# Patient Record
Sex: MALE | Race: WHITE | Employment: OTHER | URBAN - METROPOLITAN AREA
[De-identification: names, ages, dates, MRNs, and addresses within clinical notes are randomized per-mention and may not be internally consistent; named-entity substitution may affect disease eponyms.]

---

## 2019-04-19 ENCOUNTER — APPOINTMENT (OUTPATIENT)
Dept: GENERAL RADIOLOGY | Age: 79
End: 2019-04-19
Payer: MEDICARE

## 2019-04-19 ENCOUNTER — HOSPITAL ENCOUNTER (EMERGENCY)
Age: 79
Discharge: HOME | End: 2019-04-19
Attending: EMERGENCY MEDICINE
Payer: MEDICARE

## 2019-04-19 ENCOUNTER — APPOINTMENT (OUTPATIENT)
Dept: CT IMAGING | Age: 79
End: 2019-04-19
Payer: MEDICARE

## 2019-04-19 VITALS
BODY MASS INDEX: 18.41 KG/M2 | HEART RATE: 63 BPM | SYSTOLIC BLOOD PRESSURE: 140 MMHG | OXYGEN SATURATION: 100 % | TEMPERATURE: 97.7 F | WEIGHT: 132 LBS | RESPIRATION RATE: 16 BRPM | DIASTOLIC BLOOD PRESSURE: 81 MMHG

## 2019-04-19 VITALS
HEART RATE: 60 BPM | RESPIRATION RATE: 18 BRPM | DIASTOLIC BLOOD PRESSURE: 67 MMHG | SYSTOLIC BLOOD PRESSURE: 138 MMHG | TEMPERATURE: 98.4 F | OXYGEN SATURATION: 100 %

## 2019-04-19 DIAGNOSIS — N43.3 RIGHT HYDROCELE: ICD-10-CM

## 2019-04-19 DIAGNOSIS — R10.9 ACUTE ABDOMINAL PAIN: Primary | ICD-10-CM

## 2019-04-19 DIAGNOSIS — R10.84 GENERALIZED ABDOMINAL CRAMPING: Primary | ICD-10-CM

## 2019-04-19 LAB
A/G RATIO: 1.8 (ref 1.1–2.2)
A/G RATIO: 2 (ref 1.1–2.2)
ALBUMIN SERPL-MCNC: 4.3 G/DL (ref 3.4–5)
ALBUMIN SERPL-MCNC: 4.7 G/DL (ref 3.4–5)
ALP BLD-CCNC: 66 U/L (ref 40–129)
ALP BLD-CCNC: 70 U/L (ref 40–129)
ALT SERPL-CCNC: 14 U/L (ref 10–40)
ALT SERPL-CCNC: 15 U/L (ref 10–40)
ANION GAP SERPL CALCULATED.3IONS-SCNC: 9 MMOL/L (ref 3–16)
ANION GAP SERPL CALCULATED.3IONS-SCNC: 9 MMOL/L (ref 3–16)
AST SERPL-CCNC: 17 U/L (ref 15–37)
AST SERPL-CCNC: 19 U/L (ref 15–37)
BASOPHILS ABSOLUTE: 0 K/UL (ref 0–0.2)
BASOPHILS ABSOLUTE: 0.1 K/UL (ref 0–0.2)
BASOPHILS RELATIVE PERCENT: 0.4 %
BASOPHILS RELATIVE PERCENT: 0.6 %
BILIRUB SERPL-MCNC: 0.8 MG/DL (ref 0–1)
BILIRUB SERPL-MCNC: 1.2 MG/DL (ref 0–1)
BILIRUBIN URINE: NEGATIVE
BILIRUBIN URINE: NEGATIVE
BLOOD, URINE: NEGATIVE
BLOOD, URINE: NEGATIVE
BUN BLDV-MCNC: 12 MG/DL (ref 7–20)
BUN BLDV-MCNC: 14 MG/DL (ref 7–20)
CALCIUM SERPL-MCNC: 9.3 MG/DL (ref 8.3–10.6)
CALCIUM SERPL-MCNC: 9.5 MG/DL (ref 8.3–10.6)
CHLORIDE BLD-SCNC: 96 MMOL/L (ref 99–110)
CHLORIDE BLD-SCNC: 99 MMOL/L (ref 99–110)
CLARITY: CLEAR
CLARITY: CLEAR
CO2: 27 MMOL/L (ref 21–32)
CO2: 28 MMOL/L (ref 21–32)
COLOR: YELLOW
COLOR: YELLOW
CREAT SERPL-MCNC: 0.5 MG/DL (ref 0.8–1.3)
CREAT SERPL-MCNC: 0.7 MG/DL (ref 0.8–1.3)
EOSINOPHILS ABSOLUTE: 0 K/UL (ref 0–0.6)
EOSINOPHILS ABSOLUTE: 0.1 K/UL (ref 0–0.6)
EOSINOPHILS RELATIVE PERCENT: 0.1 %
EOSINOPHILS RELATIVE PERCENT: 1.2 %
GFR AFRICAN AMERICAN: >60
GFR AFRICAN AMERICAN: >60
GFR NON-AFRICAN AMERICAN: >60
GFR NON-AFRICAN AMERICAN: >60
GLOBULIN: 2.3 G/DL
GLOBULIN: 2.4 G/DL
GLUCOSE BLD-MCNC: 116 MG/DL (ref 70–99)
GLUCOSE BLD-MCNC: 148 MG/DL (ref 70–99)
GLUCOSE URINE: NEGATIVE MG/DL
GLUCOSE URINE: NEGATIVE MG/DL
HCT VFR BLD CALC: 39.8 % (ref 40.5–52.5)
HCT VFR BLD CALC: 42.7 % (ref 40.5–52.5)
HEMOGLOBIN: 13.2 G/DL (ref 13.5–17.5)
HEMOGLOBIN: 14 G/DL (ref 13.5–17.5)
KETONES, URINE: ABNORMAL MG/DL
KETONES, URINE: NEGATIVE MG/DL
LEUKOCYTE ESTERASE, URINE: NEGATIVE
LEUKOCYTE ESTERASE, URINE: NEGATIVE
LIPASE: 101 U/L (ref 13–60)
LIPASE: 23 U/L (ref 13–60)
LYMPHOCYTES ABSOLUTE: 0.3 K/UL (ref 1–5.1)
LYMPHOCYTES ABSOLUTE: 0.6 K/UL (ref 1–5.1)
LYMPHOCYTES RELATIVE PERCENT: 4.8 %
LYMPHOCYTES RELATIVE PERCENT: 5.6 %
MCH RBC QN AUTO: 32 PG (ref 26–34)
MCH RBC QN AUTO: 32.2 PG (ref 26–34)
MCHC RBC AUTO-ENTMCNC: 32.9 G/DL (ref 31–36)
MCHC RBC AUTO-ENTMCNC: 33.2 G/DL (ref 31–36)
MCV RBC AUTO: 97.1 FL (ref 80–100)
MCV RBC AUTO: 97.4 FL (ref 80–100)
MICROSCOPIC EXAMINATION: ABNORMAL
MICROSCOPIC EXAMINATION: NORMAL
MONOCYTES ABSOLUTE: 0.4 K/UL (ref 0–1.3)
MONOCYTES ABSOLUTE: 0.5 K/UL (ref 0–1.3)
MONOCYTES RELATIVE PERCENT: 4.5 %
MONOCYTES RELATIVE PERCENT: 5 %
NEUTROPHILS ABSOLUTE: 6.4 K/UL (ref 1.7–7.7)
NEUTROPHILS ABSOLUTE: 8.9 K/UL (ref 1.7–7.7)
NEUTROPHILS RELATIVE PERCENT: 88.6 %
NEUTROPHILS RELATIVE PERCENT: 89.2 %
NITRITE, URINE: NEGATIVE
NITRITE, URINE: NEGATIVE
PDW BLD-RTO: 13.5 % (ref 12.4–15.4)
PDW BLD-RTO: 13.6 % (ref 12.4–15.4)
PH UA: 5.5 (ref 5–8)
PH UA: 7 (ref 5–8)
PLATELET # BLD: 217 K/UL (ref 135–450)
PLATELET # BLD: 228 K/UL (ref 135–450)
PMV BLD AUTO: 6.6 FL (ref 5–10.5)
PMV BLD AUTO: 6.7 FL (ref 5–10.5)
POTASSIUM SERPL-SCNC: 4.4 MMOL/L (ref 3.5–5.1)
POTASSIUM SERPL-SCNC: 4.7 MMOL/L (ref 3.5–5.1)
PROTEIN UA: NEGATIVE MG/DL
PROTEIN UA: NEGATIVE MG/DL
RBC # BLD: 4.1 M/UL (ref 4.2–5.9)
RBC # BLD: 4.39 M/UL (ref 4.2–5.9)
SODIUM BLD-SCNC: 132 MMOL/L (ref 136–145)
SODIUM BLD-SCNC: 136 MMOL/L (ref 136–145)
SPECIFIC GRAVITY UA: >1.03 (ref 1–1.03)
SPECIFIC GRAVITY UA: >1.03 (ref 1–1.03)
TOTAL PROTEIN: 6.7 G/DL (ref 6.4–8.2)
TOTAL PROTEIN: 7 G/DL (ref 6.4–8.2)
URINE REFLEX TO CULTURE: ABNORMAL
URINE REFLEX TO CULTURE: NORMAL
URINE TYPE: ABNORMAL
URINE TYPE: NORMAL
UROBILINOGEN, URINE: 0.2 E.U./DL
UROBILINOGEN, URINE: 1 E.U./DL
WBC # BLD: 7.2 K/UL (ref 4–11)
WBC # BLD: 9.9 K/UL (ref 4–11)

## 2019-04-19 PROCEDURE — 83690 ASSAY OF LIPASE: CPT

## 2019-04-19 PROCEDURE — 81003 URINALYSIS AUTO W/O SCOPE: CPT

## 2019-04-19 PROCEDURE — 74177 CT ABD & PELVIS W/CONTRAST: CPT

## 2019-04-19 PROCEDURE — 80053 COMPREHEN METABOLIC PANEL: CPT

## 2019-04-19 PROCEDURE — 6360000004 HC RX CONTRAST MEDICATION: Performed by: EMERGENCY MEDICINE

## 2019-04-19 PROCEDURE — 74019 RADEX ABDOMEN 2 VIEWS: CPT

## 2019-04-19 PROCEDURE — 85025 COMPLETE CBC W/AUTO DIFF WBC: CPT

## 2019-04-19 PROCEDURE — 99284 EMERGENCY DEPT VISIT MOD MDM: CPT

## 2019-04-19 RX ORDER — MORPHINE SULFATE 4 MG/ML
4 INJECTION, SOLUTION INTRAMUSCULAR; INTRAVENOUS ONCE
Status: DISCONTINUED | OUTPATIENT
Start: 2019-04-19 | End: 2019-04-19 | Stop reason: HOSPADM

## 2019-04-19 RX ORDER — ONDANSETRON 2 MG/ML
4 INJECTION INTRAMUSCULAR; INTRAVENOUS ONCE
Status: DISCONTINUED | OUTPATIENT
Start: 2019-04-19 | End: 2019-04-19 | Stop reason: HOSPADM

## 2019-04-19 RX ORDER — IBUPROFEN 200 MG
200 TABLET ORAL EVERY 6 HOURS PRN
Status: ON HOLD | COMMUNITY
End: 2022-01-13 | Stop reason: ALTCHOICE

## 2019-04-19 RX ORDER — LATANOPROST 50 UG/ML
1 SOLUTION/ DROPS OPHTHALMIC NIGHTLY
COMMUNITY

## 2019-04-19 RX ORDER — DORZOLAMIDE HCL 20 MG/ML
2 SOLUTION/ DROPS OPHTHALMIC 3 TIMES DAILY
COMMUNITY

## 2019-04-19 RX ORDER — DICYCLOMINE HYDROCHLORIDE 10 MG/1
10 CAPSULE ORAL EVERY 6 HOURS PRN
Qty: 20 CAPSULE | Refills: 0 | Status: SHIPPED | OUTPATIENT
Start: 2019-04-19 | End: 2021-03-09 | Stop reason: ALTCHOICE

## 2019-04-19 RX ORDER — FINASTERIDE 5 MG/1
5 TABLET, FILM COATED ORAL DAILY
COMMUNITY

## 2019-04-19 RX ORDER — TIMOLOL MALEATE 6.8 MG/ML
1 SOLUTION/ DROPS OPHTHALMIC DAILY
COMMUNITY

## 2019-04-19 RX ADMIN — IOPAMIDOL 75 ML: 755 INJECTION, SOLUTION INTRAVENOUS at 11:41

## 2019-04-19 ASSESSMENT — ENCOUNTER SYMPTOMS
RECTAL PAIN: 0
BACK PAIN: 0
ABDOMINAL DISTENTION: 1
WHEEZING: 0
NAUSEA: 0
VOMITING: 0
BLOOD IN STOOL: 0
CONSTIPATION: 1
SHORTNESS OF BREATH: 0
STRIDOR: 0
ANAL BLEEDING: 0
ALLERGIC/IMMUNOLOGIC NEGATIVE: 1
ABDOMINAL PAIN: 1
COLOR CHANGE: 0
COUGH: 0
DIARRHEA: 0

## 2019-04-19 ASSESSMENT — PAIN SCALES - GENERAL
PAINLEVEL_OUTOF10: 2
PAINLEVEL_OUTOF10: 10
PAINLEVEL_OUTOF10: 0

## 2019-04-19 ASSESSMENT — PAIN DESCRIPTION - LOCATION
LOCATION: ABDOMEN
LOCATION: ABDOMEN

## 2019-04-19 ASSESSMENT — PAIN DESCRIPTION - PAIN TYPE: TYPE: ACUTE PAIN

## 2019-04-19 NOTE — ED PROVIDER NOTES
[facility] Emergency Department    CHIEF COMPLAINT  Chief Complaint   Patient presents with    Abdominal Pain     Pt was seen this AM in the ED, went home and was having a BM and stomach started hurting again so pt is back      HISTORY OF Manasa 57 is a 70 y.o. male  who presents to the ED complaining of abdominal cramping pain. He reports the pain worsened after he successfully had a bowel movement. He has minimal pain and having a lot of flatus. No nausea or vomiting. No blood on his stool. No diarrhea. He says is it mostly lower abdominal cramping. Denies dysuria or hematuria or retention. He took a laxative last night due to thinking he was constipated. No fevers. No CP cough or SOB. This morning's workup included a nonacute CTAP. Discharged with Bentyl which he took and says it helped. No other complaints, modifying factors or associated symptoms. I have reviewed the following from the nursing documentation. Past Medical History:   Diagnosis Date    Glaucoma     Prostate enlargement      History reviewed. No pertinent surgical history. History reviewed. No pertinent family history.   Social History     Socioeconomic History    Marital status:      Spouse name: Not on file    Number of children: Not on file    Years of education: Not on file    Highest education level: Not on file   Occupational History    Not on file   Social Needs    Financial resource strain: Not on file    Food insecurity:     Worry: Not on file     Inability: Not on file    Transportation needs:     Medical: Not on file     Non-medical: Not on file   Tobacco Use    Smoking status: Never Smoker    Smokeless tobacco: Never Used   Substance and Sexual Activity    Alcohol use: No    Drug use: No    Sexual activity: Not on file   Lifestyle    Physical activity:     Days per week: Not on file     Minutes per session: Not on file    Stress: Not on file Relationships    Social connections:     Talks on phone: Not on file     Gets together: Not on file     Attends Muslim service: Not on file     Active member of club or organization: Not on file     Attends meetings of clubs or organizations: Not on file     Relationship status: Not on file    Intimate partner violence:     Fear of current or ex partner: Not on file     Emotionally abused: Not on file     Physically abused: Not on file     Forced sexual activity: Not on file   Other Topics Concern    Not on file   Social History Narrative    Not on file     No current facility-administered medications for this encounter. Current Outpatient Medications   Medication Sig Dispense Refill    Timolol (TIMOPTIC) 0.5 % (DAILY) SOLN ophthalmic solution 1 drop daily      dorzolamide (TRUSOPT) 2 % ophthalmic solution Place 2 drops into both eyes 3 times daily      latanoprost (XALATAN) 0.005 % ophthalmic solution Place 1 drop into both eyes nightly      finasteride (PROSCAR) 5 MG tablet Take 5 mg by mouth daily      ibuprofen (ADVIL;MOTRIN) 200 MG tablet Take 200 mg by mouth every 6 hours as needed for Pain      dicyclomine (BENTYL) 10 MG capsule Take 1 capsule by mouth every 6 hours as needed (cramps) 20 capsule 0    tamsulosin (FLOMAX) 0.4 MG capsule Take 0.4 mg by mouth daily. No Known Allergies    REVIEW OF SYSTEMS  10 systems reviewed, pertinent positives per HPI otherwise noted to be negative. PHYSICAL EXAM  BP (!) 149/79   Pulse 60   Temp 98.4 °F (36.9 °C) (Oral)   Resp 18   SpO2 100%    GENERAL APPEARANCE: Awake and alert. Cooperative. No distress. HENT: Normocephalic. Atraumatic. Mucous membranes are dry. NECK: Supple. EYES: PERRL. EOM's grossly intact. HEART/CHEST: RRR. No murmurs. No chest wall tenderness. LUNGS: Respirations unlabored. CTAB. Good air exchange. Speaking comfortably in full sentences. ABDOMEN: Mild diffuse nonfocal tenderness. Soft. Non-distended.  No Lipase 23.0 13.0 - 60.0 U/L   Urine, reflex to culture   Result Value Ref Range    Color, UA YELLOW Straw/Yellow    Clarity, UA Clear Clear    Glucose, Ur Negative Negative mg/dL    Bilirubin Urine Negative Negative    Ketones, Urine TRACE (A) Negative mg/dL    Specific Gravity, UA >1.030 1.005 - 1.030    Blood, Urine Negative Negative    pH, UA 5.5 5.0 - 8.0    Protein, UA Negative Negative mg/dL    Urobilinogen, Urine 0.2 <2.0 E.U./dL    Nitrite, Urine Negative Negative    Leukocyte Esterase, Urine Negative Negative    Microscopic Examination Not Indicated     Urine Reflex to Culture Not Indicated     Urine Type Not Specified      RADIOLOGY    Ct Abdomen Pelvis W Iv Contrast Additional Contrast? None    Result Date: 4/19/2019  EXAMINATION: CT OF THE ABDOMEN AND PELVIS WITH CONTRAST 4/19/2019 11:40 am TECHNIQUE: CT of the abdomen and pelvis was performed with the administration of intravenous contrast. Multiplanar reformatted images are provided for review. Dose modulation, iterative reconstruction, and/or weight based adjustment of the mA/kV was utilized to reduce the radiation dose to as low as reasonably achievable. COMPARISON: None. HISTORY: ORDERING SYSTEM PROVIDED HISTORY: abd pain TECHNOLOGIST PROVIDED HISTORY: Additional Contrast?->None Ordering Physician Provided Reason for Exam: abd pain Acuity: Unknown Type of Exam: Unknown FINDINGS: Lower Chest: Unremarkable Organs: 5 mm low-attenuation lesion in the dome of the right hepatic lobe which is too small to accurately characterize. Gallbladder, pancreas, and spleen are unremarkable. 1.4 x 0.9 cm cyst in the medial right hepatic lobe. GI/Bowel: No bowel obstruction. Appendix is not visualized however no findings suggest acute appendicitis. Pelvis: No bladder stone. No pelvic fluid collection. No adenopathy. Small left inguinal hernia containing nonobstructed bowel. Right hydrocele. Peritoneum/Retroperitoneum: No abdominal aortic aneurysm.   Adrenal glands unremarkable. Parapelvic cysts bilaterally. Bones/Soft Tissues: No acute bony abnormality. Moderate to severe disc height loss at L5-S1. No acute findings. Right hydrocele. Xr Abdomen (2 Views)    Result Date: 4/19/2019  EXAMINATION: TWO XRAY VIEWS OF THE ABDOMEN 4/19/2019 5:23 pm COMPARISON: CT abdomen and pelvis of 04/19/2019. HISTORY: ORDERING SYSTEM PROVIDED HISTORY: abd pain TECHNOLOGIST PROVIDED HISTORY: Reason for exam:->abd pain Ordering Physician Provided Reason for Exam: Pt was seen this AM in the ED, went home and was having a BM and stomach started hurting again so pt is back Acuity: Acute Type of Exam: Unknown Acute abnormality. Follow-up study. FINDINGS: Moderate amount of bowel gas is present predominantly within the large bowel. Slight distension of a few mid abdominal small bowel loops present. No air-fluid level. Inferior lung bases are clear. No worrisome abnormal calcification. Excreted iodinated contrast is seen filling the bladder. No acute abdominal abnormality. Moderate amount of bowel gas present. ED COURSE/MDM  Patient seen and evaluated. Old records reviewed. Labs and imaging reviewed and results discussed with patient. After initial evaluation, differential diagnostic considerations included: kidney stone, pyelonephritis, UTI, appendicitis, bowel obstruction, diverticulitis, hernia, gastritis/gastroenteritis, pancreatitis, cholecystitis, hepatitis, constipation, IBS, IBD    The patient's ED workup was notable for benign exam.  C/w abdominal spasms from intestines. UA neg. Labs reassuring. Defer repeat CT imaging since it was done this morning but KUB shows moderate bowel gas, c/w his flatus. Has Bentyl already, encouraged to stay hydrated and take his medications he already has. F/u with PCP. No indication for admission at this point or further testing or emergent GI consultation. Return precautions discusse. d    CLINICAL IMPRESSION  1.  Generalized

## 2019-04-19 NOTE — CARE COORDINATION
Discharge Planning:  SW met with pt and dtr to discuss discharge needs. 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 Dtr reported she is pretty much caregiver for pt and pt's spouse. Dtr reported they have a lot of family to help out with their needs. Pt reported he is very active and able to take care of spouse. Pt reported no home or community needs at this time.     Electronically signed by CHERELLE Maria, BERENICE on 4/19/2019 at 1:22 PM

## 2019-04-19 NOTE — ED PROVIDER NOTES
I independently examined and evaluated Osorio Tanner. In brief history of present illness revealed 72-year-old male who presents complaining of severe lower abdominal pain since last night. States had a normal bowel movement 2 days ago. No nausea, vomiting, diarrhea, urinary complaints, fevers or chills. At the time of my examination patient states symptoms have spontaneously resolved. Physical examination revealed well-appearing elderly male who is awake, alert and in no acute distress. Abdomen is soft, nondistended, nontender without rebound or guarding.      I have reviewed and interpreted all of the currently available lab results from this visit:  Results for orders placed or performed during the hospital encounter of 04/19/19   CBC Auto Differential   Result Value Ref Range    WBC 7.2 4.0 - 11.0 K/uL    RBC 4.10 (L) 4.20 - 5.90 M/uL    Hemoglobin 13.2 (L) 13.5 - 17.5 g/dL    Hematocrit 39.8 (L) 40.5 - 52.5 %    MCV 97.1 80.0 - 100.0 fL    MCH 32.2 26.0 - 34.0 pg    MCHC 33.2 31.0 - 36.0 g/dL    RDW 13.5 12.4 - 15.4 %    Platelets 430 699 - 047 K/uL    MPV 6.6 5.0 - 10.5 fL    Neutrophils % 88.6 %    Lymphocytes % 4.8 %    Monocytes % 5.0 %    Eosinophils % 1.2 %    Basophils % 0.4 %    Neutrophils # 6.4 1.7 - 7.7 K/uL    Lymphocytes # 0.3 (L) 1.0 - 5.1 K/uL    Monocytes # 0.4 0.0 - 1.3 K/uL    Eosinophils # 0.1 0.0 - 0.6 K/uL    Basophils # 0.0 0.0 - 0.2 K/uL   Comprehensive Metabolic Panel   Result Value Ref Range    Sodium 136 136 - 145 mmol/L    Potassium 4.7 3.5 - 5.1 mmol/L    Chloride 99 99 - 110 mmol/L    CO2 28 21 - 32 mmol/L    Anion Gap 9 3 - 16    Glucose 148 (H) 70 - 99 mg/dL    BUN 14 7 - 20 mg/dL    CREATININE 0.7 (L) 0.8 - 1.3 mg/dL    GFR Non-African American >60 >60    GFR African American >60 >60    Calcium 9.3 8.3 - 10.6 mg/dL    Total Protein 6.7 6.4 - 8.2 g/dL    Alb 4.3 3.4 - 5.0 g/dL    Albumin/Globulin Ratio 1.8 1.1 - 2.2    Total Bilirubin 0.8 0.0 - 1.0 mg/dL    Alkaline Phosphatase 66 40 - 129 U/L    ALT 15 10 - 40 U/L    AST 17 15 - 37 U/L    Globulin 2.4 g/dL   Lipase   Result Value Ref Range    Lipase 101.0 (H) 13.0 - 60.0 U/L   Urinalysis Reflex to Culture   Result Value Ref Range    Color, UA YELLOW Straw/Yellow    Clarity, UA Clear Clear    Glucose, Ur Negative Negative mg/dL    Bilirubin Urine Negative Negative    Ketones, Urine Negative Negative mg/dL    Specific Gravity, UA >1.030 1.005 - 1.030    Blood, Urine Negative Negative    pH, UA 7.0 5.0 - 8.0    Protein, UA Negative Negative mg/dL    Urobilinogen, Urine 1.0 <2.0 E.U./dL    Nitrite, Urine Negative Negative    Leukocyte Esterase, Urine Negative Negative    Microscopic Examination Not Indicated     Urine Reflex to Culture Not Indicated     Urine Type Not Specified      CT ABDOMEN PELVIS W IV CONTRAST Additional Contrast? None (Final result)   Result time 04/19/19 12:02:51   Final result by Juliet aPthak MD (04/19/19 12:02:51)                Impression:    No acute findings. Right hydrocele. Before disposition, questions were sought and answered with the patient. They have voiced understanding and agree with the plan. All diagnostic, treatment, and disposition decisions were made by myself in conjunction with the advanced practice provider. For all further details of the patient's emergency department visit, please see the advanced practitioner's documentation.        Della Nolasco MD  04/19/19 [id number]

## 2019-04-19 NOTE — ED NOTES
Pt a&o x4. Pt c/o abd pain with BM. Pt was seen and had work up earlier today for same issue. Sts took 1 dose of Bentyl at home. IV established by medic student without complications, blood work obtained by this RN and sent to lab. Pt tolerated well. Pt family at bedside. Pt placed on appropriate monitors and cycling. Pt sitting supine in bed in low position, call light in reach, side rails up x2. Pt showing no signs of distress at this time. Breathing easy and unlabored. Will continue to monitor.         Nila Rodriguez RN  04/19/19 [id number]

## 2019-04-19 NOTE — ED PROVIDER NOTES
[address] [facility]  eMERGENCY dEPARTMENT eNCOUnter        Pt Name: Connie Zavala  MRN: [id number]  Jcgfdesiree 1940  Date of evaluation: 4/19/2019  Provider: Aimee Redd PA-C  PCP: Nighat Louise MD    This patient was seen and evaluated by the attending physician Dr. Tricia Rosales. CHIEF COMPLAINT       Chief Complaint   Patient presents with    Abdominal Pain     started last night; eating/drinking normally; denies vomiting/diarrhea       HISTORY OF PRESENT ILLNESS   (Location/Symptom, Timing/Onset, Context/Setting, Quality, Duration, Modifying Factors, Severity)  Note limiting factors. Connie Zavala is a 70 y.o. male with history of prostate enlargement and glaucoma who presents to the emergency department complaining of low abdominal pain starting last night. He rates his pain to be a 10 out of 10 on pain scale. Denies radiation of symptoms, testicular pain or swelling, penile pain or discharge or acute urinary symptoms. He thought he might have some constipation, therefore took a laxative, however this did not relieve his abdominal discomfort. Denies obstipation. Denies documented fever or chills. Nursing Notes were all reviewed and agreed with or any disagreements were addressed  in the HPI. REVIEW OF SYSTEMS    (2-9 systems for level 4, 10 or more for level 5)     Review of Systems   Constitutional: Negative for chills and fever. HENT: Negative. Eyes: Negative for visual disturbance. Respiratory: Negative for cough, shortness of breath, wheezing and stridor. Cardiovascular: Negative for chest pain, palpitations and leg swelling. Gastrointestinal: Positive for abdominal distention, abdominal pain and constipation. Negative for anal bleeding, blood in stool, diarrhea, nausea, rectal pain and vomiting. Endocrine: Negative. Genitourinary: Negative. Musculoskeletal: Negative for back pain, neck pain and neck stiffness. Skin: Negative for color change, pallor, rash and wound. Allergic/Immunologic: Negative. Neurological: Negative for dizziness, tremors, seizures, syncope, facial asymmetry, speech difficulty, weakness, light-headedness, numbness and headaches. Hematological: Negative. Psychiatric/Behavioral: Negative for confusion. All other systems reviewed and are negative. Positives and Pertinent negatives as per HPI. Except as noted abovein the ROS, all other systems were reviewed and negative. PAST MEDICAL HISTORY     Past Medical History:   Diagnosis Date    Glaucoma     Prostate enlargement          SURGICAL HISTORY   History reviewed. No pertinent surgical history. CURRENTMEDICATIONS       Previous Medications    DORZOLAMIDE (TRUSOPT) 2 % OPHTHALMIC SOLUTION    Place 2 drops into both eyes 3 times daily    FINASTERIDE (PROSCAR) 5 MG TABLET    Take 5 mg by mouth daily    IBUPROFEN (ADVIL;MOTRIN) 200 MG TABLET    Take 200 mg by mouth every 6 hours as needed for Pain    LATANOPROST (XALATAN) 0.005 % OPHTHALMIC SOLUTION    Place 1 drop into both eyes nightly    TAMSULOSIN (FLOMAX) 0.4 MG CAPSULE    Take 0.4 mg by mouth daily. TIMOLOL (TIMOPTIC) 0.5 % (DAILY) SOLN OPHTHALMIC SOLUTION    1 drop daily         ALLERGIES     Patient has no known allergies. FAMILYHISTORY     History reviewed. No pertinent family history.        SOCIAL HISTORY       Social History     Socioeconomic History    Marital status:      Spouse name: None    Number of children: None    Years of education: None    Highest education level: None   Occupational History    None   Social Needs    Financial resource strain: None    Food insecurity:     Worry: None     Inability: None    Transportation needs:     Medical: None     Non-medical: None   Tobacco Use    Smoking status: Never Smoker    Smokeless tobacco: Never Used   Substance and Sexual Activity    Alcohol use: No    Drug use: No    Sexual activity: None   Lifestyle    Physical activity:     Days per week: None     Minutes per session: None    Stress: None   Relationships    Social connections:     Talks on phone: None     Gets together: None     Attends Latter day service: None     Active member of club or organization: None     Attends meetings of clubs or organizations: None     Relationship status: None    Intimate partner violence:     Fear of current or ex partner: None     Emotionally abused: None     Physically abused: None     Forced sexual activity: None   Other Topics Concern    None   Social History Narrative    None       SCREENINGS             PHYSICAL EXAM    (up to 7 for level 4, 8 or more for level 5)     ED Triage Vitals [04/19/19 0948]   BP Temp Temp Source Pulse Resp SpO2 Height Weight   (!) 150/98 97.7 °F (36.5 °C) Temporal 51 18 100 % -- 132 lb (59.9 kg)       Physical Exam   Constitutional: He is oriented to person, place, and time. He appears well-developed and well-nourished. No distress. HENT:   Head: Normocephalic and atraumatic. Right Ear: External ear normal.   Left Ear: External ear normal.   Nose: Nose normal.   Mouth/Throat: Oropharynx is clear and moist.   Eyes: Pupils are equal, round, and reactive to light. Conjunctivae and EOM are normal. Right eye exhibits no discharge. Left eye exhibits no discharge. No scleral icterus. Neck: Normal range of motion. No JVD present. Cardiovascular: Regular rhythm. Bradycardia present. Pulmonary/Chest: Effort normal and breath sounds normal.   Abdominal: Soft. Bowel sounds are normal. He exhibits distension (minimal low abdomen). He exhibits no abdominal bruit, no ascites and no pulsatile midline mass. There is tenderness in the periumbilical area, suprapubic area and left lower quadrant. There is no rigidity, no rebound, no guarding, no CVA tenderness, no tenderness at McBurney's point and negative Nair's sign. Musculoskeletal: Normal range of motion. Lymphadenopathy:     He has no cervical adenopathy. Neurological: He is alert and oriented to person, place, and time. Skin: Skin is warm and dry. Capillary refill takes less than 2 seconds. No rash noted. He is not diaphoretic. No erythema. No pallor. Psychiatric: He has a normal mood and affect. His behavior is normal.   Nursing note and vitals reviewed. Pain resolved spontaneously before medication was even given. Repeat exam prior to discharge reveals a soft and nondistended abdomen. Patient admits that he passed some gas and got a bunch of relief of his abdominal discomfort. DIAGNOSTIC RESULTS   LABS:    Labs Reviewed   CBC WITH AUTO DIFFERENTIAL - Abnormal; Notable for the following components:       Result Value    RBC 4.10 (*)     Hemoglobin 13.2 (*)     Hematocrit 39.8 (*)     Lymphocytes # 0.3 (*)     All other components within normal limits    Narrative:     Performed at:  [facility] [address], [place]   Phone [phone]   COMPREHENSIVE METABOLIC PANEL - Abnormal; Notable for the following components:    Glucose 148 (*)     CREATININE 0.7 (*)     All other components within normal limits    Narrative:     Performed at:  [facility] [address], [place] [facility] [employer]   Phone [phone]   LIPASE - Abnormal; Notable for the following components:    Lipase 101.0 (*)     All other components within normal limits    Narrative:     Performed at:  [facility] [address], [place]   Phone [phone]   URINE RT REFLEX TO CULTURE    Narrative:     Performed at:  [facility] [address], [place]   Phone [phone]       All other labs were within normal range or not returned as of this dictation. EKG:  All EKG's are interpreted by the Emergency Department Physician who either signs orCo-signs

## 2021-03-09 ENCOUNTER — APPOINTMENT (OUTPATIENT)
Dept: GENERAL RADIOLOGY | Age: 81
DRG: 603 | End: 2021-03-09
Payer: MEDICARE

## 2021-03-09 ENCOUNTER — HOSPITAL ENCOUNTER (INPATIENT)
Age: 81
LOS: 3 days | Discharge: HOME HEALTH | DRG: 603 | End: 2021-03-12
Attending: EMERGENCY MEDICINE | Admitting: INTERNAL MEDICINE
Payer: MEDICARE

## 2021-03-09 DIAGNOSIS — M79.89 LEG SWELLING: Primary | ICD-10-CM

## 2021-03-09 DIAGNOSIS — L03.115 CELLULITIS OF RIGHT LOWER EXTREMITY: ICD-10-CM

## 2021-03-09 PROBLEM — L03.119 CELLULITIS OF LOWER LEG: Status: ACTIVE | Noted: 2021-03-09

## 2021-03-09 PROBLEM — D64.9 ANEMIA: Status: ACTIVE | Noted: 2021-03-09

## 2021-03-09 PROBLEM — I10 HTN (HYPERTENSION): Status: ACTIVE | Noted: 2021-03-09

## 2021-03-09 PROBLEM — L03.119 CELLULITIS OF ANTERIOR LOWER LEG: Status: ACTIVE | Noted: 2021-03-09

## 2021-03-09 PROBLEM — N40.0 BPH (BENIGN PROSTATIC HYPERPLASIA): Status: ACTIVE | Noted: 2021-03-09

## 2021-03-09 LAB
A/G RATIO: 1.6 (ref 1.1–2.2)
ALBUMIN SERPL-MCNC: 4 G/DL (ref 3.4–5)
ALP BLD-CCNC: 83 U/L (ref 40–129)
ALT SERPL-CCNC: 18 U/L (ref 10–40)
ANION GAP SERPL CALCULATED.3IONS-SCNC: 6 MMOL/L (ref 3–16)
AST SERPL-CCNC: 22 U/L (ref 15–37)
BASOPHILS ABSOLUTE: 0.1 K/UL (ref 0–0.2)
BASOPHILS RELATIVE PERCENT: 1.1 %
BILIRUB SERPL-MCNC: 0.5 MG/DL (ref 0–1)
BILIRUBIN URINE: NEGATIVE
BLOOD, URINE: NEGATIVE
BUN BLDV-MCNC: 13 MG/DL (ref 7–20)
CALCIUM SERPL-MCNC: 9.5 MG/DL (ref 8.3–10.6)
CHLORIDE BLD-SCNC: 98 MMOL/L (ref 99–110)
CLARITY: CLEAR
CO2: 29 MMOL/L (ref 21–32)
COLOR: YELLOW
CREAT SERPL-MCNC: 0.6 MG/DL (ref 0.8–1.3)
EOSINOPHILS ABSOLUTE: 0.6 K/UL (ref 0–0.6)
EOSINOPHILS RELATIVE PERCENT: 10.4 %
GFR AFRICAN AMERICAN: >60
GFR NON-AFRICAN AMERICAN: >60
GLOBULIN: 2.5 G/DL
GLUCOSE BLD-MCNC: 90 MG/DL (ref 70–99)
GLUCOSE URINE: NEGATIVE MG/DL
HCT VFR BLD CALC: 36.9 % (ref 40.5–52.5)
HEMOGLOBIN: 12 G/DL (ref 13.5–17.5)
KETONES, URINE: NEGATIVE MG/DL
LACTIC ACID, SEPSIS: 1 MMOL/L (ref 0.4–1.9)
LEUKOCYTE ESTERASE, URINE: NEGATIVE
LYMPHOCYTES ABSOLUTE: 0.8 K/UL (ref 1–5.1)
LYMPHOCYTES RELATIVE PERCENT: 13.3 %
MCH RBC QN AUTO: 32.2 PG (ref 26–34)
MCHC RBC AUTO-ENTMCNC: 32.6 G/DL (ref 31–36)
MCV RBC AUTO: 98.7 FL (ref 80–100)
MICROSCOPIC EXAMINATION: NORMAL
MONOCYTES ABSOLUTE: 0.5 K/UL (ref 0–1.3)
MONOCYTES RELATIVE PERCENT: 8.9 %
NEUTROPHILS ABSOLUTE: 3.8 K/UL (ref 1.7–7.7)
NEUTROPHILS RELATIVE PERCENT: 66.3 %
NITRITE, URINE: NEGATIVE
PDW BLD-RTO: 13.6 % (ref 12.4–15.4)
PH UA: 8 (ref 5–8)
PLATELET # BLD: 214 K/UL (ref 135–450)
PMV BLD AUTO: 6.2 FL (ref 5–10.5)
POTASSIUM REFLEX MAGNESIUM: 4.9 MMOL/L (ref 3.5–5.1)
PRO-BNP: 222 PG/ML (ref 0–449)
PROTEIN UA: NEGATIVE MG/DL
RBC # BLD: 3.74 M/UL (ref 4.2–5.9)
SODIUM BLD-SCNC: 133 MMOL/L (ref 136–145)
SPECIFIC GRAVITY UA: 1.01 (ref 1–1.03)
TOTAL PROTEIN: 6.5 G/DL (ref 6.4–8.2)
URINE REFLEX TO CULTURE: NORMAL
URINE TYPE: NORMAL
UROBILINOGEN, URINE: 0.2 E.U./DL
WBC # BLD: 5.8 K/UL (ref 4–11)

## 2021-03-09 PROCEDURE — 96374 THER/PROPH/DIAG INJ IV PUSH: CPT

## 2021-03-09 PROCEDURE — 85025 COMPLETE CBC W/AUTO DIFF WBC: CPT

## 2021-03-09 PROCEDURE — 1200000000 HC SEMI PRIVATE

## 2021-03-09 PROCEDURE — 2580000003 HC RX 258: Performed by: INTERNAL MEDICINE

## 2021-03-09 PROCEDURE — 80053 COMPREHEN METABOLIC PANEL: CPT

## 2021-03-09 PROCEDURE — 83880 ASSAY OF NATRIURETIC PEPTIDE: CPT

## 2021-03-09 PROCEDURE — 94760 N-INVAS EAR/PLS OXIMETRY 1: CPT

## 2021-03-09 PROCEDURE — 81003 URINALYSIS AUTO W/O SCOPE: CPT

## 2021-03-09 PROCEDURE — 6360000002 HC RX W HCPCS: Performed by: PHYSICIAN ASSISTANT

## 2021-03-09 PROCEDURE — 83605 ASSAY OF LACTIC ACID: CPT

## 2021-03-09 PROCEDURE — 2580000003 HC RX 258: Performed by: PHYSICIAN ASSISTANT

## 2021-03-09 PROCEDURE — 71045 X-RAY EXAM CHEST 1 VIEW: CPT

## 2021-03-09 PROCEDURE — 99283 EMERGENCY DEPT VISIT LOW MDM: CPT

## 2021-03-09 RX ORDER — 0.9 % SODIUM CHLORIDE 0.9 %
500 INTRAVENOUS SOLUTION INTRAVENOUS PRN
Status: DISCONTINUED | OUTPATIENT
Start: 2021-03-09 | End: 2021-03-12 | Stop reason: HOSPADM

## 2021-03-09 RX ORDER — TAMSULOSIN HYDROCHLORIDE 0.4 MG/1
0.4 CAPSULE ORAL DAILY
Status: DISCONTINUED | OUTPATIENT
Start: 2021-03-10 | End: 2021-03-12 | Stop reason: HOSPADM

## 2021-03-09 RX ORDER — FINASTERIDE 5 MG/1
5 TABLET, FILM COATED ORAL DAILY
Status: DISCONTINUED | OUTPATIENT
Start: 2021-03-10 | End: 2021-03-12 | Stop reason: HOSPADM

## 2021-03-09 RX ORDER — TIMOLOL MALEATE 5 MG/ML
1 SOLUTION/ DROPS OPHTHALMIC DAILY
Status: DISCONTINUED | OUTPATIENT
Start: 2021-03-10 | End: 2021-03-12 | Stop reason: HOSPADM

## 2021-03-09 RX ORDER — POTASSIUM CHLORIDE 20 MEQ/1
40 TABLET, EXTENDED RELEASE ORAL PRN
Status: DISCONTINUED | OUTPATIENT
Start: 2021-03-09 | End: 2021-03-09 | Stop reason: SDUPTHER

## 2021-03-09 RX ORDER — LATANOPROST 50 UG/ML
1 SOLUTION/ DROPS OPHTHALMIC NIGHTLY
Status: DISCONTINUED | OUTPATIENT
Start: 2021-03-09 | End: 2021-03-12 | Stop reason: HOSPADM

## 2021-03-09 RX ORDER — POTASSIUM CHLORIDE 7.45 MG/ML
10 INJECTION INTRAVENOUS PRN
Status: DISCONTINUED | OUTPATIENT
Start: 2021-03-09 | End: 2021-03-12 | Stop reason: HOSPADM

## 2021-03-09 RX ORDER — ONDANSETRON 2 MG/ML
4 INJECTION INTRAMUSCULAR; INTRAVENOUS EVERY 6 HOURS PRN
Status: DISCONTINUED | OUTPATIENT
Start: 2021-03-09 | End: 2021-03-12 | Stop reason: HOSPADM

## 2021-03-09 RX ORDER — SODIUM CHLORIDE 9 MG/ML
INJECTION, SOLUTION INTRAVENOUS CONTINUOUS
Status: DISCONTINUED | OUTPATIENT
Start: 2021-03-09 | End: 2021-03-11

## 2021-03-09 RX ORDER — POTASSIUM CHLORIDE 20 MEQ/1
40 TABLET, EXTENDED RELEASE ORAL PRN
Status: DISCONTINUED | OUTPATIENT
Start: 2021-03-09 | End: 2021-03-12 | Stop reason: HOSPADM

## 2021-03-09 RX ORDER — ACETAMINOPHEN 650 MG/1
650 SUPPOSITORY RECTAL EVERY 6 HOURS PRN
Status: DISCONTINUED | OUTPATIENT
Start: 2021-03-09 | End: 2021-03-12 | Stop reason: HOSPADM

## 2021-03-09 RX ORDER — SODIUM CHLORIDE 0.9 % (FLUSH) 0.9 %
10 SYRINGE (ML) INJECTION EVERY 12 HOURS SCHEDULED
Status: DISCONTINUED | OUTPATIENT
Start: 2021-03-09 | End: 2021-03-12 | Stop reason: HOSPADM

## 2021-03-09 RX ORDER — DORZOLAMIDE HCL 20 MG/ML
2 SOLUTION/ DROPS OPHTHALMIC 3 TIMES DAILY
Status: DISCONTINUED | OUTPATIENT
Start: 2021-03-09 | End: 2021-03-12 | Stop reason: HOSPADM

## 2021-03-09 RX ORDER — HYDRALAZINE HYDROCHLORIDE 20 MG/ML
10 INJECTION INTRAMUSCULAR; INTRAVENOUS EVERY 6 HOURS PRN
Status: DISCONTINUED | OUTPATIENT
Start: 2021-03-09 | End: 2021-03-12 | Stop reason: HOSPADM

## 2021-03-09 RX ORDER — PROMETHAZINE HYDROCHLORIDE 25 MG/1
12.5 TABLET ORAL EVERY 6 HOURS PRN
Status: DISCONTINUED | OUTPATIENT
Start: 2021-03-09 | End: 2021-03-12 | Stop reason: HOSPADM

## 2021-03-09 RX ORDER — POTASSIUM CHLORIDE 7.45 MG/ML
10 INJECTION INTRAVENOUS PRN
Status: DISCONTINUED | OUTPATIENT
Start: 2021-03-09 | End: 2021-03-09 | Stop reason: SDUPTHER

## 2021-03-09 RX ORDER — ACETAMINOPHEN 325 MG/1
650 TABLET ORAL EVERY 6 HOURS PRN
Status: DISCONTINUED | OUTPATIENT
Start: 2021-03-09 | End: 2021-03-12 | Stop reason: HOSPADM

## 2021-03-09 RX ORDER — SODIUM CHLORIDE 0.9 % (FLUSH) 0.9 %
10 SYRINGE (ML) INJECTION PRN
Status: DISCONTINUED | OUTPATIENT
Start: 2021-03-09 | End: 2021-03-12 | Stop reason: HOSPADM

## 2021-03-09 RX ADMIN — SODIUM CHLORIDE: 9 INJECTION, SOLUTION INTRAVENOUS at 23:31

## 2021-03-09 RX ADMIN — CEFEPIME HYDROCHLORIDE 2000 MG: 2 INJECTION, POWDER, FOR SOLUTION INTRAVENOUS at 21:11

## 2021-03-09 RX ADMIN — Medication 10 ML: at 23:30

## 2021-03-09 ASSESSMENT — ENCOUNTER SYMPTOMS
SHORTNESS OF BREATH: 0
NAUSEA: 0
DIARRHEA: 0
VOMITING: 0
ABDOMINAL PAIN: 0

## 2021-03-10 LAB
A/G RATIO: 1.5 (ref 1.1–2.2)
ALBUMIN SERPL-MCNC: 3.5 G/DL (ref 3.4–5)
ALP BLD-CCNC: 76 U/L (ref 40–129)
ALT SERPL-CCNC: 16 U/L (ref 10–40)
ANION GAP SERPL CALCULATED.3IONS-SCNC: 6 MMOL/L (ref 3–16)
AST SERPL-CCNC: 20 U/L (ref 15–37)
BASOPHILS ABSOLUTE: 0 K/UL (ref 0–0.2)
BASOPHILS RELATIVE PERCENT: 0.9 %
BILIRUB SERPL-MCNC: 0.8 MG/DL (ref 0–1)
BUN BLDV-MCNC: 10 MG/DL (ref 7–20)
CALCIUM SERPL-MCNC: 9.3 MG/DL (ref 8.3–10.6)
CHLORIDE BLD-SCNC: 102 MMOL/L (ref 99–110)
CO2: 28 MMOL/L (ref 21–32)
CREAT SERPL-MCNC: <0.5 MG/DL (ref 0.8–1.3)
EOSINOPHILS ABSOLUTE: 0.6 K/UL (ref 0–0.6)
EOSINOPHILS RELATIVE PERCENT: 12.6 %
GFR AFRICAN AMERICAN: >60
GFR NON-AFRICAN AMERICAN: >60
GLOBULIN: 2.4 G/DL
GLUCOSE BLD-MCNC: 91 MG/DL (ref 70–99)
HCT VFR BLD CALC: 36.2 % (ref 40.5–52.5)
HEMOGLOBIN: 11.9 G/DL (ref 13.5–17.5)
LYMPHOCYTES ABSOLUTE: 1.1 K/UL (ref 1–5.1)
LYMPHOCYTES RELATIVE PERCENT: 22.6 %
MCH RBC QN AUTO: 32.5 PG (ref 26–34)
MCHC RBC AUTO-ENTMCNC: 33 G/DL (ref 31–36)
MCV RBC AUTO: 98.5 FL (ref 80–100)
MONOCYTES ABSOLUTE: 0.6 K/UL (ref 0–1.3)
MONOCYTES RELATIVE PERCENT: 11.8 %
NEUTROPHILS ABSOLUTE: 2.6 K/UL (ref 1.7–7.7)
NEUTROPHILS RELATIVE PERCENT: 52.1 %
PDW BLD-RTO: 13.6 % (ref 12.4–15.4)
PLATELET # BLD: 209 K/UL (ref 135–450)
PMV BLD AUTO: 6.2 FL (ref 5–10.5)
POTASSIUM REFLEX MAGNESIUM: 4.3 MMOL/L (ref 3.5–5.1)
PRO-BNP: 233 PG/ML (ref 0–449)
RBC # BLD: 3.67 M/UL (ref 4.2–5.9)
SODIUM BLD-SCNC: 136 MMOL/L (ref 136–145)
TOTAL PROTEIN: 5.9 G/DL (ref 6.4–8.2)
WBC # BLD: 5 K/UL (ref 4–11)

## 2021-03-10 PROCEDURE — 80053 COMPREHEN METABOLIC PANEL: CPT

## 2021-03-10 PROCEDURE — 97165 OT EVAL LOW COMPLEX 30 MIN: CPT

## 2021-03-10 PROCEDURE — 85025 COMPLETE CBC W/AUTO DIFF WBC: CPT

## 2021-03-10 PROCEDURE — 97530 THERAPEUTIC ACTIVITIES: CPT

## 2021-03-10 PROCEDURE — 1200000000 HC SEMI PRIVATE

## 2021-03-10 PROCEDURE — 2580000003 HC RX 258: Performed by: INTERNAL MEDICINE

## 2021-03-10 PROCEDURE — 94760 N-INVAS EAR/PLS OXIMETRY 1: CPT

## 2021-03-10 PROCEDURE — 6360000002 HC RX W HCPCS: Performed by: INTERNAL MEDICINE

## 2021-03-10 PROCEDURE — 97116 GAIT TRAINING THERAPY: CPT

## 2021-03-10 PROCEDURE — 6370000000 HC RX 637 (ALT 250 FOR IP): Performed by: INTERNAL MEDICINE

## 2021-03-10 PROCEDURE — 97161 PT EVAL LOW COMPLEX 20 MIN: CPT

## 2021-03-10 RX ORDER — DIPHENHYDRAMINE HYDROCHLORIDE 50 MG/ML
25 INJECTION INTRAMUSCULAR; INTRAVENOUS EVERY 6 HOURS PRN
Status: DISCONTINUED | OUTPATIENT
Start: 2021-03-10 | End: 2021-03-12 | Stop reason: HOSPADM

## 2021-03-10 RX ADMIN — TAMSULOSIN HYDROCHLORIDE 0.4 MG: 0.4 CAPSULE ORAL at 10:27

## 2021-03-10 RX ADMIN — SODIUM CHLORIDE: 9 INJECTION, SOLUTION INTRAVENOUS at 22:58

## 2021-03-10 RX ADMIN — DIPHENHYDRAMINE HYDROCHLORIDE 25 MG: 50 INJECTION, SOLUTION INTRAMUSCULAR; INTRAVENOUS at 12:29

## 2021-03-10 RX ADMIN — ENOXAPARIN SODIUM 40 MG: 40 INJECTION SUBCUTANEOUS at 10:27

## 2021-03-10 RX ADMIN — SODIUM CHLORIDE: 9 INJECTION, SOLUTION INTRAVENOUS at 10:34

## 2021-03-10 RX ADMIN — DORZOLAMIDE HYDROCHLORIDE 2 DROP: 20 SOLUTION/ DROPS OPHTHALMIC at 22:12

## 2021-03-10 RX ADMIN — CEFEPIME HYDROCHLORIDE 2000 MG: 2 INJECTION, POWDER, FOR SOLUTION INTRAVENOUS at 22:10

## 2021-03-10 RX ADMIN — DORZOLAMIDE HYDROCHLORIDE 2 DROP: 20 SOLUTION/ DROPS OPHTHALMIC at 16:31

## 2021-03-10 RX ADMIN — CEFEPIME HYDROCHLORIDE 2000 MG: 2 INJECTION, POWDER, FOR SOLUTION INTRAVENOUS at 10:27

## 2021-03-10 RX ADMIN — TAMSULOSIN HYDROCHLORIDE 0.4 MG: 0.4 CAPSULE ORAL at 22:10

## 2021-03-10 RX ADMIN — FINASTERIDE 5 MG: 5 TABLET, FILM COATED ORAL at 10:27

## 2021-03-10 RX ADMIN — Medication 10 ML: at 10:27

## 2021-03-10 RX ADMIN — DIPHENHYDRAMINE HYDROCHLORIDE 25 MG: 50 INJECTION, SOLUTION INTRAMUSCULAR; INTRAVENOUS at 23:19

## 2021-03-10 RX ADMIN — LATANOPROST 1 DROP: 50 SOLUTION OPHTHALMIC at 22:13

## 2021-03-10 RX ADMIN — LATANOPROST 1 DROP: 50 SOLUTION OPHTHALMIC at 00:34

## 2021-03-10 RX ADMIN — FINASTERIDE 5 MG: 5 TABLET, FILM COATED ORAL at 22:10

## 2021-03-10 ASSESSMENT — ENCOUNTER SYMPTOMS
RHINORRHEA: 0
EYE DISCHARGE: 0
COLOR CHANGE: 1
NAUSEA: 0
VOMITING: 0
COUGH: 0
SHORTNESS OF BREATH: 0
EYE PAIN: 0

## 2021-03-10 NOTE — H&P
History and Physical  Dr. Rigo Manuel  3/9/2021    PCP: Coco Christensen MD    Cc:   Chief Complaint   Patient presents with    Leg Swelling     pt with c/o bi-lateral leg swelling and itching- states began a couple days ago. HPI:  Rachell Stewart is a 68 y.o. male who has a past medical history of Glaucoma and Prostate enlargement. Patient presents with Cellulitis of anterior lower leg. HPI      68 y.o. male patient presents emergency department for evaluation of bilateral leg swelling that began a couple days ago. Patient states he is also developed some wounds and redness to his right lower leg. Patient has been scratching his leg as it has been itchy. He denies any pain. Patient states the swelling does not go down with keeping them elevated. He denies any chest pain or shortness of breath. He denies any fever at home. Denies any history of this leg swelling. Denies any previous wounds to his legs. States he is not diabetic    Pt has been declining over past year or two  (he is patient from my office)  Has family that helps care for him at home (they live close by)    He does not recall how the cellulitis starts      Problem list of hospitalization thus far: Active Hospital Problems    Diagnosis    Cellulitis of anterior lower leg [L03.119]    HTN (hypertension) [I10]    BPH (benign prostatic hyperplasia) [N40.0]    Anemia [D64.9]         Review of Systems: (1 system for EPF, 2-9 for detailed, 10+ for comprehensive)  Review of Systems   Constitutional: Negative for chills and fever. HENT: Negative for rhinorrhea and tinnitus. Eyes: Negative for pain and discharge. Respiratory: Negative for cough and shortness of breath. Cardiovascular: Negative for chest pain and leg swelling. Gastrointestinal: Negative for nausea and vomiting. Endocrine: Negative for cold intolerance and heat intolerance. Genitourinary: Negative for frequency and urgency.    Musculoskeletal: Negative for gait problem and neck pain. Skin: Positive for color change and wound. Allergic/Immunologic: Negative for food allergies. Neurological: Negative for dizziness and numbness. Hematological: Negative for adenopathy. Psychiatric/Behavioral: Negative for agitation and decreased concentration. Past Medical History:   Past Medical History:   Diagnosis Date    Glaucoma     Prostate enlargement        Past Surgical History: History reviewed. No pertinent surgical history. Social History:   Social History     Tobacco History     Smoking Status  Never Smoker    Smokeless Tobacco Use  Never Used          Alcohol History     Alcohol Use Status  No          Drug Use     Drug Use Status  No          Sexual Activity     Sexually Active  Not Asked                Fam History: History reviewed. No pertinent family history. PFSH: The above PMHx, PSHx, SocHx, FamHx has been reviewed by myself. (1 area for detailed, 2-3 for comprehensive)      Code Status: No Order    Meds  following list of home medications fromelectronic chart has been reviewed by myself  Prior to Admission medications    Medication Sig Start Date End Date Taking? Authorizing Provider   Timolol (TIMOPTIC) 0.5 % (DAILY) SOLN ophthalmic solution 1 drop daily   Yes Historical Provider, MD   dorzolamide (TRUSOPT) 2 % ophthalmic solution Place 2 drops into both eyes 3 times daily   Yes Historical Provider, MD   latanoprost (XALATAN) 0.005 % ophthalmic solution Place 1 drop into both eyes nightly   Yes Historical Provider, MD   finasteride (PROSCAR) 5 MG tablet Take 5 mg by mouth daily   Yes Historical Provider, MD   tamsulosin (FLOMAX) 0.4 MG capsule Take 0.4 mg by mouth daily.      Yes Historical Provider, MD   ibuprofen (ADVIL;MOTRIN) 200 MG tablet Take 200 mg by mouth every 6 hours as needed for Pain    Historical Provider, MD         No Known Allergies          EXAM: (2-7 system for EPF/Detailed, ?8 for Comprehensive)  BP (!) 150/73   Pulse (!) 47   Temp 98.7 °F (37.1 °C) (Oral)   Resp 16   Ht 5' 10\" (1.778 m)   Wt 140 lb (63.5 kg)   SpO2 100%   BMI 20.09 kg/m²   Constitutional: vitals as above: alert, appears stated age and cooperative  Psychiatric: normal insight and judgment, oriented to person, place, time, and general circumstances  Head: Normocephalic, without obvious abnormality, atraumatic  Eyes:lids and lashes normal, conjunctivae and sclerae normal and pupils equal, round, reactive to light and accomodation  EMNT: external ears normal, lips normal  Neck: no adenopathy, supple, symmetrical, trachea midline and thyroid not enlarged, symmetric, no tenderness/mass/nodules   Respiratory: clear to auscultation and percussion bilaterally with normal respiratory effort  Cardiovascular: normal rate, regular rhythm, normal S1 and S2 and no carotid bruits  Gastrointestinal: soft, non-tender, non-distended, normal bowel sounds, no masses or organomegaly  Lymphatic:   Extremities: mild edema bilat LE  Skin:erythema, scabbed wounds R Fine  Neurologic:negative    LABS:  Labs Reviewed   CBC WITH AUTO DIFFERENTIAL - Abnormal; Notable for the following components:       Result Value    RBC 3.74 (*)     Hemoglobin 12.0 (*)     Hematocrit 36.9 (*)     Lymphocytes Absolute 0.8 (*)     All other components within normal limits    Narrative:     Performed at:  [facility] [address], [place], [facility] [email or web address]   Phone [phone]   COMPREHENSIVE METABOLIC PANEL W/ REFLEX TO MG FOR LOW K - Abnormal; Notable for the following components:    Sodium 133 (*)     Chloride 98 (*)     CREATININE 0.6 (*)     All other components within normal limits    Narrative:     Performed at:  [facility] [address],  [place], [place]   Phone [phone]   BRAIN NATRIURETIC PEPTIDE    Narrative:     Performed at:  [facility] [address], [place], [address] [place]   Phone [phone]   URINE RT REFLEX TO CULTURE    Narrative:     Performed at:  [facility]  [address] [facility],  [place], [address]   Phone [phone]   LACTATE, SEPSIS    Narrative:     Performed at:  [facility]  [address] [facility],  [place], [address]   Phone [phone]         IMAGING:  Imaging results from the ER have been reviewed in the computerized chart. Xr Chest Portable    Result Date: 3/9/2021  EXAMINATION: ONE XRAY VIEW OF THE CHEST 3/9/2021 8:14 pm COMPARISON: 06/30/2016 HISTORY: ORDERING SYSTEM PROVIDED HISTORY: leg swelling TECHNOLOGIST PROVIDED HISTORY: Reason for exam:->leg swelling Reason for Exam: Leg Swelling (pt with c/o bi-lateral leg swelling and itching- states began a couple days ago. ) Acuity: Chronic Type of Exam: Initial FINDINGS: The left ventricle is normal.  The pulmonary vessels are normal.  The lungs are hyperinflated and emphysematous. No consolidation or effusion is seen. There is moderate scoliosis which is unchanged. Stable chronic changes with no acute abnormality seen. MEDICAL DECISION MAKING:    Principal Problem:    Cellulitis of anterior lower leg -New Problem to me. Lesions to R leg, now cellulitic  Plan: admit, iv cefepime empirically. Trend wbc. Await cx  Active Problems:    HTN (hypertension) -Established problem. Stable. BP OK  Plan: Pt home BP meds reviewed and will be continued. IV Hydralazine ordered for control of extremely high blood pressures   Will monitor labs to assess Creat/K for possible complications of medications. BPH (benign prostatic hyperplasia) -Established problem. Stable. Plan: on flomax at home, will continue    Anemia -New Problem to me. Not sure of cause  Plan: No indication for transfusion. Cont to monitor h/h to assess progression of anemia. Recommend ferrous sulfate or MVI as outpatient.            Diagnoses as listed above, designated as new or established and plan outlined for each. Data Reviewed:   (1) Lab tests were reviewed or ordered. (1) Radiology tests were reviewed or ordered. (1) Medical test (Echo, EKG, PFT/veronica) were ordered. (1)History was not obtained from someone other than patient  (1) Old records  were reviewed - see HPI/MDM for pertinent details if review done. (2) Case wasdiscussed with another health care provider: Hong NOVAK NP  (2) Imaging was viewed by myself. (2) EKG  was not viewed by myself. The patient isbeing placed in inpatient status with the expectation of requiring a hospital stay spanning at least two midnights for care and treatment of the problems noted in the problem list.  This determination is also based on thepatients comorbidities and past medical history, the severity and timing of the signs and symptoms upon presentation.         Electronically signed by: Phillip Marsh 3/9/2021

## 2021-03-10 NOTE — PROGRESS NOTES
Occupational Therapy   Occupational Therapy Initial Assessment/Discharge summary  Date: 3/10/2021   Patient Name: Cely Isbell  MRN: [id number]     : 1940    Date of Service: 3/10/2021    Discharge Recommendations:Kaan Flores scored a  on the AM-PAC ADL Inpatient form. At this time, no further OT is recommended upon discharge due to patient presents at functional baseline. Recommend patient returns to prior setting with prior services. OT Equipment Recommendations  Equipment Needed: No    Assessment   Treatment Diagnosis: Cellulitis of LE  Prognosis: Good  Decision Making: Low Complexity  OT Education: OT Role;Plan of Care  Patient Education: Eval, discharge recommendations--patient verbalized understanding  REQUIRES OT FOLLOW UP: No  Activity Tolerance  Activity Tolerance: Patient Tolerated treatment well  Safety Devices  Safety Devices in place: Yes  Type of devices: All fall risk precautions in place;Call light within reach; Chair alarm in place; Left in chair;Nurse notified; Patient at risk for falls           Patient Diagnosis(es): The primary encounter diagnosis was Leg swelling. A diagnosis of Cellulitis of right lower extremity was also pertinent to this visit. has a past medical history of Glaucoma and Prostate enlargement. has no past surgical history on file. Treatment Diagnosis: Cellulitis of LE      Restrictions  Restrictions/Precautions  Restrictions/Precautions: Fall Risk(low fall risk)  Position Activity Restriction  Other position/activity restrictions: Cely Isbell is a 68 y.o. male patient presents emergency department for evaluation of bilateral leg swelling that began a couple days ago. Patient states he is also developed some wounds and redness to his right lower leg. Patient has been scratching his leg as it has been itchy. He denies any pain. Patient states the swelling does not go down with keeping them elevated.   He denies any chest pain or Glaucoma  Cognition  Overall Cognitive Status: Exceptions  Arousal/Alertness: Appropriate responses to stimuli  Safety Judgement: Decreased awareness of need for assistance  Insights: Decreased awareness of deficits  Perception  Overall Perceptual Status: WFL     Sensation  Overall Sensation Status: WFL        LUE AROM (degrees)  LUE AROM : WFL  RUE AROM (degrees)  RUE AROM : WFL  LUE Strength  L Hand General: 5/5  RUE Strength  R Hand General: 5/5       AM-PAC Score        [facility] Inpatient Daily Activity Raw Score: 21 (03/10/21 1423)  AM-PAC Inpatient ADL T-Scale Score : 44.27 (03/10/21 1423)  ADL Inpatient CMS 0-100% Score: 32.79 (03/10/21 1423)  ADL Inpatient CMS G-Code Modifier : CJ (03/10/21 1423)    Goals  Patient Goals   Patient goals : Evaluation only-discharge OT       Therapy Time   Individual Concurrent Group Co-treatment   Time In 1310         Time Out 1352         Minutes 42              Timed Code Treatment Minutes:   27    Total Treatment Minutes:  [address] [place], OTR/L [id number]

## 2021-03-10 NOTE — CARE COORDINATION
CM spoke to patient and daughter in law at bedside. Pt is not active with any home care services. Daughter in law provides transportation for pt. Pt alert and oriented. Therapy has no recommendations at d/c. Pt currently on IV Cefepime for cellulitis and per MD note may consider adding vancomycin if no improvement. José Miguel contacted Justen Monk with [employer] [phone] and he will check IV benefits.      Amanda Quarles RN, BSN  [phone]

## 2021-03-10 NOTE — PROGRESS NOTES
Sent message to Dr. Wilmer Vines: Patient is having significant itching in his legs. Can you order some Benadryl so he doesn't start scratching them.

## 2021-03-10 NOTE — PROGRESS NOTES
Pharmacy Medication History Note      List of current medications patient is taking is complete. Source of information: [facility] [phone], Patient    Changes made to medication list:  Medications removed (include reason, ex. therapy complete or physician discontinued):  Dicyclomine    Other notes (ex. Recent course of antibiotics, Coumadin dosing):  Patient takes all medications at night. Denies use of other OTC or herbal medications. Last dose times updated. Thanks,  Shelley Cotton, PharmD  PGY-1 Pharmacy Resident  [id number]    No current facility-administered medications on file prior to encounter. Current Outpatient Medications on File Prior to Encounter   Medication Sig Dispense Refill    Timolol (TIMOPTIC) 0.5 % (DAILY) SOLN ophthalmic solution 1 drop daily      dorzolamide (TRUSOPT) 2 % ophthalmic solution Place 2 drops into both eyes 3 times daily      latanoprost (XALATAN) 0.005 % ophthalmic solution Place 1 drop into both eyes nightly      finasteride (PROSCAR) 5 MG tablet Take 5 mg by mouth daily      tamsulosin (FLOMAX) 0.4 MG capsule Take 0.4 mg by mouth daily.         ibuprofen (ADVIL;MOTRIN) 200 MG tablet Take 200 mg by mouth every 6 hours as needed for Pain      [DISCONTINUED] dicyclomine (BENTYL) 10 MG capsule Take 1 capsule by mouth every 6 hours as needed (cramps) 20 capsule 0

## 2021-03-10 NOTE — CARE COORDINATION
[facility] Contin[facility] Nurse  Consult Note       NAME:  Manuel Thomas  MEDICAL RECORD NUMBER:  [id number]  AGE: 68 y.o. GENDER: male  : 1940  TODAY'S DATE:  3/10/2021    Subjective   Reason for WOCN Evaluation and Assessment: chronic wound, cellulitis       Manuel Thomas is a 68 y.o. male referred by:   [x] Physician  [x] Nursing  [] Other:     Wound Identification:  Wound Type: venous  Contributing Factors: venous stasis    Wound History: present on admission  Current Wound Care Treatment:  Open to air    Patient Goal of Care:  [x] Wound Healing  [] Odor Control  [] Palliative Care  [] Pain Control   [] Other:         PAST MEDICAL HISTORY        Diagnosis Date    Glaucoma     Prostate enlargement        PAST SURGICAL HISTORY    History reviewed. No pertinent surgical history. FAMILY HISTORY    History reviewed. No pertinent family history. SOCIAL HISTORY    Social History     Tobacco Use    Smoking status: Never Smoker    Smokeless tobacco: Never Used   Substance Use Topics    Alcohol use: No    Drug use: No       ALLERGIES    No Known Allergies    MEDICATIONS    No current facility-administered medications on file prior to encounter. Current Outpatient Medications on File Prior to Encounter   Medication Sig Dispense Refill    Timolol (TIMOPTIC) 0.5 % (DAILY) SOLN ophthalmic solution 1 drop daily      dorzolamide (TRUSOPT) 2 % ophthalmic solution Place 2 drops into both eyes 3 times daily      latanoprost (XALATAN) 0.005 % ophthalmic solution Place 1 drop into both eyes nightly      finasteride (PROSCAR) 5 MG tablet Take 5 mg by mouth daily      tamsulosin (FLOMAX) 0.4 MG capsule Take 0.4 mg by mouth daily.         ibuprofen (ADVIL;MOTRIN) 200 MG tablet Take 200 mg by mouth every 6 hours as needed for Pain         Objective    /66   Pulse 72   Temp 97.5 °F (36.4 °C) (Oral)   Resp 16   Ht 5' 10\" (1.778 m)   Wt 134 lb 3.2 oz (60.9 kg)   SpO2 96%   BMI 19.26 kg/m²     LABS:  WBC:    Lab Results   Component Value Date    WBC 5.0 03/10/2021     H/H:    Lab Results   Component Value Date    HGB 11.9 03/10/2021    HCT 36.2 03/10/2021     PTT:  No results found for: APTT, PTT[APTT}  PT/INR:  No results found for: PROTIME, INR  HgBA1c:    Lab Results   Component Value Date    LABA1C 5.6 09/03/2019       Assessment   Keagan Risk Score: Keagan Scale Score: 20    Patient Active Problem List   Diagnosis Code    Cellulitis of anterior lower leg L03.119    HTN (hypertension) I10    BPH (benign prostatic hyperplasia) N40.0    Anemia D64.9    Cellulitis of lower leg L03.119              Patient has wound care consult  for cellulitis to bilateral lower legs. Patient was agreeable to the visit. Patient sitting in recliner and daughter is in room. Patient has one defined area of redness to right lower leg with several intact blisters. Legs feel warm to the touch. Patient says he had the blisters for a couple of days. No drainage or open areas noted. Patient is on IVF and IV antibiotics. Discussed case with nurse Alfonso Payan. Will cover blisters with vaseline guaze and roll guaze secured with paper tape. Response to treatment:  Well tolerated by patient. Pain Assessment:  Severity:  0 / 10  Quality of pain: N/A  Wound Pain Timing/Severity: none  Premedicated: No    Plan   Plan of Care:   vaseline guaze to cover blisters on right lower leg. Cover with roll guaze and secure with paper tape. Elevate legs while sitting and lying in bed.      Specialty Bed Required : No   [] Low Air Loss   [] Pressure Redistribution  [] Fluid Immersion  [] Bariatric  [] Total Pressure Relief  [] Other:     Current Diet: DIET GENERAL;  Dietician consult:  No    Discharge Plan:  Placement for patient upon discharge: home with support    Patient appropriate for Outpatient [address]: No    Referrals:  []   [] [address] [employer] [facility]  [] Supplies  [] Other    Patient/Caregiver Teaching:  Level of patient/caregiver understanding able to:   [x] Indicates understanding       [] Needs reinforcement  [] Unsuccessful      [] Verbal Understanding  [] Demonstrated understanding       [] No evidence of learning  [] Refused teaching         [] N/A       Electronically signed by DENNYS Chang, RN  Wound/Ostomy Care on 3/10/2021 at 2:39 PM

## 2021-03-10 NOTE — ED PROVIDER NOTES
[facility]        Pt Name: Cee Kaye  MRN: [id number]  [place] 1940  Date of evaluation: 3/9/2021  Provider: Kecia Loza PA-C  PCP: Maryjane Rivera MD     I have seen and evaluated this patient with my supervising physician Leeanne Jennings MD.    [address] [place] [address]       Chief Complaint   Patient presents with    Leg Swelling     pt with c/o bi-lateral leg swelling and itching- states began a couple days ago. HISTORY OF PRESENT ILLNESS   (Location, Timing/Onset, Context/Setting, Quality, Duration, Modifying Factors, Severity, Associated Signs and Symptoms)  Note limiting factors. Cee Kaye is a 68 y.o. male patient presents emergency department for evaluation of bilateral leg swelling that began a couple days ago. Patient states he is also developed some wounds and redness to his right lower leg. Patient has been scratching his leg as it has been itchy. He denies any pain. Patient states the swelling does not go down with keeping them elevated. He denies any chest pain or shortness of breath. He denies any fever at home. Denies any history of this leg swelling. Denies any previous wounds to his legs. States he is not diabetic. Nursing Notes were all reviewed and agreed with or any disagreements were addressed in the HPI. REVIEW OF SYSTEMS    (2-9 systems for level 4, 10 or more for level 5)     Review of Systems   Constitutional: Negative for fatigue and fever. HENT: Negative. Eyes: Negative for visual disturbance. Respiratory: Negative for shortness of breath. Cardiovascular: Positive for leg swelling. Negative for chest pain. Gastrointestinal: Negative for abdominal pain, diarrhea, nausea and vomiting. Genitourinary: Negative. Musculoskeletal: Negative. Skin: Positive for wound. Neurological: Negative. Positives and Pertinent negatives as per HPI.   Except as noted above in the ROS, all other systems were reviewed and negative. PAST MEDICAL HISTORY     Past Medical History:   Diagnosis Date    Glaucoma     Prostate enlargement          SURGICAL HISTORY   History reviewed. No pertinent surgical history. CURRENTMEDICATIONS       Previous Medications    DICYCLOMINE (BENTYL) 10 MG CAPSULE    Take 1 capsule by mouth every 6 hours as needed (cramps)    DORZOLAMIDE (TRUSOPT) 2 % OPHTHALMIC SOLUTION    Place 2 drops into both eyes 3 times daily    FINASTERIDE (PROSCAR) 5 MG TABLET    Take 5 mg by mouth daily    IBUPROFEN (ADVIL;MOTRIN) 200 MG TABLET    Take 200 mg by mouth every 6 hours as needed for Pain    LATANOPROST (XALATAN) 0.005 % OPHTHALMIC SOLUTION    Place 1 drop into both eyes nightly    TAMSULOSIN (FLOMAX) 0.4 MG CAPSULE    Take 0.4 mg by mouth daily. TIMOLOL (TIMOPTIC) 0.5 % (DAILY) SOLN OPHTHALMIC SOLUTION    1 drop daily         ALLERGIES     Patient has no known allergies. FAMILYHISTORY     History reviewed. No pertinent family history. SOCIAL HISTORY       Social History     Tobacco Use    Smoking status: Never Smoker    Smokeless tobacco: Never Used   Substance Use Topics    Alcohol use: No    Drug use: No       SCREENINGS             PHYSICAL EXAM    (up to 7 for level 4, 8 or more for level 5)     ED Triage Vitals [03/09/21 1912]   BP Temp Temp Source Pulse Resp SpO2 Height Weight   (!) 175/87 98.7 °F (37.1 °C) Oral 77 16 96 % 5' 10\" (1.778 m) 140 lb (63.5 kg)       Physical Exam  Vitals signs and nursing note reviewed. Constitutional:       General: He is not in acute distress. Appearance: Normal appearance. He is well-developed. He is not ill-appearing, toxic-appearing or diaphoretic. HENT:      Head: Normocephalic and atraumatic. Nose: Nose normal.   Eyes:      General:         Right eye: No discharge. Left eye: No discharge.       Conjunctiva/sclera: Conjunctivae normal.      Pupils: Pupils are equal, 21     Narrative:     Performed at:  [facility]  [address],  [facility], [address]   Phone [phone]   LACTATE, SEPSIS    Narrative:     Performed at:  [facility] [address],  [facility], [address]   Phone [phone]   URINE RT REFLEX TO CULTURE   LACTATE, SEPSIS       All other labs were within normal range or not returned as of this dictation. EKG: All EKG's are interpreted by the Emergency Department Physician in the absence of a cardiologist.  Please see their note for interpretation of EKG. RADIOLOGY:   Non-plain film images such as CT, Ultrasound and MRI are read by the radiologist. Plain radiographic images are visualized and preliminarily interpreted by the ED Provider with the below findings:        Interpretation per the Radiologist below, if available at the time of this note:    XR CHEST PORTABLE   Final Result   Stable chronic changes with no acute abnormality seen. Xr Chest Portable    Result Date: 3/9/2021  EXAMINATION: ONE XRAY VIEW OF THE CHEST 3/9/2021 8:14 pm COMPARISON: 06/30/2016 HISTORY: ORDERING SYSTEM PROVIDED HISTORY: leg swelling TECHNOLOGIST PROVIDED HISTORY: Reason for exam:->leg swelling Reason for Exam: Leg Swelling (pt with c/o bi-lateral leg swelling and itching- states began a couple days ago. ) Acuity: Chronic Type of Exam: Initial FINDINGS: The left ventricle is normal.  The pulmonary vessels are normal.  The lungs are hyperinflated and emphysematous. No consolidation or effusion is seen. There is moderate scoliosis which is unchanged. Stable chronic changes with no acute abnormality seen.            PROCEDURES   Unless otherwise noted below, none     Procedures    CRITICAL CARE TIME   N/A    CONSULTS:  None      EMERGENCY DEPARTMENT COURSE and DIFFERENTIAL DIAGNOSIS/MDM:   Vitals:    Vitals:    03/09/21 1912 03/09/21 2019 03/09/21 2030 BP: (!) 175/87 (!) 160/86 (!) 150/70   Pulse: 77 52 58   Resp: 16     Temp: 98.7 °F (37.1 °C)     TempSrc: Oral     SpO2: 96% 90% 99%   Weight: 140 lb (63.5 kg)     Height: 5' 10\" (1.778 m)         Patient was given the following medications:  Medications   cefepime (MAXIPIME) 2000 mg IVPB minibag (has no administration in time range)         Patient presents emergency department for evaluation of leg swelling. Patient is alert and oriented no acute distress. Vitals are stable and she is afebrile. Patient has 2+ pitting edema to bilateral lower extremities below the knee. Right leg has some erythema, warmth and wounds to anterior shin. See image for further information. Lungs are clear to auscultation bilaterally heart rate is regular without murmurs rubs or gallops. Laboratory evaluation unremarkable. Patient started on cefepime. Patient will be admitted to his PCP Christoph Castro for further management. FINAL IMPRESSION      1. Leg swelling    2. Cellulitis of right lower extremity          DISPOSITION/PLAN   DISPOSITION Decision To Admit 03/09/2021 09:00:13 PM      PATIENT REFERREDTO:  No follow-up provider specified.     DISCHARGE MEDICATIONS:  New Prescriptions    No medications on file       DISCONTINUED MEDICATIONS:  Discontinued Medications    No medications on file              (Please note that portions of this note were completed with a voice recognition program.  Efforts were made to edit the dictations but occasionally words are mis-transcribed.)    Aníbal Concepcion PA-C (electronically signed)            Aníbal Concepcion PA-C  03/09/21 2129

## 2021-03-10 NOTE — ED NOTES
Bed: 12  Expected date:   Expected time:   Means of arrival:   Comments:  [address], RN  03/09/21 2009

## 2021-03-10 NOTE — ED NOTES
PT report given to CLARE Smith at this time, she states no further questions or concerns.        Remberto Arroyo RN  03/09/21 9301

## 2021-03-10 NOTE — ED NOTES
PT in from home with complaints of new onset bilateral swelling to lower extremities. +2 pitting edema noted to both legs, redness, weepy discharge noted on both legs. Right leg has red, patchy wound which PT states he thinks may be from scraping his shin while working. Daughter in law at bedside, states that patient hesitant to seek care in general and may have been experiencing edema longer than stated. PT denies any fever or chills at this time, denies chest pain or shortness of breath. PT alert and oriented, ambulatory at baseline. PT placed on monitor, will continue with current plan of care.      Wendy Odell RN  03/09/21 3656

## 2021-03-10 NOTE — ED PROVIDER NOTES
were red but the right one was significantly worse. The patient had multiple deeper lesions on the right lower extremity. This was consistent with cellulitis. The area is not significantly warm however. Medical decision makin-year-old male presents with lower extremity redness and swelling. His right lower extremity is consistent with cellulitis. This appears to be more concerning than chronic stasis dermatitis. He will be started antibiotics admitted for further care. The patient's laboratory results are otherwise unremarkable with a normal white cell count. He is not diabetic. FINAL IMPRESSION:    1. Leg swelling    2.  Cellulitis of right lower extremity           Doris Parr MD  21 [id number]

## 2021-03-10 NOTE — PLAN OF CARE
Problem: Skin Integrity:  Intervention: Wound dressing application  Note: Apply vaseline guaze to right lower legs to protect intact blisters. Cover with roll guaze and secure with paper tape. Change daily.

## 2021-03-10 NOTE — CARE COORDINATION
Referral received to check IV Benefits. Patients benefit information is as follows: Therapy:Cefepime 2g Q12    Pt.  Copay:$97/week for Cefepime    Electronically signed by Jairo Hayes on 3/10/2021 at 5:42 PM   Cell Ph# [phone], Office # [phone]

## 2021-03-10 NOTE — PROGRESS NOTES
Progress Note - Dr. Radha Hines - Internal Medicine  PCP: Yennifer Gracia MD [facility] / [address] [phone]    Hospital Day: 1  Code Status: Full Code  Current Diet: DIET GENERAL;        CC: follow up on medical issues    Subjective:   Sherman Aleman is a 68 y.o. male. He denies problems    Doing ok  Pt somewhat confused  Leg looks about the same    He denies chest pain, denies shortness of breath, denies nausea,  denies emesis. 10 system Review of Systems is reviewed with patient, and pertinent positives are noted in HPI above . Otherwise, Review of systems is negative. I have reviewed the patient's medical and social history in detail and updated the computerized patient record. To recap: He  has a past medical history of Glaucoma and Prostate enlargement. . He  has no past surgical history on file. Wava Prim He  reports that he has never smoked. He has never used smokeless tobacco. He reports that he does not drink alcohol or use drugs. .        Active Hospital Problems    Diagnosis Date Noted    Cellulitis of anterior lower leg [Q86.526] 03/09/2021    HTN (hypertension) [I10] 03/09/2021    BPH (benign prostatic hyperplasia) [N40.0] 03/09/2021    Anemia [D64.9] 03/09/2021    Cellulitis of lower leg [L03.119] 03/09/2021       Current Facility-Administered Medications: tamsulosin (FLOMAX) capsule 0.4 mg, 0.4 mg, Oral, Daily  timolol (TIMOPTIC) 0.5 % ophthalmic solution 1 drop, 1 drop, Both Eyes, Daily  dorzolamide (TRUSOPT) 2 % ophthalmic solution 2 drop, 2 drop, Both Eyes, TID  latanoprost (XALATAN) 0.005 % ophthalmic solution 1 drop, 1 drop, Both Eyes, Nightly  finasteride (PROSCAR) tablet 5 mg, 5 mg, Oral, Daily  0.9 % sodium chloride infusion, , Intravenous, Continuous  sodium chloride flush 0.9 % injection 10 mL, 10 mL, Intravenous, 2 times per day  sodium chloride flush 0.9 % injection 10 mL, 10 mL, Intravenous, PRN  potassium chloride (KLOR-CON M) extended release tablet 40 mEq, 40 mEq, Oral, PRN **OR** potassium bicarb-citric acid (EFFER-K) effervescent tablet 40 mEq, 40 mEq, Oral, PRN **OR** potassium chloride 10 mEq/100 mL IVPB (Peripheral Line), 10 mEq, Intravenous, PRN  enoxaparin (LOVENOX) injection 40 mg, 40 mg, Subcutaneous, Daily  promethazine (PHENERGAN) tablet 12.5 mg, 12.5 mg, Oral, Q6H PRN **OR** ondansetron (ZOFRAN) injection 4 mg, 4 mg, Intravenous, Q6H PRN  magnesium hydroxide (MILK OF MAGNESIA) 400 MG/5ML suspension 30 mL, 30 mL, Oral, Daily PRN  acetaminophen (TYLENOL) tablet 650 mg, 650 mg, Oral, Q6H PRN **OR** acetaminophen (TYLENOL) suppository 650 mg, 650 mg, Rectal, Q6H PRN  cefepime (MAXIPIME) 2000 mg IVPB minibag, 2,000 mg, Intravenous, Q12H  hydrALAZINE (APRESOLINE) injection 10 mg, 10 mg, Intravenous, Q6H PRN  0.9 % sodium chloride bolus, 500 mL, Intravenous, PRN         Objective:  /71   Pulse 53   Temp 97.5 °F (36.4 °C) (Oral)   Resp 16   Ht 5' 10\" (1.778 m)   Wt 134 lb 3.2 oz (60.9 kg)   SpO2 97%   BMI 19.26 kg/m²      Patient Vitals for the past 24 hrs:   BP Temp Temp src Pulse Resp SpO2 Height Weight   03/10/21 0535 131/71 97.5 °F (36.4 °C) Oral 53 16 97 %     03/10/21 0006 134/71 98.7 °F (37.1 °C) Oral 59 16 99 %     03/09/21 2343     15 98 %     03/09/21 2254 (!) 155/79 98.6 °F (37 °C) Oral 58 16 99 % 5' 10\" (1.778 m) 134 lb 3.2 oz (60.9 kg)   03/09/21 2200 138/72   58  99 %     03/09/21 2130 137/71   58  100 %     03/09/21 2100 (!) 150/73   (!) 47  100 %     03/09/21 2030 (!) 150/70   58  99 %     03/09/21 2019 (!) 160/86   52  90 %     03/09/21 1912 (!) 175/87 98.7 °F (37.1 °C) Oral 77 16 96 % 5' 10\" (1.778 m) 140 lb (63.5 kg)     Patient Vitals for the past 96 hrs (Last 3 readings):   Weight   03/09/21 2254 134 lb 3.2 oz (60.9 kg)   03/09/21 1912 140 lb (63.5 kg)         No intake or output data in the 24 hours ending 03/10/21 0858      Physical Exam:   /71   Pulse 53   Temp 97.5 °F (36.4 °C) (Oral)   Resp 16   Ht 5' 10\" (1.778 m)   Wt 134 lb 3.2 oz (60.9 kg)   SpO2 97%   BMI 19.26 kg/m²   General appearance: alert, appears stated age and cooperative  Head: Normocephalic, without obvious abnormality, atraumatic  Lungs: clear to auscultation bilaterally  Heart: regular rate and rhythm, S1, S2 normal, no murmur, click, rub or gallop  Abdomen: soft, non-tender; bowel sounds normal; no masses,  no organomegaly  Extremities: erythema/edema to R shin unchanged    Labs:  Lab Results   Component Value Date    WBC 5.0 03/10/2021    HGB 11.9 (L) 03/10/2021    HCT 36.2 (L) 03/10/2021     03/10/2021    CHOL 124 09/03/2019    TRIG 40 09/03/2019    HDL 64 (H) 09/03/2019    ALT 16 03/10/2021    AST 20 03/10/2021     03/10/2021    K 4.3 03/10/2021     03/10/2021    CREATININE <0.5 (L) 03/10/2021    BUN 10 03/10/2021    CO2 28 03/10/2021    TSH 1.02 09/03/2019    PSA 0.87 09/03/2019    LABA1C 5.6 09/03/2019    LABMICR Not Indicated 03/09/2021     No results found for: CKTOTAL, CKMB, CKMBINDEX, TROPONINI    Recent Imaging Results are Reviewed:  Xr Chest Portable    Result Date: 3/9/2021  EXAMINATION: ONE XRAY VIEW OF THE CHEST 3/9/2021 8:14 pm COMPARISON: 06/30/2016 HISTORY: ORDERING SYSTEM PROVIDED HISTORY: leg swelling TECHNOLOGIST PROVIDED HISTORY: Reason for exam:->leg swelling Reason for Exam: Leg Swelling (pt with c/o bi-lateral leg swelling and itching- states began a couple days ago. ) Acuity: Chronic Type of Exam: Initial FINDINGS: The left ventricle is normal.  The pulmonary vessels are normal.  The lungs are hyperinflated and emphysematous. No consolidation or effusion is seen. There is moderate scoliosis which is unchanged. Stable chronic changes with no acute abnormality seen. Assessment and Plan:  Principal Problem:    Cellulitis of anterior lower leg -Established problem. Stable. Plan: cont iv abx for now. On iv cefepime.  If not better, can consider adding vanc  Active Problems: HTN (hypertension) -Established problem. Stable. 131/71  Plan: cont same meds    BPH (benign prostatic hyperplasia) -Established problem. Stable. No issues  Plan: cont flomax    Anemia -Established problem. Stable.  hgb  11.9  Plan: No indication for transfusion. Cont to monitor h/h to assess progression of anemia. Recommend ferrous sulfate or MVI as outpatient.      Cellulitis of lower leg            Phillip Marsh  3/10/2021

## 2021-03-10 NOTE — PROGRESS NOTES
Physical Therapy    Facility/Department: [address] ONCOLOGY  Initial Assessment    NAME: Aditi Rodgers  : 1940  MRN: [id number]    Date of Service: 3/10/2021    Discharge Recommendations:  Aditi Rodgers scored a 21/24 on the AM-PAC short mobility form. At this time, no further PT is recommended upon discharge due to high functioning status. Recommend patient returns to prior setting with prior services. PT Equipment Recommendations  Equipment Needed: No    Assessment   Body structures, Functions, Activity limitations: Decreased functional mobility ; Decreased balance;Decreased strength  Assessment: pt demonstrates impaired balance and generalized muscle weakness. Pt and daughter states he is functioning near baseline. Pt would continue to benefit from skilled PT during hospitalization to address above impairments and facilitate return to PLOF  Treatment Diagnosis: impaired balance, generalized muscle weakness  Prognosis: Good  Decision Making: Low Complexity  Clinical Presentation: stable  PT Education: Goals;PT Role;Plan of Care;Transfer Training;Gait Training  Patient Education: pt verbalized understanding  Barriers to Learning: [place]  REQUIRES PT FOLLOW UP: Yes  Activity Tolerance  Activity Tolerance: Patient Tolerated treatment well  Activity Tolerance: no complaints during evaluation       Patient Diagnosis(es): The primary encounter diagnosis was Leg swelling. A diagnosis of Cellulitis of right lower extremity was also pertinent to this visit. has a past medical history of Glaucoma and Prostate enlargement. has no past surgical history on file. Restrictions  Restrictions/Precautions  Restrictions/Precautions: Fall Risk(low fall risk)  Position Activity Restriction  Other position/activity restrictions: Aditi Rodgers is a 68 y.o. male patient presents emergency department for evaluation of bilateral leg swelling that began a couple days ago.   Patient states he is also developed some wounds and redness to his right lower leg. Patient has been scratching his leg as it has been itchy. He denies any pain. Patient states the swelling does not go down with keeping them elevated. He denies any chest pain or shortness of breath. He denies any fever at home. Denies any history of this leg swelling. Denies any previous wounds to his legs. States he is not diabetic. Vision/Hearing  Vision: Impaired  Vision Exceptions: Wears glasses at all times  Hearing: Exceptions to [facility]  Hearing Exceptions: Hard of hearing/hearing concerns     Subjective  General  Chart Reviewed: Yes  Patient assessed for rehabilitation services?: Yes  Response To Previous Treatment: Not applicable  Family / Caregiver Present: Yes(daughter)  Diagnosis: cellulitis of anterior lower leg  Follows Commands: Within Functional Limits  Subjective  Subjective: Pt met in room sleeping in recliner with legs elevated. Pt has difficulty hearing. Pt denies pain, but states that his leg itch more when in dependent position. Pt agreeable to therapy  Pain Screening  Patient Currently in Pain: Denies          Orientation  Orientation  Overall Orientation Status: Within Functional Limits  Social/Functional History  Social/Functional History  Lives With: Spouse  Type of Home: House  Home Layout: One level  Home Access: Level entry  Bathroom Shower/Tub: Walk-in shower, Shower chair with back  Bathroom Toilet: Handicap height  Bathroom Equipment: Grab bars around toilet  ADL Assistance: Independent  Homemaking Assistance: Independent  Ambulation Assistance: Independent  Transfer Assistance: Independent  Active : No  Patient's  Info: Radha Edward drives  Occupation: Retired  Additional Comments: states that he had one fall a couple of weeks ago when shoveling snow. Pt fell and had abrasion of arm that is healing well.   Denies head injury    Objective          AROM RLE (degrees)  RLE AROM: WFL  AROM LLE (degrees)  LLE AROM : WFL  Strength RLE  Strength RLE: WFL  Comment: grossly 3+/5  Strength LLE  Strength LLE: WFL  Comment: grossly 3+/5        Bed mobility  Comment: pt in recliner at beginning and end of session. not observed this date  Transfers  Sit to Stand: Supervision  Stand to sit: Supervision  Comment: pt performs with good hand placement and Controlled sitting and standing with no LoBs. Ambulation  Ambulation?: Yes  More Ambulation?: No  Ambulation 1  Surface: level tile  Device: No Device  Assistance: Contact guard assistance  Quality of Gait: Pt ambulates with increased Mark, feet externally rotated and forward flexed posture. Pt demonstrates good alva and stride length. Pt is mildly unsteady with slightly deviated path and medial/lateral sway and does not bear full weight through forefoot in terminal stance. Pt appears to be walking more on heels. No loBs observed  Distance: 250'     Balance  Posture: Fair  Sitting - Static: Good  Sitting - Dynamic: Good  Standing - Static: Good  Standing - Dynamic: Good;-(deviated path, and mild unsteadiness in gait.)        Plan   Plan  Times per week: 1-2x  Times per day: Daily  Current Treatment Recommendations: Strengthening, ROM, Balance Training, Gait Training  Safety Devices  Type of devices:  All fall risk precautions in place, Call light within reach, Chair alarm in place, Gait belt, Patient at risk for falls, Left in chair, Nurse notified  Restraints  Initially in place: No    AM-PAC Score  AM-PAC Inpatient Mobility Raw Score : 21 (03/10/21 1445)  AM-PAC Inpatient T-Scale Score : 50.25 (03/10/21 1445)  Mobility Inpatient CMS 0-100% Score: 28.97 (03/10/21 1445)  Mobility Inpatient CMS G-Code Modifier : Meghan Duel (03/10/21 1445)          Goals  Short term goals  Time Frame for Short term goals: to be met at d/c  Short term goal 1: Pt will ambulate 300' independently  Short term goal 2: pt will perform sit<>stand transfer with Miriam  Short term goal 3: Pt will perform bed mobility with Miriam  Patient Goals   Patient goals : to return home       Therapy Time   Individual Concurrent Group Co-treatment   Time In 1310         Time Out 1351         Minutes 41         Timed Code Treatment Minutes: Harper 45, SPT  Thien Arroyo, PT    This evaluation was observed and supervised by Thien Arroyo, [address] [place] [address]

## 2021-03-11 ENCOUNTER — APPOINTMENT (OUTPATIENT)
Dept: CT IMAGING | Age: 81
DRG: 603 | End: 2021-03-11
Payer: MEDICARE

## 2021-03-11 LAB
ANION GAP SERPL CALCULATED.3IONS-SCNC: 6 MMOL/L (ref 3–16)
BASOPHILS ABSOLUTE: 0 K/UL (ref 0–0.2)
BASOPHILS RELATIVE PERCENT: 0.9 %
BUN BLDV-MCNC: 14 MG/DL (ref 7–20)
CALCIUM SERPL-MCNC: 8.9 MG/DL (ref 8.3–10.6)
CHLORIDE BLD-SCNC: 102 MMOL/L (ref 99–110)
CO2: 28 MMOL/L (ref 21–32)
CREAT SERPL-MCNC: 0.6 MG/DL (ref 0.8–1.3)
EOSINOPHILS ABSOLUTE: 0.6 K/UL (ref 0–0.6)
EOSINOPHILS RELATIVE PERCENT: 11.2 %
GFR AFRICAN AMERICAN: >60
GFR NON-AFRICAN AMERICAN: >60
GLUCOSE BLD-MCNC: 92 MG/DL (ref 70–99)
HCT VFR BLD CALC: 38.1 % (ref 40.5–52.5)
HEMOGLOBIN: 12.6 G/DL (ref 13.5–17.5)
LYMPHOCYTES ABSOLUTE: 1 K/UL (ref 1–5.1)
LYMPHOCYTES RELATIVE PERCENT: 19.6 %
MCH RBC QN AUTO: 32.5 PG (ref 26–34)
MCHC RBC AUTO-ENTMCNC: 33.2 G/DL (ref 31–36)
MCV RBC AUTO: 98.1 FL (ref 80–100)
MONOCYTES ABSOLUTE: 0.5 K/UL (ref 0–1.3)
MONOCYTES RELATIVE PERCENT: 9.6 %
NEUTROPHILS ABSOLUTE: 3.1 K/UL (ref 1.7–7.7)
NEUTROPHILS RELATIVE PERCENT: 58.7 %
PDW BLD-RTO: 13.8 % (ref 12.4–15.4)
PLATELET # BLD: 206 K/UL (ref 135–450)
PMV BLD AUTO: 6.2 FL (ref 5–10.5)
POTASSIUM SERPL-SCNC: 4.4 MMOL/L (ref 3.5–5.1)
RBC # BLD: 3.88 M/UL (ref 4.2–5.9)
SODIUM BLD-SCNC: 136 MMOL/L (ref 136–145)
WBC # BLD: 5.2 K/UL (ref 4–11)

## 2021-03-11 PROCEDURE — 1200000000 HC SEMI PRIVATE

## 2021-03-11 PROCEDURE — 6360000002 HC RX W HCPCS: Performed by: INTERNAL MEDICINE

## 2021-03-11 PROCEDURE — 72125 CT NECK SPINE W/O DYE: CPT

## 2021-03-11 PROCEDURE — 70450 CT HEAD/BRAIN W/O DYE: CPT

## 2021-03-11 PROCEDURE — 36415 COLL VENOUS BLD VENIPUNCTURE: CPT

## 2021-03-11 PROCEDURE — 94760 N-INVAS EAR/PLS OXIMETRY 1: CPT

## 2021-03-11 PROCEDURE — 80048 BASIC METABOLIC PNL TOTAL CA: CPT

## 2021-03-11 PROCEDURE — 85025 COMPLETE CBC W/AUTO DIFF WBC: CPT

## 2021-03-11 PROCEDURE — 6370000000 HC RX 637 (ALT 250 FOR IP): Performed by: INTERNAL MEDICINE

## 2021-03-11 PROCEDURE — 2580000003 HC RX 258: Performed by: INTERNAL MEDICINE

## 2021-03-11 RX ORDER — HYDROCODONE BITARTRATE AND ACETAMINOPHEN 5; 325 MG/1; MG/1
1 TABLET ORAL EVERY 6 HOURS PRN
Status: DISCONTINUED | OUTPATIENT
Start: 2021-03-11 | End: 2021-03-12 | Stop reason: HOSPADM

## 2021-03-11 RX ORDER — BACLOFEN 10 MG/1
10 TABLET ORAL 3 TIMES DAILY
Status: DISCONTINUED | OUTPATIENT
Start: 2021-03-11 | End: 2021-03-12 | Stop reason: HOSPADM

## 2021-03-11 RX ADMIN — BACLOFEN 10 MG: 10 TABLET ORAL at 20:51

## 2021-03-11 RX ADMIN — TAMSULOSIN HYDROCHLORIDE 0.4 MG: 0.4 CAPSULE ORAL at 20:51

## 2021-03-11 RX ADMIN — BACLOFEN 10 MG: 10 TABLET ORAL at 14:14

## 2021-03-11 RX ADMIN — ENOXAPARIN SODIUM 40 MG: 40 INJECTION SUBCUTANEOUS at 08:59

## 2021-03-11 RX ADMIN — DORZOLAMIDE HYDROCHLORIDE 2 DROP: 20 SOLUTION/ DROPS OPHTHALMIC at 20:51

## 2021-03-11 RX ADMIN — CEFEPIME HYDROCHLORIDE 2000 MG: 2 INJECTION, POWDER, FOR SOLUTION INTRAVENOUS at 08:59

## 2021-03-11 RX ADMIN — HYDROCODONE BITARTRATE AND ACETAMINOPHEN 1 TABLET: 5; 325 TABLET ORAL at 11:16

## 2021-03-11 RX ADMIN — FINASTERIDE 5 MG: 5 TABLET, FILM COATED ORAL at 20:51

## 2021-03-11 RX ADMIN — HYDROCODONE BITARTRATE AND ACETAMINOPHEN 1 TABLET: 5; 325 TABLET ORAL at 17:18

## 2021-03-11 RX ADMIN — CEFEPIME HYDROCHLORIDE 2000 MG: 2 INJECTION, POWDER, FOR SOLUTION INTRAVENOUS at 20:51

## 2021-03-11 RX ADMIN — BACLOFEN 10 MG: 10 TABLET ORAL at 09:45

## 2021-03-11 RX ADMIN — LATANOPROST 1 DROP: 50 SOLUTION OPHTHALMIC at 21:01

## 2021-03-11 RX ADMIN — DORZOLAMIDE HYDROCHLORIDE 2 DROP: 20 SOLUTION/ DROPS OPHTHALMIC at 09:00

## 2021-03-11 RX ADMIN — Medication 10 ML: at 20:51

## 2021-03-11 RX ADMIN — ACETAMINOPHEN 650 MG: 325 TABLET ORAL at 14:15

## 2021-03-11 RX ADMIN — TIMOLOL MALEATE 1 DROP: 5 SOLUTION/ DROPS OPHTHALMIC at 09:00

## 2021-03-11 RX ADMIN — Medication 10 ML: at 09:00

## 2021-03-11 RX ADMIN — ACETAMINOPHEN 650 MG: 325 TABLET ORAL at 21:03

## 2021-03-11 ASSESSMENT — PAIN SCALES - GENERAL
PAINLEVEL_OUTOF10: 3
PAINLEVEL_OUTOF10: 5
PAINLEVEL_OUTOF10: 0
PAINLEVEL_OUTOF10: 0

## 2021-03-11 NOTE — PLAN OF CARE
Problem: Pain:  Goal: Pain level will decrease  Description: Pain level will decrease  Outcome: Ongoing  Goal: Control of acute pain  Description: Control of acute pain  Outcome: Ongoing  Goal: Control of chronic pain  Description: Control of chronic pain  Outcome: Ongoing     Problem: Skin Integrity:  Goal: Will show no infection signs and symptoms  Description: Will show no infection signs and symptoms  Outcome: Ongoing  Goal: Absence of new skin breakdown  Description: Absence of new skin breakdown  Outcome: Ongoing     Problem:  Activity:  Goal: Ability to avoid complications of mobility impairment will improve  Description: Ability to avoid complications of mobility impairment will improve  Outcome: Ongoing  Goal: Range of joint motion will improve  Description: Range of joint motion will improve  Outcome: Ongoing  Goal: Ability to ambulate will improve  Description: Ability to ambulate will improve  Outcome: Ongoing  Goal: Muscle strength will improve  Description: Muscle strength will improve  Outcome: Ongoing

## 2021-03-11 NOTE — PROGRESS NOTES
Progress Note - Dr. Peyman Jane - Internal Medicine  PCP: Connor Abbott MD [id number]  / [place] Peter [phone]    Hospital Day: 2  Code Status: Full Code  Current Diet: DIET GENERAL;        CC: follow up on medical issues    Subjective:   Aditi Rodgers is a 68 y.o. male. Doing well  No new issues  Pain controlled    C/o stiff neck  Muscle relaxer to be added    Legs look much better  Swelling better    He denies chest pain, denies shortness of breath, denies nausea,  denies emesis. 10 system Review of Systems is reviewed with patient, and pertinent positives are noted in HPI above . Otherwise, Review of systems is negative. I have reviewed the patient's medical and social history in detail and updated the computerized patient record. To recap: He  has a past medical history of Glaucoma and Prostate enlargement. . He  has no past surgical history on file. Jackson Nimco He  reports that he has never smoked. He has never used smokeless tobacco. He reports that he does not drink alcohol or use drugs. .        Active Hospital Problems    Diagnosis Date Noted    Cellulitis of anterior lower leg [S84.981] 03/09/2021    HTN (hypertension) [I10] 03/09/2021    BPH (benign prostatic hyperplasia) [N40.0] 03/09/2021    Anemia [D64.9] 03/09/2021    Cellulitis of lower leg [L03.119] 03/09/2021       Current Facility-Administered Medications: diphenhydrAMINE (BENADRYL) injection 25 mg, 25 mg, Intravenous, Q6H PRN  tamsulosin (FLOMAX) capsule 0.4 mg, 0.4 mg, Oral, Daily  timolol (TIMOPTIC) 0.5 % ophthalmic solution 1 drop, 1 drop, Both Eyes, Daily  dorzolamide (TRUSOPT) 2 % ophthalmic solution 2 drop, 2 drop, Both Eyes, TID  latanoprost (XALATAN) 0.005 % ophthalmic solution 1 drop, 1 drop, Both Eyes, Nightly  finasteride (PROSCAR) tablet 5 mg, 5 mg, Oral, Daily  0.9 % sodium chloride infusion, , Intravenous, Continuous  sodium chloride flush 0.9 % injection 10 mL, 10 mL, Intravenous, 2 times per day  sodium chloride flush 0.9 % injection 10 mL, 10 mL, Intravenous, PRN  potassium chloride (KLOR-CON M) extended release tablet 40 mEq, 40 mEq, Oral, PRN **OR** potassium bicarb-citric acid (EFFER-K) effervescent tablet 40 mEq, 40 mEq, Oral, PRN **OR** potassium chloride 10 mEq/100 mL IVPB (Peripheral Line), 10 mEq, Intravenous, PRN  enoxaparin (LOVENOX) injection 40 mg, 40 mg, Subcutaneous, Daily  promethazine (PHENERGAN) tablet 12.5 mg, 12.5 mg, Oral, Q6H PRN **OR** ondansetron (ZOFRAN) injection 4 mg, 4 mg, Intravenous, Q6H PRN  magnesium hydroxide (MILK OF MAGNESIA) 400 MG/5ML suspension 30 mL, 30 mL, Oral, Daily PRN  acetaminophen (TYLENOL) tablet 650 mg, 650 mg, Oral, Q6H PRN **OR** acetaminophen (TYLENOL) suppository 650 mg, 650 mg, Rectal, Q6H PRN  cefepime (MAXIPIME) 2000 mg IVPB minibag, 2,000 mg, Intravenous, Q12H  hydrALAZINE (APRESOLINE) injection 10 mg, 10 mg, Intravenous, Q6H PRN  0.9 % sodium chloride bolus, 500 mL, Intravenous, PRN         Objective:  /69   Pulse 71   Temp 97.3 °F (36.3 °C) (Oral)   Resp 16   Ht 5' 10\" (1.778 m)   Wt 134 lb 3.2 oz (60.9 kg)   SpO2 97%   BMI 19.26 kg/m²      Patient Vitals for the past 24 hrs:   BP Temp Temp src Pulse Resp SpO2   03/11/21 0857 125/69 97.3 °F (36.3 °C) Oral 71 16 97 %   03/11/21 0453 (!) 158/79 97.4 °F (36.3 °C) Oral 56 16 97 %   03/10/21 2346 124/68 97.6 °F (36.4 °C) Oral 53 16 96 %   03/10/21 2103 130/63 97.6 °F (36.4 °C) Oral 62 16 97 %   03/10/21 1648      94 %   03/10/21 1603 (!) 149/69 98.2 °F (36.8 °C) Oral 56 16 94 %   03/10/21 1058 129/66 97.5 °F (36.4 °C) Oral 72 16 96 %   03/10/21 1024 122/72 97.4 °F (36.3 °C) Oral 82 16 99 %     Patient Vitals for the past 96 hrs (Last 3 readings):   Weight   03/09/21 2254 134 lb 3.2 oz (60.9 kg)   03/09/21 1912 140 lb (63.5 kg)           Intake/Output Summary (Last 24 hours) at 3/11/2021 0910  Last data filed at 3/10/2021 1016  Gross per 24 hour   Intake 120 ml   Output    Net 120 ml         Physical Exam:   /69   Pulse 71   Temp 97.3 °F (36.3 °C) (Oral)   Resp 16   Ht 5' 10\" (1.778 m)   Wt 134 lb 3.2 oz (60.9 kg)   SpO2 97%   BMI 19.26 kg/m²   General appearance: alert, appears stated age and cooperative  Head: Normocephalic, without obvious abnormality, atraumatic  Lungs: clear to auscultation bilaterally  Heart: regular rate and rhythm, S1, S2 normal, no murmur, click, rub or gallop  Abdomen: soft, non-tender; bowel sounds normal; no masses,  no organomegaly  Extremities: erythema improved. Edema improved    Labs:  Lab Results   Component Value Date    WBC 5.2 03/11/2021    HGB 12.6 (L) 03/11/2021    HCT 38.1 (L) 03/11/2021     03/11/2021    CHOL 124 09/03/2019    TRIG 40 09/03/2019    HDL 64 (H) 09/03/2019    ALT 16 03/10/2021    AST 20 03/10/2021     03/11/2021    K 4.4 03/11/2021     03/11/2021    CREATININE 0.6 (L) 03/11/2021    BUN 14 03/11/2021    CO2 28 03/11/2021    TSH 1.02 09/03/2019    PSA 0.87 09/03/2019    LABA1C 5.6 09/03/2019    LABMICR Not Indicated 03/09/2021     No results found for: CKTOTAL, CKMB, CKMBINDEX, TROPONINI    Recent Imaging Results are Reviewed:  Xr Chest Portable    Result Date: 3/9/2021  EXAMINATION: ONE XRAY VIEW OF THE CHEST 3/9/2021 8:14 pm COMPARISON: 06/30/2016 HISTORY: ORDERING SYSTEM PROVIDED HISTORY: leg swelling TECHNOLOGIST PROVIDED HISTORY: Reason for exam:->leg swelling Reason for Exam: Leg Swelling (pt with c/o bi-lateral leg swelling and itching- states began a couple days ago. ) Acuity: Chronic Type of Exam: Initial FINDINGS: The left ventricle is normal.  The pulmonary vessels are normal.  The lungs are hyperinflated and emphysematous. No consolidation or effusion is seen. There is moderate scoliosis which is unchanged. Stable chronic changes with no acute abnormality seen. Assessment and Plan:  Principal Problem:    Cellulitis of anterior lower leg -Established problem. Improving.   Doing much better  Plan: one more day of iv abx. Active Problems:    HTN (hypertension) -Established problem. Stable. 125/69  Plan: Continue present orders/plan. BPH (benign prostatic hyperplasia) -Established problem. Stable. Plan: Continue present orders/plan. Anemia -Established problem. Stable.  hgb 12.6  Plan: Continue present orders/plan.      Cellulitis of lower leg            Albino Samaniego  3/11/2021

## 2021-03-11 NOTE — PROGRESS NOTES
Sent message to Dr. Miguel Martinez: The patient is in severe pain. I'm not really sure what is going on but he is having trouble swallowing. Patient's family reported he lost consciousness d/t pain. Although the patient states he could still hear and feel his family member. X-ray of neck?

## 2021-03-11 NOTE — PROGRESS NOTES
Shift assessment completed. Routine vitals stable. Scheduled medications given. Patient is awake, alert and oriented. Respirations are easy and unlabored. Patient does not appear to be in distress. Call light within reach. Granddaughter at bedside.

## 2021-03-11 NOTE — PROGRESS NOTES
Sent message to Dr. Vishnu De Souza: BRAIN/VENTRICLES: No acute hemorrhage. Periventricular and subcortical hypoattenuation is nonspecific and may be related to microvascular disease. Artifact partially obscures the miko. Artifact partially obscures the posterior fossa. Lorry Brown white differentiation appears maintained given artifact near the skull base and through the posterior fossa. Cerebral volume loss and ventriculomegaly noted. There is no midline shift. Basal cisterns appear patent. ORBITS: Visualized orbits appear unremarkable on this unenhanced exam. SINUSES: Visualized paranasal sinuses appear clear. Partial opacification of the mastoid air cells. SOFT TISSUES/SKULL: No depressed calvarial fracture identified. C-spine: BONES/ALIGNMENT: There is no acute fracture or traumatic malalignment. DEGENERATIVE CHANGES: There is reversal of the normal curvature of the cervical spine. Posterior disc osteophyte complex at the level of C5-6 contributes to mild to moderate canal narrowing. Mild-to-moderate multilevel degenerative changes most prominent in the mid cervical spine.  SOFT TISSUES: There is no prevertebral soft tissue swelling

## 2021-03-12 VITALS
HEART RATE: 70 BPM | WEIGHT: 134.2 LBS | OXYGEN SATURATION: 98 % | HEIGHT: 70 IN | BODY MASS INDEX: 19.21 KG/M2 | SYSTOLIC BLOOD PRESSURE: 138 MMHG | TEMPERATURE: 97.9 F | RESPIRATION RATE: 18 BRPM | DIASTOLIC BLOOD PRESSURE: 84 MMHG

## 2021-03-12 PROCEDURE — 92610 EVALUATE SWALLOWING FUNCTION: CPT

## 2021-03-12 PROCEDURE — 92526 ORAL FUNCTION THERAPY: CPT

## 2021-03-12 PROCEDURE — 6370000000 HC RX 637 (ALT 250 FOR IP): Performed by: INTERNAL MEDICINE

## 2021-03-12 RX ORDER — AMOXICILLIN AND CLAVULANATE POTASSIUM 875; 125 MG/1; MG/1
1 TABLET, FILM COATED ORAL 2 TIMES DAILY
Qty: 14 TABLET | Refills: 0 | Status: SHIPPED | OUTPATIENT
Start: 2021-03-12 | End: 2021-03-19

## 2021-03-12 RX ORDER — BACLOFEN 10 MG/1
10 TABLET ORAL 3 TIMES DAILY
Qty: 30 TABLET | Refills: 1 | Status: SHIPPED | OUTPATIENT
Start: 2021-03-12

## 2021-03-12 RX ADMIN — DICLOFENAC SODIUM 4 G: 10 GEL TOPICAL at 11:36

## 2021-03-12 RX ADMIN — HYDROCODONE BITARTRATE AND ACETAMINOPHEN 1 TABLET: 5; 325 TABLET ORAL at 00:12

## 2021-03-12 RX ADMIN — HYDROCODONE BITARTRATE AND ACETAMINOPHEN 1 TABLET: 5; 325 TABLET ORAL at 07:57

## 2021-03-12 RX ADMIN — BACLOFEN 10 MG: 10 TABLET ORAL at 07:57

## 2021-03-12 ASSESSMENT — PAIN DESCRIPTION - LOCATION: LOCATION: BACK;NECK;LEG

## 2021-03-12 ASSESSMENT — PAIN SCALES - GENERAL: PAINLEVEL_OUTOF10: 7

## 2021-03-12 NOTE — PROGRESS NOTES
Shift assessment completed. Routine vitals stable. Scheduled medications given. PRN Tylenol given for 7/10 neck pain. Patient is awake, alert and oriented. Respirations are easy and unlabored. Call light within reach. Bed alarm on. Pt's daughter at bedside.

## 2021-03-12 NOTE — PROGRESS NOTES
Spoke to wife, would like prescriptions transferred to [facility] on [address] due to outpatient not being contracted with insurance.     Renetta MASCORRO/ Michael Proctor. [place]

## 2021-03-12 NOTE — CARE COORDINATION
CM spoke to daughter and pt in room and agree to nursing home care visits. Daughter states they used [facility] with another family member and would like to use again. CM called Lalit Kaur with [facility] and made referral. She states have RN demonstrate dressing change for daughter d/t they will not have a nurse available over the weekend to see pt. CM informed Cheri SPARKS. CM went back and spoke to daughter and patient and informed home care will not be able to see until next week. Daughter verbalized understanding and is aware she will be taught dressing change. CM called Cain Tsang with [facility] and informed him pt is discharging on oral abx. Patient discharged 3/12/2021 to home with home care services.    All discharge needs met per case management     Dariusz Brown RN, BSN  [phone]

## 2021-03-12 NOTE — DISCHARGE INSTR - COC
Continuity of Care Form    Patient Name: Greyson Shields   :  1940  MRN:  [id number]    Admit date:  3/9/2021  Discharge date:  3/12/2021    Code Status Order: Full Code   Advance Directives:   [facility] Documentation       Date/Time Healthcare Directive Type of Healthcare Directive Copy in [address] Agent's Name Healthcare Agent's Phone Number    21 0823  Yes, patient has an advance directive for healthcare treatment  Durable power of  for health care                    Admitting Physician:  Frank Spencer MD  PCP: Frank Spencer MD    Discharging Nurse: Ana [address] Unit/Room#: 6WG-9782/2608-12  Discharging Unit Phone Number: [phone]    Emergency Contact:   Extended Emergency Contact Information  Primary Emergency Contact: Campos Portillo  Address: [address] [place] [address], [address]  Home Phone: [phone]  Relation: Spouse  Secondary Emergency Contact: Brenden Flores  Home Phone: [phone]  Relation: Child    Past Surgical History:  History reviewed. No pertinent surgical history. Immunization History: There is no immunization history on file for this patient.     Active Problems:  Patient Active Problem List   Diagnosis Code    Cellulitis of anterior lower leg L03.119    HTN (hypertension) I10    BPH (benign prostatic hyperplasia) N40.0    Anemia D64.9    Cellulitis of lower leg L03.119       Isolation/Infection:   Isolation            No Isolation          Patient Infection Status       None to display            Nurse Assessment:  Last Vital Signs: /84   Pulse 70   Temp 97.9 °F (36.6 °C) (Axillary)   Resp 18   Ht 5' 10\" (1.778 m)   Wt 134 lb 3.2 oz (60.9 kg)   SpO2 98%   BMI 19.26 kg/m²     Last documented pain score (0-10 scale): Pain Level: 10  Last Weight:   Wt Readings from Last 1 Encounters:   21 134 lb 3.2 oz (60.9 kg)     Mental Status:  oriented and alert    IV Access:  - PHYSICIAN SECTION    Prognosis: Fair    Condition at Discharge: Stable    Rehab Potential (if transferring to Rehab): Good    Recommended Labs or Other Treatments After Discharge: Home care nurse to apply vaseline guaze to right lower legs to protect intact blisters. Cover with roll guaze and secure with paper tape. Change daily. Physician Certification: I certify the above information and transfer of Cee Kaye  is necessary for the continuing treatment of the diagnosis listed and that he requires [address] for greater 30 days.      Update Admission H&P: No change in H&P    PHYSICIAN SIGNATURE:  Electronically signed by Maryjane Rivera MD on 3/12/21 at 11:47 AM EST

## 2021-03-12 NOTE — PROGRESS NOTES
Physical Therapy  Attempted to see Pt. Who was actively being d/c'd to home. Daughter reports Pt. Will have 24 hour Supervision. Please refer to previous note for D/c measures. Jasiel Morrow.  [place], [place], [id number]

## 2021-03-12 NOTE — PROGRESS NOTES
Aware of discharge with home care. Home care orders sent to [facility]. Discharge planner notified.

## 2021-03-12 NOTE — DISCHARGE SUMMARY
[facility] -- Physician Discharge [address] [facility]  1940  MRN: [id number]    Admit Date: 3/9/2021  Discharge Date: No discharge date for patient encounter. Attending MD: Pawan Ontiveros MD  Discharging MD: Pawan Ontiveros MD  PCP: Pawan Ontiveros MD [id number] / [place] [address] [phone]    Admission Diagnosis: Cellulitis of lower leg [X97.686]  DISCHARGE DIAGNOSIS: same    Full Hospital Problem List:  C/Janice Knight [address] Problems    Diagnosis Date Noted    Cellulitis of anterior lower leg [V76.387] 03/09/2021    HTN (hypertension) [I10] 03/09/2021    BPH (benign prostatic hyperplasia) [N40.0] 03/09/2021    Anemia [D64.9] 03/09/2021    Cellulitis of lower leg [L03.119] 03/09/2021           Hospital Course:  68 y. o. male patient presents emergency department for evaluation of bilateral leg swelling that began a couple days ago. Linda Carpenter states he is also developed some wounds and redness to his right lower leg.  Patient has been scratching his leg as it has been itchy.  He denies any pain.  Patient states the swelling does not go down with keeping them elevated.  He denies any chest pain or shortness of breath.  He denies any fever at home.  Denies any history of this leg swelling.  Denies any previous wounds to his legs.  States he is not diabetic     Pt has been declining over past year or two  (he is patient from my office)  Has family that helps care for him at home (they live close by)     He does not recall how the cellulitis started  Admitted, placed on iv cefepime  Wound much better    To d/c on oral augmentin    Consults made during Hospitalization:  IP CONSULT TO INTERNAL MEDICINE    Treatment team at time of Discharge: Treatment Team: Attending Provider: Pawan Ontiveros MD; Consulting Physician: Pawan Ontiveros MD; Patient Care Tech: Dea Birmingham;  Registered Nurse: Jose L Watkins RN; Physical Therapist: Christina Bean PT    Imaging Results:  Ct Head Wo Contrast    Result Date: 3/11/2021  EXAMINATION: CT OF THE HEAD WITHOUT CONTRAST  3/11/2021 3:23 pm TECHNIQUE: CT of the head was performed without the administration of intravenous contrast. Dose modulation, iterative reconstruction, and/or weight based adjustment of the mA/kV was utilized to reduce the radiation dose to as low as reasonably achievable. COMPARISON: None. HISTORY: ORDERING SYSTEM PROVIDED HISTORY: severe pain TECHNOLOGIST PROVIDED HISTORY: Reason for exam:->severe pain Has a \"code stroke\" or \"stroke alert\" been called? ->No Reason for Exam: severe pain Acuity: Acute Type of Exam: Initial FINDINGS: BRAIN/VENTRICLES: No acute hemorrhage. Periventricular and subcortical hypoattenuation is nonspecific and may be related to microvascular disease. Artifact partially obscures the miko. Artifact partially obscures the posterior fossa. [place] Crow white differentiation appears maintained given artifact near the skull base and through the posterior fossa. Cerebral volume loss and ventriculomegaly noted. There is no midline shift. Basal cisterns appear patent. ORBITS: Visualized orbits appear unremarkable on this unenhanced exam. SINUSES: Visualized paranasal sinuses appear clear. Partial opacification of the mastoid air cells. SOFT TISSUES/SKULL:   No depressed calvarial fracture identified. No acute hemorrhage or midline shift. Other findings as described. Ct Cervical Spine Wo Contrast    Result Date: 3/11/2021  EXAMINATION: CT OF THE CERVICAL SPINE WITHOUT CONTRAST 3/11/2021 3:24 pm TECHNIQUE: CT of the cervical spine was performed without the administration of intravenous contrast. Multiplanar reformatted images are provided for review. Dose modulation, iterative reconstruction, and/or weight based adjustment of the mA/kV was utilized to reduce the radiation dose to as low as reasonably achievable. COMPARISON: None.  HISTORY: ORDERING SYSTEM PROVIDED HISTORY: severe pain TECHNOLOGIST PROVIDED HISTORY: Reason for exam:-> severe pain Reason for Exam: severe pain Acuity: Acute Type of Exam: Initial FINDINGS: BONES/ALIGNMENT: There is no acute fracture or traumatic malalignment. DEGENERATIVE CHANGES: There is reversal of the normal curvature of the cervical spine. Posterior disc osteophyte complex at the level of C5-6 contributes to mild to moderate canal narrowing. Mild-to-moderate multilevel degenerative changes most prominent in the mid cervical spine. SOFT TISSUES: There is no prevertebral soft tissue swelling. 1. No acute abnormality of the cervical spine. 2. Mild to moderate multilevel degenerative changes including a posterior disc osteophyte complex at C5-6 contributing to mild to moderate canal narrowing. Xr Chest Portable    Result Date: 3/9/2021  EXAMINATION: ONE XRAY VIEW OF THE CHEST 3/9/2021 8:14 pm COMPARISON: 06/30/2016 HISTORY: ORDERING SYSTEM PROVIDED HISTORY: leg swelling TECHNOLOGIST PROVIDED HISTORY: Reason for exam:->leg swelling Reason for Exam: Leg Swelling (pt with c/o bi-lateral leg swelling and itching- states began a couple days ago. ) Acuity: Chronic Type of Exam: Initial FINDINGS: The left ventricle is normal.  The pulmonary vessels are normal.  The lungs are hyperinflated and emphysematous. No consolidation or effusion is seen. There is moderate scoliosis which is unchanged. Stable chronic changes with no acute abnormality seen.          Discharge Exam:  /84   Pulse 70   Temp 97.9 °F (36.6 °C) (Axillary)   Resp 18   Ht 5' 10\" (1.778 m)   Wt 134 lb 3.2 oz (60.9 kg)   SpO2 98%   BMI 19.26 kg/m²   General appearance: alert, appears stated age and cooperative  Head: Normocephalic, without obvious abnormality, atraumatic  Lungs: clear to auscultation bilaterally  Heart: regular rate and rhythm, S1, S2 normal, no murmur, click, rub or gallop  Abdomen: soft, non-tender; bowel sounds normal; no masses,  no organomegaly  Extremities: erythma much improved to R shin Disposition: home    Condition: stable    Discharge Medications:   Jammie Milner   Home Medication Instructions ABIOLA:[id number]    Printed on:03/12/21 9156   Medication Information                      amoxicillin-clavulanate (AUGMENTIN) 875-125 MG per tablet  Take 1 tablet by mouth 2 times daily for 7 days             baclofen (LIORESAL) 10 MG tablet  Take 1 tablet by mouth 3 times daily             dorzolamide (TRUSOPT) 2 % ophthalmic solution  Place 2 drops into both eyes 3 times daily             finasteride (PROSCAR) 5 MG tablet  Take 5 mg by mouth daily             ibuprofen (ADVIL;MOTRIN) 200 MG tablet  Take 200 mg by mouth every 6 hours as needed for Pain             latanoprost (XALATAN) 0.005 % ophthalmic solution  Place 1 drop into both eyes nightly             tamsulosin (FLOMAX) 0.4 MG capsule  Take 0.4 mg by mouth daily. Timolol (TIMOPTIC) 0.5 % (DAILY) SOLN ophthalmic solution  1 drop daily                 Allergies:  No Known Allergies    Follow up Instructions: Follow-up with PCP: Matilda Nova MD in 2 wk .       Total time spent on day of discharge including face-to-face visit, examination, documentation, counseling, preparation of discharge plans and followup, and discharge medicine reconciliation and presciptions is 36 minutes    Signed:  Matilda Nova MD  3/12/2021

## 2021-03-12 NOTE — PROGRESS NOTES
Patient discharged to home with home care, IV removed. Discharge instructions provided and explained. Pt's daughter taught how to change dressing on RLE, some dressing change supplies provided at discharge for over weekend since home care nurse will not see patient until Monday. Belongings sent with patient.   Pt transported to front entrance in wheelchair, transported home by daughter

## 2021-03-12 NOTE — PROGRESS NOTES
CLINICAL BEDSIDE SWALLOWING EVALUATION  Speech Therapy Department    Patient Name:  Eddie Zacarias  :  1940  Pain level: denied   Medical Diagnosis:   Cellulitis of lower leg [L03.119]    HPI: \"80 y. o. male patient presents emergency department for evaluation of bilateral leg swelling that began a couple days ago. Keith Bar states he is also developed some wounds and redness to his right lower leg.  Patient has been scratching his leg as it has been itchy.  He denies any pain.  Patient states the swelling does not go down with keeping them elevated.  He denies any chest pain or shortness of breath.  He denies any fever at home. [facility] any history of this leg swelling.  Denies any previous wounds to his legs.  States he is not diabetic     Pt has been declining over past year or two  (he is patient from my office)  Has family that helps care for him at home (they live close by)     He does not recall how the cellulitis starts\"    CXR: 3/9/21  Impression   Stable chronic changes with no acute abnormality seen.         \"SLP eval and treat\" orders received. RN reported pt had a lot of neck pain previously. Per chart, this appeared to be impacting swallowing. Pt seen sitting upright in bed. He reported he feels better this morning and reported no hx dysphagia. Daughter-in-law present at bedside. Pt reported sometimes he has \"saliva in my mouth\" that he has to \"spit out\" throughout the day/night but no complaints of coughing/choking with PO. Treatment Diagnosis:  Dysphagia    Impressions: Pt presents with mild oral dysphagia. Pt wears upper dentures; lower dentition is limited. Lingual/labial strength/ROM/coordination are grossly WFL. Pt seen with breakfast meal consisting of sausage links and scrambled eggs. Pt was also given trials of thin liquids, pureed solids, soft solids, regular textures, and mixed consistency.  Oral phase was characterized by prolonged mastication and delayed bolus formation and AP propulsion. Pharyngeal phase was characterized by grossly timely swallow response and adequate laryngeal elevation felt upon palpation. Diffuse oral residue was noted with scrambled eggs (reduced bolus formation). Pt utilized liquid rinse and/or lingual sweep independently to clear. No overt s/s aspiration with any textures. At this time, recommend continue regular texture diet/thin liquids. Pt does not require further speech therapy services for dysphagia at this time. Dietary Recommendations:  Continue regular texture diet/thin liquids; meds as tolerated    Strategies: 90 degree positioning with all p.o. intake; small bites/sips; alternate textures through meal; reduce rate of intake, one bite/sip at a time    Treatment/Goals: No Speech therapy for dysphagia warranted tx at this time. Oral motor Exam:  Dentition: upper dentures; limited lower dentition  Labial/Facial: WFL  Lingual: WFL  Voice: WFL    Oral Phase:   Prolonged mastication  Reduced bolus formation  Delayed A-P propulsion    Pharyngeal Phase:  Grossly WFL    Patient/Family Education:Education, results and recommendations re: swallow strategies/aspiration precautions, positioning for PO intake, and no further dysphagia tx given to the Pt, daughter-in-law and nurse, who verbalized understanding. Timed Code Treatment: 0 minutes    Total Treatment Time: 24 minutes    Discharge Recommendations: Speech Therapy for Speech/Dysphagia treatment is not warranted at discharge. If patient discharges prior to next session this note will serve as a discharge summary. Signature:    Reba Briones M.S. Jewell Goetz  Speech-Language Pathologist

## 2021-03-12 NOTE — PLAN OF CARE
Problem: Skin Integrity:  Goal: Will show no infection signs and symptoms  Description: Will show no infection signs and symptoms  Outcome: Met This Shift     Problem: Pain:  Goal: Pain level will decrease  Description: Pain level will decrease  Outcome: Ongoing  Goal: Control of acute pain  Description: Control of acute pain  Outcome: Ongoing  Goal: Control of chronic pain  Description: Control of chronic pain  Outcome: Ongoing     Problem: Skin Integrity:  Goal: Absence of new skin breakdown  Description: Absence of new skin breakdown  Outcome: Ongoing

## 2021-03-12 NOTE — PROGRESS NOTES
[facility] home care referral. Spoke with pt and re: home care plan of care/services. Agreeable. Demographic's verified. Will follow for home care.

## 2021-12-24 ENCOUNTER — HOSPITAL ENCOUNTER (INPATIENT)
Age: 81
LOS: 3 days | Discharge: NURSING, REHAB OR LONG-TERM CARE | DRG: 178 | End: 2021-12-27
Attending: INTERNAL MEDICINE | Admitting: INTERNAL MEDICINE
Payer: MEDICARE

## 2021-12-24 ENCOUNTER — APPOINTMENT (OUTPATIENT)
Dept: GENERAL RADIOLOGY | Age: 81
DRG: 178 | End: 2021-12-24
Payer: MEDICARE

## 2021-12-24 DIAGNOSIS — J06.9 ACUTE UPPER RESPIRATORY INFECTION: ICD-10-CM

## 2021-12-24 DIAGNOSIS — R53.1 GENERAL WEAKNESS: ICD-10-CM

## 2021-12-24 DIAGNOSIS — E87.1 HYPONATREMIA: Primary | ICD-10-CM

## 2021-12-24 PROBLEM — Z20.822 SUSPECTED COVID-19 VIRUS INFECTION: Status: ACTIVE | Noted: 2021-12-24

## 2021-12-24 LAB
A/G RATIO: 1.4 (ref 1.1–2.2)
ALBUMIN SERPL-MCNC: 3.9 G/DL (ref 3.4–5)
ALP BLD-CCNC: 90 U/L (ref 40–129)
ALT SERPL-CCNC: 14 U/L (ref 10–40)
ANION GAP SERPL CALCULATED.3IONS-SCNC: 10 MMOL/L (ref 3–16)
AST SERPL-CCNC: 18 U/L (ref 15–37)
BASOPHILS ABSOLUTE: 0 K/UL (ref 0–0.2)
BASOPHILS RELATIVE PERCENT: 0.5 %
BILIRUB SERPL-MCNC: 0.9 MG/DL (ref 0–1)
BUN BLDV-MCNC: 15 MG/DL (ref 7–20)
CALCIUM SERPL-MCNC: 9.3 MG/DL (ref 8.3–10.6)
CHLORIDE BLD-SCNC: 89 MMOL/L (ref 99–110)
CO2: 25 MMOL/L (ref 21–32)
CREAT SERPL-MCNC: <0.5 MG/DL (ref 0.8–1.3)
EOSINOPHILS ABSOLUTE: 0 K/UL (ref 0–0.6)
EOSINOPHILS RELATIVE PERCENT: 0.3 %
GFR AFRICAN AMERICAN: >60
GFR NON-AFRICAN AMERICAN: >60
GLUCOSE BLD-MCNC: 101 MG/DL (ref 70–99)
HCT VFR BLD CALC: 44.6 % (ref 40.5–52.5)
HEMOGLOBIN: 14.8 G/DL (ref 13.5–17.5)
LACTIC ACID, SEPSIS: 1.3 MMOL/L (ref 0.4–1.9)
LYMPHOCYTES ABSOLUTE: 0.6 K/UL (ref 1–5.1)
LYMPHOCYTES RELATIVE PERCENT: 13.4 %
MCH RBC QN AUTO: 31.4 PG (ref 26–34)
MCHC RBC AUTO-ENTMCNC: 33.1 G/DL (ref 31–36)
MCV RBC AUTO: 94.9 FL (ref 80–100)
MONOCYTES ABSOLUTE: 0.6 K/UL (ref 0–1.3)
MONOCYTES RELATIVE PERCENT: 12.2 %
NEUTROPHILS ABSOLUTE: 3.4 K/UL (ref 1.7–7.7)
NEUTROPHILS RELATIVE PERCENT: 73.6 %
PDW BLD-RTO: 13.4 % (ref 12.4–15.4)
PLATELET # BLD: 363 K/UL (ref 135–450)
PMV BLD AUTO: 6.7 FL (ref 5–10.5)
POTASSIUM REFLEX MAGNESIUM: 4.6 MMOL/L (ref 3.5–5.1)
PRO-BNP: 259 PG/ML (ref 0–449)
RAPID INFLUENZA  B AGN: NEGATIVE
RAPID INFLUENZA A AGN: NEGATIVE
RBC # BLD: 4.7 M/UL (ref 4.2–5.9)
SODIUM BLD-SCNC: 124 MMOL/L (ref 136–145)
TOTAL PROTEIN: 6.7 G/DL (ref 6.4–8.2)
TROPONIN: <0.01 NG/ML
WBC # BLD: 4.7 K/UL (ref 4–11)

## 2021-12-24 PROCEDURE — 6370000000 HC RX 637 (ALT 250 FOR IP): Performed by: INTERNAL MEDICINE

## 2021-12-24 PROCEDURE — 84300 ASSAY OF URINE SODIUM: CPT

## 2021-12-24 PROCEDURE — 83880 ASSAY OF NATRIURETIC PEPTIDE: CPT

## 2021-12-24 PROCEDURE — 1200000000 HC SEMI PRIVATE

## 2021-12-24 PROCEDURE — U0005 INFEC AGEN DETEC AMPLI PROBE: HCPCS

## 2021-12-24 PROCEDURE — 71046 X-RAY EXAM CHEST 2 VIEWS: CPT

## 2021-12-24 PROCEDURE — 36415 COLL VENOUS BLD VENIPUNCTURE: CPT

## 2021-12-24 PROCEDURE — 2580000003 HC RX 258: Performed by: PHYSICIAN ASSISTANT

## 2021-12-24 PROCEDURE — U0003 INFECTIOUS AGENT DETECTION BY NUCLEIC ACID (DNA OR RNA); SEVERE ACUTE RESPIRATORY SYNDROME CORONAVIRUS 2 (SARS-COV-2) (CORONAVIRUS DISEASE [COVID-19]), AMPLIFIED PROBE TECHNIQUE, MAKING USE OF HIGH THROUGHPUT TECHNOLOGIES AS DESCRIBED BY CMS-2020-01-R: HCPCS

## 2021-12-24 PROCEDURE — 80053 COMPREHEN METABOLIC PANEL: CPT

## 2021-12-24 PROCEDURE — 99283 EMERGENCY DEPT VISIT LOW MDM: CPT

## 2021-12-24 PROCEDURE — 85025 COMPLETE CBC W/AUTO DIFF WBC: CPT

## 2021-12-24 PROCEDURE — 6360000002 HC RX W HCPCS: Performed by: INTERNAL MEDICINE

## 2021-12-24 PROCEDURE — 81003 URINALYSIS AUTO W/O SCOPE: CPT

## 2021-12-24 PROCEDURE — 87804 INFLUENZA ASSAY W/OPTIC: CPT

## 2021-12-24 PROCEDURE — 83605 ASSAY OF LACTIC ACID: CPT

## 2021-12-24 PROCEDURE — 2580000003 HC RX 258: Performed by: INTERNAL MEDICINE

## 2021-12-24 PROCEDURE — 93005 ELECTROCARDIOGRAM TRACING: CPT | Performed by: PHYSICIAN ASSISTANT

## 2021-12-24 PROCEDURE — 84484 ASSAY OF TROPONIN QUANT: CPT

## 2021-12-24 PROCEDURE — 83935 ASSAY OF URINE OSMOLALITY: CPT

## 2021-12-24 RX ORDER — 0.9 % SODIUM CHLORIDE 0.9 %
500 INTRAVENOUS SOLUTION INTRAVENOUS ONCE
Status: COMPLETED | OUTPATIENT
Start: 2021-12-24 | End: 2021-12-24

## 2021-12-24 RX ORDER — 0.9 % SODIUM CHLORIDE 0.9 %
500 INTRAVENOUS SOLUTION INTRAVENOUS PRN
Status: DISCONTINUED | OUTPATIENT
Start: 2021-12-24 | End: 2021-12-27 | Stop reason: HOSPADM

## 2021-12-24 RX ORDER — HYDROCODONE BITARTRATE AND ACETAMINOPHEN 5; 325 MG/1; MG/1
1 TABLET ORAL EVERY 6 HOURS PRN
Status: DISCONTINUED | OUTPATIENT
Start: 2021-12-24 | End: 2021-12-27 | Stop reason: HOSPADM

## 2021-12-24 RX ORDER — HYDRALAZINE HYDROCHLORIDE 20 MG/ML
10 INJECTION INTRAMUSCULAR; INTRAVENOUS EVERY 6 HOURS PRN
Status: DISCONTINUED | OUTPATIENT
Start: 2021-12-24 | End: 2021-12-27 | Stop reason: HOSPADM

## 2021-12-24 RX ORDER — POTASSIUM CHLORIDE 7.45 MG/ML
10 INJECTION INTRAVENOUS PRN
Status: DISCONTINUED | OUTPATIENT
Start: 2021-12-24 | End: 2021-12-24 | Stop reason: SDUPTHER

## 2021-12-24 RX ORDER — SODIUM CHLORIDE 0.9 % (FLUSH) 0.9 %
5-40 SYRINGE (ML) INJECTION EVERY 12 HOURS SCHEDULED
Status: DISCONTINUED | OUTPATIENT
Start: 2021-12-24 | End: 2021-12-27 | Stop reason: HOSPADM

## 2021-12-24 RX ORDER — POTASSIUM CHLORIDE 7.45 MG/ML
10 INJECTION INTRAVENOUS PRN
Status: DISCONTINUED | OUTPATIENT
Start: 2021-12-24 | End: 2021-12-27 | Stop reason: HOSPADM

## 2021-12-24 RX ORDER — ONDANSETRON 2 MG/ML
4 INJECTION INTRAMUSCULAR; INTRAVENOUS EVERY 6 HOURS PRN
Status: DISCONTINUED | OUTPATIENT
Start: 2021-12-24 | End: 2021-12-27 | Stop reason: HOSPADM

## 2021-12-24 RX ORDER — SODIUM CHLORIDE 9 MG/ML
25 INJECTION, SOLUTION INTRAVENOUS PRN
Status: DISCONTINUED | OUTPATIENT
Start: 2021-12-24 | End: 2021-12-27 | Stop reason: HOSPADM

## 2021-12-24 RX ORDER — DEXAMETHASONE SODIUM PHOSPHATE 10 MG/ML
6 INJECTION, SOLUTION INTRAMUSCULAR; INTRAVENOUS DAILY
Status: DISCONTINUED | OUTPATIENT
Start: 2021-12-24 | End: 2021-12-27 | Stop reason: HOSPADM

## 2021-12-24 RX ORDER — ACETAMINOPHEN 650 MG/1
650 SUPPOSITORY RECTAL EVERY 6 HOURS PRN
Status: DISCONTINUED | OUTPATIENT
Start: 2021-12-24 | End: 2021-12-27 | Stop reason: HOSPADM

## 2021-12-24 RX ORDER — POTASSIUM CHLORIDE 20 MEQ/1
40 TABLET, EXTENDED RELEASE ORAL PRN
Status: DISCONTINUED | OUTPATIENT
Start: 2021-12-24 | End: 2021-12-27 | Stop reason: HOSPADM

## 2021-12-24 RX ORDER — ACETAMINOPHEN 325 MG/1
650 TABLET ORAL EVERY 6 HOURS PRN
Status: DISCONTINUED | OUTPATIENT
Start: 2021-12-24 | End: 2021-12-27 | Stop reason: HOSPADM

## 2021-12-24 RX ORDER — POTASSIUM CHLORIDE 20 MEQ/1
40 TABLET, EXTENDED RELEASE ORAL PRN
Status: DISCONTINUED | OUTPATIENT
Start: 2021-12-24 | End: 2021-12-24 | Stop reason: SDUPTHER

## 2021-12-24 RX ORDER — SODIUM CHLORIDE 0.9 % (FLUSH) 0.9 %
5-40 SYRINGE (ML) INJECTION PRN
Status: DISCONTINUED | OUTPATIENT
Start: 2021-12-24 | End: 2021-12-27 | Stop reason: HOSPADM

## 2021-12-24 RX ORDER — ONDANSETRON 4 MG/1
4 TABLET, ORALLY DISINTEGRATING ORAL EVERY 8 HOURS PRN
Status: DISCONTINUED | OUTPATIENT
Start: 2021-12-24 | End: 2021-12-27 | Stop reason: HOSPADM

## 2021-12-24 RX ORDER — TAMSULOSIN HYDROCHLORIDE 0.4 MG/1
0.4 CAPSULE ORAL DAILY
Status: DISCONTINUED | OUTPATIENT
Start: 2021-12-24 | End: 2021-12-27 | Stop reason: HOSPADM

## 2021-12-24 RX ORDER — SODIUM CHLORIDE 9 MG/ML
INJECTION, SOLUTION INTRAVENOUS CONTINUOUS
Status: DISCONTINUED | OUTPATIENT
Start: 2021-12-24 | End: 2021-12-27 | Stop reason: HOSPADM

## 2021-12-24 RX ADMIN — SODIUM CHLORIDE 500 ML: 9 INJECTION, SOLUTION INTRAVENOUS at 13:30

## 2021-12-24 RX ADMIN — DEXAMETHASONE SODIUM PHOSPHATE 6 MG: 10 INJECTION, SOLUTION INTRAMUSCULAR; INTRAVENOUS at 20:57

## 2021-12-24 RX ADMIN — ENOXAPARIN SODIUM 40 MG: 100 INJECTION SUBCUTANEOUS at 20:56

## 2021-12-24 RX ADMIN — TAMSULOSIN HYDROCHLORIDE 0.4 MG: 0.4 CAPSULE ORAL at 20:57

## 2021-12-24 RX ADMIN — Medication 10 ML: at 21:00

## 2021-12-24 RX ADMIN — SODIUM CHLORIDE: 9 INJECTION, SOLUTION INTRAVENOUS at 16:49

## 2021-12-24 ASSESSMENT — PAIN SCALES - GENERAL
PAINLEVEL_OUTOF10: 0
PAINLEVEL_OUTOF10: 3

## 2021-12-24 ASSESSMENT — PAIN DESCRIPTION - PAIN TYPE: TYPE: CHRONIC PAIN

## 2021-12-24 ASSESSMENT — PAIN DESCRIPTION - FREQUENCY: FREQUENCY: CONTINUOUS

## 2021-12-24 ASSESSMENT — PAIN DESCRIPTION - LOCATION: LOCATION: BACK

## 2021-12-24 ASSESSMENT — PAIN DESCRIPTION - DESCRIPTORS: DESCRIPTORS: ACHING;CONSTANT;DISCOMFORT

## 2021-12-24 ASSESSMENT — PAIN DESCRIPTION - PROGRESSION: CLINICAL_PROGRESSION: NOT CHANGED

## 2021-12-24 ASSESSMENT — PAIN DESCRIPTION - ORIENTATION: ORIENTATION: LOWER

## 2021-12-24 ASSESSMENT — PAIN - FUNCTIONAL ASSESSMENT: PAIN_FUNCTIONAL_ASSESSMENT: PREVENTS OR INTERFERES SOME ACTIVE ACTIVITIES AND ADLS

## 2021-12-24 ASSESSMENT — PAIN DESCRIPTION - ONSET: ONSET: ON-GOING

## 2021-12-24 NOTE — ED PROVIDER NOTES
EKG:  Read by me in the absence of a cardiologist shows: Sinus rhythm, rate 77, RSR prime pattern in V1 and V2, nonspecific ST-T wave abnormality, motion artifact, poor R wave progression, prior EKG not available, axis left    I was available for consultation. I did not perform face-to-face evaluation. Please see SHEILA Hayward note for further information on this visit.          Pro Anthony MD  12/24/21 [id number]

## 2021-12-24 NOTE — ED PROVIDER NOTES
[facility] [facility]        Pt Name: Jacinto Rodney  MRN: [id number]  [place] 1940  Date of evaluation: 12/24/2021  Provider: Shon Perdomo PA-C  PCP: Sedrick Abbasi MD  Note Started: 11:51 AM EST       CARA. I have evaluated this patient. My supervising physician was available for consultation. CHIEF COMPLAINT       Chief Complaint   Patient presents with    Cough     chills and body aches, cough and CP       HISTORY OF PRESENT ILLNESS   (Location, Timing/Onset, Context/Setting, Quality, Duration, Modifying Factors, Severity, Associated Signs and Symptoms)  Note limiting factors. Chief Complaint: Chills, body aches, headache, cough, chest discomfort, recent Covid exposure    Jacinto Rodney is a 80 y.o. male who presents to the emergency department with a chief complaint of a 6-day history of cough, chest congestion, chest discomfort, general fatigue, body aches, chills, recent Covid exposure. He is not vaccinated for Covid. Believes he got a flu shot. 4 days ago was placed on steroids and Levaquin. States the cough is productive of green sputum. Denies diarrhea, bloody stool, dysuria, hematuria, abdominal pain. States he has had some change in his taste stating that everything tastes sweet. Denies any other symptoms. Nursing Notes were all reviewed and agreed with or any disagreements were addressed in the HPI. REVIEW OF SYSTEMS    (2-9 systems for level 4, 10 or more for level 5)     Review of Systems    Positives and Pertinent negatives as per HPI. Except as noted above in the ROS, all other systems were reviewed and negative. PAST MEDICAL HISTORY     Past Medical History:   Diagnosis Date    Glaucoma     Prostate enlargement          SURGICAL HISTORY   History reviewed. No pertinent surgical history.       CURRENTMEDICATIONS       Previous Medications    BACLOFEN (LIORESAL) 10 MG TABLET    Take 1 tablet by mouth 3 times daily    DORZOLAMIDE (TRUSOPT) 2 % OPHTHALMIC SOLUTION    Place 2 drops into both eyes 3 times daily    FINASTERIDE (PROSCAR) 5 MG TABLET    Take 5 mg by mouth daily    IBUPROFEN (ADVIL;MOTRIN) 200 MG TABLET    Take 200 mg by mouth every 6 hours as needed for Pain    LATANOPROST (XALATAN) 0.005 % OPHTHALMIC SOLUTION    Place 1 drop into both eyes nightly    TAMSULOSIN (FLOMAX) 0.4 MG CAPSULE    Take 0.4 mg by mouth daily. TIMOLOL (TIMOPTIC) 0.5 % (DAILY) SOLN OPHTHALMIC SOLUTION    1 drop daily         ALLERGIES     Patient has no known allergies. FAMILYHISTORY     History reviewed. No pertinent family history. SOCIAL HISTORY       Social History     Tobacco Use    Smoking status: Never Smoker    Smokeless tobacco: Never Used   Substance Use Topics    Alcohol use: No    Drug use: No       SCREENINGS             PHYSICAL EXAM    (up to 7 for level 4, 8 or more for level 5)     ED Triage Vitals   BP Temp Temp src Pulse Resp SpO2 Height Weight   12/24/21 1144 12/24/21 1144 -- 12/24/21 1144 12/24/21 1144 12/24/21 1147 -- --   106/64 97.7 °F (36.5 °C)  75 18 97 %         Physical Exam  Vitals and nursing note reviewed. Constitutional:       Appearance: He is well-developed. He is not diaphoretic. Comments: Chronically ill-appearing   HENT:      Head: Atraumatic. Nose: Nose normal.   Eyes:      General:         Right eye: No discharge. Left eye: No discharge. Cardiovascular:      Rate and Rhythm: Normal rate and regular rhythm. Heart sounds: No murmur heard. No friction rub. No gallop. Pulmonary:      Effort: Pulmonary effort is normal. No respiratory distress. Breath sounds: No stridor. No wheezing, rhonchi or rales. Abdominal:      General: Bowel sounds are normal. There is no distension. Palpations: Abdomen is soft. There is no mass. Tenderness: There is no abdominal tenderness. There is no guarding or rebound. Hernia: No hernia is present. Musculoskeletal:         General: No swelling. Normal range of motion. Cervical back: Normal range of motion. Skin:     General: Skin is warm and dry. Findings: No erythema or rash. Neurological:      Mental Status: He is alert and oriented to person, place, and time. Cranial Nerves: No cranial nerve deficit. Psychiatric:         Behavior: Behavior normal.         DIAGNOSTIC RESULTS   LABS:    Labs Reviewed   CBC WITH AUTO DIFFERENTIAL - Abnormal; Notable for the following components:       Result Value    Lymphocytes Absolute 0.6 (*)     All other components within normal limits    Narrative:     Performed at:  [facility] [address]   Phone [phone]   COMPREHENSIVE METABOLIC PANEL W/ REFLEX TO MG FOR LOW K - Abnormal; Notable for the following components:    Sodium 124 (*)     Chloride 89 (*)     Glucose 101 (*)     CREATININE <0.5 (*)     All other components within normal limits    Narrative:     Performed at:  [facility]  [facility]   Phone [phone]   RAPID INFLUENZA A/B ANTIGENS    Narrative:     Performed at:  [facility] [address]   Phone [phone]   TROPONIN    Narrative:     Performed at:  [facility]  [facility]   Phone [phone]   BRAIN NATRIURETIC PEPTIDE    Narrative:     Performed at:  [facility]  [facility]   Phone [phone]   LACTATE, SEPSIS    Narrative:     Performed at:  [facility]  [facility]   Phone [phone]   URINE RT REFLEX TO CULTURE   OSMOLALITY, URINE   SODIUM, URINE, RANDOM       When ordered only abnormal lab results are displayed.  All other labs were within normal range or not returned as of this dictation. EKG: When ordered, EKG's are interpreted by the Emergency Department Physician in the absence of a cardiologist.  Please see their note for interpretation of EKG. RADIOLOGY:   Non-plain film images such as CT, Ultrasound and MRI are read by the radiologist. Plain radiographic images are visualized and preliminarily interpreted by the ED Provider with the below findings:        Interpretation per the Radiologist below, if available at the time of this note:    XR CHEST (2 VW)   Final Result   No acute process. PROCEDURES   Unless otherwise noted below, none     Procedures    CRITICAL CARE TIME   N/A    CONSULTS: Dr. Oneal Severin and DIFFERENTIAL DIAGNOSIS/MDM:   Vitals:    Vitals:    12/24/21 1144 12/24/21 1147 12/24/21 1420   BP: 106/64  (!) 142/86   Pulse: 75  66   Resp: 18  18   Temp: 97.7 °F (36.5 °C)     SpO2:  97% 96%       Patient was given the following medications:  Medications   0.9 % sodium chloride bolus (0 mLs IntraVENous Stopped 12/24/21 1444)           Patient presented with progressively worsening general weakness, cough. Recent exposure to COVID-19. Currently on Levaquin and steroids. Has been having decreased oral intake. Son states that he is actually had to go into his room in the middle the night actually assist him to get to the bathroom is a bit unable to fully walk by himself. Has lymphopenia with hyponatremia with a sodium 124. Vitals are unremarkable. Chest x-ray imaging unremarkable. Suspect possible COVID-19 with hyponatremia. Was started on some IV fluids and urinalysis was added. Low suspicion for pulmonary embolus, pneumothorax or other emergent etiology. Do not believe IV antibiotics are warranted at this time. Was admitted to internal medicine. Stable time of admission. FINAL IMPRESSION      1. Hyponatremia    2. General weakness    3.  Acute upper respiratory infection          DISPOSITION/PLAN   DISPOSITION Admitted 12/24/2021 01:44:17 PM      PATIENT REFERRED TO:  No follow-up provider specified.     DISCHARGE MEDICATIONS:  New Prescriptions    No medications on file       DISCONTINUED MEDICATIONS:  Discontinued Medications    No medications on file              (Please note that portions of this note were completed with a voice recognition program.  Efforts were made to edit the dictations but occasionally words are mis-transcribed.)    Rahul Mcfadden PA-C (electronically signed)            Rahul Mcfadden PA-C  12/24/21 1527

## 2021-12-24 NOTE — ED TRIAGE NOTES
Per son, patient went to see sister 2 weeks ago, who is now covid +, has had symptoms for 6 days, productive cough, no appetite, complaints of dizziness, CP

## 2021-12-24 NOTE — PLAN OF CARE
Pt admitted from ED. 4 eyes completed. Pt noted to have pain with any movement. Pt has minimal padding on his buttocks and c/o pain with minimal movement. Home med's reviewed. Pt takes all med's at bedtime with exception of how his eye drops are ordered. Message sent to Dr. Benita Zabala regarding pain and possible pain med orders.      Noreen CLAYTONN, RN   [phone]

## 2021-12-24 NOTE — PLAN OF CARE
4 Eyes Skin Assessment     NAME:  Nicolas Spence  YOB: 1940  MEDICAL RECORD NUMBER:  [id number]    The patient is being assess for  Admission    I agree that 2 RN's have performed a thorough Head to Toe Skin Assessment on the patient. ALL assessment sites listed below have been assessed. Areas assessed by both nurses:    Head, Face, Ears, Shoulders, Back, Chest, Arms, Elbows, Hands, Sacrum. Buttock, Coccyx, Ischium and Legs. Feet and Heels        Does the Patient have a Wound?  Other healig scan on Left knee       Keagan Prevention initiated:  Yes   Wound Care Orders initiated:  No    Pressure Injury (Stage 3,4, Unstageable, DTI, NWPT, and Complex wounds) if present place consult order under [de-identified] No    New and Established Ostomies if present place consult order under : No      Nurse 1 eSignature: Electronically signed by Bita Mcguire RN on 12/24/21 at 5:22 PM EST    **SHARE this note so that the co-signing nurse is able to place an eSignature**    Nurse 2 eSignature: {Esignature:[id number]}

## 2021-12-24 NOTE — PLAN OF CARE
Results for Thom May (MRN [id number]) as of 12/24/2021 15:08   Ref. Range 12/24/2021 11:53   Sodium Latest Ref Range: 136 - 145 mmol/L 124 (L)       Results for Thom May (MRN [id number]) as of 12/24/2021 15:08   Ref. Range 12/24/2021 11:53   BUN Latest Ref Range: 7 - 20 mg/dL 15   Creatinine Latest Ref Range: 0.8 - 1.3 mg/dL <0.5 (L)       Results for Thom May (MRN [id number]) as of 12/24/2021 15:08   Ref. Range 12/24/2021 11:48   Rapid Influenza A Ag Latest Ref Range: Negative  Negative   Rapid Influenza B Ag Latest Ref Range: Negative  Negative   RAPID INFLUENZA A/B ANTIGENS Unknown Rpt     CXR 12/24/2021 (in ED)    Narrative   EXAMINATION:   TWO XRAY VIEWS OF THE CHEST       12/24/2021 12:18 pm       COMPARISON:   03/09/2021       HISTORY:   ORDERING SYSTEM PROVIDED HISTORY: cough   TECHNOLOGIST PROVIDED HISTORY:   Reason for exam:->cough   Reason for Exam: cough, lethargic; recent contact with +Covid relatives       FINDINGS:   The lungs are without acute focal process.  There is no effusion or   pneumothorax. The cardiomediastinal silhouette is stable. The osseous   structures are stable.  Thoracic scoliosis convex right again noted.           Impression   No acute process.      Isabel CLAYTONN, RN   [phone]

## 2021-12-24 NOTE — H&P
History and Physical  Dr. Sandhya Love  12/24/2021    PCP: Caio Acharya MD    Cc:   Chief Complaint   Patient presents with    Cough     chills and body aches, cough and CP       HPI:  Eron Green is a 80 y.o. male who has a past medical history of Glaucoma and Prostate enlargement. Patient presents with Hyponatremia. HPI  (1-3 for expanded problem focused, ?4 for detailed/comprehensive)     80 y.o. male who presents to the emergency department with a chief complaint of a 6-day history of cough, chest congestion, chest discomfort, general fatigue, body aches, chills, recent Covid exposure. He is not vaccinated for Covid. Believes he got a flu shot. 4 days ago was placed on steroids and Levaquin. States the cough is productive of green sputum. Denies diarrhea, bloody stool, dysuria, hematuria, abdominal pain. States he has had some change in his taste stating that everything tastes sweet. Denies any other symptoms. Given presentation, covid is highly suspected  Swab done in ER  Patient to be admitted to covid floor, placed in islation  Does not have o2 requirement in ER    Problem list of hospitalization thus far: Active Hospital Problems    Diagnosis     Hyponatremia [E87.1]     Suspected COVID-19 virus infection [Z20.822]     HTN (hypertension) [I10]     BPH (benign prostatic hyperplasia) [N40.0]          Review of Systems: (1 system for EPF, 2-9 for detailed, 10+ for comprehensive)  Constitutional: Negative for  fever.   +chills  +fatigue  HENT: Negative for dental problem, nosebleeds and rhinorrhea. Eyes: Negative for photophobia and visual disturbance. Respiratory: positive for cough, chest tightness and shortness of breath. Cardiovascular: Negative for chest pain and leg swelling. Gastrointestinal: Negative for diarrhea, nausea and vomiting. Endocrine: Negative for polydipsia and polyphagia. Genitourinary: Negative for frequency, hematuria and urgency.      Musculoskeletal: Negative for back pain and positive for myalgias. Skin: Negative for rash. Allergic/Immunologic: Negative for food allergies. Neurological: Negative for dizziness, seizures, syncope and facial asymmetry. Hematological: Negative for adenopathy. Psychiatric/Behavioral: Negative for dysphoric mood. The patient is not nervous/anxious. Past Medical History:   Past Medical History:   Diagnosis Date    Glaucoma     Prostate enlargement        Past Surgical History: History reviewed. No pertinent surgical history. Social History:   Social History     Tobacco History     Smoking Status  Never Smoker    Smokeless Tobacco Use  Never Used          Alcohol History     Alcohol Use Status  No          Drug Use     Drug Use Status  No          Sexual Activity     Sexually Active  Not Asked                Fam History: History reviewed. No pertinent family history. PFSH: The above PMHx, PSHx, SocHx, FamHx has been reviewed by myself. (1 area for detailed, 2-3 for comprehensive)      Code Status: Prior    Meds  following list of home medications fromelectronic chart has been reviewed by myself  Prior to Admission medications    Medication Sig Start Date End Date Taking? Authorizing Provider   tamsulosin (FLOMAX) 0.4 MG capsule Take 0.4 mg by mouth daily.      Yes Historical Provider, MD   baclofen (LIORESAL) 10 MG tablet Take 1 tablet by mouth 3 times daily 3/12/21   Richard Trinidad MD   Timolol (TIMOPTIC) 0.5 % (DAILY) SOLN ophthalmic solution 1 drop daily    Historical Provider, MD   dorzolamide (TRUSOPT) 2 % ophthalmic solution Place 2 drops into both eyes 3 times daily    Historical Provider, MD   latanoprost (XALATAN) 0.005 % ophthalmic solution Place 1 drop into both eyes nightly    Historical Provider, MD   finasteride (PROSCAR) 5 MG tablet Take 5 mg by mouth daily    Historical Provider, MD   ibuprofen (ADVIL;MOTRIN) 200 MG tablet Take 200 mg by mouth every 6 hours as needed for Pain Historical Provider, MD         No Known Allergies          EXAM: (2-7 system for EPF/Detailed, ?8 for Comprehensive)  /64   Pulse 75   Temp 97.7 °F (36.5 °C)   Resp 18   SpO2 97%   Constitutional: vitals as above: alert, appears stated age and cooperative  , cachetic  Psychiatric: normal insight and judgment, oriented to person, place, time, and general circumstances    Head: Normocephalic, without obvious abnormality, atraumatic    Eyes:lids and lashes normal, conjunctivae and sclerae normal and pupils equal, round, reactive to light and accomodation    EMNT: external ears normal, nares midline    Neck: no carotid bruit, supple, symmetrical, trachea midline and thyroid not enlarged, symmetric, no tenderness/mass/nodules     Respiratory: crackles bilaterally with normal respiratory effort    Cardiovascular: normal rate, regular rhythm, normal S1 and S2 and no murmurs    Gastrointestinal: soft, non-tender, non-distended, normal bowel sounds, no masses or organomegaly    Extremities: no clubbing, no edema    Skin:No rashes or nodules noted.     Neurologic:negative         LABS:  Labs Reviewed   CBC WITH AUTO DIFFERENTIAL - Abnormal; Notable for the following components:       Result Value    Lymphocytes Absolute 0.6 (*)     All other components within normal limits    Narrative:     Performed at:  [facility] [address], [place], [facility]   Phone [phone]   COMPREHENSIVE METABOLIC PANEL W/ REFLEX TO MG FOR LOW K - Abnormal; Notable for the following components:    Sodium 124 (*)     Chloride 89 (*)     Glucose 101 (*)     CREATININE <0.5 (*)     All other components within normal limits    Narrative:     Performed at:  [facility] [address], [place], [address]   Phone [phone]   RAPID INFLUENZA A/B ANTIGENS    Narrative:     Performed at:  [facility] [address], [place], [place] [address]   Phone [phone]   TROPONIN    Narrative:     Performed at:  [facility]  [address] [facility],  [place], [address] [place], [place] [address]   Phone [phone]   BRAIN NATRIURETIC PEPTIDE    Narrative:     Performed at:  [facility]  [address] [facility],  [place], [address] [facility]   Phone [phone]   LACTATE, SEPSIS    Narrative:     Performed at:  [facility]  [address] [facility],  [place], [address] [facility]   Phone [phone]   URINE RT REFLEX TO CULTURE         IMAGING:  Imaging results from the ER have been reviewed in the computerized chart. XR CHEST (2 VW)    Result Date: 12/24/2021  EXAMINATION: TWO XRAY VIEWS OF THE CHEST 12/24/2021 12:18 pm COMPARISON: 03/09/2021 HISTORY: ORDERING SYSTEM PROVIDED HISTORY: cough TECHNOLOGIST PROVIDED HISTORY: Reason for exam:->cough Reason for Exam: cough, lethargic; recent contact with +Covid relatives FINDINGS: The lungs are without acute focal process. There is no effusion or pneumothorax. The cardiomediastinal silhouette is stable. The osseous structures are stable. Thoracic scoliosis convex right again noted. No acute process. EKG:   EKG from ER, reviewed by self  it shows normal sinus rhythm at 77  Old chart reviewed, EKG dated 2020 from office chart is reviewed, there is not difference noted. Old study shows sinus rhythm at 74    Lab Results   Component Value Date    GLUCOSE 101 12/24/2021     No results found for: POCGLU  /64   Pulse 75   Temp 97.7 °F (36.5 °C)   Resp 18   SpO2 97%     MEDICAL DECISION MAKING:    Principal Problem:    Hyponatremia -New Problem to me. Pt with reported poor po intake, Na 124  Plan: admit, ivf, recheck labs in am  Active Problems:    HTN (hypertension) -Established problem. Stable. 106/64  Plan: Pt home BP meds reviewed and will be continued. IV Hydralazine ordered for control of extremely high blood pressures.    Will monitor labs to assess Creat/K for possible complications of medications. BPH (benign prostatic hyperplasia) -Established problem. Stable. Plan: cont on home meds    Suspected COVID-19 virus infection -New Problem to me. Plan: place in droplet plus isolation, swab result pending          Diagnoses as listed above, designated as new or established and plan outlined for each. Data Reviewed:   (1) Lab tests were reviewed or ordered. (1) Radiology tests were reviewed or ordered. (1) Medical test (Echo, EKG, PFT/veronica) were ordered. (1)History was not obtained from someone other than patient  (1) Old records were reviewed - see HPI/MDM for pertinent details if review done. (2) Case was discussed with another health care provider: MANAS SOSA  (2) Imaging was viewed by myself. (2) EKG  was viewed by myself. The patient is being placed in inpatient status with the expectation of requiring a hospital stay spanning at least two midnights for care and treatment of the problems noted in the problem list.  This determination is also based on thepatients comorbidities and past medical history, the severity and timing of the signs and symptoms upon presentation.     (Please note that portions of this note were completed with a voice recognition program.  Efforts were made to edit the dictations but occasionally words are mis-transcribed.)      Electronically signed by: Thania Baltazar MD 12/24/2021        '

## 2021-12-25 LAB
A/G RATIO: 1.3 (ref 1.1–2.2)
ALBUMIN SERPL-MCNC: 3.1 G/DL (ref 3.4–5)
ALP BLD-CCNC: 75 U/L (ref 40–129)
ALT SERPL-CCNC: 11 U/L (ref 10–40)
ANION GAP SERPL CALCULATED.3IONS-SCNC: 9 MMOL/L (ref 3–16)
AST SERPL-CCNC: 14 U/L (ref 15–37)
BASOPHILS ABSOLUTE: 0 K/UL (ref 0–0.2)
BASOPHILS RELATIVE PERCENT: 0 %
BILIRUB SERPL-MCNC: 0.7 MG/DL (ref 0–1)
BILIRUBIN URINE: NEGATIVE
BLOOD, URINE: NEGATIVE
BUN BLDV-MCNC: 13 MG/DL (ref 7–20)
C-REACTIVE PROTEIN: <3 MG/L (ref 0–5.1)
CALCIUM SERPL-MCNC: 8.5 MG/DL (ref 8.3–10.6)
CHLORIDE BLD-SCNC: 96 MMOL/L (ref 99–110)
CLARITY: CLEAR
CO2: 23 MMOL/L (ref 21–32)
COLOR: YELLOW
CREAT SERPL-MCNC: <0.5 MG/DL (ref 0.8–1.3)
D DIMER: 516 NG/ML DDU (ref 0–229)
EKG ATRIAL RATE: 77 BPM
EKG DIAGNOSIS: NORMAL
EKG P AXIS: 82 DEGREES
EKG P-R INTERVAL: 176 MS
EKG Q-T INTERVAL: 382 MS
EKG QRS DURATION: 98 MS
EKG QTC CALCULATION (BAZETT): 432 MS
EKG R AXIS: -79 DEGREES
EKG T AXIS: 81 DEGREES
EKG VENTRICULAR RATE: 77 BPM
EOSINOPHILS ABSOLUTE: 0 K/UL (ref 0–0.6)
EOSINOPHILS RELATIVE PERCENT: 0.1 %
GFR AFRICAN AMERICAN: >60
GFR NON-AFRICAN AMERICAN: >60
GLUCOSE BLD-MCNC: 123 MG/DL (ref 70–99)
GLUCOSE URINE: NEGATIVE MG/DL
HCT VFR BLD CALC: 40.1 % (ref 40.5–52.5)
HEMOGLOBIN: 13.7 G/DL (ref 13.5–17.5)
KETONES, URINE: NEGATIVE MG/DL
LEUKOCYTE ESTERASE, URINE: NEGATIVE
LYMPHOCYTES ABSOLUTE: 0.3 K/UL (ref 1–5.1)
LYMPHOCYTES RELATIVE PERCENT: 11.1 %
MCH RBC QN AUTO: 32 PG (ref 26–34)
MCHC RBC AUTO-ENTMCNC: 34.1 G/DL (ref 31–36)
MCV RBC AUTO: 93.9 FL (ref 80–100)
MICROSCOPIC EXAMINATION: NORMAL
MONOCYTES ABSOLUTE: 0.2 K/UL (ref 0–1.3)
MONOCYTES RELATIVE PERCENT: 7.8 %
NEUTROPHILS ABSOLUTE: 2.2 K/UL (ref 1.7–7.7)
NEUTROPHILS RELATIVE PERCENT: 81 %
NITRITE, URINE: NEGATIVE
OSMOLALITY URINE: 265 MOSM/KG (ref 390–1070)
PDW BLD-RTO: 13.4 % (ref 12.4–15.4)
PH UA: 5.5 (ref 5–8)
PLATELET # BLD: 309 K/UL (ref 135–450)
PMV BLD AUTO: 6.5 FL (ref 5–10.5)
POTASSIUM REFLEX MAGNESIUM: 4.4 MMOL/L (ref 3.5–5.1)
PROCALCITONIN: 0.02 NG/ML (ref 0–0.15)
PROTEIN UA: NEGATIVE MG/DL
RBC # BLD: 4.27 M/UL (ref 4.2–5.9)
SARS-COV-2: DETECTED
SODIUM BLD-SCNC: 128 MMOL/L (ref 136–145)
SODIUM URINE: 33 MMOL/L
SPECIFIC GRAVITY UA: 1.01 (ref 1–1.03)
TOTAL PROTEIN: 5.5 G/DL (ref 6.4–8.2)
TROPONIN: <0.01 NG/ML
URINE REFLEX TO CULTURE: NORMAL
URINE TYPE: NORMAL
UROBILINOGEN, URINE: 1 E.U./DL
WBC # BLD: 2.8 K/UL (ref 4–11)

## 2021-12-25 PROCEDURE — 97166 OT EVAL MOD COMPLEX 45 MIN: CPT

## 2021-12-25 PROCEDURE — 93010 ELECTROCARDIOGRAM REPORT: CPT | Performed by: INTERNAL MEDICINE

## 2021-12-25 PROCEDURE — 97530 THERAPEUTIC ACTIVITIES: CPT

## 2021-12-25 PROCEDURE — 84145 PROCALCITONIN (PCT): CPT

## 2021-12-25 PROCEDURE — 86140 C-REACTIVE PROTEIN: CPT

## 2021-12-25 PROCEDURE — 2580000003 HC RX 258: Performed by: INTERNAL MEDICINE

## 2021-12-25 PROCEDURE — 84484 ASSAY OF TROPONIN QUANT: CPT

## 2021-12-25 PROCEDURE — 94760 N-INVAS EAR/PLS OXIMETRY 1: CPT

## 2021-12-25 PROCEDURE — 80053 COMPREHEN METABOLIC PANEL: CPT

## 2021-12-25 PROCEDURE — 36415 COLL VENOUS BLD VENIPUNCTURE: CPT

## 2021-12-25 PROCEDURE — 6360000002 HC RX W HCPCS: Performed by: INTERNAL MEDICINE

## 2021-12-25 PROCEDURE — 85379 FIBRIN DEGRADATION QUANT: CPT

## 2021-12-25 PROCEDURE — 85025 COMPLETE CBC W/AUTO DIFF WBC: CPT

## 2021-12-25 PROCEDURE — 97162 PT EVAL MOD COMPLEX 30 MIN: CPT

## 2021-12-25 PROCEDURE — 6370000000 HC RX 637 (ALT 250 FOR IP): Performed by: INTERNAL MEDICINE

## 2021-12-25 PROCEDURE — 1200000000 HC SEMI PRIVATE

## 2021-12-25 PROCEDURE — 93005 ELECTROCARDIOGRAM TRACING: CPT | Performed by: INTERNAL MEDICINE

## 2021-12-25 RX ORDER — DEXAMETHASONE SODIUM PHOSPHATE 10 MG/ML
6 INJECTION, SOLUTION INTRAMUSCULAR; INTRAVENOUS EVERY 24 HOURS
Status: DISCONTINUED | OUTPATIENT
Start: 2021-12-25 | End: 2021-12-26

## 2021-12-25 RX ADMIN — HYDROCODONE BITARTRATE AND ACETAMINOPHEN 1 TABLET: 5; 325 TABLET ORAL at 03:20

## 2021-12-25 RX ADMIN — SODIUM CHLORIDE: 9 INJECTION, SOLUTION INTRAVENOUS at 17:31

## 2021-12-25 RX ADMIN — TAMSULOSIN HYDROCHLORIDE 0.4 MG: 0.4 CAPSULE ORAL at 09:09

## 2021-12-25 RX ADMIN — HYDROCODONE BITARTRATE AND ACETAMINOPHEN 1 TABLET: 5; 325 TABLET ORAL at 11:28

## 2021-12-25 RX ADMIN — Medication 10 ML: at 21:15

## 2021-12-25 RX ADMIN — ENOXAPARIN SODIUM 40 MG: 100 INJECTION SUBCUTANEOUS at 21:15

## 2021-12-25 RX ADMIN — ACETAMINOPHEN 650 MG: 325 TABLET ORAL at 09:09

## 2021-12-25 RX ADMIN — Medication 10 ML: at 09:10

## 2021-12-25 RX ADMIN — DEXAMETHASONE SODIUM PHOSPHATE 6 MG: 10 INJECTION, SOLUTION INTRAMUSCULAR; INTRAVENOUS at 09:09

## 2021-12-25 RX ADMIN — SODIUM CHLORIDE: 9 INJECTION, SOLUTION INTRAVENOUS at 09:13

## 2021-12-25 RX ADMIN — HYDROCODONE BITARTRATE AND ACETAMINOPHEN 1 TABLET: 5; 325 TABLET ORAL at 21:15

## 2021-12-25 ASSESSMENT — PAIN DESCRIPTION - LOCATION
LOCATION: LEG
LOCATION: CHEST
LOCATION: BACK;NECK

## 2021-12-25 ASSESSMENT — PAIN DESCRIPTION - FREQUENCY: FREQUENCY: CONTINUOUS

## 2021-12-25 ASSESSMENT — PAIN DESCRIPTION - PROGRESSION: CLINICAL_PROGRESSION: NOT CHANGED

## 2021-12-25 ASSESSMENT — PAIN SCALES - WONG BAKER
WONGBAKER_NUMERICALRESPONSE: 6
WONGBAKER_NUMERICALRESPONSE: 6

## 2021-12-25 ASSESSMENT — PAIN DESCRIPTION - PAIN TYPE
TYPE: CHRONIC PAIN
TYPE: ACUTE PAIN
TYPE: ACUTE PAIN

## 2021-12-25 ASSESSMENT — PAIN SCALES - GENERAL
PAINLEVEL_OUTOF10: 6
PAINLEVEL_OUTOF10: 10
PAINLEVEL_OUTOF10: 10
PAINLEVEL_OUTOF10: 6
PAINLEVEL_OUTOF10: 0
PAINLEVEL_OUTOF10: 10

## 2021-12-25 ASSESSMENT — PAIN DESCRIPTION - DESCRIPTORS
DESCRIPTORS: ACHING;CONSTANT
DESCRIPTORS: ACHING

## 2021-12-25 ASSESSMENT — PAIN DESCRIPTION - ORIENTATION
ORIENTATION: LEFT
ORIENTATION: LEFT

## 2021-12-25 ASSESSMENT — PAIN DESCRIPTION - ONSET: ONSET: ON-GOING

## 2021-12-25 NOTE — PROGRESS NOTES
VSS. Patient alert, resting in bed. Head to toe assessment completed. Scheduled medications given. Patient denies being in pain at this time. Bed locked and in low position. Bed alarm engaged. No other needs expressed at this time.

## 2021-12-25 NOTE — PROGRESS NOTES
Physical Therapy    Facility/Department: [address] ONCOLOGY  Initial Assessment    NAME: Meche Linn  : 1940  MRN: [id number]    Date of Service: 2021    Discharge Recommendations:  Meche Linn scored a 7/24 on the AM-PAC short mobility form. Current research shows that an AM-PAC score of 17 or less is typically not associated with a discharge to the patient's home setting. Based on the patient's AM-PAC score and their current functional mobility deficits, it is recommended that the patient have 3-5 sessions per week of Physical Therapy at d/c to increase the patient's independence. Please see assessment section for further patient specific details. If patient discharges prior to next session this note will serve as a discharge summary. Please see below for the latest assessment towards goals. 3-5 sessions per week   PT Equipment Recommendations  Equipment Needed: No    Assessment   Body structures, Functions, Activity limitations: Decreased functional mobility ; Decreased endurance;Decreased strength;Decreased balance  Assessment: Patient not at baseline function and wuold benefit from skilled PT to address above deficits and facilitate return to baseline function. Limited session due to chest pain.  Will continue to assess mobility status and appropriate d/c recommendations as medical status improves  Treatment Diagnosis: decreased functional mobility, impaired gait  Prognosis: Fair  Decision Making: Medium Complexity  Clinical Presentation: evolving  PT Education: Plan of Care;Goals;PT Role  Patient Education: limited ability to verbalize understanding (patient very [place]) - recommend reinforcement  Barriers to Learning: hearing  REQUIRES PT FOLLOW UP: Yes  Activity Tolerance  Activity Tolerance: Patient limited by pain  Activity Tolerance: SpO2 remained in mid-high 90's during session, but did drop to mid 80's with coughing episode - recovered following coughing - nursing aware Patient Diagnosis(es): The primary encounter diagnosis was Hyponatremia. Diagnoses of General weakness and Acute upper respiratory infection were also pertinent to this visit. has a past medical history of Glaucoma and Prostate enlargement. has no past surgical history on file. Restrictions  Restrictions/Precautions  Restrictions/Precautions: Fall Risk (high fall risk)  Required Braces or Orthoses?: No  Position Activity Restriction  Other position/activity restrictions: Lillie Nichols is a 80 y.o. male who presents to the emergency department with a chief complaint of a 6-day history of cough, chest congestion, chest discomfort, general fatigue, body aches, chills, recent Covid exposure. He is not vaccinated for Covid. Believes he got a flu shot. 4 days ago was placed on steroids and Levaquin. States the cough is productive of green sputum. Denies diarrhea, bloody stool, dysuria, hematuria, abdominal pain. States he has had some change in his taste stating that everything tastes sweet. Denies any other symptoms. Found to have COVID-19  Vision/Hearing  Vision: Impaired  Vision Exceptions: Wears glasses at all times (reports poor vision despite glasses (unable to see nurse button on call light - requested nursing to find soft touch call light))  Hearing: Exceptions to [facility]  Hearing Exceptions: No hearing aid;Hard of hearing/hearing concerns (significantly [place], appears to be able to hear slightly better out of R ear)     Subjective  General  Chart Reviewed: Yes  Family / Caregiver Present: No  Diagnosis: acute upper respiratory infection  Follows Commands: Within Functional Limits (difficulty due to [facility])  General Comment  Comments: supine in bed upon arrival with alarm on  Subjective  Subjective: Initially denied pain, however throughout session patient reported increasing L sided chest pain. Moaning with pain. Nursing alerted immediately.   Pain Screening  Patient Currently in Pain: Other (comment) (eyes closed)          Orientation  Orientation  Overall Orientation Status:  (difficult to assess due to [facility])  Social/Functional History  Social/Functional History  Lives With: Alone  Type of Home: House  Home Layout: One level  Home Access: Level entry  Bathroom Shower/Tub: Walk-in shower,Shower chair with back  Bathroom Toilet: Handicap height  Bathroom Equipment: Grab bars around toilet  Receives Help From: Family (states son checks in frequently)  ADL Assistance: Independent  Homemaking Responsibilities: Yes  Ambulation Assistance: Independent  Transfer Assistance: Independent  Active : No  Patient's  Info: Caleb Grissom drives  Additional Comments: Difficult to obtain full history on this date due to [facility] and chest pain - information supplemented from previous admission    Objective          AROM RLE (degrees)  RLE AROM: WFL (noted spontaneously in bed)  AROM LLE (degrees)  LLE AROM : WFL (noted spontaneously in bed)  Strength Other  Other: unable to formally assess due to chest pain        Bed mobility  Rolling to Left: Maximum assistance  Rolling to Right: Maximum assistance  Comment: rolling performed for repositioning, moaning with pain with rolling, holding L side of chest- session terminated and nursing notified immediately                    Plan   Plan  Times per week: 3-5  Times per day: Daily  Current Treatment Recommendations: Strengthening,Balance Training,Functional Mobility Training,Transfer [employer]  Type of devices:  All fall risk precautions in place,Bed alarm in place,Call light within reach,Left in bed,Nurse notified,Patient at risk for falls  Restraints  Initially in place: No    AM-PAC Score  AM-PAC Inpatient Mobility Raw Score : 7 (12/25/21 1035)  AM-PAC Inpatient T-Scale Score : 26.42 (12/25/21 1035)  Mobility Inpatient CMS 0-100% Score: 92.36 (12/25/21 1035)  Mobility Inpatient CMS G-Code Modifier : CM (12/25/21 1035)          Goals  Short term goals  Time Frame for Short term goals:  To be met prior to discharge  Short term goal 1: Bed mobility with min A  Short term goal 2: Sit to/from stand with min A  Short term goal 3: Ambulate 13' with AAD and min A  Patient Goals   Patient goals : unable to state       Therapy Time   Individual Concurrent Group Co-treatment   Time In 0832         Time Out 0903         Minutes 31         Timed Code Treatment Minutes: Digna Petersen, PT    Thanks, Se Iverson, PT, DPT [id number]

## 2021-12-25 NOTE — PROGRESS NOTES
General weakness and Acute upper respiratory infection were also pertinent to this visit. has a past medical history of Glaucoma and Prostate enlargement. has no past surgical history on file. Treatment Diagnosis: Decreased functional status secondary to COVID-19      Restrictions  Restrictions/Precautions  Restrictions/Precautions: Fall Risk (high fall risk)  Required Braces or Orthoses?: No  Position Activity Restriction  Other position/activity restrictions: Merry Emery is a 80 y.o. male who presents to the emergency department with a chief complaint of a 6-day history of cough, chest congestion, chest discomfort, general fatigue, body aches, chills, recent Covid exposure. He is not vaccinated for Covid. Believes he got a flu shot. 4 days ago was placed on steroids and Levaquin. States the cough is productive of green sputum. Denies diarrhea, bloody stool, dysuria, hematuria, abdominal pain. States he has had some change in his taste stating that everything tastes sweet. Denies any other symptoms. Found to have COVID-19    Subjective   General  Chart Reviewed: Yes  Patient assessed for rehabilitation services?: Yes  Family / Caregiver Present: No  Diagnosis: acute upper respiratory infection, COVID-19  Subjective  Subjective: Pt supine in bed upon arrival; agreeable to eval. Very [place] with c/o chest pain; moaning and placing hands over L chest. Did not quantify pain numerically. RN notified.   Patient Currently in Pain: Yes  Pain Assessment  Pain Assessment: Faces  Pain Level: 10  Siegel-Baker Pain Rating: Hurts even more  Pain Type: Acute pain  Pain Location: Chest  Pain Orientation: Left  Pre Treatment Pain Screening  Intervention List: Nurse/Physician notified  Vital Signs  Resp: 18  BP: 136/84  MAP (mmHg): (!) 64  Patient Currently in Pain: Yes  Oxygen Therapy  SpO2: 92 %  Pulse Oximeter Device Mode: Intermittent  Pulse Oximeter Device Location: Finger  O2 Device: None (Room air)  Social/Functional History  Social/Functional History  Lives With: Alone  Type of Home: House  Home Layout: One level  Home Access: Level entry  Bathroom Shower/Tub: Walk-in shower,Shower chair with back  Bathroom Toilet: Handicap height  Bathroom Equipment: Grab bars around toilet  Receives Help From: Family (states son checks in frequently)  ADL Assistance: Independent  Homemaking Responsibilities: Yes  Ambulation Assistance: Independent  Transfer Assistance: Independent  Active : No  Patient's  Info: Kate Collins drives  Additional Comments: Difficult to obtain full history on this date due to [facility] and chest pain - information supplemented from previous admission       Objective   Vision: Impaired  Vision Exceptions: Wears glasses at all times (reports poor vision despite glasses (unable to see nurse button on call light - requested nursing to find soft touch call light))  Hearing: Exceptions to [facility]  Hearing Exceptions: No hearing aid;Hard of hearing/hearing concerns (significantly [place], appears to be able to hear slightly better out of R ear)    Orientation  Overall Orientation Status: Within Functional Limits     Standing Balance  Comment: Deferred due to chest pain with RN ordering EKG  ADL  Additional Comments: Pt declined ADLs; brief noted to be clean and dry.   Tone RUE  RUE Tone: Normotonic  Tone LUE  LUE Tone: Normotonic  Coordination  Movements Are Fluid And Coordinated: Yes     Bed mobility  Rolling to Left: Maximum assistance  Rolling to Right: Maximum assistance  Comment: rolling performed for repositioning, moaning with pain with rolling, holding L side of chest- session terminated and nursing notified immediately        Cognition  Overall Cognitive Status: Exceptions  Arousal/Alertness: Inconsistent responses to stimuli  Following Commands: Inconsistently follows commands  Attention Span: Difficulty attending to directions  Memory: Appears intact  Initiation: Requires cues for some  Sequencing: Requires cues for some  Cognition Comment: Likely impacted by [facility] and significant pain on this date as pt perseverating on chest pain- RN notified and EKG ordered        Sensation  Overall Sensation Status:  (did not assess)        LUE AROM (degrees)  LUE AROM : WFL  Left Hand AROM (degrees)  Left Hand AROM: WFL  RUE AROM (degrees)  RUE AROM : WFL  RUE General AROM: decreased bilateral shoulder flexion, possibly due to pain on this date  Right Hand AROM (degrees)  Right Hand AROM: WFL                      Plan   Plan  Times per week: 3-5  Specific instructions for Next Treatment: cotx  Current Treatment Recommendations: Strengthening,Balance Training,Functional Mobility Training,Endurance Training,Self-Care / ADL,Pain Management    G-Code     OutComes Score                                                  AM-PAC Score        AM-[facility] Inpatient Daily Activity Raw Score: 9 (12/25/21 1059)  AM-PAC Inpatient ADL T-Scale Score : 25.33 (12/25/21 1059)  ADL Inpatient CMS 0-100% Score: 79.59 (12/25/21 1059)  ADL Inpatient CMS G-Code Modifier : CL (12/25/21 1059)    Goals  Short term goals  Time Frame for Short term goals: d/c  Short term goal 1: Pt will complete bed mobility min A. Short term goal 2: Pt will complete functional mobility min A. Short term goal 3: Pt will complete functional transfer min A. Short term goal 4: Pt will complete UB ADLs with supervision. Short term goal 5: Pt will complete LB ADLs min A.   Patient Goals   Patient goals : did not state on this date       Therapy Time   Individual Concurrent Group Co-treatment   Time In 0832         Time Out 0903         Minutes 31         Timed Code Treatment Minutes: Karla We-07-A 1498, Francisca Salazar 126

## 2021-12-25 NOTE — PROGRESS NOTES
Progress Note - Dr. Owen Glaser - Internal Medicine  PCP: Margaret Abreu MD [id number] / [place] [phone]    Hospital Day: 1  Code Status: Full Code  Current Diet: ADULT DIET; Regular        CC: follow up on medical issues    Subjective:   Cecille [place] is a 80 y.o. male. Pt seen and examined  Chart reviewed since last visit, labs and imaging below        Pt remains confused  covid test is positive  So far, no o2 requirement, but he is dyspneic when I interview him    He denies chest pain, complains of shortness of breath, denies nausea,  denies emesis. 10 system Review of Systems is reviewed with patient, and pertinent positives are noted in HPI above . Otherwise, Review of systems is negative. I have reviewed the patient's medical and social history in detail and updated the computerized patient record. To recap: He  has a past medical history of Glaucoma and Prostate enlargement. . He  has no past surgical history on file. Jasmine Ornelas He  reports that he has never smoked. He has never used smokeless tobacco. He reports that he does not drink alcohol and does not use drugs. .        Active Hospital Problems    Diagnosis Date Noted    Hyponatremia [E87.1] 2021    Suspected COVID-19 virus infection [Z20.822] 2021    HTN (hypertension) [I10] 2021    BPH (benign prostatic hyperplasia) [N40.0] 2021       Current Facility-Administered Medications: tamsulosin (FLOMAX) capsule 0.4 mg, 0.4 mg, Oral, Daily  0.9 % sodium chloride infusion, , IntraVENous, Continuous  sodium chloride flush 0.9 % injection 5-40 mL, 5-40 mL, IntraVENous, 2 times per day  sodium chloride flush 0.9 % injection 5-40 mL, 5-40 mL, IntraVENous, PRN  0.9 % sodium chloride infusion, 25 mL, IntraVENous, PRN  potassium chloride (KLOR-CON M) extended release tablet 40 mEq, 40 mEq, Oral, PRN **OR** potassium bicarb-citric acid (EFFER-K) effervescent tablet 40 mEq, 40 mEq, Oral, PRN **OR** potassium chloride 10 mEq/100 mL IVPB (Peripheral Line), 10 mEq, IntraVENous, PRN  enoxaparin (LOVENOX) injection 40 mg, 40 mg, SubCUTAneous, Nightly  ondansetron (ZOFRAN-ODT) disintegrating tablet 4 mg, 4 mg, Oral, Q8H PRN **OR** ondansetron (ZOFRAN) injection 4 mg, 4 mg, IntraVENous, Q6H PRN  magnesium hydroxide (MILK OF MAGNESIA) 400 MG/5ML suspension 30 mL, 30 mL, Oral, Daily PRN  acetaminophen (TYLENOL) tablet 650 mg, 650 mg, Oral, Q6H PRN **OR** acetaminophen (TYLENOL) suppository 650 mg, 650 mg, Rectal, Q6H PRN  hydrALAZINE (APRESOLINE) injection 10 mg, 10 mg, IntraVENous, Q6H PRN  0.9 % sodium chloride bolus, 500 mL, IntraVENous, PRN  dexamethasone (PF) (DECADRON) injection 6 mg, 6 mg, IntraVENous, Daily  HYDROcodone-acetaminophen (NORCO) 5-325 MG per tablet 1 tablet, 1 tablet, Oral, Q6H PRN         Objective:  /84   Pulse 61   Temp 97.4 °F (36.3 °C) (Temporal)   Resp 18   Ht 5' 10\" (1.778 m)   Wt 108 lb 11.2 oz (49.3 kg)   SpO2 92%   BMI 15.60 kg/m²      Patient Vitals for the past 24 hrs:   BP Temp Temp src Pulse Resp SpO2 Height Weight   12/25/21 0951     18 92 %     12/25/21 0914 136/84     94 %     12/25/21 0708 114/77 97.4 °F (36.3 °C) Temporal 61 18 98 %     12/25/21 0325 119/80 97.2 °F (36.2 °C) Temporal  18 95 %     12/24/21 2316 139/80 97.4 °F (36.3 °C) Temporal 61 18 97 %     12/24/21 1842 (!) 147/97 97.6 °F (36.4 °C) Axillary 82 18 94 %     12/24/21 1600 (!) 151/87 97.8 °F (36.6 °C) Oral 63 18 95 % 5' 10\" (1.778 m) 108 lb 11.2 oz (49.3 kg)   12/24/21 1420 (!) 142/86   66 18 96 %     12/24/21 1147      97 %     12/24/21 1144 106/64 97.7 °F (36.5 °C)  75 18        Patient Vitals for the past 96 hrs (Last 3 readings):   Weight   12/24/21 1600 108 lb 11.2 oz (49.3 kg)           Intake/Output Summary (Last 24 hours) at 12/25/2021 0952  Last data filed at 12/24/2021 2332  Gross per 24 hour   Intake 100 ml   Output 750 ml   Net -650 ml         Physical Exam:   Vitals as above  General appearance: alert, appears stated age and cooperative    Head: Normocephalic, without obvious abnormality, atraumatic    Lungs: crackles bilaterally    Heart: regular rate and rhythm, S1, S2 normal, no murmur    Abdomen: soft, non-tender; bowel sounds normal; no masses, no organomegaly    Extremities: extremities normal, atraumatic, no cyanosis, no edema      Labs:  Lab Results   Component Value Date    WBC 2.8 (L) 12/25/2021    HGB 13.7 12/25/2021    HCT 40.1 (L) 12/25/2021     12/25/2021    CHOL 124 09/03/2019    TRIG 40 09/03/2019    HDL 64 (H) 09/03/2019    ALT 11 12/25/2021    AST 14 (L) 12/25/2021     (L) 12/25/2021    K 4.4 12/25/2021    CL 96 (L) 12/25/2021    CREATININE <0.5 (L) 12/25/2021    BUN 13 12/25/2021    CO2 23 12/25/2021    TSH 1.02 09/03/2019    PSA 0.87 09/03/2019    LABA1C 5.6 09/03/2019    LABMICR Not Indicated 12/24/2021     Lab Results   Component Value Date    TROPONINI <0.01 12/24/2021       Recent Imaging Results are Reviewed:  XR CHEST (2 VW)    Result Date: 12/24/2021  EXAMINATION: TWO XRAY VIEWS OF THE CHEST 12/24/2021 12:18 pm COMPARISON: 03/09/2021 HISTORY: ORDERING SYSTEM PROVIDED HISTORY: cough TECHNOLOGIST PROVIDED HISTORY: Reason for exam:->cough Reason for Exam: cough, lethargic; recent contact with +Covid relatives FINDINGS: The lungs are without acute focal process. There is no effusion or pneumothorax. The cardiomediastinal silhouette is stable. The osseous structures are stable. Thoracic scoliosis convex right again noted. No acute process. Lab Results   Component Value Date    GLUCOSE 123 12/25/2021     No results found for: POCGLU  /84   Pulse 61   Temp 97.4 °F (36.3 °C) (Temporal)   Resp 18   Ht 5' 10\" (1.778 m)   Wt 108 lb 11.2 oz (49.3 kg)   SpO2 92%   BMI 15.60 kg/m²     Assessment and Plan:  Principal Problem:    Hyponatremia -Established problem. Stable. Na 128  Plan: Continue present orders/plan.    Active Problems:    HTN (hypertension) -Established problem. Stable. 136/84  Plan: cont home meds    BPH (benign prostatic hyperplasia) -Established problem. Stable. Plan: Continue present orders/plan. Covid-19 virus infection -New Problem to me. Confirmed with positive test  Plan: for now, iv steroids. Monitor oxygenation closely. If requires o2, would start remdesivir.   Trend crp      (Please note that portions of this note were completed with a voice recognition program.  Efforts were made to edit the dictations but occasionally words are mis-transcribed.)        Willem Alcaraz MD  12/25/2021

## 2021-12-26 LAB
ANION GAP SERPL CALCULATED.3IONS-SCNC: 8 MMOL/L (ref 3–16)
BASOPHILS ABSOLUTE: 0 K/UL (ref 0–0.2)
BASOPHILS RELATIVE PERCENT: 0.2 %
BUN BLDV-MCNC: 9 MG/DL (ref 7–20)
C-REACTIVE PROTEIN: <3 MG/L (ref 0–5.1)
CALCIUM SERPL-MCNC: 8.7 MG/DL (ref 8.3–10.6)
CHLORIDE BLD-SCNC: 101 MMOL/L (ref 99–110)
CO2: 25 MMOL/L (ref 21–32)
CREAT SERPL-MCNC: <0.5 MG/DL (ref 0.8–1.3)
EKG ATRIAL RATE: 79 BPM
EKG DIAGNOSIS: NORMAL
EKG P AXIS: 90 DEGREES
EKG P-R INTERVAL: 188 MS
EKG Q-T INTERVAL: 382 MS
EKG QRS DURATION: 104 MS
EKG QTC CALCULATION (BAZETT): 438 MS
EKG R AXIS: -64 DEGREES
EKG T AXIS: 91 DEGREES
EKG VENTRICULAR RATE: 79 BPM
EOSINOPHILS ABSOLUTE: 0 K/UL (ref 0–0.6)
EOSINOPHILS RELATIVE PERCENT: 0.1 %
GFR AFRICAN AMERICAN: >60
GFR NON-AFRICAN AMERICAN: >60
GLUCOSE BLD-MCNC: 97 MG/DL (ref 70–99)
HCT VFR BLD CALC: 37.8 % (ref 40.5–52.5)
HEMOGLOBIN: 12.9 G/DL (ref 13.5–17.5)
LYMPHOCYTES ABSOLUTE: 0.7 K/UL (ref 1–5.1)
LYMPHOCYTES RELATIVE PERCENT: 10.8 %
MCH RBC QN AUTO: 32 PG (ref 26–34)
MCHC RBC AUTO-ENTMCNC: 34.2 G/DL (ref 31–36)
MCV RBC AUTO: 93.5 FL (ref 80–100)
MONOCYTES ABSOLUTE: 0.9 K/UL (ref 0–1.3)
MONOCYTES RELATIVE PERCENT: 14.7 %
NEUTROPHILS ABSOLUTE: 4.6 K/UL (ref 1.7–7.7)
NEUTROPHILS RELATIVE PERCENT: 74.2 %
PDW BLD-RTO: 13.2 % (ref 12.4–15.4)
PLATELET # BLD: 307 K/UL (ref 135–450)
PMV BLD AUTO: 6.2 FL (ref 5–10.5)
POTASSIUM SERPL-SCNC: 4.1 MMOL/L (ref 3.5–5.1)
RBC # BLD: 4.04 M/UL (ref 4.2–5.9)
SODIUM BLD-SCNC: 134 MMOL/L (ref 136–145)
WBC # BLD: 6.1 K/UL (ref 4–11)

## 2021-12-26 PROCEDURE — 36415 COLL VENOUS BLD VENIPUNCTURE: CPT

## 2021-12-26 PROCEDURE — 1200000000 HC SEMI PRIVATE

## 2021-12-26 PROCEDURE — 6370000000 HC RX 637 (ALT 250 FOR IP): Performed by: INTERNAL MEDICINE

## 2021-12-26 PROCEDURE — 86140 C-REACTIVE PROTEIN: CPT

## 2021-12-26 PROCEDURE — 6360000002 HC RX W HCPCS: Performed by: INTERNAL MEDICINE

## 2021-12-26 PROCEDURE — 2580000003 HC RX 258: Performed by: INTERNAL MEDICINE

## 2021-12-26 PROCEDURE — 85025 COMPLETE CBC W/AUTO DIFF WBC: CPT

## 2021-12-26 PROCEDURE — 80048 BASIC METABOLIC PNL TOTAL CA: CPT

## 2021-12-26 PROCEDURE — 93010 ELECTROCARDIOGRAM REPORT: CPT | Performed by: INTERNAL MEDICINE

## 2021-12-26 RX ORDER — DORZOLAMIDE HCL 20 MG/ML
1 SOLUTION/ DROPS OPHTHALMIC 2 TIMES DAILY
Status: DISCONTINUED | OUTPATIENT
Start: 2021-12-26 | End: 2021-12-27 | Stop reason: HOSPADM

## 2021-12-26 RX ORDER — TIMOLOL MALEATE 6.8 MG/ML
1 SOLUTION/ DROPS OPHTHALMIC NIGHTLY
Status: DISCONTINUED | OUTPATIENT
Start: 2021-12-26 | End: 2021-12-27 | Stop reason: HOSPADM

## 2021-12-26 RX ORDER — MORPHINE SULFATE 2 MG/ML
1 INJECTION, SOLUTION INTRAMUSCULAR; INTRAVENOUS EVERY 6 HOURS PRN
Status: DISCONTINUED | OUTPATIENT
Start: 2021-12-26 | End: 2021-12-27 | Stop reason: HOSPADM

## 2021-12-26 RX ORDER — LATANOPROST 50 UG/ML
1 SOLUTION/ DROPS OPHTHALMIC NIGHTLY
Status: DISCONTINUED | OUTPATIENT
Start: 2021-12-26 | End: 2021-12-27 | Stop reason: HOSPADM

## 2021-12-26 RX ADMIN — TIMOLOL MALEATE 1 DROP: 5 SOLUTION OPHTHALMIC at 19:56

## 2021-12-26 RX ADMIN — SODIUM CHLORIDE: 9 INJECTION, SOLUTION INTRAVENOUS at 03:02

## 2021-12-26 RX ADMIN — MORPHINE SULFATE 1 MG: 2 INJECTION, SOLUTION INTRAMUSCULAR; INTRAVENOUS at 22:03

## 2021-12-26 RX ADMIN — DEXAMETHASONE SODIUM PHOSPHATE 6 MG: 10 INJECTION, SOLUTION INTRAMUSCULAR; INTRAVENOUS at 08:42

## 2021-12-26 RX ADMIN — HYDROCODONE BITARTRATE AND ACETAMINOPHEN 1 TABLET: 5; 325 TABLET ORAL at 08:42

## 2021-12-26 RX ADMIN — ENOXAPARIN SODIUM 30 MG: 100 INJECTION SUBCUTANEOUS at 20:02

## 2021-12-26 RX ADMIN — DORZOLAMIDE HYDROCHLORIDE 1 DROP: 20 SOLUTION/ DROPS OPHTHALMIC at 15:51

## 2021-12-26 RX ADMIN — SODIUM CHLORIDE: 9 INJECTION, SOLUTION INTRAVENOUS at 17:54

## 2021-12-26 RX ADMIN — Medication 10 ML: at 20:03

## 2021-12-26 RX ADMIN — HYDROCODONE BITARTRATE AND ACETAMINOPHEN 1 TABLET: 5; 325 TABLET ORAL at 17:51

## 2021-12-26 RX ADMIN — SODIUM CHLORIDE: 9 INJECTION, SOLUTION INTRAVENOUS at 11:20

## 2021-12-26 RX ADMIN — DORZOLAMIDE HYDROCHLORIDE 1 DROP: 20 SOLUTION/ DROPS OPHTHALMIC at 19:56

## 2021-12-26 RX ADMIN — TAMSULOSIN HYDROCHLORIDE 0.4 MG: 0.4 CAPSULE ORAL at 08:42

## 2021-12-26 RX ADMIN — LATANOPROST 1 DROP: 50 SOLUTION OPHTHALMIC at 19:56

## 2021-12-26 ASSESSMENT — PAIN - FUNCTIONAL ASSESSMENT
PAIN_FUNCTIONAL_ASSESSMENT: ACTIVITIES ARE NOT PREVENTED

## 2021-12-26 ASSESSMENT — PAIN DESCRIPTION - PAIN TYPE
TYPE: ACUTE PAIN
TYPE: CHRONIC PAIN
TYPE: CHRONIC PAIN

## 2021-12-26 ASSESSMENT — PAIN SCALES - GENERAL
PAINLEVEL_OUTOF10: 10
PAINLEVEL_OUTOF10: 10
PAINLEVEL_OUTOF10: 6
PAINLEVEL_OUTOF10: 1
PAINLEVEL_OUTOF10: 5
PAINLEVEL_OUTOF10: 3
PAINLEVEL_OUTOF10: 3
PAINLEVEL_OUTOF10: 10

## 2021-12-26 ASSESSMENT — PAIN DESCRIPTION - PROGRESSION
CLINICAL_PROGRESSION: GRADUALLY WORSENING
CLINICAL_PROGRESSION: GRADUALLY WORSENING
CLINICAL_PROGRESSION: GRADUALLY IMPROVING
CLINICAL_PROGRESSION: GRADUALLY WORSENING
CLINICAL_PROGRESSION: GRADUALLY WORSENING

## 2021-12-26 ASSESSMENT — PAIN SCALES - WONG BAKER
WONGBAKER_NUMERICALRESPONSE: 6
WONGBAKER_NUMERICALRESPONSE: 6

## 2021-12-26 ASSESSMENT — PAIN DESCRIPTION - ORIENTATION
ORIENTATION: LOWER

## 2021-12-26 ASSESSMENT — PAIN DESCRIPTION - DESCRIPTORS
DESCRIPTORS: ACHING;CONSTANT;DISCOMFORT
DESCRIPTORS: ACHING;CONSTANT;DISCOMFORT;SORE
DESCRIPTORS: ACHING;DULL

## 2021-12-26 ASSESSMENT — PAIN DESCRIPTION - LOCATION
LOCATION: BACK
LOCATION: CHEST;NECK
LOCATION: BACK

## 2021-12-26 ASSESSMENT — PAIN DESCRIPTION - FREQUENCY
FREQUENCY: CONTINUOUS
FREQUENCY: INTERMITTENT
FREQUENCY: CONTINUOUS

## 2021-12-26 ASSESSMENT — PAIN DESCRIPTION - ONSET
ONSET: ON-GOING
ONSET: GRADUAL
ONSET: ON-GOING

## 2021-12-26 NOTE — CONSULTS
Comprehensive Nutrition Assessment    Type and Reason for Visit:  Consult (Poor appetite/PO intake)    Nutrition Recommendations/Plan:   Offer Ensure Enlive BID    Nutrition Assessment:  Consult received for poor PO intake/appetite. Pt is in droplet plus isolation for COVID-19 and has had some confusion per chart review; all information obtained from EMR. Pt reported poor appetite and PO intake prior to admission; reported taste changes as well. Suspect much of this is related to COVID-19. Unable to assess for weight loss as the only weight hx in the chart is stated. Do note underweight BMI 15.60. PO intake during admission has been 26-50% of meals on regular diet. Pt also with increased nutrient needs related to stage II pressure ulcer to coccyx. Will offer Ensure Enlive BID. Malnutrition Assessment:  Malnutrition Status:  Insufficient data     Estimated Daily Nutrient Needs:  Energy (kcal):  2878-9771; Weight Used for Energy Requirements:  Current (49 kg)     Protein (g):   grams; Weight Used for Protein Requirements:  Ideal (75 kg; 1.3-1.6 grams per kg)        Fluid (ml/day):   ; Method Used for Fluid Requirements:  1 ml/kcal      Nutrition Related Findings:  No edema noted. LBM 12/25. Na+ 134. Wounds:  Stage II,Pressure Injury       Current Nutrition Therapies:    ADULT DIET;  Regular    Anthropometric Measures:  · Height: 5' 10\" (177.8 cm)  · Current Body Weight: 108 lb 11 oz (49.3 kg)   · Ideal Body Weight: 166 lbs; % Ideal Body Weight 65.5 %   · BMI: 15.6  · BMI Categories: Underweight (BMI less than 18.5)       Nutrition Diagnosis:   · Inadequate protein-energy intake related to inadequate protein-energy intake as evidenced by intake 26-50%    · Increased nutrient needs related to increase demand for energy/nutrients as evidenced by wounds      Nutrition Interventions:   Food and/or Nutrient Delivery:  Continue Current Diet,Start Oral Nutrition Supplement  Nutrition Education/Counseling: Education not indicated   Coordination of Nutrition Care:  Continue to monitor while inpatient    Goals:  Pt will consume at least 50% of meals and supplements       Nutrition Monitoring and Evaluation:   Behavioral-Environmental Outcomes:  None Identified   Food/Nutrient Intake Outcomes:  Food and Nutrient Intake,Supplement Intake  Physical Signs/Symptoms Outcomes:  Biochemical Data,Skin     Discharge Planning:     Too soon to determine     Electronically signed by Vanna Latif RD, LD on 12/26/21 at 10:41 AM EST    Contact: [phone]

## 2021-12-26 NOTE — PROGRESS NOTES
Progress Note - Dr. Radha Munguia - Internal Medicine  PCP: Thania Baltazar MD [id number] / [place] [phone]    Hospital Day: 2  Code Status: Full Code  Current Diet: ADULT DIET; Regular        CC: follow up on medical issues    Subjective:   Curtis Vásquez is a 80 y.o. male. Pt seen and examined  Chart reviewed since last visit, labs and imaging below        Pt remains confused  covid test is positive  So far, no o2 requirement, but he is still dyspneic when I interview him    PT./OT scores rec SNF  However, covid status could prove problematic for placement    He denies chest pain, complains of shortness of breath, denies nausea,  denies emesis. 10 system Review of Systems is reviewed with patient, and pertinent positives are noted in HPI above . Otherwise, Review of systems is negative. I have reviewed the patient's medical and social history in detail and updated the computerized patient record. To recap: He  has a past medical history of Glaucoma and Prostate enlargement. . He  has no past surgical history on file. Sara Chakraborty He  reports that he has never smoked. He has never used smokeless tobacco. He reports that he does not drink alcohol and does not use drugs. .        Active Hospital Problems    Diagnosis Date Noted    Hyponatremia [E87.1] 12/24/2021    Suspected COVID-19 virus infection [Z20.822] 12/24/2021    HTN (hypertension) [I10] 03/09/2021    BPH (benign prostatic hyperplasia) [N40.0] 03/09/2021       Current Facility-Administered Medications: dexamethasone (PF) (DECADRON) injection 6 mg, 6 mg, IntraVENous, Q24H  tamsulosin (FLOMAX) capsule 0.4 mg, 0.4 mg, Oral, Daily  0.9 % sodium chloride infusion, , IntraVENous, Continuous  sodium chloride flush 0.9 % injection 5-40 mL, 5-40 mL, IntraVENous, 2 times per day  sodium chloride flush 0.9 % injection 5-40 mL, 5-40 mL, IntraVENous, PRN  0.9 % sodium chloride infusion, 25 mL, IntraVENous, PRN  potassium chloride (KLOR-CON M) extended release tablet 40 mEq, 40 mEq, Oral, PRN **OR** potassium bicarb-citric acid (EFFER-K) effervescent tablet 40 mEq, 40 mEq, Oral, PRN **OR** potassium chloride 10 mEq/100 mL IVPB (Peripheral Line), 10 mEq, IntraVENous, PRN  enoxaparin (LOVENOX) injection 40 mg, 40 mg, SubCUTAneous, Nightly  ondansetron (ZOFRAN-ODT) disintegrating tablet 4 mg, 4 mg, Oral, Q8H PRN **OR** ondansetron (ZOFRAN) injection 4 mg, 4 mg, IntraVENous, Q6H PRN  magnesium hydroxide (MILK OF MAGNESIA) 400 MG/5ML suspension 30 mL, 30 mL, Oral, Daily PRN  acetaminophen (TYLENOL) tablet 650 mg, 650 mg, Oral, Q6H PRN **OR** acetaminophen (TYLENOL) suppository 650 mg, 650 mg, Rectal, Q6H PRN  hydrALAZINE (APRESOLINE) injection 10 mg, 10 mg, IntraVENous, Q6H PRN  0.9 % sodium chloride bolus, 500 mL, IntraVENous, PRN  dexamethasone (PF) (DECADRON) injection 6 mg, 6 mg, IntraVENous, Daily  HYDROcodone-acetaminophen (NORCO) 5-325 MG per tablet 1 tablet, 1 tablet, Oral, Q6H PRN         Objective:  BP (!) 147/86   Pulse 61   Temp 97.4 °F (36.3 °C) (Axillary)   Resp 16   Ht 5' 10\" (1.778 m)   Wt 108 lb 11.2 oz (49.3 kg)   SpO2 97%   BMI 15.60 kg/m²      Patient Vitals for the past 24 hrs:   BP Temp Temp src Pulse Resp SpO2   12/26/21 0840 (!) 147/86 97.4 °F (36.3 °C) Axillary 61 16 97 %   12/26/21 0304 137/79 98.4 °F (36.9 °C) Oral 56 20 97 %   12/25/21 2307 110/73 97.4 °F (36.3 °C) Oral 75 20 93 %   12/25/21 2108 (!) 142/83 98.1 °F (36.7 °C) Axillary 61 20 92 %   12/25/21 1733 135/86 97.4 °F (36.3 °C) Axillary 60 18 97 %   12/25/21 1607      96 %   12/25/21 1145      93 %   12/25/21 1131 (!) 156/93 97.5 °F (36.4 °C) Axillary 93 18    12/25/21 0951     18 92 %   12/25/21 0914 136/84     94 %     Patient Vitals for the past 96 hrs (Last 3 readings):   Weight   12/24/21 1600 108 lb 11.2 oz (49.3 kg)           Intake/Output Summary (Last 24 hours) at 12/26/2021 0912  Last data filed at 12/25/2021 2307  Gross per 24 hour   Intake 1320 ml Output    Net 1320 ml         Physical Exam:   Vitals as above  General appearance: alert, appears stated age and cooperative    Head: Normocephalic, without obvious abnormality, atraumatic    Lungs: crackles bilaterally    Heart: regular rate and rhythm, S1, S2 normal, no murmur    Abdomen: soft, non-tender; bowel sounds normal; no masses, no organomegaly    Extremities: extremities normal, atraumatic, no cyanosis, no edema      Labs:  Lab Results   Component Value Date    WBC 6.1 12/26/2021    HGB 12.9 (L) 12/26/2021    HCT 37.8 (L) 12/26/2021     12/26/2021    CHOL 124 09/03/2019    TRIG 40 09/03/2019    HDL 64 (H) 09/03/2019    ALT 11 12/25/2021    AST 14 (L) 12/25/2021     (L) 12/26/2021    K 4.1 12/26/2021     12/26/2021    CREATININE <0.5 (L) 12/26/2021    BUN 9 12/26/2021    CO2 25 12/26/2021    TSH 1.02 09/03/2019    PSA 0.87 09/03/2019    LABA1C 5.6 09/03/2019    LABMICR Not Indicated 12/24/2021     Lab Results   Component Value Date    TROPONINI <0.01 12/25/2021       Recent Imaging Results are Reviewed:  XR CHEST (2 VW)    Result Date: 12/24/2021  EXAMINATION: TWO XRAY VIEWS OF THE CHEST 12/24/2021 12:18 pm COMPARISON: 03/09/2021 HISTORY: ORDERING SYSTEM PROVIDED HISTORY: cough TECHNOLOGIST PROVIDED HISTORY: Reason for exam:->cough Reason for Exam: cough, lethargic; recent contact with +Covid relatives FINDINGS: The lungs are without acute focal process. There is no effusion or pneumothorax. The cardiomediastinal silhouette is stable. The osseous structures are stable. Thoracic scoliosis convex right again noted. No acute process. Lab Results   Component Value Date    GLUCOSE 97 12/26/2021     No results found for: POCGLU  BP (!) 147/86   Pulse 61   Temp 97.4 °F (36.3 °C) (Axillary)   Resp 16   Ht 5' 10\" (1.778 m)   Wt 108 lb 11.2 oz (49.3 kg)   SpO2 97%   BMI 15.60 kg/m²     Assessment and Plan:  Principal Problem:    Hyponatremia -Established problem. Stable.

## 2021-12-26 NOTE — PLAN OF CARE
Problem: Pain:  Description: Pain management should include both nonpharmacologic and pharmacologic interventions. Goal: Pain level will decrease  Description: Pain level will decrease  Outcome: Ongoing  Goal: Control of acute pain  Description: Control of acute pain  Outcome: Ongoing  Goal: Control of chronic pain  Description: Control of chronic pain  Outcome: Ongoing     Problem: Skin Integrity:  Goal: Will show no infection signs and symptoms  Description: Will show no infection signs and symptoms  Outcome: Ongoing  Goal: Absence of new skin breakdown  Description: Absence of new skin breakdown  Outcome: Ongoing     Problem: Falls - Risk of:  Goal: Will remain free from falls  Description: Will remain free from falls  Outcome: Ongoing  Goal: Absence of physical injury  Description: Absence of physical injury  Outcome: Ongoing     Problem: Airway Clearance - Ineffective  Goal: Achieve or maintain patent airway  Outcome: Ongoing     Problem: Gas Exchange - Impaired  Goal: Absence of hypoxia  Outcome: Ongoing  Goal: Promote optimal lung function  Outcome: Ongoing     Problem: Breathing Pattern - Ineffective  Goal: Ability to achieve and maintain a regular respiratory rate  Outcome: Ongoing     Problem:  Body Temperature -  Risk of, Imbalanced  Goal: Ability to maintain a body temperature within defined limits  Outcome: Ongoing  Goal: Will regain or maintain usual level of consciousness  Outcome: Ongoing  Goal: Complications related to the disease process, condition or treatment will be avoided or minimized  Outcome: Ongoing     Problem: Isolation Precautions - Risk of Spread of Infection  Goal: Prevent transmission of infection  Outcome: Ongoing     Problem: Nutrition Deficits  Goal: Optimize nutritional status  Outcome: Ongoing     Problem: Risk for Fluid Volume Deficit  Goal: Maintain normal heart rhythm  Outcome: Ongoing  Goal: Maintain absence of muscle cramping  Outcome: Ongoing  Goal: Maintain normal serum

## 2021-12-26 NOTE — PLAN OF CARE
Nutrition Problem #1: Inadequate protein-energy intake  Intervention: Food and/or Nutrient Delivery: Continue Current Diet,Start Oral Nutrition Supplement  Nutritional Goals: Pt will consume at least 50% of meals and supplements

## 2021-12-27 VITALS
TEMPERATURE: 98.9 F | WEIGHT: 108.7 LBS | OXYGEN SATURATION: 96 % | DIASTOLIC BLOOD PRESSURE: 60 MMHG | HEART RATE: 72 BPM | HEIGHT: 70 IN | SYSTOLIC BLOOD PRESSURE: 112 MMHG | BODY MASS INDEX: 15.56 KG/M2 | RESPIRATION RATE: 18 BRPM

## 2021-12-27 PROBLEM — U07.1 COVID-19: Status: ACTIVE | Noted: 2021-12-27

## 2021-12-27 LAB
ANION GAP SERPL CALCULATED.3IONS-SCNC: 4 MMOL/L (ref 3–16)
BASOPHILS ABSOLUTE: 0 K/UL (ref 0–0.2)
BASOPHILS RELATIVE PERCENT: 0.4 %
BUN BLDV-MCNC: 9 MG/DL (ref 7–20)
CALCIUM SERPL-MCNC: 8.5 MG/DL (ref 8.3–10.6)
CHLORIDE BLD-SCNC: 100 MMOL/L (ref 99–110)
CO2: 29 MMOL/L (ref 21–32)
CREAT SERPL-MCNC: <0.5 MG/DL (ref 0.8–1.3)
EOSINOPHILS ABSOLUTE: 0 K/UL (ref 0–0.6)
EOSINOPHILS RELATIVE PERCENT: 0.5 %
GFR AFRICAN AMERICAN: >60
GFR NON-AFRICAN AMERICAN: >60
GLUCOSE BLD-MCNC: 84 MG/DL (ref 70–99)
HCT VFR BLD CALC: 38.5 % (ref 40.5–52.5)
HEMOGLOBIN: 13.1 G/DL (ref 13.5–17.5)
LYMPHOCYTES ABSOLUTE: 0.8 K/UL (ref 1–5.1)
LYMPHOCYTES RELATIVE PERCENT: 15.9 %
MCH RBC QN AUTO: 32 PG (ref 26–34)
MCHC RBC AUTO-ENTMCNC: 34 G/DL (ref 31–36)
MCV RBC AUTO: 94.1 FL (ref 80–100)
MONOCYTES ABSOLUTE: 0.9 K/UL (ref 0–1.3)
MONOCYTES RELATIVE PERCENT: 17.4 %
NEUTROPHILS ABSOLUTE: 3.3 K/UL (ref 1.7–7.7)
NEUTROPHILS RELATIVE PERCENT: 65.8 %
PDW BLD-RTO: 13.5 % (ref 12.4–15.4)
PLATELET # BLD: 319 K/UL (ref 135–450)
PMV BLD AUTO: 6 FL (ref 5–10.5)
POTASSIUM SERPL-SCNC: 3.9 MMOL/L (ref 3.5–5.1)
RBC # BLD: 4.1 M/UL (ref 4.2–5.9)
SODIUM BLD-SCNC: 133 MMOL/L (ref 136–145)
WBC # BLD: 5 K/UL (ref 4–11)

## 2021-12-27 PROCEDURE — 85025 COMPLETE CBC W/AUTO DIFF WBC: CPT

## 2021-12-27 PROCEDURE — 2580000003 HC RX 258: Performed by: INTERNAL MEDICINE

## 2021-12-27 PROCEDURE — 36415 COLL VENOUS BLD VENIPUNCTURE: CPT

## 2021-12-27 PROCEDURE — 80048 BASIC METABOLIC PNL TOTAL CA: CPT

## 2021-12-27 PROCEDURE — 6360000002 HC RX W HCPCS: Performed by: INTERNAL MEDICINE

## 2021-12-27 PROCEDURE — 6370000000 HC RX 637 (ALT 250 FOR IP): Performed by: INTERNAL MEDICINE

## 2021-12-27 RX ORDER — DEXAMETHASONE 2 MG/1
2 TABLET ORAL 2 TIMES DAILY WITH MEALS
Qty: 14 TABLET | Refills: 0 | Status: SHIPPED | OUTPATIENT
Start: 2021-12-27 | End: 2022-01-03

## 2021-12-27 RX ADMIN — DEXAMETHASONE SODIUM PHOSPHATE 6 MG: 10 INJECTION, SOLUTION INTRAMUSCULAR; INTRAVENOUS at 10:14

## 2021-12-27 RX ADMIN — MAGNESIUM HYDROXIDE 30 ML: 400 SUSPENSION ORAL at 10:21

## 2021-12-27 RX ADMIN — ONDANSETRON 4 MG: 2 INJECTION INTRAMUSCULAR; INTRAVENOUS at 15:31

## 2021-12-27 RX ADMIN — SODIUM CHLORIDE: 9 INJECTION, SOLUTION INTRAVENOUS at 02:28

## 2021-12-27 RX ADMIN — SODIUM CHLORIDE, PRESERVATIVE FREE 10 ML: 5 INJECTION INTRAVENOUS at 15:31

## 2021-12-27 RX ADMIN — Medication 10 ML: at 10:26

## 2021-12-27 RX ADMIN — TAMSULOSIN HYDROCHLORIDE 0.4 MG: 0.4 CAPSULE ORAL at 10:14

## 2021-12-27 RX ADMIN — HYDROCODONE BITARTRATE AND ACETAMINOPHEN 1 TABLET: 5; 325 TABLET ORAL at 10:21

## 2021-12-27 RX ADMIN — ENOXAPARIN SODIUM 30 MG: 100 INJECTION SUBCUTANEOUS at 10:14

## 2021-12-27 RX ADMIN — DORZOLAMIDE HYDROCHLORIDE 1 DROP: 20 SOLUTION/ DROPS OPHTHALMIC at 10:15

## 2021-12-27 RX ADMIN — HYDROCODONE BITARTRATE AND ACETAMINOPHEN 1 TABLET: 5; 325 TABLET ORAL at 16:19

## 2021-12-27 RX ADMIN — SODIUM CHLORIDE: 9 INJECTION, SOLUTION INTRAVENOUS at 10:31

## 2021-12-27 ASSESSMENT — PAIN SCALES - GENERAL
PAINLEVEL_OUTOF10: 10
PAINLEVEL_OUTOF10: 10
PAINLEVEL_OUTOF10: 0
PAINLEVEL_OUTOF10: 10

## 2021-12-27 ASSESSMENT — PAIN DESCRIPTION - PROGRESSION
CLINICAL_PROGRESSION: GRADUALLY WORSENING

## 2021-12-27 ASSESSMENT — PAIN SCALES - WONG BAKER
WONGBAKER_NUMERICALRESPONSE: 6

## 2021-12-27 NOTE — PROGRESS NOTES
Pt c/o of neck pain repositioned and prn given for pain. Pt very [place] does not have hearing aids.  VSS prn given for constipation per pt request. Pt resting call light in reach alarm on for pt safety

## 2021-12-27 NOTE — PLAN OF CARE
Pt c/o pain on right chest, under his breast region. Pt also co dull aching pain in lower neck. Message sent to Dr. Marissa Martinez. Waiting on response.      Isabel CLAYTONN, RN   [phone]

## 2021-12-27 NOTE — CARE COORDINATION
CM notified son that ze has accepted his dad and transport is set for 5pm. He verbalized understanding. CM notified Stephan Duran at [address] of  time. HENS completed.     Leni Marshall RN, BSN  [phone]

## 2021-12-27 NOTE — PROGRESS NOTES
Progress Note - Dr. Jordin Porras - Internal Medicine  PCP: Nicole Mcdermott MD [id number]  / [address] [phone]    Hospital Day: 3  Code Status: Full Code  Current Diet: ADULT DIET; Regular  ADULT ORAL NUTRITION SUPPLEMENT; Lunch, Dinner; Standard High Calorie/High Protein Oral Supplement        CC: follow up on medical issues    Subjective:   Nicolas Spence is a 80 y.o. male. Pt seen and examined  Chart reviewed since last visit, labs and imaging below        Pt remains confused  covid test is positive  So far, no o2 requirement, and he is not dyspneic now    PT./OT scores rec SNF  However, covid status could prove problematic for placement  AS he does not require o2, could look for d/c if we can find appropriate placement    He denies chest pain, denies shortness of breath, denies nausea,  denies emesis. 10 system Review of Systems is reviewed with patient, and pertinent positives are noted in HPI above . Otherwise, Review of systems is negative. I have reviewed the patient's medical and social history in detail and updated the computerized patient record. To recap: He  has a past medical history of Glaucoma and Prostate enlargement. . He  has no past surgical history on file. Merary Collins He  reports that he has never smoked. He has never used smokeless tobacco. He reports that he does not drink alcohol and does not use drugs. .        Active Hospital Problems    Diagnosis Date Noted    COVID-19 [U07.1] 12/27/2021    Hyponatremia [E87.1] 12/24/2021    HTN (hypertension) [I10] 03/09/2021    BPH (benign prostatic hyperplasia) [N40.0] 03/09/2021       Current Facility-Administered Medications: enoxaparin (LOVENOX) injection 30 mg, 30 mg, SubCUTAneous, BID  dorzolamide (TRUSOPT) 2 % ophthalmic solution 1 drop, 1 drop, Both Eyes, BID  latanoprost (XALATAN) 0.005 % ophthalmic solution 1 drop, 1 drop, Both Eyes, Nightly  Timolol (TIMOPTIC) 0.5 % ophthalmic solution 1 drop, 1 drop, Both Eyes, Nightly  morphine (PF) injection 1 mg, 1 mg, IntraVENous, Q6H PRN  tamsulosin (FLOMAX) capsule 0.4 mg, 0.4 mg, Oral, Daily  0.9 % sodium chloride infusion, , IntraVENous, Continuous  sodium chloride flush 0.9 % injection 5-40 mL, 5-40 mL, IntraVENous, 2 times per day  sodium chloride flush 0.9 % injection 5-40 mL, 5-40 mL, IntraVENous, PRN  0.9 % sodium chloride infusion, 25 mL, IntraVENous, PRN  potassium chloride (KLOR-CON M) extended release tablet 40 mEq, 40 mEq, Oral, PRN **OR** potassium bicarb-citric acid (EFFER-K) effervescent tablet 40 mEq, 40 mEq, Oral, PRN **OR** potassium chloride 10 mEq/100 mL IVPB (Peripheral Line), 10 mEq, IntraVENous, PRN  ondansetron (ZOFRAN-ODT) disintegrating tablet 4 mg, 4 mg, Oral, Q8H PRN **OR** ondansetron (ZOFRAN) injection 4 mg, 4 mg, IntraVENous, Q6H PRN  magnesium hydroxide (MILK OF MAGNESIA) 400 MG/5ML suspension 30 mL, 30 mL, Oral, Daily PRN  acetaminophen (TYLENOL) tablet 650 mg, 650 mg, Oral, Q6H PRN **OR** acetaminophen (TYLENOL) suppository 650 mg, 650 mg, Rectal, Q6H PRN  hydrALAZINE (APRESOLINE) injection 10 mg, 10 mg, IntraVENous, Q6H PRN  0.9 % sodium chloride bolus, 500 mL, IntraVENous, PRN  dexamethasone (PF) (DECADRON) injection 6 mg, 6 mg, IntraVENous, Daily  HYDROcodone-acetaminophen (NORCO) 5-325 MG per tablet 1 tablet, 1 tablet, Oral, Q6H PRN         Objective:  /71   Pulse 65   Temp 97.4 °F (36.3 °C) (Axillary)   Resp 18   Ht 5' 10\" (1.778 m)   Wt 108 lb 11.2 oz (49.3 kg)   SpO2 96%   BMI 15.60 kg/m²      Patient Vitals for the past 24 hrs:   BP Temp Temp src Pulse Resp SpO2 Height   12/27/21 0756 119/71 97.4 °F (36.3 °C) Axillary 65 18 96 %    12/27/21 0427 127/66 98.7 °F (37.1 °C) Axillary 55 18 97 %    12/26/21 2331 (!) 142/71 97.6 °F (36.4 °C) Axillary 52 18 93 %    12/26/21 1917 101/64 97.7 °F (36.5 °C) Axillary 62 17 91 %    12/26/21 1730 (!) 156/81 97.6 °F (36.4 °C) Axillary 59 18 95 %    12/26/21 1547 134/70 98 °F (36.7 °C) Oral 88 18 98 %  12/26/21 1121 (!) 157/70 97.6 °F (36.4 °C) Axillary 53 18 95 %    12/26/21 1038       5' 10\" (1.778 m)     Patient Vitals for the past 96 hrs (Last 3 readings):   Weight   12/24/21 1600 108 lb 11.2 oz (49.3 kg)           Intake/Output Summary (Last 24 hours) at 12/27/2021 0910  Last data filed at 12/26/2021 1514  Gross per 24 hour   Intake 240 ml   Output    Net 240 ml         Physical Exam:   Vitals as above  General appearance: alert, appears stated age and cooperative    Head: Normocephalic, without obvious abnormality, atraumatic    Lungs: crackles bilaterally    Heart: regular rate and rhythm, S1, S2 normal, no murmur    Abdomen: soft, non-tender; bowel sounds normal; no masses, no organomegaly    Extremities: extremities normal, atraumatic, no cyanosis, no edema      Labs:  Lab Results   Component Value Date    WBC 5.0 12/27/2021    HGB 13.1 (L) 12/27/2021    HCT 38.5 (L) 12/27/2021     12/27/2021    CHOL 124 09/03/2019    TRIG 40 09/03/2019    HDL 64 (H) 09/03/2019    ALT 11 12/25/2021    AST 14 (L) 12/25/2021     (L) 12/27/2021    K 3.9 12/27/2021     12/27/2021    CREATININE <0.5 (L) 12/27/2021    BUN 9 12/27/2021    CO2 29 12/27/2021    TSH 1.02 09/03/2019    PSA 0.87 09/03/2019    LABA1C 5.6 09/03/2019    LABMICR Not Indicated 12/24/2021     Lab Results   Component Value Date    TROPONINI <0.01 12/25/2021       Recent Imaging Results are Reviewed:  XR CHEST (2 VW)    Result Date: 12/24/2021  EXAMINATION: TWO XRAY VIEWS OF THE CHEST 12/24/2021 12:18 pm COMPARISON: 03/09/2021 HISTORY: ORDERING SYSTEM PROVIDED HISTORY: cough TECHNOLOGIST PROVIDED HISTORY: Reason for exam:->cough Reason for Exam: cough, lethargic; recent contact with +Covid relatives FINDINGS: The lungs are without acute focal process. There is no effusion or pneumothorax. The cardiomediastinal silhouette is stable. The osseous structures are stable. Thoracic scoliosis convex right again noted.      No acute process. Lab Results   Component Value Date    GLUCOSE 84 12/27/2021     No results found for: POCGLU  /71   Pulse 65   Temp 97.4 °F (36.3 °C) (Axillary)   Resp 18   Ht 5' 10\" (1.778 m)   Wt 108 lb 11.2 oz (49.3 kg)   SpO2 96%   BMI 15.60 kg/m²     Assessment and Plan:  Principal Problem:    Hyponatremia -Established problem. Stable. Na 133  Plan: Continue present orders/plan. Active Problems:    HTN (hypertension) -Established problem. Stable. 119/71  Plan: cont home meds    BPH (benign prostatic hyperplasia) -Established problem. Stable. Plan: Continue present orders/plan. Covid-19 virus infection - Confirmed with positive test  Plan: for now, iv steroids. Monitor oxygenation closely. If requires o2, would start remdesivir. Trend crp    Disp - would like to watch at least one more day.  Consider d/c 12/27 - may need snf but unsure if we will be able to do this nor if family is amenable to this    (Please note that portions of this note were completed with a voice recognition program.  Efforts were made to edit the dictations but occasionally words are mis-transcribed.)        Go Casey MD  12/27/2021

## 2021-12-27 NOTE — DISCHARGE INSTR - COC
Continuity of Care Form    Patient Name: Andry Sauer   :  1940  MRN:  [id number]    Admit date:  2021  Discharge date:  2021    Code Status Order: Full Code   Advance Directives:      Admitting Physician:  Go Casey MD  PCP: Go Casey MD    Discharging Nurse: [facility] Unit/Room#: 8WB-4043/0761-26  Discharging Unit Phone Number: [phone]    Emergency Contact:   Extended Emergency Contact Information  Primary Emergency Contact: Brenden Flores  Home Phone: [phone]  Relation: Child  Secondary Emergency Contact: [address] [facility] [address],[facility] Phone: [phone]  Work Phone: [phone]  Relation: Child    Past Surgical History:  History reviewed. No pertinent surgical history. Immunization History: There is no immunization history on file for this patient.     Active Problems:  Patient Active Problem List   Diagnosis Code    Cellulitis of anterior lower leg L03.119    HTN (hypertension) I10    BPH (benign prostatic hyperplasia) N40.0    Anemia D64.9    Cellulitis of lower leg L03.119    Hyponatremia E87.1    Suspected COVID-19 virus infection Z20.822    COVID-19 U07.1       Isolation/Infection:   Isolation            Droplet Plus          Patient Infection Status       Infection Onset Added Last Indicated Last Indicated By Review Planned Expiration Resolved Resolved By    COVID-19 21 COVID-19 22      Resolved    COVID-19 (Rule Out) 21 COVID-19 (Ordered)   21 Rule-Out Test Resulted            Nurse Assessment:  Last Vital Signs: /71   Pulse 65   Temp 97.4 °F (36.3 °C) (Axillary)   Resp 18   Ht 5' 10\" (1.778 m)   Wt 108 lb 11.2 oz (49.3 kg)   SpO2 96%   BMI 15.60 kg/m²     Last documented pain score (0-10 scale): Pain Level: 10  Last Weight:   Wt Readings from Last 1 Encounters:   21 108 lb 11.2 oz (49.3 kg)     Mental Status:  oriented and alert    IV Access:  - None    Nursing

## 2021-12-27 NOTE — DISCHARGE SUMMARY
[facility] -- Physician Discharge Summary     Anaya Mccormick  1940  MRN: [id number]    Admit Date: 12/24/2021  Discharge Date: No discharge date for patient encounter. Attending MD: Neel Harp MD  Discharging MD: Neel Harp MD  PCP: Neel Harp MD [address] [address] [phone]    Admission Diagnosis: Acute upper respiratory infection [J06.9]  Hyponatremia [E87.1]  General weakness [R53.1]  DISCHARGE DIAGNOSIS: same    Full Hospital Problem List:  Active Hospital Problems    Diagnosis Date Noted    COVID-19 [U07.1] 12/27/2021    Hyponatremia [E87.1] 12/24/2021    HTN (hypertension) [I10] 03/09/2021    BPH (benign prostatic hyperplasia) [N40.0] 03/09/2021           Hospital Course:     80 y. o. male who presents to the emergency department with a chief complaint of a 6-day history of cough, chest congestion, chest discomfort, general fatigue, body aches, chills, recent Covid exposure. He is not vaccinated for Covid. Believes he got a flu shot. 4 days ago was placed on steroids and Levaquin. States the cough is productive of green sputum. Denies diarrhea, bloody stool, dysuria, hematuria, abdominal pain. States he has had some change in his taste stating that everything tastes sweet.  Denies any other symptoms.     Given presentation, covid is highly suspected  Swab done in ER  Patient to be admitted to covid floor, placed in islation  Does not have o2 requirement in ER    Pt is found to be covid positive  He never has o2 requirement here  He is extremely debilitated  PT/OT rec SNF    Pt is accepted to [facility] made during Hospitalization:  [address]    Treatment team at time of Discharge: Treatment Team: Attending Provider: Neel Harp MD; Consulting Physician: Neel Harp MD; Respiratory Therapist (Day): Rashawn Costa RCP; Registered Nurse: Henrique Yanze RN; Utilization Reviewer: Dionicio Fox RN; Registered Nurse: Sean Caldera RN    Imaging Results:  XR CHEST (2 VW)    Result Date: 12/24/2021  EXAMINATION: TWO XRAY VIEWS OF THE CHEST 12/24/2021 12:18 pm COMPARISON: 03/09/2021 HISTORY: ORDERING SYSTEM PROVIDED HISTORY: cough TECHNOLOGIST PROVIDED HISTORY: Reason for exam:->cough Reason for Exam: cough, lethargic; recent contact with +Covid relatives FINDINGS: The lungs are without acute focal process. There is no effusion or pneumothorax. The cardiomediastinal silhouette is stable. The osseous structures are stable. Thoracic scoliosis convex right again noted. No acute process. Discharge Exam:  See progress note from day of d/c    Disposition: SNF    Condition: stable    Discharge Medications:     Medication List      START taking these medications    dexamethasone 2 MG tablet  Commonly known as: DECADRON  Take 1 tablet by mouth 2 times daily (with meals) for 7 days        CONTINUE taking these medications    baclofen 10 MG tablet  Commonly known as: LIORESAL  Take 1 tablet by mouth 3 times daily     dorzolamide 2 % ophthalmic solution  Commonly known as: TRUSOPT     finasteride 5 MG tablet  Commonly known as: PROSCAR     Flomax 0.4 MG capsule  Generic drug: tamsulosin     ibuprofen 200 MG tablet  Commonly known as: ADVIL;MOTRIN     latanoprost 0.005 % ophthalmic solution  Commonly known as: XALATAN     Timolol 0.5 % Soln ophthalmic solution  Commonly known as: TIMOPTIC           Where to Get Your Medications      You can get these medications from any pharmacy    Bring a paper prescription for each of these medications  · dexamethasone 2 MG tablet         Allergies:  No Known Allergies    Follow up Instructions: Follow-up with PCP: Artelia Halsted, MD in  2 wk .       Total time spent on day of discharge including face-to-face visit, examination, documentation, counseling, preparation of discharge plans and followup, and discharge medicine reconciliation and presciptions is 33 minutes    Signed:  Rakesh Hines MD  12/27/2021

## 2021-12-27 NOTE — CARE COORDINATION
Discharge Plan:     Patient discharged to:[facility]   SW/[facility] Planner faxed, [address] and ABEL to:[phone]  Narcotic Prescriptions faxed were:n/a  RN:  will call report to:     [address] [place] [address] with: [address] [facility]  [phone]   time:5pm  Family advised of discharge?:yes  HENS Submitted?:  yes  All discharge needs met per case management.     Antonio Jonas RN, BSN  [phone]

## 2021-12-27 NOTE — PROGRESS NOTES
VSS. Call light within reach. Assessment complete at this time. Fall precautions in place. PRN Norco administered for pain 10/10. PRN Miralax given for no bowel movement 4 days. No signs of distress or additional needs at this time.

## 2021-12-27 NOTE — PLAN OF CARE
Dr. Abbey Mcmillan responded back ordering, \"morphine 1mg every 6 hours prn\". Order placed.      Sindy Montanez BSN, RN   [phone]

## 2021-12-27 NOTE — CARE COORDINATION
CM attempted to call pt in room and no answer, he is very [place]. CM called and spoke to son Colonel Padron and informed pt is stable medically for discharge and explained two facilities accepting Covid patients and he verbalized understanding. He states he lives by pt but feels he should go to facility before coming home. ROSALIO called Vicente Palencia at [facility] [phone] and Jazlyn Garcia at [facility] of [address] and left vm for both referring pt.     Mona Mccann, RN, BSN  [phone]

## 2022-01-12 ENCOUNTER — APPOINTMENT (OUTPATIENT)
Dept: GENERAL RADIOLOGY | Age: 82
DRG: 643 | End: 2022-01-12
Payer: MEDICARE

## 2022-01-12 ENCOUNTER — HOSPITAL ENCOUNTER (INPATIENT)
Age: 82
LOS: 9 days | Discharge: NURSING, REHAB OR LONG-TERM CARE | DRG: 643 | End: 2022-01-21
Attending: INTERNAL MEDICINE | Admitting: INTERNAL MEDICINE
Payer: MEDICARE

## 2022-01-12 DIAGNOSIS — E87.8 HYPOCHLOREMIA: ICD-10-CM

## 2022-01-12 DIAGNOSIS — E87.1 HYPONATREMIA: Primary | ICD-10-CM

## 2022-01-12 LAB
A/G RATIO: 1.5 (ref 1.1–2.2)
ALBUMIN SERPL-MCNC: 3.4 G/DL (ref 3.4–5)
ALBUMIN SERPL-MCNC: 3.4 G/DL (ref 3.4–5)
ALP BLD-CCNC: 78 U/L (ref 40–129)
ALT SERPL-CCNC: 11 U/L (ref 10–40)
ANION GAP SERPL CALCULATED.3IONS-SCNC: 7 MMOL/L (ref 3–16)
ANION GAP SERPL CALCULATED.3IONS-SCNC: 8 MMOL/L (ref 3–16)
AST SERPL-CCNC: 14 U/L (ref 15–37)
BASOPHILS ABSOLUTE: 0 K/UL (ref 0–0.2)
BASOPHILS RELATIVE PERCENT: 0.6 %
BILIRUB SERPL-MCNC: 0.7 MG/DL (ref 0–1)
BUN BLDV-MCNC: 8 MG/DL (ref 7–20)
BUN BLDV-MCNC: 9 MG/DL (ref 7–20)
CALCIUM SERPL-MCNC: 8.3 MG/DL (ref 8.3–10.6)
CALCIUM SERPL-MCNC: 8.4 MG/DL (ref 8.3–10.6)
CHLORIDE BLD-SCNC: 79 MMOL/L (ref 99–110)
CHLORIDE BLD-SCNC: 82 MMOL/L (ref 99–110)
CO2: 26 MMOL/L (ref 21–32)
CO2: 27 MMOL/L (ref 21–32)
CREAT SERPL-MCNC: <0.5 MG/DL (ref 0.8–1.3)
CREAT SERPL-MCNC: <0.5 MG/DL (ref 0.8–1.3)
EOSINOPHILS ABSOLUTE: 0.1 K/UL (ref 0–0.6)
EOSINOPHILS RELATIVE PERCENT: 2.1 %
GFR AFRICAN AMERICAN: >60
GFR AFRICAN AMERICAN: >60
GFR NON-AFRICAN AMERICAN: >60
GFR NON-AFRICAN AMERICAN: >60
GLUCOSE BLD-MCNC: 93 MG/DL (ref 70–99)
GLUCOSE BLD-MCNC: 94 MG/DL (ref 70–99)
HCT VFR BLD CALC: 32.5 % (ref 40.5–52.5)
HEMOGLOBIN: 11.2 G/DL (ref 13.5–17.5)
LYMPHOCYTES ABSOLUTE: 0.5 K/UL (ref 1–5.1)
LYMPHOCYTES RELATIVE PERCENT: 13.3 %
MAGNESIUM: 1.6 MG/DL (ref 1.8–2.4)
MCH RBC QN AUTO: 32.2 PG (ref 26–34)
MCHC RBC AUTO-ENTMCNC: 34.4 G/DL (ref 31–36)
MCV RBC AUTO: 93.7 FL (ref 80–100)
MONOCYTES ABSOLUTE: 0.6 K/UL (ref 0–1.3)
MONOCYTES RELATIVE PERCENT: 16.2 %
NEUTROPHILS ABSOLUTE: 2.3 K/UL (ref 1.7–7.7)
NEUTROPHILS RELATIVE PERCENT: 67.8 %
OSMOLALITY: 243 MOSM/KG (ref 280–301)
PDW BLD-RTO: 13.2 % (ref 12.4–15.4)
PHOSPHORUS: 2.6 MG/DL (ref 2.5–4.9)
PLATELET # BLD: 207 K/UL (ref 135–450)
PMV BLD AUTO: 5.9 FL (ref 5–10.5)
POTASSIUM SERPL-SCNC: 4.5 MMOL/L (ref 3.5–5.1)
POTASSIUM SERPL-SCNC: 4.7 MMOL/L (ref 3.5–5.1)
RBC # BLD: 3.47 M/UL (ref 4.2–5.9)
SODIUM BLD-SCNC: 113 MMOL/L (ref 136–145)
SODIUM BLD-SCNC: 114 MMOL/L (ref 136–145)
SODIUM BLD-SCNC: 116 MMOL/L (ref 136–145)
TOTAL PROTEIN: 5.6 G/DL (ref 6.4–8.2)
TROPONIN: <0.01 NG/ML
URIC ACID, SERUM: 1.5 MG/DL (ref 3.5–7.2)
WBC # BLD: 3.5 K/UL (ref 4–11)

## 2022-01-12 PROCEDURE — 83930 ASSAY OF BLOOD OSMOLALITY: CPT

## 2022-01-12 PROCEDURE — 82570 ASSAY OF URINE CREATININE: CPT

## 2022-01-12 PROCEDURE — 71045 X-RAY EXAM CHEST 1 VIEW: CPT

## 2022-01-12 PROCEDURE — 82436 ASSAY OF URINE CHLORIDE: CPT

## 2022-01-12 PROCEDURE — 83935 ASSAY OF URINE OSMOLALITY: CPT

## 2022-01-12 PROCEDURE — 93005 ELECTROCARDIOGRAM TRACING: CPT | Performed by: PHYSICIAN ASSISTANT

## 2022-01-12 PROCEDURE — 84300 ASSAY OF URINE SODIUM: CPT

## 2022-01-12 PROCEDURE — 6370000000 HC RX 637 (ALT 250 FOR IP): Performed by: INTERNAL MEDICINE

## 2022-01-12 PROCEDURE — 2060000000 HC ICU INTERMEDIATE R&B

## 2022-01-12 PROCEDURE — 83735 ASSAY OF MAGNESIUM: CPT

## 2022-01-12 PROCEDURE — 84484 ASSAY OF TROPONIN QUANT: CPT

## 2022-01-12 PROCEDURE — 85025 COMPLETE CBC W/AUTO DIFF WBC: CPT

## 2022-01-12 PROCEDURE — 84550 ASSAY OF BLOOD/URIC ACID: CPT

## 2022-01-12 PROCEDURE — 99283 EMERGENCY DEPT VISIT LOW MDM: CPT

## 2022-01-12 PROCEDURE — 2580000003 HC RX 258: Performed by: INTERNAL MEDICINE

## 2022-01-12 PROCEDURE — 80053 COMPREHEN METABOLIC PANEL: CPT

## 2022-01-12 PROCEDURE — 84295 ASSAY OF SERUM SODIUM: CPT

## 2022-01-12 PROCEDURE — 36415 COLL VENOUS BLD VENIPUNCTURE: CPT

## 2022-01-12 PROCEDURE — 84540 ASSAY OF URINE/UREA-N: CPT

## 2022-01-12 PROCEDURE — 84133 ASSAY OF URINE POTASSIUM: CPT

## 2022-01-12 RX ORDER — LATANOPROST 50 UG/ML
1 SOLUTION/ DROPS OPHTHALMIC NIGHTLY
Status: DISCONTINUED | OUTPATIENT
Start: 2022-01-12 | End: 2022-01-21 | Stop reason: HOSPADM

## 2022-01-12 RX ORDER — ONDANSETRON 4 MG/1
4 TABLET, ORALLY DISINTEGRATING ORAL EVERY 8 HOURS PRN
Status: DISCONTINUED | OUTPATIENT
Start: 2022-01-12 | End: 2022-01-21 | Stop reason: HOSPADM

## 2022-01-12 RX ORDER — SODIUM CHLORIDE 9 MG/ML
25 INJECTION, SOLUTION INTRAVENOUS PRN
Status: DISCONTINUED | OUTPATIENT
Start: 2022-01-12 | End: 2022-01-21 | Stop reason: HOSPADM

## 2022-01-12 RX ORDER — ACETAMINOPHEN 325 MG/1
650 TABLET ORAL EVERY 6 HOURS PRN
Status: DISCONTINUED | OUTPATIENT
Start: 2022-01-12 | End: 2022-01-21 | Stop reason: HOSPADM

## 2022-01-12 RX ORDER — HYDRALAZINE HYDROCHLORIDE 20 MG/ML
10 INJECTION INTRAMUSCULAR; INTRAVENOUS EVERY 6 HOURS PRN
Status: DISCONTINUED | OUTPATIENT
Start: 2022-01-12 | End: 2022-01-21 | Stop reason: HOSPADM

## 2022-01-12 RX ORDER — POTASSIUM CHLORIDE 20 MEQ/1
40 TABLET, EXTENDED RELEASE ORAL PRN
Status: DISCONTINUED | OUTPATIENT
Start: 2022-01-12 | End: 2022-01-21 | Stop reason: HOSPADM

## 2022-01-12 RX ORDER — SODIUM CHLORIDE 9 MG/ML
INJECTION, SOLUTION INTRAVENOUS CONTINUOUS
Status: DISCONTINUED | OUTPATIENT
Start: 2022-01-12 | End: 2022-01-13

## 2022-01-12 RX ORDER — ACETAMINOPHEN 650 MG/1
650 SUPPOSITORY RECTAL EVERY 6 HOURS PRN
Status: DISCONTINUED | OUTPATIENT
Start: 2022-01-12 | End: 2022-01-21 | Stop reason: HOSPADM

## 2022-01-12 RX ORDER — TIMOLOL MALEATE 5 MG/ML
1 SOLUTION/ DROPS OPHTHALMIC 2 TIMES DAILY
Status: DISCONTINUED | OUTPATIENT
Start: 2022-01-12 | End: 2022-01-21 | Stop reason: HOSPADM

## 2022-01-12 RX ORDER — IBUPROFEN 400 MG/1
200 TABLET ORAL EVERY 6 HOURS PRN
Status: DISCONTINUED | OUTPATIENT
Start: 2022-01-12 | End: 2022-01-21 | Stop reason: HOSPADM

## 2022-01-12 RX ORDER — POTASSIUM CHLORIDE 20 MEQ/1
40 TABLET, EXTENDED RELEASE ORAL PRN
Status: DISCONTINUED | OUTPATIENT
Start: 2022-01-12 | End: 2022-01-12 | Stop reason: SDUPTHER

## 2022-01-12 RX ORDER — TAMSULOSIN HYDROCHLORIDE 0.4 MG/1
0.4 CAPSULE ORAL DAILY
Status: DISCONTINUED | OUTPATIENT
Start: 2022-01-13 | End: 2022-01-21 | Stop reason: HOSPADM

## 2022-01-12 RX ORDER — POTASSIUM CHLORIDE 7.45 MG/ML
10 INJECTION INTRAVENOUS PRN
Status: DISCONTINUED | OUTPATIENT
Start: 2022-01-12 | End: 2022-01-21 | Stop reason: HOSPADM

## 2022-01-12 RX ORDER — POTASSIUM CHLORIDE 7.45 MG/ML
10 INJECTION INTRAVENOUS PRN
Status: DISCONTINUED | OUTPATIENT
Start: 2022-01-12 | End: 2022-01-12 | Stop reason: SDUPTHER

## 2022-01-12 RX ORDER — DORZOLAMIDE HCL 20 MG/ML
2 SOLUTION/ DROPS OPHTHALMIC 3 TIMES DAILY
Status: DISCONTINUED | OUTPATIENT
Start: 2022-01-12 | End: 2022-01-21 | Stop reason: HOSPADM

## 2022-01-12 RX ORDER — SODIUM CHLORIDE 0.9 % (FLUSH) 0.9 %
5-40 SYRINGE (ML) INJECTION EVERY 12 HOURS SCHEDULED
Status: DISCONTINUED | OUTPATIENT
Start: 2022-01-12 | End: 2022-01-21 | Stop reason: HOSPADM

## 2022-01-12 RX ORDER — 0.9 % SODIUM CHLORIDE 0.9 %
500 INTRAVENOUS SOLUTION INTRAVENOUS PRN
Status: DISCONTINUED | OUTPATIENT
Start: 2022-01-12 | End: 2022-01-21 | Stop reason: HOSPADM

## 2022-01-12 RX ORDER — FINASTERIDE 5 MG/1
5 TABLET, FILM COATED ORAL DAILY
Status: DISCONTINUED | OUTPATIENT
Start: 2022-01-13 | End: 2022-01-21 | Stop reason: HOSPADM

## 2022-01-12 RX ORDER — BACLOFEN 10 MG/1
10 TABLET ORAL 3 TIMES DAILY
Status: DISCONTINUED | OUTPATIENT
Start: 2022-01-12 | End: 2022-01-15

## 2022-01-12 RX ORDER — ONDANSETRON 2 MG/ML
4 INJECTION INTRAMUSCULAR; INTRAVENOUS EVERY 6 HOURS PRN
Status: DISCONTINUED | OUTPATIENT
Start: 2022-01-12 | End: 2022-01-21 | Stop reason: HOSPADM

## 2022-01-12 RX ORDER — SODIUM CHLORIDE 0.9 % (FLUSH) 0.9 %
5-40 SYRINGE (ML) INJECTION PRN
Status: DISCONTINUED | OUTPATIENT
Start: 2022-01-12 | End: 2022-01-21 | Stop reason: HOSPADM

## 2022-01-12 RX ADMIN — SODIUM CHLORIDE: 9 INJECTION, SOLUTION INTRAVENOUS at 20:22

## 2022-01-12 RX ADMIN — TIMOLOL MALEATE 1 DROP: 5 SOLUTION OPHTHALMIC at 23:42

## 2022-01-12 RX ADMIN — LATANOPROST 1 DROP: 50 SOLUTION OPHTHALMIC at 23:47

## 2022-01-12 RX ADMIN — DORZOLAMIDE HYDROCHLORIDE 2 DROP: 20 SOLUTION/ DROPS OPHTHALMIC at 23:37

## 2022-01-12 ASSESSMENT — ENCOUNTER SYMPTOMS
DIARRHEA: 0
COUGH: 0
RHINORRHEA: 0
NAUSEA: 0
ABDOMINAL PAIN: 0
SHORTNESS OF BREATH: 0
VOMITING: 0
WHEEZING: 0

## 2022-01-12 ASSESSMENT — PAIN SCALES - GENERAL: PAINLEVEL_OUTOF10: 0

## 2022-01-12 NOTE — CONSULTS
Clarence Dresser, MD Zane Libman, MD Karol Risen, MD               Office: [phone]                      Fax: [phone]             [facility]                   NEPHROLOGY INITIAL CONSULT NOTE:     PATIENT NAME: Maurilio Leahy  : 1940  MRN: [id number]  REASON FOR CONSULT: For evaluation and management of hyponatremia. (My recommendations will be communicated by way of shared medical record.)      IMPRESSION:       Admitted for:  Hypochloremia [E87.8]  Hyponatremia [E87.1]      Hyponatremia :   : Acute on Chronic, Severe 113, mildly symptomatic w/ AMS  -No Reported Severe symptoms: seizures, obtundation, coma, and respiratory arrest.    - Risk factors for hyponatremia:    - NSAIDs use   - recent admission in 2021 - w/ hyponatremia - lowest Na 124 , on d/c 133 on 2021 - had \"URI\" symptoms, first Rx Levaquin + Steroids -> (+) COVID 2021, not hypoxic.    - Patient is at [address] [facility] risk of developing Osmotic Demyelination Syndrome.    - Call us urgently if any/worsening neurological findings. PLAN:     Work up:  - Serum Osm , UOsm , Mamie , Urine K, CL-, Uric acid,   - renal function = Cr <0.5 = low solutes intake, as w/ BUN low,    - BP - not low,   - K WNL  - hypochloremic  - hypomagnesemia   - S.alb-WNL  -LFT not elevated  -mild Anemic of chronic dz  -CXR - No acute process. Management:  - Monitor Serum Na Q 4 hrs, - requested RN to page me results. - Goal Serum Na ~ 120, by first 24 hrs    -f/u recheck, if <115, start HT saline - as now   -otherwise NS for MIVF+salt tabs    - no need for dDAVP  proactively     -hold NSAIDs    -check bladder scan - as w/ h/o BPH    - keep Flomax, Proscar    - order neuro check, seizure precautions  - Strict I/O with daily weights. --- Call us urgently if any/worsening neurological findings. D/C plan from renal stand point: Expect ~ 4-5 days  - once Na >130s, stable w/o neuro. s/s       D/W pt, nurse        Other major problems: Management per primary and other consulting teams.   //      Hospital Problems           Last Modified POA    * (Principal) Hyponatremia 1/12/2022 Yes    HTN (hypertension) 1/12/2022 Yes    BPH (benign prostatic hyperplasia) 1/12/2022 Yes    Anemia 1/12/2022 Yes    COVID-19 1/13/2022 Yes        : other supportive care :   - Check daily renal function panel with electrolytes-phosphorus  - Strict monitoring of I/Os, daily weight  - non-enal feeds/diet  - Current medications reviewed. Please refer to the orders. High Complexity. Multiple complex problems. Discussed with patient and treatment team-    Time spent > 30 (~35) minutes. Thank you for allowing me to participate in this patient's care. Please do not hesitate to contact me anytime. We will follow along with you. Shanna Butler MD,  [facility] [address] [place] [address] [place]: [phone] or [facility]  Fax: [phone]        =======================================================================================   =======================================================================================      CHIEF COMPLAINT:   Chief Complaint   Patient presents with    Other     Pt brought in by [facility] from  [address] E[facility] for abnormal lab. Pt Na+ 117. pt hx of hyponatremia. Very [place]     History Obtained From:  patient + treatment team + Electronic Medical Records    HPI: Mr. Aubree Trujillo is a 80 y.o. male with significant past medical history as below:   Past Medical History:   Diagnosis Date    Glaucoma     Prostate enlargement       Presents with Other (Pt brought in by [facility] from  [address] [facility] for abnormal lab. Pt Na+ 117. pt hx of hyponatremia. Very [place])    Admitted with Hypochloremia [E87.8]  Hyponatremia [E87.1]   Found to have hyponatremia , so we are called for that. In ER w/ Na 113, last sodium 124 ~ 3 weeks ago. No hypotension, but w/ AMS. So admitted.   Regarding: hyponatremia  · Duration: acute on chronic  · Location: kidneys  · Severity: Severe   · Timing: continous  · Context (ex: related to condition)  refer to my assessment / impression. · Modifying factors (ex: medications, interventions):   , refer to my plan / recommendation. · Associated signs & symptoms (ex: edema, SOB): As mentioned above in CC and HPI      Past medical, Surgical, Social, Family medical history reviewed by me. PAST MEDICAL HISTORY:   Past Medical History:   Diagnosis Date    Glaucoma     Prostate enlargement      PAST SURGICAL HISTORY: No past surgical history on file. FAMILY HISTORY: No family history on file. SOCIAL HISTORY:   Social History     Socioeconomic History    Marital status:      Spouse name: Not on file    Number of children: Not on file    Years of education: Not on file    Highest education level: Not on file   Occupational History    Not on file   Tobacco Use    Smoking status: Never Smoker    Smokeless tobacco: Never Used   Substance and Sexual Activity    Alcohol use: No    Drug use: No    Sexual activity: Not on file   Other Topics Concern    Not on file   Social History Narrative    Not on file     Social Determinants of Health     Financial Resource Strain:     Difficulty of Paying Living Expenses: Not on file   Food Insecurity:     Worried About Running Out of Food in the Last Year: Not on file    Taniya of Food in the Last Year: Not on file   Transportation Needs:     Lack of Transportation (Medical): Not on file    Lack of Transportation (Non-Medical):  Not on file   Physical Activity:     Days of Exercise per Week: Not on file    Minutes of Exercise per Session: Not on file   Stress:     Feeling of Stress : Not on file   Social Connections:     Frequency of Communication with Friends and Family: Not on file    Frequency of Social Gatherings with Friends and Family: Not on file    Attends Faith Services: Not on file   [place] Active Member of Clubs or Organizations: Not on file    Attends Club or Organization Meetings: Not on file    Marital Status: Not on file   Intimate Partner Violence:     Fear of Current or Ex-Partner: Not on file    Emotionally Abused: Not on file    Physically Abused: Not on file    Sexually Abused: Not on file   Housing Stability:     Unable to Pay for Housing in the Last Year: Not on file    Number of [place] in the Last Year: Not on file    Unstable Housing in the Last Year: Not on file          MEDICATIONS: reviewed by me. Medications Prior to Admission:  No current facility-administered medications on file prior to encounter. Current Outpatient Medications on File Prior to Encounter   Medication Sig Dispense Refill    cyanocobalamin 1000 MCG tablet Take 1,000 mcg by mouth daily      magnesium hydroxide (MILK OF MAGNESIA) 400 MG/5ML suspension Take 30 mLs by mouth daily as needed for Constipation      baclofen (LIORESAL) 10 MG tablet Take 1 tablet by mouth 3 times daily (Patient taking differently: Take 10 mg by mouth every 6 hours as needed Indications: muscle pain ) 30 tablet 1    Timolol (TIMOPTIC) 0.5 % (DAILY) SOLN ophthalmic solution 1 drop daily      dorzolamide (TRUSOPT) 2 % ophthalmic solution Place 2 drops into both eyes 3 times daily      latanoprost (XALATAN) 0.005 % ophthalmic solution Place 1 drop into both eyes nightly      finasteride (PROSCAR) 5 MG tablet Take 5 mg by mouth daily      ibuprofen (ADVIL;MOTRIN) 200 MG tablet Take 200 mg by mouth every 6 hours as needed for Pain      tamsulosin (FLOMAX) 0.4 MG capsule Take 0.4 mg by mouth daily.              Current Facility-Administered Medications:     tamsulosin (FLOMAX) capsule 0.4 mg, 0.4 mg, Oral, Daily, Glen Jimenez MD, 0.4 mg at 01/13/22 1012    Timolol (TIMOPTIC) 0.5 % ophthalmic solution 1 drop, 1 drop, Both Eyes, BID, Glen Jimenez MD, 1 drop at 01/13/22 1014    dorzolamide (TRUSOPT) 2 % ophthalmic solution 2 drop, 2 drop, Both Eyes, TID, Marita Benitez MD, 2 drop at 01/13/22 1014    latanoprost (XALATAN) 0.005 % ophthalmic solution 1 drop, 1 drop, Both Eyes, Nightly, Marita Benitez MD, 1 drop at 01/12/22 2347    finasteride (PROSCAR) tablet 5 mg, 5 mg, Oral, Daily, Marita Benitez MD, 5 mg at 01/13/22 1013    ibuprofen (ADVIL;MOTRIN) tablet 200 mg, 200 mg, Oral, Q6H PRN, Marita Benitez MD, 200 mg at 01/13/22 1013    baclofen (LIORESAL) tablet 10 mg, 10 mg, Oral, TID, Marita Benitez MD, 10 mg at 01/13/22 1013    sodium chloride flush 0.9 % injection 5-40 mL, 5-40 mL, IntraVENous, 2 times per day, Marita Benitez MD, 10 mL at 01/13/22 1013    sodium chloride flush 0.9 % injection 5-40 mL, 5-40 mL, IntraVENous, PRN, Marita Benitez MD    0.9 % sodium chloride infusion, 25 mL, IntraVENous, PRN, Marita Benitez MD    potassium chloride (KLOR-CON M) extended release tablet 40 mEq, 40 mEq, Oral, PRN **OR** potassium bicarb-citric acid (EFFER-K) effervescent tablet 40 mEq, 40 mEq, Oral, PRN **OR** potassium chloride 10 mEq/100 mL IVPB (Peripheral Line), 10 mEq, IntraVENous, PRN, Marita Benitez MD    enoxaparin (LOVENOX) injection 40 mg, 40 mg, SubCUTAneous, Daily, Marita Benitez MD, 40 mg at 01/13/22 1014    ondansetron (ZOFRAN-ODT) disintegrating tablet 4 mg, 4 mg, Oral, Q8H PRN **OR** ondansetron (ZOFRAN) injection 4 mg, 4 mg, IntraVENous, Q6H PRN, Marita Benitez MD    magnesium hydroxide (MILK OF MAGNESIA) 400 MG/5ML suspension 30 mL, 30 mL, Oral, Daily PRN, Marita Benitez MD    acetaminophen (TYLENOL) tablet 650 mg, 650 mg, Oral, Q6H PRN **OR** acetaminophen (TYLENOL) suppository 650 mg, 650 mg, Rectal, Q6H PRN, Marita Benitez MD    hydrALAZINE (APRESOLINE) injection 10 mg, 10 mg, IntraVENous, Q6H PRN, Marita Benitez MD    0.9 % sodium chloride bolus, 500 mL, IntraVENous, PRN, Marita Benitez MD    0.9 % sodium chloride infusion, , IntraVENous, Continuous, Cory Cope Alex Calvin MD, Last Rate: 80 mL/hr at 01/13/22 0747, New Bag at 01/13/22 0747      Allergies reviewed by me: Patient has no known allergies. REVIEW OF SYSTEMS:  Review of systems not obtained FULLY due to patient factors - mental status       =======================================================================================     PHYSICAL EXAM:  Recent vital signs and recent I/Os reviewed by me. Wt Readings from Last 3 Encounters:   01/12/22 105 lb (47.6 kg)   12/24/21 108 lb 11.2 oz (49.3 kg)   03/09/21 134 lb 3.2 oz (60.9 kg)     BP Readings from Last 3 Encounters:   01/13/22 (!) 169/84   12/27/21 112/60   03/12/21 138/84     Patient Vitals for the past 24 hrs:   BP Temp Temp src Pulse Resp SpO2 Height Weight   01/13/22 1115 (!) 169/84 97 °F (36.1 °C) Axillary 55 17 98 %     01/13/22 0730 (!) 163/82 97.2 °F (36.2 °C) Axillary 51 16 94 %     01/13/22 0435 (!) 146/81 97.8 °F (36.6 °C) Oral 54 16 95 %     01/13/22 0039 139/76 98.1 °F (36.7 °C) Axillary 69 16 97 %     01/12/22 2232     16 95 %     01/12/22 2115 (!) 148/70 98 °F (36.7 °C) Oral 55 14 96 %     01/12/22 2016 135/74   57 16 97 %     01/12/22 1715 (!) 152/82   57 15      01/12/22 1707    56 16 100 %     01/12/22 1622 (!) 151/76 98.3 °F (36.8 °C) Oral 54 18 98 % 5' 10\" (1.778 m) 105 lb (47.6 kg)       Intake/Output Summary (Last 24 hours) at 1/13/2022 1131  Last data filed at 1/13/2022 0238  Gross per 24 hour   Intake 500.99 ml   Output 1075 ml   Net -574.01 ml       General: Awake, but disoriented   HENT: Atraumatic, normocephalic   Eyes: Normal conjunctiva, Non-incteral sclera. Neck: Supple, JVD not visible. CVS:  Heart sounds are normal. No loud murmur. RS: Normal respiratory effort, Breat sound: diminished at bases. Abd: Soft , bowel sounds are normal, no distension and no tenderness . Skin: No rash , some bruises,   CNS: Awake , No focal.   Extremities/MSK:   Edema, no cyanosis. =======================================================================================     DATA:  Diagnostic tests reviewed by me for today's visit:   (AS NEEDED FOR MY EVALUATION AND MANAGEMENT). Recent Labs     01/12/22 1645 01/13/22 0434   WBC 3.5* 4.5   HCT 32.5* 32.9*    199     Iron Saturation:  No components found for: PERCENTFE  FERRITIN:  No results found for: FERRITIN  IRON:  No results found for: IRON  TIBC:  No results found for: TIBC    Recent Labs     01/12/22 1645 01/12/22 1645 01/12/22 1901 01/12/22  1901 01/12/22  2306 01/13/22  0313 01/13/22  0434 01/13/22  0706 01/13/22  1024   *   < > 116*   < > 114* 115* 118* 117* 115*   K 4.5  --  4.7  --   --   --  4.5  --   --    CL 79*  --  82*  --   --   --  84*  --   --    CO2 27  --  26  --   --   --  28  --   --    BUN 9  --  8  --   --   --  6*  --   --    CREATININE <0.5*  --  <0.5*  --   --   --  <0.5*  --   --     < > = values in this interval not displayed. Recent Labs     01/12/22 1645 01/12/22 1901 01/13/22 0434   CALCIUM 8.4 8.3 8.4   MG 1.60*  --   --    PHOS  --  2.6 2.4*     No results for input(s): PH, PCO2, PO2 in the last 72 hours.     Invalid input(s): Rosa Maria Toth    ABG:  No results found for: PH, PCO2, PO2, HCO3, BE, THGB, TCO2, O2SAT  VBG:  No results found for: PHVEN, MMK5CNY, BEVEN, T0OVOZDI    LDH:  No results found for: LDH  Uric Acid:    Lab Results   Component Value Date    LABURIC 1.5 01/12/2022       PT/INR:  No results found for: PROTIME, INR  Warfarin PT/INR:  No components found for: PTPATWAR, PTINRWAR  PTT:  No results found for: APTT, PTT[APTT}  Last 3 Troponin:    Lab Results   Component Value Date    TROPONINI <0.01 01/12/2022    TROPONINI <0.01 12/25/2021    TROPONINI <0.01 12/24/2021       U/A:    Lab Results   Component Value Date    COLORU YELLOW 12/24/2021    PROTEINU Negative 12/24/2021    PHUR 5.5 12/24/2021    CLARITYU Clear 12/24/2021    SPECGRAV 1.007 12/24/2021 LEUKOCYTESUR Negative 12/24/2021    UROBILINOGEN 1.0 12/24/2021    BILIRUBINUR Negative 12/24/2021    BLOODU Negative 12/24/2021    GLUCOSEU Negative 12/24/2021     Microalbumen/Creatinine ratio:  No components found for: RUCREAT  24 Hour Urine for Protein:  No components found for: RAWUPRO, UHRS3, RKPH88PB, UTV3  24 Hour Urine for Creatinine Clearance:  No components found for: CREAT4, UHRS10, UTV10  Urine Toxicology:  No components found for: Jessee Harrier, IBENZO, ICOCAINE, IMARTHC, IOPIATES, IPHENCYC    HgBA1c:    Lab Results   Component Value Date    LABA1C 5.6 09/03/2019     RPR:  No results found for: RPR  HIV:  No results found for: HIV  HEMAL:  No results found for: ANATITER, HEMAL  RF:  No results found for: RF  DSDNA:  No components found for: DNA  AMYLASE:  No results found for: AMYLASE  LIPASE:    Lab Results   Component Value Date    LIPASE 23.0 04/19/2019     Fibrinogen Level:  No components found for: FIB       BELOW MENTIONED RADIOLOGY STUDY RESULTS BY ME (AS NEEDED FOR MY EVALUATION AND MANAGEMENT). XR CHEST (2 VW)    Result Date: 12/24/2021  EXAMINATION: TWO XRAY VIEWS OF THE CHEST 12/24/2021 12:18 pm COMPARISON: 03/09/2021 HISTORY: ORDERING SYSTEM PROVIDED HISTORY: cough TECHNOLOGIST PROVIDED HISTORY: Reason for exam:->cough Reason for Exam: cough, lethargic; recent contact with +Covid relatives FINDINGS: The lungs are without acute focal process. There is no effusion or pneumothorax. The cardiomediastinal silhouette is stable. The osseous structures are stable. Thoracic scoliosis convex right again noted. No acute process. XR CHEST PORTABLE    Result Date: 1/12/2022  EXAMINATION: ONE XRAY VIEW OF THE CHEST 1/12/2022 4:32 pm COMPARISON: 12/24/2021 HISTORY: ORDERING SYSTEM PROVIDED HISTORY: SOB TECHNOLOGIST PROVIDED HISTORY: Reason for exam:->SOB Reason for Exam: shortness of breath FINDINGS: The lungs are without acute focal process. There is no effusion or pneumothorax.  The cardiomediastinal silhouette is stable. The osseous structures are stable. Thoracic scoliosis convex right. No acute process.

## 2022-01-12 NOTE — ED PROVIDER NOTES
[address] [facility]        Pt Name: Evelyn Stokes  MRN: [id number]  [place] 1940  Date of evaluation: 1/12/2022  Provider: Jerel Marques PA-C  PCP: Hua Infante MD  Note Started: 4:25 PM EST       CARA. I have evaluated this patient. My supervising physician was available for consultation. CHIEF COMPLAINT       Chief Complaint   Patient presents with    Other     Pt brought in by [facility] from [address] [facility] for abnormal lab. Pt Na+ 117. pt hx of hyponatremia. Very [place]       HISTORY OF PRESENT ILLNESS   (Location, Timing/Onset, Context/Setting, Quality, Duration, Modifying Factors, Severity, Associated Signs and Symptoms)  Note limiting factors. Chief Complaint: Abnormal labs     Evelyn Stokes is a 80 y.o. male who presents for evaluation of abnormal labs. Apparently had a sodium of 117 at home. Does have a history of hyponatremia. Patient is very hard of hearing a difficult historian. No additional information is able to obtain at this time. Upon further chart review, patient sodium was 124 at time of admission on 12/24/2021.  133 at discharge. Nursing Notes were all reviewed and agreed with or any disagreements were addressed in the HPI. REVIEW OF SYSTEMS    (2-9 systems for level 4, 10 or more for level 5)     Review of Systems   Unable to perform ROS: Other   Constitutional: Negative for appetite change, chills and fever. HENT: Negative for congestion and rhinorrhea. Respiratory: Negative for cough, shortness of breath and wheezing. Cardiovascular: Negative for chest pain. Gastrointestinal: Negative for abdominal pain, diarrhea, nausea and vomiting. Genitourinary: Negative for difficulty urinating, dysuria and hematuria. Musculoskeletal: Negative for neck pain and neck stiffness. Skin: Negative for rash. Neurological: Negative for headaches.        Positives and Pertinent negatives as per HPI. Except as noted above in the ROS, all other systems were reviewed and negative. PAST MEDICAL HISTORY     Past Medical History:   Diagnosis Date    Glaucoma     Prostate enlargement          SURGICAL HISTORY   No past surgical history on file. CURRENTMEDICATIONS       Previous Medications    BACLOFEN (LIORESAL) 10 MG TABLET    Take 1 tablet by mouth 3 times daily    DORZOLAMIDE (TRUSOPT) 2 % OPHTHALMIC SOLUTION    Place 2 drops into both eyes 3 times daily    FINASTERIDE (PROSCAR) 5 MG TABLET    Take 5 mg by mouth daily    IBUPROFEN (ADVIL;MOTRIN) 200 MG TABLET    Take 200 mg by mouth every 6 hours as needed for Pain    LATANOPROST (XALATAN) 0.005 % OPHTHALMIC SOLUTION    Place 1 drop into both eyes nightly    TAMSULOSIN (FLOMAX) 0.4 MG CAPSULE    Take 0.4 mg by mouth daily. TIMOLOL (TIMOPTIC) 0.5 % (DAILY) SOLN OPHTHALMIC SOLUTION    1 drop daily         ALLERGIES     Patient has no known allergies. FAMILYHISTORY     No family history on file. SOCIAL HISTORY       Social History     Tobacco Use    Smoking status: Never Smoker    Smokeless tobacco: Never Used   Substance Use Topics    Alcohol use: No    Drug use: No       SCREENINGS             PHYSICAL EXAM    (up to 7 for level 4, 8 or more for level 5)     ED Triage Vitals [01/12/22 1622]   BP Temp Temp Source Pulse Resp SpO2 Height Weight   (!) 151/76 98.3 °F (36.8 °C) Oral 54 18 98 % 5' 10\" (1.778 m) 105 lb (47.6 kg)       Physical Exam  Constitutional:       General: He is not in acute distress. Appearance: He is well-developed. He is not ill-appearing, toxic-appearing or diaphoretic. HENT:      Head: Normocephalic and atraumatic. Right Ear: External ear normal.      Left Ear: External ear normal.      Nose: Nose normal.   Eyes:      General:         Right eye: No discharge. Left eye: No discharge. Cardiovascular:      Rate and Rhythm: Regular rhythm. Bradycardia present.       Heart sounds: Normal heart sounds. Pulmonary:      Effort: Pulmonary effort is normal. No respiratory distress. Breath sounds: Normal breath sounds. Chest:      Chest wall: No tenderness. Abdominal:      General: There is no distension. Palpations: Abdomen is soft. Tenderness: There is no abdominal tenderness. Musculoskeletal:         General: Normal range of motion. Cervical back: Normal range of motion and neck supple. Skin:     General: Skin is warm and dry. Neurological:      Mental Status: He is alert and oriented to person, place, and time. Mental status is at baseline. GCS: GCS eye subscore is 4. GCS verbal subscore is 5. GCS motor subscore is 6. Cranial Nerves: Cranial nerves are intact.    Psychiatric:         Behavior: Behavior normal.         DIAGNOSTIC RESULTS   LABS:    Labs Reviewed   CBC WITH AUTO DIFFERENTIAL - Abnormal; Notable for the following components:       Result Value    WBC 3.5 (*)     RBC 3.47 (*)     Hemoglobin 11.2 (*)     Hematocrit 32.5 (*)     Lymphocytes Absolute 0.5 (*)     All other components within normal limits    Narrative:     Performed at:  [facility] [address], [place], [address] [employer]   Phone [phone]   COMPREHENSIVE METABOLIC PANEL - Abnormal; Notable for the following components:    Sodium 113 (*)     Chloride 79 (*)     CREATININE <0.5 (*)     Total Protein 5.6 (*)     AST 14 (*)     All other components within normal limits    Narrative:     Kerwin Saleh  [employer] tel. [phone],  Chemistry results called to and read back by Eunice Manuel, 01/12/2022  17:43, by [facility]  Performed at:  [facility] [address], [place], [facility] [employer]   Phone [phone]   MAGNESIUM - Abnormal; Notable for the following components:    Magnesium 1.60 (*)     All other components within normal limits    Narrative:     Kerwin Saleh  [employer] tel. [phone],  Chemistry results called to and read back by medications:  Medications - No data to display        Patient presents for evaluation of hyponatremia. On exam, he is chronically ill-appearing but no acute distress and nontoxic. Slightly bradycardic but vitals otherwise stable and he is afebrile. Lungs are clear to auscultation bilaterally. No witnessed seizure-like activity. Please see attending note for EKG interpretation. CBC and CMP are remarkable for a sodium of 113 and a chloride of 79. Troponin is negative. Mag 1.6. Chest x-ray shows no acute process. I spoke with on-call nephrology, Dr. Yohana Terrazas, will consult with the patient and put in orders regarding management of profound electrolyte disturbance. I do spoke with admitting physician, Dr. Pia Traore, who will resume care of the patient at this time. Patient was informed and agreeable. He is stable for admission. FINAL IMPRESSION      1. Hyponatremia    2. Hypochloremia          DISPOSITION/PLAN   DISPOSITION Decision To Admit 01/12/2022 05:53:39 PM      PATIENT REFERRED TO:  No follow-up provider specified.     DISCHARGE MEDICATIONS:  New Prescriptions    No medications on file       DISCONTINUED MEDICATIONS:  Discontinued Medications    No medications on file              (Please note that portions of this note were completed with a voice recognition program.  Efforts were made to edit the dictations but occasionally words are mis-transcribed.)    Janee Diaz PA-C (electronically signed)           Harriet Nvaarro PA-C  01/12/22 5250

## 2022-01-12 NOTE — H&P
History and Physical  Dr. Sarah Veloz  1/12/2022    PCP: Sheryl Hernández MD    Cc:   Chief Complaint   Patient presents with    Other     Pt brought in by [facility] from [facility] for abnormal lab. Pt Na+ 117. pt hx of hyponatremia. Very [place]       HPI:  Anisha Herrera is a 80 y.o. male who has a past medical history of Glaucoma and Prostate enlargement. Patient presents with Hyponatremia. HPI  (1-3 for expanded problem focused, ?4 for detailed/comprehensive)     Anisha Herrera is a 80 y.o. male who presents for evaluation of abnormal labs. Apparently had a sodium of 117 at home. Does have a history of hyponatremia. Patient is very hard of hearing a difficult historian. No additional information is able to obtain at this time. Review of my recent d/c summary shows hyponatremia also an issue last time, thought to be 2/2 covid. Na was 133 on discharge    TOday, 113. To be admitted  Renal consulted  Defer decision on hypertonic saline to them    Problem list of hospitalization thus far: Active Hospital Problems    Diagnosis     COVID-19 [U07.1]     Hyponatremia [E87.1]     HTN (hypertension) [I10]     BPH (benign prostatic hyperplasia) [N40.0]     Anemia [D64.9]          Review of Systems: (1 system for EPF, 2-9 for detailed, 10+ for comprehensive)    Cannot obtain from patient, he simply answers yes to every question    Past Medical History:   Past Medical History:   Diagnosis Date    Glaucoma     Prostate enlargement        Past Surgical History: No past surgical history on file. Social History:   Social History     Tobacco History     Smoking Status  Never Smoker    Smokeless Tobacco Use  Never Used          Alcohol History     Alcohol Use Status  No          Drug Use     Drug Use Status  No          Sexual Activity     Sexually Active  Not Asked                Fam History: No family history on file. PFSH: The above PMHx, PSHx, SocHx, FamHx has been reviewed by myself.  (1 area for detailed, 2-3 for comprehensive)      Code Status: Prior    M[facility]  [facility]  Prior to Admission medications    Medication Sig Start Date End Date Taking? Authorizing Provider   baclofen (LIORESAL) 10 MG tablet Take 1 tablet by mouth 3 times daily 3/12/21   Lesly Sin MD   Timolol (TIMOPTIC) 0.5 % (DAILY) SOLN ophthalmic solution 1 drop daily    Historical Provider, MD   dorzolamide (TRUSOPT) 2 % ophthalmic solution Place 2 drops into both eyes 3 times daily    Historical Provider, MD   latanoprost (XALATAN) 0.005 % ophthalmic solution Place 1 drop into both eyes nightly    Historical Provider, MD   finasteride (PROSCAR) 5 MG tablet Take 5 mg by mouth daily    Historical Provider, MD   ibuprofen (ADVIL;MOTRIN) 200 MG tablet Take 200 mg by mouth every 6 hours as needed for Pain    Historical Provider, MD   tamsulosin (FLOMAX) 0.4 MG capsule Take 0.4 mg by mouth daily.       Historical Provider, MD         No Known Allergies          EXAM: (2-7 system for EPF/Detailed, ?8 for Comprehensive)  BP (!) 152/82   Pulse 57   Temp 98.3 °F (36.8 °C) (Oral)   Resp 15   Ht 5' 10\" (1.778 m)   Wt 105 lb (47.6 kg)   SpO2 100%   BMI 15.07 kg/m²   Constitutional: vitals as above: alert, appears stated age   Head: Normocephalic, without obvious abnormality, atraumatic    Eyes:lids and lashes normal, conjunctivae and sclerae normal and pupils equal, round, reactive to light and accomodation    EMNT: external ears normal, nares midline    Neck: no carotid bruit, supple, symmetrical, trachea midline and thyroid not enlarged, symmetric, no tenderness/mass/nodules     Respiratory: clear to auscultation and percussion bilaterally with normal respiratory effort    Cardiovascular: normal rate, regular rhythm, normal S1 and S2 and no murmurs    Gastrointestinal: soft, non-tender, non-distended, normal bowel sounds, no masses or organomegaly    Extremities: no clubbing, no edema    Skin:No rashes or nodules noted.     Neurologic:negative         LABS:  Labs Reviewed   CBC WITH AUTO DIFFERENTIAL - Abnormal; Notable for the following components:       Result Value    WBC 3.5 (*)     RBC 3.47 (*)     Hemoglobin 11.2 (*)     Hematocrit 32.5 (*)     Lymphocytes Absolute 0.5 (*)     All other components within normal limits    Narrative:     Performed at:  [facility] [address],  [employer]   Phone [phone]   COMPREHENSIVE METABOLIC PANEL - Abnormal; Notable for the following components:    Sodium 113 (*)     Chloride 79 (*)     CREATININE <0.5 (*)     Total Protein 5.6 (*)     AST 14 (*)     All other components within normal limits    Narrative:     Leni Robertsahams  [employer] tel. [phone],  Chemistry results called to and read back by Chata Medina, 01/12/2022  17:43, by [facility]  Performed at:  [facility] [address],  [employer]   Phone [phone]   MAGNESIUM - Abnormal; Notable for the following components:    Magnesium 1.60 (*)     All other components within normal limits    Narrative:     Leni Roa  [employer] tel. [phone],  Chemistry results called to and read back by Chata Medina, 01/12/2022  17:43, by [facility]  Performed at:  [facility] [address],  [employer]   Phone [phone]   TROPONIN    Narrative:     Leni Roa  [employer] tel. [phone],  Chemistry results called to and read back by Chata Medina, 01/12/2022  17:43, by [facility]  Performed at:  [facility] [address],  [employer]   Phone [phone]   CREATININE, RANDOM URINE   ELECTROLYTES URINE RANDOM   UREA NITROGEN, URINE   OSMOLALITY, URINE   OSMOLALITY   URIC ACID   RENAL FUNCTION PANEL   SODIUM   SODIUM   RENAL FUNCTION PANEL         IMAGING:  Imaging results from the ER have been reviewed in the computerized chart.  XR CHEST (2 VW)    Result Date: 12/24/2021  EXAMINATION: TWO XRAY VIEWS OF THE CHEST 12/24/2021 12:18 pm COMPARISON: 03/09/2021 HISTORY: ORDERING SYSTEM PROVIDED HISTORY: cough TECHNOLOGIST PROVIDED HISTORY: Reason for exam:->cough Reason for Exam: cough, lethargic; recent contact with +Covid relatives FINDINGS: The lungs are without acute focal process. There is no effusion or pneumothorax. The cardiomediastinal silhouette is stable. The osseous structures are stable. Thoracic scoliosis convex right again noted. No acute process. XR CHEST PORTABLE    Result Date: 1/12/2022  EXAMINATION: ONE XRAY VIEW OF THE CHEST 1/12/2022 4:32 pm COMPARISON: 12/24/2021 HISTORY: ORDERING SYSTEM PROVIDED HISTORY: SOB TECHNOLOGIST PROVIDED HISTORY: Reason for exam:->SOB Reason for Exam: shortness of breath FINDINGS: The lungs are without acute focal process. There is no effusion or pneumothorax. The cardiomediastinal silhouette is stable. The osseous structures are stable. Thoracic scoliosis convex right. No acute process. EKG:   EKG from ER, reviewed by self  it shows sinus patrick at 47  Old chart reviewed, EKG dated 12/25/21 is reviewed, there is  difference noted. Old study shows sinus at 78    Lab Results   Component Value Date    GLUCOSE 93 01/12/2022     No results found for: POCGLU  BP (!) 152/82   Pulse 57   Temp 98.3 °F (36.8 °C) (Oral)   Resp 15   Ht 5' 10\" (1.778 m)   Wt 105 lb (47.6 kg)   SpO2 100%   BMI 15.07 kg/m²     MEDICAL DECISION MAKING:    Principal Problem:    Hyponatremia -New Problem to me. Profound hyponatremia. At this time, cause uncertain. ? poor intake or SIADH? Plan: admit, consult renal, fluids (normal saline vs hypertonic). Likely needs NaCl tabs as well  Active Problems:    HTN (hypertension) -Established problem. Stable. 152/82  Plan: Pt home BP meds reviewed and will be continued. IV Hydralazine ordered for control of extremely high blood pressures.    Will monitor labs to assess Creat/K for possible complications of medications. BPH (benign prostatic hyperplasia) -Established problem. Stable. Plan: Continue present orders/plan. Anemia -Established problem. Stable. Plan:  No indication for transfusion. Cont to monitor h/h to assess progression of anemia. Recommend ferrous sulfate or MVI as outpatient. COVID19 - pt with recent covid infection  Plan - cont supportive care        Diagnoses as listed above, designated as new or established and plan outlined for each. Data Reviewed:   (1) Lab tests were reviewed or ordered. (1) Radiology tests were reviewed or ordered. (1) Medical test (Echo, EKG, PFT/veronica) were ordered. (1)History was not obtained from someone other than patient  (1) Old records were reviewed - see HPI/MDM for pertinent details if review done. (2) Case was discussed with another health care provider: [facility] S[facility]  (2) Imaging was viewed by myself. (2) EKG  was viewed by myself. The patient is being placed in inpatient status with the expectation of requiring a hospital stay spanning at least two midnights for care and treatment of the problems noted in the problem list.  This determination is also based on thepatients comorbidities and past medical history, the severity and timing of the signs and symptoms upon presentation.     (Please note that portions of this note were completed with a voice recognition program.  Efforts were made to edit the dictations but occasionally words are mis-transcribed.)      Electronically signed by: Glen Jimenez MD 1/12/2022

## 2022-01-12 NOTE — CONSULTS
MD Benja Humphrey MD Bula Lingo, MD                Office: [phone]                      Fax: [phone]               [email or web address]                   Nephrology consult received from [facility] for Hyponatremia  Brief Summary Note-- full consult report will follow. Chart reviewed. Discussed with ER team - Srinivas SOSA, ER Nurse    Thank you for allowing me to participate in this patient's care. Please do not hesitate to contact us anytime. We will follow along with you. Elizabeth Blas MD  [facility] [address] [place] [address]   [phone] or [address]    =====================================================    Brief / Initial Impression:     - Monitor Serum Na Q 4 hrs,  - Goal Serum Na ~ 120, by first 24 hrs     -f/u recheck, if <115, start HT saline  -otherwise NS for MIVF  + add salt tabs     - Give dDAVP  proactively               - as with reversible causes of hyponatremia and likely to develop a water diuresis     -hold NSAIDs     -check bladder scan - as w/ h/o BPH               - keep Flomax, Proscar     - order neuro check, seizure precautions  - Strict I/O with daily weights. --- Call us urgently if any/worsening neurological findings.           Active Problems:    * No active hospital problems. *  Resolved Problems:    * No resolved hospital problems. *          Labs reviewed by me     CBC: Recent Labs     01/12/22  1645   WBC 3.5*   HGB 11.2*   HCT 32.5*   MCV 93.7        BMP:   Recent Labs     01/12/22  1645   *   K 4.5   CL 79*   CO2 27   BUN 9   CREATININE <0.5*     Magnesium:   Lab Results   Component Value Date    MG 1.60 01/12/2022       Arterial Blood Gasses  No results for input(s): PH, PCO2, PO2 in the last 72 hours.     Invalid input(s): Lossie Massy    UA:No results for input(s): NITRITE, COLORU, PHUR, LABCAST, WBCUA, RBCUA, MUCUS, TRICHOMONAS, YEAST, BACTERIA, CLARITYU, SPECGRAV, LEUKOCYTESUR, UROBILINOGEN, BILIRUBINUR, BLOODU, GLUCOSEU, AMORPHOUS in the last 72 hours. Invalid input(s): Luciano Stafford    LIVER PROFILE:   Recent Labs     01/12/22  1645   AST 14*   ALT 11   BILITOT 0.7   ALKPHOS 78     PT/INR:  No results found for: PROTIME, INR  PTT:  No results found for: APTT  HEMAL:  No results found for: ANATITER, HEMAL        RADIOLOGY:    XR CHEST (2 VW)    Result Date: 12/24/2021  EXAMINATION: TWO XRAY VIEWS OF THE CHEST 12/24/2021 12:18 pm COMPARISON: 03/09/2021 HISTORY: ORDERING SYSTEM PROVIDED HISTORY: cough TECHNOLOGIST PROVIDED HISTORY: Reason for exam:->cough Reason for Exam: cough, lethargic; recent contact with +Covid relatives FINDINGS: The lungs are without acute focal process. There is no effusion or pneumothorax. The cardiomediastinal silhouette is stable. The osseous structures are stable. Thoracic scoliosis convex right again noted. No acute process. XR CHEST PORTABLE    Result Date: 1/12/2022  EXAMINATION: ONE XRAY VIEW OF THE CHEST 1/12/2022 4:32 pm COMPARISON: 12/24/2021 HISTORY: ORDERING SYSTEM PROVIDED HISTORY: SOB TECHNOLOGIST PROVIDED HISTORY: Reason for exam:->SOB Reason for Exam: shortness of breath FINDINGS: The lungs are without acute focal process. There is no effusion or pneumothorax. The cardiomediastinal silhouette is stable. The osseous structures are stable. Thoracic scoliosis convex right. No acute process. Imaging Results.   Chest X Ray reviwed by me

## 2022-01-13 LAB
A/G RATIO: 1.8 (ref 1.1–2.2)
ALBUMIN SERPL-MCNC: 3.5 G/DL (ref 3.4–5)
ALP BLD-CCNC: 82 U/L (ref 40–129)
ALT SERPL-CCNC: 11 U/L (ref 10–40)
ANION GAP SERPL CALCULATED.3IONS-SCNC: 6 MMOL/L (ref 3–16)
AST SERPL-CCNC: 15 U/L (ref 15–37)
BASOPHILS ABSOLUTE: 0 K/UL (ref 0–0.2)
BASOPHILS RELATIVE PERCENT: 0.6 %
BILIRUB SERPL-MCNC: 0.9 MG/DL (ref 0–1)
BUN BLDV-MCNC: 6 MG/DL (ref 7–20)
CALCIUM SERPL-MCNC: 8.4 MG/DL (ref 8.3–10.6)
CHLORIDE BLD-SCNC: 84 MMOL/L (ref 99–110)
CHLORIDE URINE RANDOM: 77 MMOL/L
CO2: 28 MMOL/L (ref 21–32)
CREAT SERPL-MCNC: <0.5 MG/DL (ref 0.8–1.3)
CREATININE URINE: 49.6 MG/DL (ref 39–259)
EKG ATRIAL RATE: 54 BPM
EKG DIAGNOSIS: NORMAL
EKG P AXIS: 91 DEGREES
EKG P-R INTERVAL: 216 MS
EKG Q-T INTERVAL: 416 MS
EKG QRS DURATION: 100 MS
EKG QTC CALCULATION (BAZETT): 394 MS
EKG R AXIS: -61 DEGREES
EKG T AXIS: 84 DEGREES
EKG VENTRICULAR RATE: 54 BPM
EOSINOPHILS ABSOLUTE: 0.1 K/UL (ref 0–0.6)
EOSINOPHILS RELATIVE PERCENT: 2.8 %
GFR AFRICAN AMERICAN: >60
GFR NON-AFRICAN AMERICAN: >60
GLUCOSE BLD-MCNC: 92 MG/DL (ref 70–99)
HCT VFR BLD CALC: 32.9 % (ref 40.5–52.5)
HEMOGLOBIN: 11.6 G/DL (ref 13.5–17.5)
LYMPHOCYTES ABSOLUTE: 0.7 K/UL (ref 1–5.1)
LYMPHOCYTES RELATIVE PERCENT: 15.4 %
MCH RBC QN AUTO: 33 PG (ref 26–34)
MCHC RBC AUTO-ENTMCNC: 35.4 G/DL (ref 31–36)
MCV RBC AUTO: 93.3 FL (ref 80–100)
MONOCYTES ABSOLUTE: 0.7 K/UL (ref 0–1.3)
MONOCYTES RELATIVE PERCENT: 15.9 %
NEUTROPHILS ABSOLUTE: 2.9 K/UL (ref 1.7–7.7)
NEUTROPHILS RELATIVE PERCENT: 65.3 %
OSMOLALITY URINE: 407 MOSM/KG (ref 390–1070)
PDW BLD-RTO: 13.2 % (ref 12.4–15.4)
PHOSPHORUS: 2.4 MG/DL (ref 2.5–4.9)
PLATELET # BLD: 199 K/UL (ref 135–450)
PMV BLD AUTO: 5.5 FL (ref 5–10.5)
POTASSIUM REFLEX MAGNESIUM: 4.5 MMOL/L (ref 3.5–5.1)
POTASSIUM, UR: 26.4 MMOL/L
RBC # BLD: 3.53 M/UL (ref 4.2–5.9)
SARS-COV-2: DETECTED
SODIUM BLD-SCNC: 113 MMOL/L (ref 136–145)
SODIUM BLD-SCNC: 113 MMOL/L (ref 136–145)
SODIUM BLD-SCNC: 114 MMOL/L (ref 136–145)
SODIUM BLD-SCNC: 114 MMOL/L (ref 136–145)
SODIUM BLD-SCNC: 115 MMOL/L (ref 136–145)
SODIUM BLD-SCNC: 115 MMOL/L (ref 136–145)
SODIUM BLD-SCNC: 116 MMOL/L (ref 136–145)
SODIUM BLD-SCNC: 117 MMOL/L (ref 136–145)
SODIUM BLD-SCNC: 118 MMOL/L (ref 136–145)
SODIUM URINE: 108 MMOL/L
TOTAL PROTEIN: 5.5 G/DL (ref 6.4–8.2)
UREA NITROGEN, UR: 380.6 MG/DL (ref 800–1666)
WBC # BLD: 4.5 K/UL (ref 4–11)

## 2022-01-13 PROCEDURE — U0003 INFECTIOUS AGENT DETECTION BY NUCLEIC ACID (DNA OR RNA); SEVERE ACUTE RESPIRATORY SYNDROME CORONAVIRUS 2 (SARS-COV-2) (CORONAVIRUS DISEASE [COVID-19]), AMPLIFIED PROBE TECHNIQUE, MAKING USE OF HIGH THROUGHPUT TECHNOLOGIES AS DESCRIBED BY CMS-2020-01-R: HCPCS

## 2022-01-13 PROCEDURE — 84295 ASSAY OF SERUM SODIUM: CPT

## 2022-01-13 PROCEDURE — 36415 COLL VENOUS BLD VENIPUNCTURE: CPT

## 2022-01-13 PROCEDURE — 2060000000 HC ICU INTERMEDIATE R&B

## 2022-01-13 PROCEDURE — U0005 INFEC AGEN DETEC AMPLI PROBE: HCPCS

## 2022-01-13 PROCEDURE — 6370000000 HC RX 637 (ALT 250 FOR IP): Performed by: INTERNAL MEDICINE

## 2022-01-13 PROCEDURE — 85025 COMPLETE CBC W/AUTO DIFF WBC: CPT

## 2022-01-13 PROCEDURE — 2580000003 HC RX 258: Performed by: INTERNAL MEDICINE

## 2022-01-13 PROCEDURE — 80053 COMPREHEN METABOLIC PANEL: CPT

## 2022-01-13 PROCEDURE — 6360000002 HC RX W HCPCS: Performed by: INTERNAL MEDICINE

## 2022-01-13 PROCEDURE — 93010 ELECTROCARDIOGRAM REPORT: CPT | Performed by: INTERNAL MEDICINE

## 2022-01-13 PROCEDURE — 92526 ORAL FUNCTION THERAPY: CPT

## 2022-01-13 PROCEDURE — 84100 ASSAY OF PHOSPHORUS: CPT

## 2022-01-13 PROCEDURE — 51798 US URINE CAPACITY MEASURE: CPT

## 2022-01-13 PROCEDURE — 92610 EVALUATE SWALLOWING FUNCTION: CPT

## 2022-01-13 RX ORDER — 3% SODIUM CHLORIDE 3 G/100ML
50 INJECTION, SOLUTION INTRAVENOUS CONTINUOUS
Status: DISPENSED | OUTPATIENT
Start: 2022-01-13 | End: 2022-01-14

## 2022-01-13 RX ADMIN — TIMOLOL MALEATE 1 DROP: 5 SOLUTION OPHTHALMIC at 20:34

## 2022-01-13 RX ADMIN — DORZOLAMIDE HYDROCHLORIDE 2 DROP: 20 SOLUTION/ DROPS OPHTHALMIC at 15:42

## 2022-01-13 RX ADMIN — SODIUM CHLORIDE: 9 INJECTION, SOLUTION INTRAVENOUS at 07:47

## 2022-01-13 RX ADMIN — TIMOLOL MALEATE 1 DROP: 5 SOLUTION OPHTHALMIC at 10:14

## 2022-01-13 RX ADMIN — SODIUM CHLORIDE 25 ML/HR: 3 INJECTION, SOLUTION INTRAVENOUS at 14:23

## 2022-01-13 RX ADMIN — BACLOFEN 10 MG: 10 TABLET ORAL at 10:13

## 2022-01-13 RX ADMIN — BACLOFEN 10 MG: 10 TABLET ORAL at 15:42

## 2022-01-13 RX ADMIN — DORZOLAMIDE HYDROCHLORIDE 2 DROP: 20 SOLUTION/ DROPS OPHTHALMIC at 20:34

## 2022-01-13 RX ADMIN — FINASTERIDE 5 MG: 5 TABLET, FILM COATED ORAL at 10:13

## 2022-01-13 RX ADMIN — TAMSULOSIN HYDROCHLORIDE 0.4 MG: 0.4 CAPSULE ORAL at 10:12

## 2022-01-13 RX ADMIN — DORZOLAMIDE HYDROCHLORIDE 2 DROP: 20 SOLUTION/ DROPS OPHTHALMIC at 10:14

## 2022-01-13 RX ADMIN — IBUPROFEN 200 MG: 400 TABLET, FILM COATED ORAL at 10:13

## 2022-01-13 RX ADMIN — SODIUM CHLORIDE, PRESERVATIVE FREE 10 ML: 5 INJECTION INTRAVENOUS at 20:34

## 2022-01-13 RX ADMIN — LATANOPROST 1 DROP: 50 SOLUTION OPHTHALMIC at 20:34

## 2022-01-13 RX ADMIN — BACLOFEN 10 MG: 10 TABLET ORAL at 20:34

## 2022-01-13 RX ADMIN — SODIUM CHLORIDE, PRESERVATIVE FREE 10 ML: 5 INJECTION INTRAVENOUS at 10:13

## 2022-01-13 RX ADMIN — ENOXAPARIN SODIUM 40 MG: 40 INJECTION SUBCUTANEOUS at 10:14

## 2022-01-13 ASSESSMENT — PAIN DESCRIPTION - LOCATION: LOCATION: GENERALIZED

## 2022-01-13 ASSESSMENT — PAIN SCALES - GENERAL
PAINLEVEL_OUTOF10: 0
PAINLEVEL_OUTOF10: 5
PAINLEVEL_OUTOF10: 0
PAINLEVEL_OUTOF10: 0
PAINLEVEL_OUTOF10: 4

## 2022-01-13 NOTE — PROGRESS NOTES
Pt very weak, max assist to help him stand and pivot to commode with walker and guard assist.  Pt also says can't chew well. Pt attempted to eat breakfast but unsuccessful. Pt is blind and very very [place]. Will place order for SLP and request dysphagia diet.

## 2022-01-13 NOTE — PROGRESS NOTES
2x staff assist to [facility]. Pt voided 550 mL yellow hazy malodorous urine. PVR bladder scan showed 0 mL. Pt back in bed with seizure precautions and high fall risk precautions in place. Bed alarmed, call light left in reach.

## 2022-01-13 NOTE — PROGRESS NOTES
Msged Nephro with Sodium level of 114. Just started the Sodium Chloride infusion at 25/hr.   Next sodium draw at 1600

## 2022-01-13 NOTE — CARE COORDINATION
Discharge Planning Assessment    RN/SW discharge planner met with patient/ (and family member) to discuss reason for admission, current living situation, and potential needs at the time of discharge    Demographics/Insurance verified Yes/medicare    Current type of dwelling:Home with son       Patient from ECF/SW confirmed with:[facility]  [address]   [facility]  Son requested referrals to other SNF's pending updated covid status.

## 2022-01-13 NOTE — PROGRESS NOTES
Pt is a complete feed. He keeps asking \"what am I doing? \" And says Oh God, Oh God\" over and over

## 2022-01-13 NOTE — PROGRESS NOTES
Specialty low air loss, alternating pressure relief air mattress on low bed frame put into place. Bed in low position and alarmed with seizure pads in place.

## 2022-01-13 NOTE — PROGRESS NOTES
Patient is admitted to room 329 3803 from the emergency room. Patient has arrived to the floor at this time via stretcher and ambulated/transfered to bed. Oriented to room. Call light and bedside table within reach. Patient rates a high fall risk. Denies further needs at this time. Will continue to monitor.  Irina Sanchez

## 2022-01-13 NOTE — PROGRESS NOTES
Facility/Department: [address]  Initial Assessment  DYSPHAGIA BEDSIDE SWALLOW EVALUATION     Patient: Latosha Webster   : 1940   MRN: [id number]      Evaluation Date: 2022   Admitting Diagnosis: Hypochloremia [E87.8]  Hyponatremia [E87.1]  Pain: Pt did not report pain                       H&P: Kana Flores is a 80 y. o. male who presents for evaluation of abnormal labs.  Apparently had a sodium of 117 at home.  Does have a history of hyponatremia.  Patient is very hard of hearing a difficult historian.  No additional information is able to obtain at this time.     Review of my recent d/c summary shows hyponatremia also an issue last time, thought to be 2/2 covid. Na was 133 on discharge     TOday, 113. To be admitted  Renal consulted  Defer decision on hypertonic saline to them    Chest xray:   No acute process. Modified Barium Swallow Study: None per chart     History/Prior Level of Function:   Living Status: Pt lives at [facility]. Prior Dysphagia History: Pt has been seen by speech therapy at this facility on 3/12/21, at that time a regular texture diet with thin liquids, meds as tolerated was recommended. It appears Pt was [facility]. Dysphagia Impressions/Diagnosis: Mild Oropharyngeal Dysphagia   Pt was seen sitting upright in bed on RA. He reported some difficulty with swallowing and reported he has to \"tilt his head backwards. \" Pt unable to provide additional information. Pt is very hard of hearing and has difficulty with vision which made communication/history gathering difficult. Various textures were provided to assess swallow function. Pt presents with prolonged mastication, reduced A-P propulsion, suspected premature bolus loss to pharynx, delayed swallow initiation, and reduced laryngeal elevation upon manual palpation.  Pt tolerated thin liquids via cup with no overt coughing, throat clearing, or change in vocal quality. Given soft and regular solids, significantly prolonged mastication was noted with eventual oral clearing achieved. Pt required assistance with self-feeding due to visual deficits. At this time, Pt would benefit from a Dysphagia II Minced and Moist with thin liquid diet for ease of mastication. RN reported Pt chewed his medication this date, therefore recommend meds crushed in puree. Will continue to follow to ensure diet tolerance. Recommended Diet and Intervention 1/13/2022:  Diet Solids Recommendation:  Dysphagia II Minced and Moist  Liquid Consistency Recommendation: Thin liquids  Recommended form of Meds:   Meds crushed in puree      Compensatory Swallowing Strategies: Alternate solids/liquids , Upright as possible with all PO intake , Assist Feed , Small bites/sips , Eat/feed slowly, Remain upright 30-45 min     SHORT TERM DYSPHAGIA GOALS/PLAN OF CARE: Speech therapy for dysphagia tx 3-5 times per week during acute care stay.     Pt will functionally tolerate recommended diet with no overt clinical s/s of aspiration  Pt will advance to least restrictive diet as indicated     Dysphagia Therapeutic Intervention:  Diet Tolerance Monitoring , Patient/Family Education , Therapeutic Trials with SLP     Discharge Recommendations: Discharge recommendations to be determined pending ongoing follow-up during acute care stay    Patient Positioning: Upright in bed    Current Diet Level (prior to evaluation): Dysphagia Soft and bite sized with  Thin liquids      Respiratory Status:   [x]Room Air   []O2 via nasal cannula   []Other:    Dentition:  []Adequate  []Dentures   [x]Missing Many Teeth  []Edentulous  []Other:    Baseline Vocal Quality:  [x]Normal  []Dysphonic   []Aphonic   []Hoarse  []Wet  []Weak  []Other:    Volitional Swallow:   []Absent   []Delayed     []Adequate     [x]Required use of drink     Oral Mechanism Exam:  []WFL [x]Mild   [] Moderate  []Severe  []To be assessed  Impaired:   []Left side []Right side    [x]Labial ROM/Coordination    []Labial Symmetry   [x]Lingual ROM/Coordination   []Lingual Symmetry  []Gag  []Other:     Oral Phase: []WFL [x]Mild   [x] Moderate  []Severe  []To be assessed   [x]Impaired/Prolonged Mastication:   []Spillage Left:   []Spillage Right:  []Pocketing Left:   []Pocketing Right:   [x]Decreased Anterior to Posterior Transit:   []Suspected Premature Bolus Loss:   []Lingual/Palatal Residue:   []Other:     Pharyngeal Phase: []WFL [x]Mild   [] Moderate  []Severe  []To be assessed   [x]Delayed Swallow:   [x]Suspected Pharyngeal Pooling:   [x]Decreased Laryngeal Elevation:   []Absent Swallow:  []Wet Vocal Quality:   []Throat Clearing-Immediate:   []Throat Clearing-Delayed:   []Cough-Immediate:   []Cough-Delayed:  []Change in Vital Signs:  []Suspected Delayed Pharyngeal Clearing:  []Other:       Eating Assistance:  []Independent  []Setup or clean-up assistance   [] Supervision or touching assistance   [] Partial or moderate assistance   [x] Substantial or maximal assistance  [] Dependent       EDUCATION:   Provided education regarding role of SLP, results of assessment, recommendations and general speech pathology plan of care. [] Pt verbalized understanding and agreement   [x] Pt requires ongoing learning   [] No evidence of comprehension     If patient discharges prior to next visit, this note will serve as discharge.      Timed Code Minutes: 0 minutes  Total Treatment Minutes: 24 minutes     Electronically signed by:    Nilsa Cain M.A., [address] [place] [address]  Speech-Language Pathologist

## 2022-01-13 NOTE — PROGRESS NOTES
Progress Note - Dr. Juana Fay - Internal Medicine  PCP: Jeanine Chow MD [id number]  / [place] [facility] [phone]    Hospital Day: 1  Code Status: Full Code  Current Diet: ADULT DIET; Regular        CC: follow up on medical issues    Subjective:   Moise Manriquez is a 80 y.o. male. Pt seen and examined  Chart reviewed since last visit, labs and imaging below      Doing about the same  Lethargic  Na slightly better 118  Does not give much history    He denies chest pain, denies shortness of breath, denies nausea,  denies emesis. 10 system Review of Systems is reviewed with patient, and pertinent positives are noted in HPI above . Otherwise, Review of systems is negative. I have reviewed the patient's medical and social history in detail and updated the computerized patient record. To recap: He  has a past medical history of Glaucoma and Prostate enlargement. . He  has no past surgical history on file. Meryle Sandman He  reports that he has never smoked. He has never used smokeless tobacco. He reports that he does not drink alcohol and does not use drugs. .        Active Hospital Problems    Diagnosis Date Noted    COVID-19 [U07.1] 12/27/2021    Hyponatremia [E87.1] 12/24/2021    HTN (hypertension) [I10] 03/09/2021    BPH (benign prostatic hyperplasia) [N40.0] 03/09/2021    Anemia [D64.9] 03/09/2021       Current Facility-Administered Medications: tamsulosin (FLOMAX) capsule 0.4 mg, 0.4 mg, Oral, Daily  Timolol (TIMOPTIC) 0.5 % ophthalmic solution 1 drop, 1 drop, Both Eyes, BID  dorzolamide (TRUSOPT) 2 % ophthalmic solution 2 drop, 2 drop, Both Eyes, TID  latanoprost (XALATAN) 0.005 % ophthalmic solution 1 drop, 1 drop, Both Eyes, Nightly  finasteride (PROSCAR) tablet 5 mg, 5 mg, Oral, Daily  ibuprofen (ADVIL;MOTRIN) tablet 200 mg, 200 mg, Oral, Q6H PRN  baclofen (LIORESAL) tablet 10 mg, 10 mg, Oral, TID  sodium chloride flush 0.9 % injection 5-40 mL, 5-40 mL, IntraVENous, 2 times per day  sodium chloride flush 0.9 % injection 5-40 mL, 5-40 mL, IntraVENous, PRN  0.9 % sodium chloride infusion, 25 mL, IntraVENous, PRN  potassium chloride (KLOR-CON M) extended release tablet 40 mEq, 40 mEq, Oral, PRN **OR** potassium bicarb-citric acid (EFFER-K) effervescent tablet 40 mEq, 40 mEq, Oral, PRN **OR** potassium chloride 10 mEq/100 mL IVPB (Peripheral Line), 10 mEq, IntraVENous, PRN  enoxaparin (LOVENOX) injection 40 mg, 40 mg, SubCUTAneous, Daily  ondansetron (ZOFRAN-ODT) disintegrating tablet 4 mg, 4 mg, Oral, Q8H PRN **OR** ondansetron (ZOFRAN) injection 4 mg, 4 mg, IntraVENous, Q6H PRN  magnesium hydroxide (MILK OF MAGNESIA) 400 MG/5ML suspension 30 mL, 30 mL, Oral, Daily PRN  acetaminophen (TYLENOL) tablet 650 mg, 650 mg, Oral, Q6H PRN **OR** acetaminophen (TYLENOL) suppository 650 mg, 650 mg, Rectal, Q6H PRN  hydrALAZINE (APRESOLINE) injection 10 mg, 10 mg, IntraVENous, Q6H PRN  0.9 % sodium chloride bolus, 500 mL, IntraVENous, PRN  0.9 % sodium chloride infusion, , IntraVENous, Continuous  0.9 % sodium chloride infusion, , IntraVENous, Continuous         Objective:  BP (!) 146/81   Pulse 54   Temp 97.8 °F (36.6 °C) (Oral)   Resp 16   Ht 5' 10\" (1.778 m)   Wt 105 lb (47.6 kg)   SpO2 95%   BMI 15.07 kg/m²      Patient Vitals for the past 24 hrs:   BP Temp Temp src Pulse Resp SpO2 Height Weight   01/13/22 0435 (!) 146/81 97.8 °F (36.6 °C) Oral 54 16 95 %     01/13/22 0039 139/76 98.1 °F (36.7 °C) Axillary 69 16 97 %     01/12/22 2232     16 95 %     01/12/22 2115 (!) 148/70 98 °F (36.7 °C) Oral 55 14 96 %     01/12/22 2016 135/74   57 16 97 %     01/12/22 1715 (!) 152/82   57 15      01/12/22 1707    56 16 100 %     01/12/22 1622 (!) 151/76 98.3 °F (36.8 °C) Oral 54 18 98 % 5' 10\" (1.778 m) 105 lb (47.6 kg)     Patient Vitals for the past 96 hrs (Last 3 readings):   Weight   01/12/22 1622 105 lb (47.6 kg)           Intake/Output Summary (Last 24 hours) at 1/13/2022 0730  Last data filed at 1/13/2022 0238  Gross per 24 hour   Intake 500.99 ml   Output 1075 ml   Net -574.01 ml         Physical Exam:   Vitals as above  General appearance: alert, appears stated age and cooperative, cachetic    Head: Normocephalic, without obvious abnormality, atraumatic    Lungs: clear to auscultation bilaterally    Heart: regular rate and rhythm, S1, S2 normal, no murmur    Abdomen: soft, non-tender; bowel sounds normal; no masses, no organomegaly    Extremities: extremities normal, atraumatic, no cyanosis, no edema      Labs:  Lab Results   Component Value Date    WBC 4.5 01/13/2022    HGB 11.6 (L) 01/13/2022    HCT 32.9 (L) 01/13/2022     01/13/2022    CHOL 124 09/03/2019    TRIG 40 09/03/2019    HDL 64 (H) 09/03/2019    ALT 11 01/13/2022    AST 15 01/13/2022     (LL) 01/13/2022    K 4.5 01/13/2022    CL 84 (L) 01/13/2022    CREATININE <0.5 (L) 01/13/2022    BUN 6 (L) 01/13/2022    CO2 28 01/13/2022    TSH 1.02 09/03/2019    PSA 0.87 09/03/2019    LABA1C 5.6 09/03/2019    LABMICR Not Indicated 12/24/2021     Lab Results   Component Value Date    TROPONINI <0.01 01/12/2022       Recent Imaging Results are Reviewed:  XR CHEST (2 VW)    Result Date: 12/24/2021  EXAMINATION: TWO XRAY VIEWS OF THE CHEST 12/24/2021 12:18 pm COMPARISON: 03/09/2021 HISTORY: ORDERING SYSTEM PROVIDED HISTORY: cough TECHNOLOGIST PROVIDED HISTORY: Reason for exam:->cough Reason for Exam: cough, lethargic; recent contact with +Covid relatives FINDINGS: The lungs are without acute focal process. There is no effusion or pneumothorax. The cardiomediastinal silhouette is stable. The osseous structures are stable. Thoracic scoliosis convex right again noted. No acute process.      XR CHEST PORTABLE    Result Date: 1/12/2022  EXAMINATION: ONE XRAY VIEW OF THE CHEST 1/12/2022 4:32 pm COMPARISON: 12/24/2021 HISTORY: ORDERING SYSTEM PROVIDED HISTORY: SOB TECHNOLOGIST PROVIDED HISTORY: Reason for exam:->SOB Reason for Exam: shortness of breath FINDINGS: The lungs are without acute focal process. There is no effusion or pneumothorax. The cardiomediastinal silhouette is stable. The osseous structures are stable. Thoracic scoliosis convex right. No acute process. Lab Results   Component Value Date    GLUCOSE 92 01/13/2022     No results found for: POCGLU  BP (!) 146/81   Pulse 54   Temp 97.8 °F (36.6 °C) (Oral)   Resp 16   Ht 5' 10\" (1.778 m)   Wt 105 lb (47.6 kg)   SpO2 95%   BMI 15.07 kg/m²     Assessment and Plan:  Principal Problem:    Hyponatremia -Established problem. Improving. Mildly better 118  Plan: renal consulted; may need hypertonic saline; will defer this to dr Maryjane Harvey. In any event, would try to raise Na in reasonable fashion to avoid CPM  Active Problems:    HTN (hypertension) -Established problem. Stable. 146/81  Plan: cont same meds    BPH (benign prostatic hyperplasia) - Established problem. Stable. No issues noted  Plan: cont flomax    Anemia -Established problem. Stable.  hgb 11.6  Plan: No indication for transfusion. Cont to monitor h/h to assess progression of anemia. Recommend ferrous sulfate or MVI as outpatient. COVID-19 -Established problem. Stable. Previous infection. Plan: repeat test sent      Disp - pt/ot pending, likely return to SNF eventually  Dr Chery Eli to cover starting this afternoon.     (Please note that portions of this note were completed with a voice recognition program.  Efforts were made to edit the dictations but occasionally words are mis-transcribed.)        Tirso Aguilera MD  1/13/2022

## 2022-01-13 NOTE — CARE COORDINATION
Per son's request, a referral was initiated to the [facility] rather than returning to [facility].   It should be noted that patient was initially covid + on 12-24-22

## 2022-01-14 LAB
ALBUMIN SERPL-MCNC: 3.1 G/DL (ref 3.4–5)
ANION GAP SERPL CALCULATED.3IONS-SCNC: 7 MMOL/L (ref 3–16)
BASOPHILS ABSOLUTE: 0 K/UL (ref 0–0.2)
BASOPHILS RELATIVE PERCENT: 1.3 %
BUN BLDV-MCNC: 6 MG/DL (ref 7–20)
CALCIUM SERPL-MCNC: 7.9 MG/DL (ref 8.3–10.6)
CHLORIDE BLD-SCNC: 88 MMOL/L (ref 99–110)
CO2: 24 MMOL/L (ref 21–32)
CREAT SERPL-MCNC: <0.5 MG/DL (ref 0.8–1.3)
EOSINOPHILS ABSOLUTE: 0.1 K/UL (ref 0–0.6)
EOSINOPHILS RELATIVE PERCENT: 4.2 %
GFR AFRICAN AMERICAN: >60
GFR NON-AFRICAN AMERICAN: >60
GLUCOSE BLD-MCNC: 84 MG/DL (ref 70–99)
HCT VFR BLD CALC: 30.6 % (ref 40.5–52.5)
HEMOGLOBIN: 10.7 G/DL (ref 13.5–17.5)
LYMPHOCYTES ABSOLUTE: 0.7 K/UL (ref 1–5.1)
LYMPHOCYTES RELATIVE PERCENT: 23.6 %
MCH RBC QN AUTO: 32.5 PG (ref 26–34)
MCHC RBC AUTO-ENTMCNC: 35 G/DL (ref 31–36)
MCV RBC AUTO: 92.9 FL (ref 80–100)
MONOCYTES ABSOLUTE: 0.6 K/UL (ref 0–1.3)
MONOCYTES RELATIVE PERCENT: 19.8 %
NEUTROPHILS ABSOLUTE: 1.6 K/UL (ref 1.7–7.7)
NEUTROPHILS RELATIVE PERCENT: 51.1 %
PDW BLD-RTO: 13.5 % (ref 12.4–15.4)
PHOSPHORUS: 2.1 MG/DL (ref 2.5–4.9)
PLATELET # BLD: 207 K/UL (ref 135–450)
PMV BLD AUTO: 5.8 FL (ref 5–10.5)
POTASSIUM SERPL-SCNC: 3.9 MMOL/L (ref 3.5–5.1)
RBC # BLD: 3.3 M/UL (ref 4.2–5.9)
SODIUM BLD-SCNC: 119 MMOL/L (ref 136–145)
SODIUM BLD-SCNC: 120 MMOL/L (ref 136–145)
SODIUM BLD-SCNC: 121 MMOL/L (ref 136–145)
SODIUM BLD-SCNC: 122 MMOL/L (ref 136–145)
WBC # BLD: 3.1 K/UL (ref 4–11)

## 2022-01-14 PROCEDURE — 97530 THERAPEUTIC ACTIVITIES: CPT

## 2022-01-14 PROCEDURE — 94760 N-INVAS EAR/PLS OXIMETRY 1: CPT

## 2022-01-14 PROCEDURE — 2580000003 HC RX 258: Performed by: INTERNAL MEDICINE

## 2022-01-14 PROCEDURE — 2060000000 HC ICU INTERMEDIATE R&B

## 2022-01-14 PROCEDURE — 6370000000 HC RX 637 (ALT 250 FOR IP): Performed by: INTERNAL MEDICINE

## 2022-01-14 PROCEDURE — 97535 SELF CARE MNGMENT TRAINING: CPT

## 2022-01-14 PROCEDURE — 80069 RENAL FUNCTION PANEL: CPT

## 2022-01-14 PROCEDURE — 51798 US URINE CAPACITY MEASURE: CPT

## 2022-01-14 PROCEDURE — 97166 OT EVAL MOD COMPLEX 45 MIN: CPT

## 2022-01-14 PROCEDURE — 92526 ORAL FUNCTION THERAPY: CPT

## 2022-01-14 PROCEDURE — 97162 PT EVAL MOD COMPLEX 30 MIN: CPT

## 2022-01-14 PROCEDURE — 36415 COLL VENOUS BLD VENIPUNCTURE: CPT

## 2022-01-14 PROCEDURE — 6360000002 HC RX W HCPCS: Performed by: INTERNAL MEDICINE

## 2022-01-14 PROCEDURE — 84295 ASSAY OF SERUM SODIUM: CPT

## 2022-01-14 PROCEDURE — 85025 COMPLETE CBC W/AUTO DIFF WBC: CPT

## 2022-01-14 RX ORDER — 3% SODIUM CHLORIDE 3 G/100ML
50 INJECTION, SOLUTION INTRAVENOUS CONTINUOUS
Status: DISPENSED | OUTPATIENT
Start: 2022-01-14 | End: 2022-01-15

## 2022-01-14 RX ORDER — SODIUM CHLORIDE 9 MG/ML
INJECTION, SOLUTION INTRAVENOUS CONTINUOUS
Status: DISCONTINUED | OUTPATIENT
Start: 2022-01-15 | End: 2022-01-19

## 2022-01-14 RX ORDER — SODIUM CHLORIDE 1000 MG
1 TABLET, SOLUBLE MISCELLANEOUS
Status: DISCONTINUED | OUTPATIENT
Start: 2022-01-14 | End: 2022-01-17

## 2022-01-14 RX ADMIN — TAMSULOSIN HYDROCHLORIDE 0.4 MG: 0.4 CAPSULE ORAL at 09:19

## 2022-01-14 RX ADMIN — SODIUM CHLORIDE 50 ML/HR: 3 INJECTION, SOLUTION INTRAVENOUS at 02:15

## 2022-01-14 RX ADMIN — SODIUM CHLORIDE TAB 1 GM 1 G: 1 TAB at 17:11

## 2022-01-14 RX ADMIN — TIMOLOL MALEATE 1 DROP: 5 SOLUTION OPHTHALMIC at 09:25

## 2022-01-14 RX ADMIN — BACLOFEN 10 MG: 10 TABLET ORAL at 09:19

## 2022-01-14 RX ADMIN — DORZOLAMIDE HYDROCHLORIDE 2 DROP: 20 SOLUTION/ DROPS OPHTHALMIC at 09:20

## 2022-01-14 RX ADMIN — DORZOLAMIDE HYDROCHLORIDE 2 DROP: 20 SOLUTION/ DROPS OPHTHALMIC at 21:03

## 2022-01-14 RX ADMIN — SODIUM CHLORIDE TAB 1 GM 1 G: 1 TAB at 13:11

## 2022-01-14 RX ADMIN — DORZOLAMIDE HYDROCHLORIDE 2 DROP: 20 SOLUTION/ DROPS OPHTHALMIC at 13:14

## 2022-01-14 RX ADMIN — TIMOLOL MALEATE 1 DROP: 5 SOLUTION OPHTHALMIC at 21:03

## 2022-01-14 RX ADMIN — FINASTERIDE 5 MG: 5 TABLET, FILM COATED ORAL at 09:19

## 2022-01-14 RX ADMIN — BACLOFEN 10 MG: 10 TABLET ORAL at 21:03

## 2022-01-14 RX ADMIN — SODIUM CHLORIDE 50 ML/HR: 3 INJECTION, SOLUTION INTRAVENOUS at 18:59

## 2022-01-14 RX ADMIN — LATANOPROST 1 DROP: 50 SOLUTION OPHTHALMIC at 21:03

## 2022-01-14 RX ADMIN — SODIUM CHLORIDE, PRESERVATIVE FREE 10 ML: 5 INJECTION INTRAVENOUS at 09:22

## 2022-01-14 RX ADMIN — SODIUM CHLORIDE: 9 INJECTION, SOLUTION INTRAVENOUS at 23:58

## 2022-01-14 RX ADMIN — BACLOFEN 10 MG: 10 TABLET ORAL at 13:12

## 2022-01-14 RX ADMIN — ENOXAPARIN SODIUM 40 MG: 40 INJECTION SUBCUTANEOUS at 09:19

## 2022-01-14 RX ADMIN — SODIUM CHLORIDE, PRESERVATIVE FREE 10 ML: 5 INJECTION INTRAVENOUS at 21:03

## 2022-01-14 ASSESSMENT — PAIN SCALES - GENERAL
PAINLEVEL_OUTOF10: 0

## 2022-01-14 NOTE — PROGRESS NOTES
Occupational Therapy   Occupational Therapy Initial Assessment  Date: 2022   Patient Name: Roosevelt Dill  MRN: [id number]     : 1940    Date of Service: 2022    Discharge Recommendations: Roosevelt Dill scored a 10/24 on the AM-PAC ADL Inpatient form. Current research shows that an AM-PAC score of 17 or less is typically not associated with a discharge to the patient's home setting. Based on the patient's AM-PAC score and their current ADL deficits, it is recommended that the patient have 3-5 sessions per week of Occupational Therapy at d/c to increase the patient's independence. Please see assessment section for further patient specific details. If patient discharges prior to next session this note will serve as a discharge summary. Please see below for the latest assessment towards goals. OT Equipment Recommendations  Equipment Needed: No    Assessment   Performance deficits / Impairments: Decreased functional mobility ; Decreased balance;Decreased ADL status; Decreased cognition;Decreased endurance;Decreased high-level IADLs;Decreased vision/visual deficit  Assessment: Patient presents below baseline d/t above deficits; OT indicated in order to maximize safety/independence with ADL and IADL  Treatment Diagnosis: Above deficits associated with hypochloremia  Prognosis: Good  Decision Making: Medium Complexity  Exam: as above  OT Education: OT Role;Plan of Care  Patient Education: eval, discharge--pt requires reinforcement for carryover  REQUIRES OT FOLLOW UP: Yes  Activity Tolerance  Activity Tolerance: Patient limited by fatigue;Treatment limited secondary to decreased cognition  Safety Devices  Safety Devices in place: Yes  Type of devices: All fall risk precautions in place;Nurse notified;Call light within reach; Chair alarm in place; Left in chair           Patient Diagnosis(es): The primary encounter diagnosis was Hyponatremia.  A diagnosis of Hypochloremia was also pertinent to this visit. has a past medical history of Glaucoma and Prostate enlargement. has no past surgical history on file. Treatment Diagnosis: Above deficits associated with hypochloremia      Restrictions  Restrictions/Precautions  Restrictions/Precautions: Fall Risk,Isolation,Modified Diet (dys mince and moist)  Required Braces or Orthoses?: No  Position Activity Restriction  Other position/activity restrictions: Sarthak Newman is a 80 y.o. male who presents for evaluation of abnormal labs. Apparently had a sodium of 117 at home. Does have a history of hyponatremia. Patient is very hard of hearing a difficult historian. No additional information is able to obtain at this time. Subjective   General  Chart Reviewed: Yes  Diagnosis: Hypochloremia  Subjective  Subjective: Pt lying upright in bed, being assisted for feeding d/t blindness--very [place]. Agreeable/cooperative with evaluation  Patient Currently in Pain: Denies  Vital Signs  Patient Currently in Pain: Denies  Social/Functional History  Social/Functional History  Lives With: Alone  Type of Home: House  Home Layout: One level  Home Access: Level entry  Bathroom Shower/Tub: Walk-in shower,Shower chair with back  Bathroom Toilet: Handicap height  Bathroom Equipment: Grab bars in shower  ADL Assistance: Independent  Homemaking Assistance: Independent  Ambulation Assistance: Independent  Transfer Assistance: Independent  Active : No  Additional Comments: patient currently at SNF for rehab       Objective   Vision: Impaired  Vision Exceptions: Legally blind  Hearing: Exceptions to [facility]  Hearing Exceptions: Hard of hearing/hearing concerns    Orientation  Overall Orientation Status: Impaired  Orientation Level: Disoriented to situation;Disoriented to place;Oriented to person;Oriented to time     Balance  Sitting Balance: Stand by assistance  Standing Balance:  Moderate assistance  Functional Mobility  Functional Mobility Comments: side steps to [facility] with min x2/bilateral HHA  ADL  Feeding: Scoop assist;Increased time to complete; Moderate assistance (A to scoop food and place utensil in pt's hand, pt able to bring to mouth)  Grooming: Setup (washing face)  UE Dressing: Dependent/Total (gown change)  Tone RUE  RUE Tone: Normotonic  Tone LUE  LUE Tone: Normotonic     Bed mobility  Supine to Sit: Stand by assistance (HOB elevated)  Sit to Supine: Stand by assistance (HOB flat)  Scooting: Stand by assistance  Transfers  Sit to stand: Dependent/Total (min x2)  Stand to sit: Dependent/Total (min x2)     Cognition  Overall Cognitive Status: Exceptions  Arousal/Alertness: Inconsistent responses to stimuli  Following Commands: Follows one step commands with increased time; Follows one step commands with repetition  Attention Span: Attends with cues to redirect; Difficulty attending to directions  Memory: Decreased recall of recent events  Insights: Decreased awareness of deficits  Initiation: Requires cues for some  Sequencing: Requires cues for some                 LUE AROM (degrees)  LUE AROM : WFL  LUE General AROM: per observation  RUE AROM (degrees)  RUE AROM : WFL  RUE General AROM: per observation                      Plan   Plan  Times per week: 3-5  Times per day: Daily    G-Code     OutComes Score                                                  AM-PAC Score        AM-[facility] Inpatient Daily Activity Raw Score: 10 (01/14/22 1015)  AM-PAC Inpatient ADL T-Scale Score : 27.31 (01/14/22 1015)  ADL Inpatient CMS 0-100% Score: 74.7 (01/14/22 1015)  ADL Inpatient CMS G-Code Modifier : CL (01/14/22 1015)    Goals  Short term goals  Time Frame for Short term goals: discharge  Short term goal 1: UB ADL min A  Short term goal 2: LB ADL mod A  Short term goal 3: Fxl transfers min A  Short term goal 4: Fxl mob mod A  Short term goal 5:  Toileting mod A  Long term goals  Time Frame for Long term goals : LTG=STG       Therapy Time   Individual Concurrent Group Co-treatment   Time In 0920 Time Out 1002         Minutes 42            Timed Code Treatment Minutes:  27 Minutes    Total Treatment Minutes:  42 minutes    Ze Lockwood, [address] [facility] OTR/L [id number]    Ze Lockwood, OT

## 2022-01-14 NOTE — PROGRESS NOTES
Speech Language Pathology  Dysphagia Treatment Note    Name:  Cheryl Kraus  :   1940  Medical Diagnosis:  Hypochloremia [E87.8]  Hyponatremia [E87.1]  Treatment Diagnosis: Oropharyngeal Dysphagia  Pain level: Pt did not report pain    Diet level prior to treatment: Dysphagia II Minced and Moist with thin liquids, Meds crushed in puree   Tolerance of Current Diet Level: Per chart review, no noted difficulty with current diet. Pt did indicate difficulty with swallowing pills \"if he isn't sitting up all the way. \"    Assessment of Texture Tolerance:  -Impressions: Pt was seen sitting upright in bed on RA. Pt is hard of hearing however was able to provide more history this date. Pt stated he has difficulty swallowing pills and chewing hard foods. Pt appears to benefit from softer foods at this time. He was agreeable to trials of thin liquids and soft solids (diced pears). Prolonged mastication noted with effective oral clearing achieved. Pt noted to clear oral cavity prior to placing another bolus in his mouth. Thin liquids via cup revealed suspected premature bolus loss to pharynx with delayed swallow initiation. Pt seemingly tolerated all PO trials with no overt clinical s/s of aspiration. Recommend continuation of current diet with ongoing use of aspiration precautions. Diet and Treatment Recommendations 2022:  Continue current diet     Compensatory strategies: Alternate solids/liquids , Upright as possible with all PO intake , Assist Feed , Small bites/sips , Eat/feed slowly, Remain upright 30-45 min     Dysphagia Goals:   1.) Pt will functionally tolerate recommended diet with no overt clinical s/s of aspiration (ongoing 2022)   2.) Pt will advance to least restrictive diet as indicated (ongoing 2022)     Plan:  Continued daily Dysphagia treatment with goals per plan of care.     Patient/Family Education:Education given to the Pt and nurse, who verbalized understanding    Discharge Recommendations:  Pt will benefit from continued skilled Speech Therapy for Dysphagia services, prior to returning home. Timed Code Treatment: 0 minutes    Total Treatment Time: 13 minutes     If patient discharges prior to next session this note will serve as a discharge summary.      Signature:   Saintclair Olives, M.A., [address] [place] [address]  Speech-Language Pathologist

## 2022-01-14 NOTE — PROGRESS NOTES
MD Misa Avalos MD Dannette Coffer, MD               Office: [phone]                      Fax: [phone]             [facility]                   NEPHROLOGY INPATIENT PROGRESS NOTE:     PATIENT NAME: Amarilys Marquis  : 1940  MRN: [id number]         IMPRESSION:       Admitted for:  Isabel Mercado [E87.8]  Hyponatremia [E87.1]      Hyponatremia :   : Acute on Chronic, Severe 113, mildly symptomatic w/ AMS  -No Reported Severe symptoms: seizures, obtundation, coma, and respiratory arrest.    - Risk factors for hyponatremia:    - NSAIDs use   - recent admission in 2021 - w/ hyponatremia - lowest Na 124 , on d/c 133 on 2021 - had \"URI\" symptoms, first Rx Levaquin + Steroids -> (+) COVID 2021, not hypoxic.    - Patient is at [address] risk of developing Osmotic Demyelination Syndrome.    - Call us urgently if any/worsening neurological findings. PLAN:     Work up:  - Serum Osm , UOsm , Mamie , Urine K, CL-, Uric acid, reviewed = likely SIADH   - renal function = Cr <0.5 = low solutes intake, as w/ BUN low,    - BP - not low,   - K WNL  - hypochloremic  - hypomagnesemia   - S.alb-WNL  -LFT not elevated  -mild Anemic of chronic dz  -CXR - No acute process. Management:  - Monitor Serum Na Q 4 hrs, - requested RN to page us results. - Goal Serum Na ~ mild-high 120, by first 24 hrs    - started HT saline - increase rate  - added salt tabs    - no need for dDAVP  proactively     -hold NSAIDs    -monitor bladder scan - as w/ h/o BPH    - keep Flomax, Proscar    - order neuro check, seizure precautions  - Strict I/O with daily weights. - not done yet - -request RN   --- Call us urgently if any/worsening neurological findings. D/C plan from renal stand point: Expect ~ 4-5 days  - once Na >130s, stable w/o neuro. s/s       D/W pt, nurse        Other major problems: Management per primary and other consulting teams.    // COVID (+)  Not hypoxia     Hospital Problems           Last Modified POA    * (Principal) Hyponatremia 1/12/2022 Yes    HTN (hypertension) 1/12/2022 Yes    BPH (benign prostatic hyperplasia) 1/12/2022 Yes    Anemia 1/12/2022 Yes    COVID-19 1/13/2022 Yes        : other supportive care :   - Check daily renal function panel with electrolytes-phosphorus  - Strict monitoring of I/Os, daily weight  - non-enal feeds/diet  - Current medications reviewed. Please refer to the orders. High Complexity. Multiple complex problems. Discussed with patient and treatment team-    Time spent > 30 (~35) minutes. Thank you for allowing me to participate in this patient's care. Please do not hesitate to contact me anytime. We will follow along with you. Olaf Harley MD,  [facility] [address] [place] [address] [place]: [phone] or [address]  Fax: [phone]        =======================================================================================   =======================================================================================  This patient is COVID-19, so in a strict isolation, as per current protocol; So in order to preserve PPE supply and to avoid unnecessary exposure to prevent transmission of COVID-19; this patient was NOT examined face to face, as risk of contact outweighs the benefits and likely would not change much of my clinical management. HPI, review of system and physical exam was limited, as it was mainly obtained from chart review and the primary team. But all relevant records and diagnostic tests were reviewed, including laboratory and imaging results. Patient's care is being coordinated with the primary service. Seen via doors-windows-glass  SUBJECTIVE:   Per nursing - more oriented, but fluctuating confusion  HTN controlled  On RA  On HT saline  Needs strict I/Os  COVID (+)    Past medical, Surgical, Social, Family medical history reviewed by me.    MEDICATIONS: reviewed by me. Medications Prior to Admission:  No current facility-administered medications on file prior to encounter. Current Outpatient Medications on File Prior to Encounter   Medication Sig Dispense Refill    cyanocobalamin 1000 MCG tablet Take 1,000 mcg by mouth daily      magnesium hydroxide (MILK OF MAGNESIA) 400 MG/5ML suspension Take 30 mLs by mouth daily as needed for Constipation      baclofen (LIORESAL) 10 MG tablet Take 1 tablet by mouth 3 times daily (Patient taking differently: Take 10 mg by mouth every 6 hours as needed Indications: muscle pain ) 30 tablet 1    Timolol (TIMOPTIC) 0.5 % (DAILY) SOLN ophthalmic solution 1 drop daily      dorzolamide (TRUSOPT) 2 % ophthalmic solution Place 2 drops into both eyes 3 times daily      latanoprost (XALATAN) 0.005 % ophthalmic solution Place 1 drop into both eyes nightly      finasteride (PROSCAR) 5 MG tablet Take 5 mg by mouth daily      tamsulosin (FLOMAX) 0.4 MG capsule Take 0.4 mg by mouth daily.              Current Facility-Administered Medications:     sodium chloride tablet 1 g, 1 g, Oral, TID WC, Seema Mejia MD    sodium chloride 3 % solution, 25 mL/hr, IntraVENous, Continuous, Dale Kelly MD, Last Rate: 25 mL/hr at 01/14/22 0345, 25 mL/hr at 01/14/22 0345    tamsulosin (FLOMAX) capsule 0.4 mg, 0.4 mg, Oral, Daily, Pierre Soliz MD, 0.4 mg at 01/14/22 0919    Timolol (TIMOPTIC) 0.5 % ophthalmic solution 1 drop, 1 drop, Both Eyes, BID, Pierre Soliz MD, 1 drop at 01/14/22 0925    dorzolamide (TRUSOPT) 2 % ophthalmic solution 2 drop, 2 drop, Both Eyes, TID, Pierre Soliz MD, 2 drop at 01/14/22 0920    latanoprost (XALATAN) 0.005 % ophthalmic solution 1 drop, 1 drop, Both Eyes, Nightly, Pierre Soliz MD, 1 drop at 01/13/22 2034    finasteride (PROSCAR) tablet 5 mg, 5 mg, Oral, Daily, Pierre Soliz MD, 5 mg at 01/14/22 0919    ibuprofen (ADVIL;MOTRIN) tablet 200 mg, 200 mg, Oral, Q6H PRN, Pierre Soliz, MD, 200 mg at 01/13/22 1013    baclofen (LIORESAL) tablet 10 mg, 10 mg, Oral, TID, Alethea Lr MD, 10 mg at 01/14/22 0919    sodium chloride flush 0.9 % injection 5-40 mL, 5-40 mL, IntraVENous, 2 times per day, Alethea Lr MD, 10 mL at 01/14/22 8183    sodium chloride flush 0.9 % injection 5-40 mL, 5-40 mL, IntraVENous, PRN, Alethea Lr MD    0.9 % sodium chloride infusion, 25 mL, IntraVENous, PRN, Alethea Lr MD    potassium chloride (KLOR-CON M) extended release tablet 40 mEq, 40 mEq, Oral, PRN **OR** potassium bicarb-citric acid (EFFER-K) effervescent tablet 40 mEq, 40 mEq, Oral, PRN **OR** potassium chloride 10 mEq/100 mL IVPB (Peripheral Line), 10 mEq, IntraVENous, PRN, Alethea Lr MD    enoxaparin (LOVENOX) injection 40 mg, 40 mg, SubCUTAneous, Daily, Alethea Lr MD, 40 mg at 01/14/22 0919    ondansetron (ZOFRAN-ODT) disintegrating tablet 4 mg, 4 mg, Oral, Q8H PRN **OR** ondansetron (ZOFRAN) injection 4 mg, 4 mg, IntraVENous, Q6H PRN, Alethea Lr MD    magnesium hydroxide (MILK OF MAGNESIA) 400 MG/5ML suspension 30 mL, 30 mL, Oral, Daily PRN, Alethea Lr MD    acetaminophen (TYLENOL) tablet 650 mg, 650 mg, Oral, Q6H PRN **OR** acetaminophen (TYLENOL) suppository 650 mg, 650 mg, Rectal, Q6H PRN, Alethea Lr MD    hydrALAZINE (APRESOLINE) injection 10 mg, 10 mg, IntraVENous, Q6H PRN, Alethea Lr MD    0.9 % sodium chloride bolus, 500 mL, IntraVENous, PRN, Alethea Lr MD      =======================================================================================     PHYSICAL EXAM:  Recent vital signs and recent I/Os reviewed by me.      Wt Readings from Last 3 Encounters:   01/14/22 105 lb (47.6 kg)   12/24/21 108 lb 11.2 oz (49.3 kg)   03/09/21 134 lb 3.2 oz (60.9 kg)     BP Readings from Last 3 Encounters:   01/14/22 121/72   12/27/21 112/60   03/12/21 138/84     Patient Vitals for the past 24 hrs:   BP Temp Temp src Pulse Resp SpO2 Weight 01/14/22 0803       105 lb (47.6 kg)   01/14/22 0715 121/72 97.6 °F (36.4 °C) Oral 54 18 96 %    01/14/22 0315 123/79 98 °F (36.7 °C) Oral 52 16 95 %    01/14/22 0014 115/71 98.1 °F (36.7 °C) Oral 53 18 97 %    01/13/22 1933 116/69 98 °F (36.7 °C) Axillary 50 18 95 %    01/13/22 1705 119/63 98.1 °F (36.7 °C) Oral 52 16 98 %    01/13/22 1212 108/65   50  99 %    01/13/22 1115 (!) 169/84 97 °F (36.1 °C) Axillary 55 17 98 %      No intake or output data in the 24 hours ending 01/14/22 1055       Physical exam:    In-person bedside physical examination deferred. Pursuant to the emergency declaration under the [address] [place] [address], [address] [place] [address] and the "[employer] and [employer] Act, this clinical encounter was conducted to provide necessary medical care. (Also consistent with new provisions and guidance offered by [facility] on March 18, 2020 in setting of COVID 19 outbreak and in order to preserve personal protective equipment in accordance with the flexibilities announced by CMS on March 30, 2020)   References: [email or web address]://[email or web address]/[email or web address]/0/Resources/COVID-19/3_18%20Telemed%20Guidance%20Updated%20March%2018. pdf?[email or web address]                      [email or web address]://[email or web address] [facility]/Portals/0/Resources/COVID-19/3_18%20Telemed%20Guidance%20Updated%20March%2018. pdf?[email or web address]=[email or web address]                      [email or web address]    However, I did visually inspect the patient and observed the following: via glass doors-windows    Vitals signs reviewed. Constitutional:       General:  no acute distress. Appearance:  ill-appearing. HENT:      Head: Normocephalic and atraumatic. Nose: Nose normal.      Mouth/Throat:      Mouth: Mucous membranes are moist.   Eyes:      General: No scleral icterus. Conjunctiva/sclera: Conjunctivae dry.    Neck: Musculoskeletal: Neck supple. Cardiovascular:      Rate and Rhythm: Normal rate. Pulmonary:      Effort: Pulmonary effort is  normal.   Abdominal:      General: There is no distension. Musculoskeletal:     lower leg:   edema. Skin:     Coloration: Skin is not jaundiced. Neurological:      General Awake, but disoriented           =======================================================================================     DATA:  Diagnostic tests reviewed by me for today's visit:   (AS NEEDED FOR MY EVALUATION AND MANAGEMENT). Recent Labs     01/12/22 1645 01/13/22  0434 01/14/22  0320   WBC 3.5* 4.5 3.1*   HCT 32.5* 32.9* 30.6*    199 207     Iron Saturation:  No components found for: PERCENTFE  FERRITIN:  No results found for: FERRITIN  IRON:  No results found for: IRON  TIBC:  No results found for: TIBC    Recent Labs     01/12/22 1645 01/12/22 1645 01/12/22  1901 01/12/22 2306 01/13/22  0434 01/13/22  0706 01/13/22  1615 01/13/22  1846 01/13/22  2304 01/14/22  0319 01/14/22  0717   *   < > 116*   < > 118*   < > 113* 113* 116* 119* 119*   K 4.5  --  4.7  --  4.5  --   --   --   --  3.9  --    CL 79*  --  82*  --  84*  --   --   --   --  88*  --    CO2 27  --  26  --  28  --   --   --   --  24  --    BUN 9  --  8  --  6*  --   --   --   --  6*  --    CREATININE <0.5*  --  <0.5*  --  <0.5*  --   --   --   --  <0.5*  --     < > = values in this interval not displayed. Recent Labs     01/12/22 1645 01/12/22 1901 01/13/22  0434 01/14/22  0319   CALCIUM 8.4 8.3 8.4 7.9*   MG 1.60*  --   --   --    PHOS  --  2.6 2.4* 2.1*     No results for input(s): PH, PCO2, PO2 in the last 72 hours.     Invalid input(s): Umair Manger    ABG:  No results found for: PH, PCO2, PO2, HCO3, BE, THGB, TCO2, O2SAT  VBG:  No results found for: PHVEN, OIQ2XEI, BEVEN, U8TJSNJZ    LDH:  No results found for: LDH  Uric Acid:    Lab Results   Component Value Date    LABURIC 1.5 01/12/2022       PT/INR: No results found for: PROTIME, INR  Warfarin PT/INR:  No components found for: PTPATWAR, PTINRWAR  PTT:  No results found for: APTT, PTT[APTT}  Last 3 Troponin:    Lab Results   Component Value Date    TROPONINI <0.01 01/12/2022    TROPONINI <0.01 12/25/2021    TROPONINI <0.01 12/24/2021       U/A:    Lab Results   Component Value Date    COLORU YELLOW 12/24/2021    PROTEINU Negative 12/24/2021    PHUR 5.5 12/24/2021    CLARITYU Clear 12/24/2021    SPECGRAV 1.007 12/24/2021    LEUKOCYTESUR Negative 12/24/2021    UROBILINOGEN 1.0 12/24/2021    BILIRUBINUR Negative 12/24/2021    BLOODU Negative 12/24/2021    GLUCOSEU Negative 12/24/2021     Microalbumen/Creatinine ratio:  No components found for: RUCREAT  24 Hour Urine for Protein:  No components found for: RAWUPRO, UHRS3, NSMK31OV, UTV3  24 Hour Urine for Creatinine Clearance:  No components found for: CREAT4, UHRS10, UTV10  Urine Toxicology:  No components found for: IAMMENTA, IBARBIT, IBENZO, ICOCAINE, IMARTHC, IOPIATES, IPHENCYC    HgBA1c:    Lab Results   Component Value Date    LABA1C 5.6 09/03/2019     RPR:  No results found for: RPR  HIV:  No results found for: HIV  HEMAL:  No results found for: ANATITER, HEMAL  RF:  No results found for: RF  DSDNA:  No components found for: DNA  AMYLASE:  No results found for: AMYLASE  LIPASE:    Lab Results   Component Value Date    LIPASE 23.0 04/19/2019     Fibrinogen Level:  No components found for: FIB       BELOW MENTIONED RADIOLOGY STUDY RESULTS BY ME (AS NEEDED FOR MY EVALUATION AND MANAGEMENT). XR CHEST (2 VW)    Result Date: 12/24/2021  EXAMINATION: TWO XRAY VIEWS OF THE CHEST 12/24/2021 12:18 pm COMPARISON: 03/09/2021 HISTORY: ORDERING SYSTEM PROVIDED HISTORY: cough TECHNOLOGIST PROVIDED HISTORY: Reason for exam:->cough Reason for Exam: cough, lethargic; recent contact with +Covid relatives FINDINGS: The lungs are without acute focal process. There is no effusion or pneumothorax.  The cardiomediastinal silhouette is stable. The osseous structures are stable. Thoracic scoliosis convex right again noted. No acute process. XR CHEST PORTABLE    Result Date: 1/12/2022  EXAMINATION: ONE XRAY VIEW OF THE CHEST 1/12/2022 4:32 pm COMPARISON: 12/24/2021 HISTORY: ORDERING SYSTEM PROVIDED HISTORY: SOB TECHNOLOGIST PROVIDED HISTORY: Reason for exam:->SOB Reason for Exam: shortness of breath FINDINGS: The lungs are without acute focal process. There is no effusion or pneumothorax. The cardiomediastinal silhouette is stable. The osseous structures are stable. Thoracic scoliosis convex right. No acute process.

## 2022-01-14 NOTE — PROGRESS NOTES
Na 119 this AM, pt more oriented throughout the night, able to hold conversation, periodically confused, easily reoriented. Good UO throughout night. 3% NS infusing, rate modified per nephrology. No other needs at this time. Vitals are stable. Call light in reach. Fall precautions in place.    Vitals:    01/14/22 0315   BP: 123/79   Pulse: 52   Resp: 16   Temp: 98 °F (36.7 °C)   SpO2: 95%

## 2022-01-14 NOTE — PROGRESS NOTES
Physical Therapy    Facility/Department: [address]  Initial Assessment    NAME: Natalia Harp  : 1940  MRN: [id number]    Date of Service: 2022    Discharge Recommendations:  Natalia Harp scored a  on the AM-PAC short mobility form. Current research shows that an AM-PAC score of 17 or less is typically not associated with a discharge to the patient's home setting. Based on the patient's AM-PAC score and their current functional mobility deficits, it is recommended that the patient have 3-5 sessions per week of Physical Therapy at d/c to increase the patient's independence. Please see assessment section for further patient specific details. If patient discharges prior to next session this note will serve as a discharge summary. Please see below for the latest assessment towards goals. 3-5 sessions per week   PT Equipment Recommendations  Equipment Needed: No    Assessment   Body structures, Functions, Activity limitations: Decreased functional mobility ; Decreased strength;Decreased endurance;Decreased safe awareness;Decreased balance;Decreased ROM  Assessment: Patient not at baseline function and would benefit from skilled PT to address above deficits and facilitate return to baseline function. Treatment Diagnosis: decreased functional mobility, impaired gait, decreased balance  Prognosis: Fair  Decision Making: Medium Complexity  Clinical Presentation: evolving  PT Education: Goals;PT Role;Plan of Care  Patient Education: unable to verbalize understanding - recommend continued reinforcement  Barriers to Learning: hearing, cognitive  REQUIRES PT FOLLOW UP: Yes  Activity Tolerance  Activity Tolerance: Patient limited by cognitive status; Patient Tolerated treatment well       Patient Diagnosis(es): The primary encounter diagnosis was Hyponatremia. A diagnosis of Hypochloremia was also pertinent to this visit.      has a past medical history of Glaucoma and Prostate enlargement. has no past surgical history on file. Restrictions  Restrictions/Precautions  Restrictions/Precautions: Fall Risk,Isolation,Modified Diet (dys mince and moist)  Required Braces or Orthoses?: No  Position Activity Restriction  Other position/activity restrictions: Easton Woods is a 80 y.o. male who presents for evaluation of abnormal labs. Apparently had a sodium of 117 at home. Does have a history of hyponatremia. Patient is very hard of hearing a difficult historian. No additional information is able to obtain at this time. Vision/Hearing  Vision: Impaired  Vision Exceptions: Legally blind (stating throughout session \"I can't see anything\")  Hearing: Exceptions to [facility]  Hearing Exceptions: Hard of hearing/hearing concerns     Subjective  General  Chart Reviewed: Yes  Family / Caregiver Present: No  Diagnosis: hyponatremia  Follows Commands: Impaired  Other (Comment): confusion and Tribe  General Comment  Comments: seated upright in bed upon arrival with alarm on  Subjective  Subjective: Reported pain in buttocks - states he has a wound. Unable to rate. Repeating \"oh god\" throughout session  Pain Screening  Patient Currently in Pain: Denies          Orientation  Orientation  Overall Orientation Status: Impaired  Orientation Level: Oriented to time;Oriented to person;Disoriented to place; Disoriented to situation  Social/Functional History  Social/Functional History  Lives With: Alone  Type of Home: House  Home Layout: One level  Home Access: Level entry  Bathroom Shower/Tub: Walk-in shower,Shower chair with back  Bathroom Toilet: Handicap height  Bathroom Equipment: Grab bars in shower  ADL Assistance: [address] [place] [address] Avenue: Independent  Ambulation Assistance: Independent  Transfer Assistance: Independent  Active : No  Additional Comments: patient currently at SNF for rehab    Objective          AROM RLE (degrees)  RLE AROM: WFL  AROM LLE (degrees)  LLE AROM : [facility]  Strength RLE  Strength RLE: WFL  Strength LLE  Strength LLE: WFL  Strength Other  Other: generalized weakness noted, unable to formally assess due to decreased command following        Bed mobility  Supine to Sit: Stand by assistance (HOB elevated)  Sit to Supine: Stand by assistance (HOB flat)  Scooting: Stand by assistance  Transfers  Sit to Stand: 2 Person Assistance (min A of 2)  Stand to sit: Minimal Assistance  Ambulation 1  Surface: level tile  Device: No Device;Hand-Held Assist  Assistance: 2 Person assistance (min A of 2)  Quality of Gait: narrow NAM, posterior lean with heel weightbearing, minimal foot clearance/step length  Distance: 2-3' laterally towards [facility]     Balance  Sitting - Static: Good  Sitting - Dynamic: Good  Standing - Static: Fair  Standing - Dynamic: [address]  Times per week: 3-5  Times per day: Daily  Current Treatment Recommendations: Strengthening,Functional Mobility Training,Transfer Training,Balance Training,Endurance Training,Gait Training,Patient/Caregiver Education & [employer] Exercise Program,Safety Education & Training  Safety Devices  Type of devices: All fall risk precautions in place,Call light within reach,Bed alarm in place,Patient at risk for falls,Left in bed,Nurse notified  Restraints  Initially in place: No (seizure pads in place)      AM-PAC Score  AM-PAC Inpatient Mobility Raw Score : 11 (01/14/22 1024)  AM-PAC Inpatient T-Scale Score : 33.86 (01/14/22 1024)  Mobility Inpatient CMS 0-100% Score: 72.57 (01/14/22 1024)  Mobility Inpatient CMS G-Code Modifier : CL (01/14/22 1024)          Goals  Short term goals  Time Frame for Short term goals:  To be met prior to discharge  Short term goal 1: Bed mobility with supervision  Short term goal 2: Sit to/from stand with CGA  Short term goal 3: Bed to/from chair with mod A  Short term goal 4: Ambulate 13' with AAD and min A  Patient Goals   Patient goals : unable to state       Therapy Time   Individual Concurrent Group Co-treatment   Time In 0920         Time Out 1002         Minutes 42         Timed Code Treatment Minutes: [address], PT   Thanks, Althea Chris, PT, DPT [id number]

## 2022-01-14 NOTE — PLAN OF CARE
Problem: Falls - Risk of:  Goal: Absence of physical injury  Description: Absence of physical injury  Outcome: Ongoing     Problem: Airway Clearance - Ineffective  Goal: Achieve or maintain patent airway  Outcome: Ongoing

## 2022-01-15 PROBLEM — L03.119 CELLULITIS OF LOWER LEG: Status: RESOLVED | Noted: 2021-03-09 | Resolved: 2022-01-15

## 2022-01-15 PROBLEM — E22.2 SIADH (SYNDROME OF INAPPROPRIATE ADH PRODUCTION) (HCC): Status: ACTIVE | Noted: 2022-01-15

## 2022-01-15 PROBLEM — Z20.822 SUSPECTED COVID-19 VIRUS INFECTION: Status: RESOLVED | Noted: 2021-12-24 | Resolved: 2022-01-15

## 2022-01-15 PROBLEM — L03.119 CELLULITIS OF ANTERIOR LOWER LEG: Status: RESOLVED | Noted: 2021-03-09 | Resolved: 2022-01-15

## 2022-01-15 LAB
ALBUMIN SERPL-MCNC: 3.2 G/DL (ref 3.4–5)
ANION GAP SERPL CALCULATED.3IONS-SCNC: 6 MMOL/L (ref 3–16)
BASOPHILS ABSOLUTE: 0 K/UL (ref 0–0.2)
BASOPHILS RELATIVE PERCENT: 1.1 %
BUN BLDV-MCNC: 7 MG/DL (ref 7–20)
CALCIUM SERPL-MCNC: 8.4 MG/DL (ref 8.3–10.6)
CHLORIDE BLD-SCNC: 91 MMOL/L (ref 99–110)
CO2: 27 MMOL/L (ref 21–32)
CREAT SERPL-MCNC: <0.5 MG/DL (ref 0.8–1.3)
EOSINOPHILS ABSOLUTE: 0.1 K/UL (ref 0–0.6)
EOSINOPHILS RELATIVE PERCENT: 3.4 %
GFR AFRICAN AMERICAN: >60
GFR NON-AFRICAN AMERICAN: >60
GLUCOSE BLD-MCNC: 86 MG/DL (ref 70–99)
HCT VFR BLD CALC: 30.8 % (ref 40.5–52.5)
HEMOGLOBIN: 10.7 G/DL (ref 13.5–17.5)
LYMPHOCYTES ABSOLUTE: 0.6 K/UL (ref 1–5.1)
LYMPHOCYTES RELATIVE PERCENT: 17.2 %
MCH RBC QN AUTO: 32.7 PG (ref 26–34)
MCHC RBC AUTO-ENTMCNC: 34.8 G/DL (ref 31–36)
MCV RBC AUTO: 93.9 FL (ref 80–100)
MONOCYTES ABSOLUTE: 0.6 K/UL (ref 0–1.3)
MONOCYTES RELATIVE PERCENT: 16.7 %
NEUTROPHILS ABSOLUTE: 2.1 K/UL (ref 1.7–7.7)
NEUTROPHILS RELATIVE PERCENT: 61.6 %
PDW BLD-RTO: 13.7 % (ref 12.4–15.4)
PHOSPHORUS: 2.2 MG/DL (ref 2.5–4.9)
PLATELET # BLD: 211 K/UL (ref 135–450)
PMV BLD AUTO: 5.8 FL (ref 5–10.5)
POTASSIUM SERPL-SCNC: 3.8 MMOL/L (ref 3.5–5.1)
RBC # BLD: 3.28 M/UL (ref 4.2–5.9)
SODIUM BLD-SCNC: 121 MMOL/L (ref 136–145)
SODIUM BLD-SCNC: 122 MMOL/L (ref 136–145)
SODIUM BLD-SCNC: 123 MMOL/L (ref 136–145)
SODIUM BLD-SCNC: 124 MMOL/L (ref 136–145)
WBC # BLD: 3.4 K/UL (ref 4–11)

## 2022-01-15 PROCEDURE — 2060000000 HC ICU INTERMEDIATE R&B

## 2022-01-15 PROCEDURE — 2580000003 HC RX 258: Performed by: INTERNAL MEDICINE

## 2022-01-15 PROCEDURE — 80069 RENAL FUNCTION PANEL: CPT

## 2022-01-15 PROCEDURE — 36415 COLL VENOUS BLD VENIPUNCTURE: CPT

## 2022-01-15 PROCEDURE — 6370000000 HC RX 637 (ALT 250 FOR IP): Performed by: INTERNAL MEDICINE

## 2022-01-15 PROCEDURE — 84295 ASSAY OF SERUM SODIUM: CPT

## 2022-01-15 PROCEDURE — 85025 COMPLETE CBC W/AUTO DIFF WBC: CPT

## 2022-01-15 PROCEDURE — 6360000002 HC RX W HCPCS: Performed by: INTERNAL MEDICINE

## 2022-01-15 RX ORDER — BACLOFEN 10 MG/1
5 TABLET ORAL 3 TIMES DAILY
Status: DISCONTINUED | OUTPATIENT
Start: 2022-01-15 | End: 2022-01-17

## 2022-01-15 RX ADMIN — TIMOLOL MALEATE 1 DROP: 5 SOLUTION OPHTHALMIC at 09:41

## 2022-01-15 RX ADMIN — TIMOLOL MALEATE 1 DROP: 5 SOLUTION OPHTHALMIC at 20:41

## 2022-01-15 RX ADMIN — BACLOFEN 5 MG: 10 TABLET ORAL at 20:40

## 2022-01-15 RX ADMIN — BACLOFEN 10 MG: 10 TABLET ORAL at 13:43

## 2022-01-15 RX ADMIN — DORZOLAMIDE HYDROCHLORIDE 2 DROP: 20 SOLUTION/ DROPS OPHTHALMIC at 20:41

## 2022-01-15 RX ADMIN — LATANOPROST 1 DROP: 50 SOLUTION OPHTHALMIC at 20:40

## 2022-01-15 RX ADMIN — ACETAMINOPHEN 650 MG: 325 TABLET ORAL at 20:40

## 2022-01-15 RX ADMIN — SODIUM CHLORIDE TAB 1 GM 1 G: 1 TAB at 09:40

## 2022-01-15 RX ADMIN — SODIUM CHLORIDE, PRESERVATIVE FREE 10 ML: 5 INJECTION INTRAVENOUS at 09:41

## 2022-01-15 RX ADMIN — DORZOLAMIDE HYDROCHLORIDE 2 DROP: 20 SOLUTION/ DROPS OPHTHALMIC at 09:41

## 2022-01-15 RX ADMIN — SODIUM CHLORIDE TAB 1 GM 1 G: 1 TAB at 16:40

## 2022-01-15 RX ADMIN — FINASTERIDE 5 MG: 5 TABLET, FILM COATED ORAL at 09:40

## 2022-01-15 RX ADMIN — ENOXAPARIN SODIUM 40 MG: 40 INJECTION SUBCUTANEOUS at 09:40

## 2022-01-15 RX ADMIN — SODIUM CHLORIDE TAB 1 GM 1 G: 1 TAB at 13:43

## 2022-01-15 RX ADMIN — TAMSULOSIN HYDROCHLORIDE 0.4 MG: 0.4 CAPSULE ORAL at 09:40

## 2022-01-15 RX ADMIN — BACLOFEN 10 MG: 10 TABLET ORAL at 09:40

## 2022-01-15 RX ADMIN — DORZOLAMIDE HYDROCHLORIDE 2 DROP: 20 SOLUTION/ DROPS OPHTHALMIC at 14:22

## 2022-01-15 ASSESSMENT — PAIN SCALES - GENERAL
PAINLEVEL_OUTOF10: 3
PAINLEVEL_OUTOF10: 0
PAINLEVEL_OUTOF10: 2
PAINLEVEL_OUTOF10: 0

## 2022-01-15 NOTE — PROGRESS NOTES
Pt bladder scan per dayshift RN ~720ml of urine. Pt up to [facility] this evening before attempting ledbetter placement, able to urinate 700ml. PVR 50ml on bladder scan.

## 2022-01-15 NOTE — PROGRESS NOTES
MD Samir Headley MD Penelope Squibb, MD               Office: [phone]                      Fax: [phone]             [facility]                   NEPHROLOGY INPATIENT PROGRESS NOTE:     PATIENT NAME: Christina Hansen  : 1940  MRN: [id number]         IMPRESSION:       Admitted for:  Hypochloremia [E87.8]  Hyponatremia [E87.1]      Treatment plan update. Hyponatremiacontinued improvement and appropriate correctionsodium 124acute on chronic problem. Volume statusmild hypovolemiacontinue on IV fluids at current rate of 50 mL/havoid fluid overload. Multiple electrolyte imbalanceimproving. Hypokalemiaimproved. Low phosphate levels replace potassium phosphate 40 mmol IV. Recheck magnesium levelsMay need replacement. Anemia levels are stable. Improved albumin 3.2 high-protein diet    High complexity. Multiple electrolyte imbalance. Not ready for discharge. Anticipate at least 2-3 hospital days as electrolytes need to be gradually corrected including sodium    High complexity                    Hyponatremia :   : Acute on Chronic, Severe 113, mildly symptomatic w/ AMS  -No Reported Severe symptoms: seizures, obtundation, coma, and respiratory arrest.    - Risk factors for hyponatremia:    - NSAIDs use   - recent admission in 2021 - w/ hyponatremia - lowest Na 124 , on d/c 133 on 2021 - had \"URI\" symptoms, first Rx Levaquin + Steroids -> (+) COVID 2021, not hypoxic.    - Patient is at HIGH risk of developing Osmotic Demyelination Syndrome.    - Call us urgently if any/worsening neurological findings.          PLAN:     Work up:  - Serum Osm , UOsm , Mamie , Urine K, CL-, Uric acid, reviewed = likely SIADH   - renal function = Cr <0.5 = low solutes intake, as w/ BUN low,    - BP - not low,   - K WNL  - hypochloremic  - hypomagnesemia   - S.alb-WNL  -LFT not elevated  -mild Anemic of chronic dz  -CXR - No acute process. Management:  - Monitor Serum Na Q 4 hrs, - requested RN to page us results. - Goal Serum Na ~ mild-high 120, by first 24 hrs    - started HT saline - increase rate  - added salt tabs    - no need for dDAVP  proactively     -hold NSAIDs    -monitor bladder scan - as w/ h/o BPH    - keep Flomax, Proscar    - order neuro check, seizure precautions  - Strict I/O with daily weights. - not done yet - -request RN   --- Call us urgently if any/worsening neurological findings. D/C plan from renal stand point: Expect ~ 4-5 days  - once Na >130s, stable w/o neuro. s/s       D/W pt, nurse        Other major problems: Management per primary and other consulting teams. //    Tank (+)  Not hypoxia     Hospital Problems           Last Modified POA    * (Principal) Hyponatremia 1/12/2022 Yes    HTN (hypertension) 1/12/2022 Yes    BPH (benign prostatic hyperplasia) 1/12/2022 Yes    Anemia 1/12/2022 Yes    COVID-19 1/13/2022 Yes        : other supportive care :   - Check daily renal function panel with electrolytes-phosphorus  - Strict monitoring of I/Os, daily weight  - non-enal feeds/diet  - Current medications reviewed. Please refer to the orders. High Complexity. Multiple complex problems. Discussed with patient and treatment team-    Time spent > 30 (~35) minutes. Thank you for allowing me to participate in this patient's care. Please do not hesitate to contact me anytime. We will follow along with you. Salina Saleem MD,  [facility] [address] [place] [address] [place]: [phone] or [employer]  Fax: [phone]        =======================================================================================   =======================================================================================  This patient is COVID-19, so in a strict isolation, as per current protocol;    So in order to preserve PPE supply and to avoid unnecessary exposure to prevent transmission of COVID-19; this patient was NOT examined face to face, as risk of contact outweighs the benefits and likely would not change much of my clinical management. HPI, review of system and physical exam was limited, as it was mainly obtained from chart review and the primary team. But all relevant records and diagnostic tests were reviewed, including laboratory and imaging results. Patient's care is being coordinated with the primary service. Seen via doors-windows-glass  SUBJECTIVE:   Per nursing - more oriented, but fluctuating confusion  HTN controlled  On RA  On HT saline  Needs strict I/Os  COVID (+)    Past medical, Surgical, Social, Family medical history reviewed by me. MEDICATIONS: reviewed by me. Medications Prior to Admission:  No current facility-administered medications on file prior to encounter. Current Outpatient Medications on File Prior to Encounter   Medication Sig Dispense Refill    cyanocobalamin 1000 MCG tablet Take 1,000 mcg by mouth daily      magnesium hydroxide (MILK OF MAGNESIA) 400 MG/5ML suspension Take 30 mLs by mouth daily as needed for Constipation      baclofen (LIORESAL) 10 MG tablet Take 1 tablet by mouth 3 times daily (Patient taking differently: Take 10 mg by mouth every 6 hours as needed Indications: muscle pain ) 30 tablet 1    Timolol (TIMOPTIC) 0.5 % (DAILY) SOLN ophthalmic solution 1 drop daily      dorzolamide (TRUSOPT) 2 % ophthalmic solution Place 2 drops into both eyes 3 times daily      latanoprost (XALATAN) 0.005 % ophthalmic solution Place 1 drop into both eyes nightly      finasteride (PROSCAR) 5 MG tablet Take 5 mg by mouth daily      tamsulosin (FLOMAX) 0.4 MG capsule Take 0.4 mg by mouth daily.              Current Facility-Administered Medications:     sodium chloride tablet 1 g, 1 g, Oral, TID WC, Tanvi Payne MD, 1 g at 01/15/22 0940    sodium chloride 3 % solution, 50 mL/hr, IntraVENous, Continuous, Lissa Parrish MD, Stopped at 01/14/22 2354    0.9 % sodium chloride infusion, , IntraVENous, Continuous, Justin Sims MD, Last Rate: 50 mL/hr at 01/14/22 2358, New Bag at 01/14/22 2358    tamsulosin (FLOMAX) capsule 0.4 mg, 0.4 mg, Oral, Daily, Lesly Sin MD, 0.4 mg at 01/15/22 0940    Timolol (TIMOPTIC) 0.5 % ophthalmic solution 1 drop, 1 drop, Both Eyes, BID, Lesly Sin MD, 1 drop at 01/15/22 0941    dorzolamide (TRUSOPT) 2 % ophthalmic solution 2 drop, 2 drop, Both Eyes, TID, Lesly Sin MD, 2 drop at 01/15/22 0941    latanoprost (XALATAN) 0.005 % ophthalmic solution 1 drop, 1 drop, Both Eyes, Nightly, Lesly Sin MD, 1 drop at 01/14/22 2103    finasteride (PROSCAR) tablet 5 mg, 5 mg, Oral, Daily, Lesly Sin MD, 5 mg at 01/15/22 0940    ibuprofen (ADVIL;MOTRIN) tablet 200 mg, 200 mg, Oral, Q6H PRN, Lesly Sin MD, 200 mg at 01/13/22 1013    baclofen (LIORESAL) tablet 10 mg, 10 mg, Oral, TID, Lesly Sin MD, 10 mg at 01/15/22 0940    sodium chloride flush 0.9 % injection 5-40 mL, 5-40 mL, IntraVENous, 2 times per day, Lesly Sin MD, 10 mL at 01/15/22 0941    sodium chloride flush 0.9 % injection 5-40 mL, 5-40 mL, IntraVENous, PRN, Lesly Sin MD    0.9 % sodium chloride infusion, 25 mL, IntraVENous, PRN, Lesly Sin MD    potassium chloride (KLOR-CON M) extended release tablet 40 mEq, 40 mEq, Oral, PRN **OR** potassium bicarb-citric acid (EFFER-K) effervescent tablet 40 mEq, 40 mEq, Oral, PRN **OR** potassium chloride 10 mEq/100 mL IVPB (Peripheral Line), 10 mEq, IntraVENous, PRN, Lesly Sin MD    enoxaparin (LOVENOX) injection 40 mg, 40 mg, SubCUTAneous, Daily, Lesly Sin MD, 40 mg at 01/15/22 0940    ondansetron (ZOFRAN-ODT) disintegrating tablet 4 mg, 4 mg, Oral, Q8H PRN **OR** ondansetron (ZOFRAN) injection 4 mg, 4 mg, IntraVENous, Q6H PRN, Lesly Sin MD    magnesium hydroxide (MILK OF MAGNESIA) 400 MG/5ML suspension 30 mL, 30 mL, Oral, Daily PRN, yal Killer Doyle Saleh MD    acetaminophen (TYLENOL) tablet 650 mg, 650 mg, Oral, Q6H PRN **OR** acetaminophen (TYLENOL) suppository 650 mg, 650 mg, Rectal, Q6H PRN, Eva William MD    hydrALAZINE (APRESOLINE) injection 10 mg, 10 mg, IntraVENous, Q6H PRN, Eva William MD    0.9 % sodium chloride bolus, 500 mL, IntraVENous, PRN, Eva William MD      =======================================================================================     PHYSICAL EXAM:  Recent vital signs and recent I/Os reviewed by me. Wt Readings from Last 3 Encounters:   01/14/22 105 lb (47.6 kg)   12/24/21 108 lb 11.2 oz (49.3 kg)   03/09/21 134 lb 3.2 oz (60.9 kg)     BP Readings from Last 3 Encounters:   01/15/22 (!) 146/82   12/27/21 112/60   03/12/21 138/84     Patient Vitals for the past 24 hrs:   BP Temp Temp src Pulse Resp SpO2   01/15/22 0711 (!) 146/82 97.4 °F (36.3 °C) Axillary 53 18 97 %   01/15/22 0333 121/70 97.6 °F (36.4 °C) Axillary 55 16 96 %   01/15/22 0026 127/70 97.6 °F (36.4 °C) Oral 56 18 94 %   01/14/22 1921 125/73 98 °F (36.7 °C) Axillary 55 18 97 %   01/14/22 1638 126/74 98.6 °F (37 °C) Axillary 54 18 97 %   01/14/22 1300 129/72 98.7 °F (37.1 °C) Axillary 55 18 98 %       Intake/Output Summary (Last 24 hours) at 1/15/2022 1136  Last data filed at 1/15/2022 3009  Gross per 24 hour   Intake    Output 1150 ml   Net -1150 ml          Physical exam:    In-person bedside physical examination deferred. Pursuant to the emergency declaration under the [address] [place] [address] and the [employer] Act, this clinical encounter was conducted to provide necessary medical care.    (Also consistent with new provisions and guidance offered by [facility] on March 18, 2020 in setting of COVID 19 outbreak and in order to preserve personal protective equipment in accordance with the flexibilities announced by CMS on March 30, 2020)   References: https://[facility]. [facility]/Portals/0/Resources/COVID-19/3_18%20Telemed%20Guidance%20Updated%20March%2018. pdf?jnm=[email or web address]                      https://[facility]/Portals/0/Resources/COVID-19/3_18%20Telemed%20Guidance%20Updated%20March%2018. pdf?oew=[email or web address]                      [email or web address] pdf    However, I did visually inspect the patient and observed the following: via glass doors-windows    Vitals signs reviewed. Constitutional:       General:  no acute distress. Appearance:  ill-appearing. HENT:      Head: Normocephalic and atraumatic. Nose: Nose normal.      Mouth/Throat:      Mouth: Mucous membranes are moist.   Eyes:      General: No scleral icterus. Conjunctiva/sclera: Conjunctivae dry. Neck:      Musculoskeletal: Neck supple. Cardiovascular:      Rate and Rhythm: Normal rate. Pulmonary:      Effort: Pulmonary effort is  normal.   Abdominal:      General: There is no distension. Musculoskeletal:     lower leg:   edema. Skin:     Coloration: Skin is not jaundiced. Neurological:      General Awake, but disoriented           =======================================================================================     DATA:  Diagnostic tests reviewed by me for today's visit:   (AS NEEDED FOR MY EVALUATION AND MANAGEMENT).        Recent Labs     01/12/22  1645 01/13/22  0434 01/14/22  0320 01/15/22  0503   WBC 3.5* 4.5 3.1* 3.4*   HCT 32.5* 32.9* 30.6* 30.8*    199 207 211     Iron Saturation:  No components found for: PERCENTFE  FERRITIN:  No results found for: FERRITIN  IRON:  No results found for: IRON  TIBC:  No results found for: TIBC    Recent Labs     01/12/22  1645 01/12/22  1645 01/12/22  1901 01/12/22  2306 01/13/22  0434 01/13/22  0706 01/14/22  0319 01/14/22  0717 01/14/22  1039 01/14/22  1427 01/14/22  1910 01/14/22  2241 01/15/22  0503   *   < > 116*   < > 118*   < > 119*   < > 121* 120* 119* 122* 124*   K 4.5  --  4.7  --  4.5  --  3.9  --   --   --   --   --  3.8   CL 79*  --  82*  --  84*  --  88*  --   --   --   --   --  91*   CO2 27  --  26  --  28  --  24  --   --   --   --   --  27   BUN 9  --  8  --  6*  --  6*  --   --   --   --   --  7   CREATININE <0.5*  --  <0.5*  --  <0.5*  --  <0.5*  --   --   --   --   --  <0.5*    < > = values in this interval not displayed. Recent Labs     01/12/22  1645 01/12/22  1901 01/13/22  0434 01/14/22  0319 01/15/22  0503   CALCIUM 8.4 8.3 8.4 7.9* 8.4   MG 1.60*  --   --   --   --    PHOS  --  2.6 2.4* 2.1* 2.2*     No results for input(s): PH, PCO2, PO2 in the last 72 hours.     Invalid input(s): Eulas Miladis    ABG:  No results found for: PH, PCO2, PO2, HCO3, BE, THGB, TCO2, O2SAT  VBG:  No results found for: PHVEN, OQL0LRG, BEVEN, D6AUDWRD    LDH:  No results found for: LDH  Uric Acid:    Lab Results   Component Value Date    LABURIC 1.5 01/12/2022       PT/INR:  No results found for: PROTIME, INR  Warfarin PT/INR:  No components found for: PTPATWAR, PTINRWAR  PTT:  No results found for: APTT, PTT[APTT}  Last 3 Troponin:    Lab Results   Component Value Date    TROPONINI <0.01 01/12/2022    TROPONINI <0.01 12/25/2021    TROPONINI <0.01 12/24/2021       U/A:    Lab Results   Component Value Date    COLORU YELLOW 12/24/2021    PROTEINU Negative 12/24/2021    PHUR 5.5 12/24/2021    CLARITYU Clear 12/24/2021    SPECGRAV 1.007 12/24/2021    LEUKOCYTESUR Negative 12/24/2021    UROBILINOGEN 1.0 12/24/2021    BILIRUBINUR Negative 12/24/2021    BLOODU Negative 12/24/2021    GLUCOSEU Negative 12/24/2021     Microalbumen/Creatinine ratio:  No components found for: RUCREAT  24 Hour Urine for Protein:  No components found for: RAWUPRO, UHRS3, BHDY28NS, UTV3  24 Hour Urine for Creatinine Clearance:  No components found for: CREAT4, UHRS10, UTV10  Urine Toxicology:  No components found for: Michael Salk, Josue Cardenas, [facility], [facility]    HgBA1c:    Lab Results   Component Value Date    LABA1C 5.6 09/03/2019     RPR:  No results found for: RPR  HIV:  No results found for: HIV  HEMAL:  No results found for: ANATITER, HEMAL  RF:  No results found for: RF  DSDNA:  No components found for: DNA  AMYLASE:  No results found for: AMYLASE  LIPASE:    Lab Results   Component Value Date    LIPASE 23.0 04/19/2019     Fibrinogen Level:  No components found for: FIB       BELOW MENTIONED RADIOLOGY STUDY RESULTS BY ME (AS NEEDED FOR MY EVALUATION AND MANAGEMENT). XR CHEST (2 VW)    Result Date: 12/24/2021  EXAMINATION: TWO XRAY VIEWS OF THE CHEST 12/24/2021 12:18 pm COMPARISON: 03/09/2021 HISTORY: ORDERING SYSTEM PROVIDED HISTORY: cough TECHNOLOGIST PROVIDED HISTORY: Reason for exam:->cough Reason for Exam: cough, lethargic; recent contact with +Covid relatives FINDINGS: The lungs are without acute focal process. There is no effusion or pneumothorax. The cardiomediastinal silhouette is stable. The osseous structures are stable. Thoracic scoliosis convex right again noted. No acute process. XR CHEST PORTABLE    Result Date: 1/12/2022  EXAMINATION: ONE XRAY VIEW OF THE CHEST 1/12/2022 4:32 pm COMPARISON: 12/24/2021 HISTORY: ORDERING SYSTEM PROVIDED HISTORY: SOB TECHNOLOGIST PROVIDED HISTORY: Reason for exam:->SOB Reason for Exam: shortness of breath FINDINGS: The lungs are without acute focal process. There is no effusion or pneumothorax. The cardiomediastinal silhouette is stable. The osseous structures are stable. Thoracic scoliosis convex right. No acute process.

## 2022-01-15 NOTE — PLAN OF CARE
Problem: Falls - Risk of:  Goal: Absence of physical injury  Description: Absence of physical injury  Outcome: Ongoing     Problem: Pain:  Goal: Control of chronic pain  Description: Control of chronic pain  Outcome: Ongoing

## 2022-01-15 NOTE — PROGRESS NOTES
Na 122 on redraw, notified Nephrology. New orders placed. 3% NaCl stopped, 0.9% NS infusing. Recheck Na in 6 hours.

## 2022-01-15 NOTE — PROGRESS NOTES
Department of Internal Medicine  General Internal Medicine   Progress Note      SUBJECTIVE:  Some dysuria , no fever or unusual SOB     History obtained from chart review and the patient  General ROS: positive for  - fatigue, malaise, sleep disturbance and weight loss  negative for - chills, fever or night sweats  Psychological ROS: positive for - anxiety and memory difficulties  negative for - disorientation, hallucinations or hostility  Ophthalmic ROS: negative  Respiratory ROS: no cough, shortness of breath, or wheezing  Cardiovascular ROS: positive for - dyspnea on exertion  negative for - chest pain  Gastrointestinal ROS: no abdominal pain, change in bowel habits, or black or bloody stools  Genito-Urinary ROS: has dysuria and trouble trouble voiding,  But no  hematuria  Musculoskeletal ROS: negative  Neurological ROS: no TIA or stroke symptoms  Dermatological ROS: negative    OBJECTIVE      Medications      Current Facility-Administered Medications: sodium chloride tablet 1 g, 1 g, Oral, TID WC  sodium chloride 3 % solution, 50 mL/hr, IntraVENous, Continuous  0.9 % sodium chloride infusion, , IntraVENous, Continuous  tamsulosin (FLOMAX) capsule 0.4 mg, 0.4 mg, Oral, Daily  Timolol (TIMOPTIC) 0.5 % ophthalmic solution 1 drop, 1 drop, Both Eyes, BID  dorzolamide (TRUSOPT) 2 % ophthalmic solution 2 drop, 2 drop, Both Eyes, TID  latanoprost (XALATAN) 0.005 % ophthalmic solution 1 drop, 1 drop, Both Eyes, Nightly  finasteride (PROSCAR) tablet 5 mg, 5 mg, Oral, Daily  ibuprofen (ADVIL;MOTRIN) tablet 200 mg, 200 mg, Oral, Q6H PRN  baclofen (LIORESAL) tablet 10 mg, 10 mg, Oral, TID  sodium chloride flush 0.9 % injection 5-40 mL, 5-40 mL, IntraVENous, 2 times per day  sodium chloride flush 0.9 % injection 5-40 mL, 5-40 mL, IntraVENous, PRN  0.9 % sodium chloride infusion, 25 mL, IntraVENous, PRN  potassium chloride (KLOR-CON M) extended release tablet 40 mEq, 40 mEq, Oral, PRN **OR** potassium bicarb-citric acid (EFFER-K) effervescent tablet 40 mEq, 40 mEq, Oral, PRN **OR** potassium chloride 10 mEq/100 mL IVPB (Peripheral Line), 10 mEq, IntraVENous, PRN  enoxaparin (LOVENOX) injection 40 mg, 40 mg, SubCUTAneous, Daily  ondansetron (ZOFRAN-ODT) disintegrating tablet 4 mg, 4 mg, Oral, Q8H PRN **OR** ondansetron (ZOFRAN) injection 4 mg, 4 mg, IntraVENous, Q6H PRN  magnesium hydroxide (MILK OF MAGNESIA) 400 MG/5ML suspension 30 mL, 30 mL, Oral, Daily PRN  acetaminophen (TYLENOL) tablet 650 mg, 650 mg, Oral, Q6H PRN **OR** acetaminophen (TYLENOL) suppository 650 mg, 650 mg, Rectal, Q6H PRN  hydrALAZINE (APRESOLINE) injection 10 mg, 10 mg, IntraVENous, Q6H PRN  0.9 % sodium chloride bolus, 500 mL, IntraVENous, PRN    Physical      Vitals: /70   Pulse 64   Temp 97.6 °F (36.4 °C) (Axillary)   Resp 18   Ht 5' 10\" (1.778 m)   Wt 105 lb (47.6 kg)   SpO2 96%   BMI 15.07 kg/m²   Temp: Temp: 97.6 °F (36.4 °C)  Max: Temp  Av.8 °F (36.6 °C)  Min: 97.4 °F (36.3 °C)  Max: 98.6 °F (37 °C)  Respiration range:  Resp  Av.7  Min: 16  Max: 18  Pulse Range:  Pulse  Av.2  Min: 53  Max: 64  Blood pressure range:  Systolic (77JWA), DVZ:350 , Min:121 , BRAYDON:514   , Diastolic (73EDB), HRX:67, Min:70, Max:82    SpO2  Av.2 %  Min: 94 %  Max: 97 %    Intake/Output Summary (Last 24 hours) at 1/15/2022 1540  Last data filed at 1/15/2022 9702  Gross per 24 hour   Intake    Output 1150 ml   Net -1150 ml       Vent settings:  Pulse  Av  Min: 50  Max: 93  Resp  Av.5  Min: 14  Max: 20  SpO2  Av.8 %  Min: 91 %  Max: 100 %    CONSTITUTIONAL:  fatigued, alert, cooperative, mild distress, appears older than stated age and cachectic  EYES:  Unremarkable   NECK:  No JVD  and supple, symmetrical, trachea midline  BACK:  symmetric and no curvature  LUNGS:  no increased work of breathing, good air exchange, no retractions and no crackles or wheezing  CARDIOVASCULAR:  normal apical pulses, regular rate and rhythm, normal S1 and S2, no S3 and no S4  ABDOMEN:  Soft scaphoid BS +   MUSCULOSKELETAL:  No edema distal pulsation weak   NEUROLOGIC:  Grossly intact without focal findings   SKIN:  Warm dry and Pale  and no bruising or bleeding    Data      Lab Results   Component Value Date    WBC 3.4 (L) 01/15/2022    HGB 10.7 (L) 01/15/2022    HCT 30.8 (L) 01/15/2022    MCV 93.9 01/15/2022     01/15/2022     Lab Results   Component Value Date     01/15/2022    K 3.8 01/15/2022    K 4.5 01/13/2022    CL 91 01/15/2022    CO2 27 01/15/2022    BUN 7 01/15/2022    CREATININE <0.5 01/15/2022    GLUCOSE 86 01/15/2022    CALCIUM 8.4 01/15/2022            ASSESSMENT AND PLAN     Principal Problem:    Hyponatremia  Active Problems:    HTN (hypertension)    BPH (benign prostatic hyperplasia)    Anemia    COVID-19  Resolved Problems:    * No resolved hospital problems.  *  SIADH     Treated with 2 NS    watch for cerbropontine demyelination   gradual escalation of sodium levels   DVT prophylaxis    COVID infection so far clinically insignifican    No Pneumonia or hypoxia   Droplet plus precaution   no need for IV steroids or Remdesivir    Nephro consult comanagement

## 2022-01-16 LAB
ALBUMIN SERPL-MCNC: 3.1 G/DL (ref 3.4–5)
ANION GAP SERPL CALCULATED.3IONS-SCNC: 9 MMOL/L (ref 3–16)
BASOPHILS ABSOLUTE: 0 K/UL (ref 0–0.2)
BASOPHILS RELATIVE PERCENT: 0.8 %
BUN BLDV-MCNC: 7 MG/DL (ref 7–20)
CALCIUM SERPL-MCNC: 8.2 MG/DL (ref 8.3–10.6)
CHLORIDE BLD-SCNC: 89 MMOL/L (ref 99–110)
CO2: 25 MMOL/L (ref 21–32)
CREAT SERPL-MCNC: <0.5 MG/DL (ref 0.8–1.3)
EOSINOPHILS ABSOLUTE: 0.2 K/UL (ref 0–0.6)
EOSINOPHILS RELATIVE PERCENT: 5.5 %
GFR AFRICAN AMERICAN: >60
GFR NON-AFRICAN AMERICAN: >60
GLUCOSE BLD-MCNC: 80 MG/DL (ref 70–99)
HCT VFR BLD CALC: 29.7 % (ref 40.5–52.5)
HEMOGLOBIN: 10.3 G/DL (ref 13.5–17.5)
LYMPHOCYTES ABSOLUTE: 0.7 K/UL (ref 1–5.1)
LYMPHOCYTES RELATIVE PERCENT: 23.1 %
MCH RBC QN AUTO: 32.6 PG (ref 26–34)
MCHC RBC AUTO-ENTMCNC: 34.6 G/DL (ref 31–36)
MCV RBC AUTO: 94 FL (ref 80–100)
MONOCYTES ABSOLUTE: 0.4 K/UL (ref 0–1.3)
MONOCYTES RELATIVE PERCENT: 14.4 %
NEUTROPHILS ABSOLUTE: 1.7 K/UL (ref 1.7–7.7)
NEUTROPHILS RELATIVE PERCENT: 56.2 %
PDW BLD-RTO: 13.8 % (ref 12.4–15.4)
PHOSPHORUS: 2.5 MG/DL (ref 2.5–4.9)
PLATELET # BLD: 211 K/UL (ref 135–450)
PMV BLD AUTO: 6 FL (ref 5–10.5)
POTASSIUM SERPL-SCNC: 3.9 MMOL/L (ref 3.5–5.1)
RBC # BLD: 3.16 M/UL (ref 4.2–5.9)
SODIUM BLD-SCNC: 120 MMOL/L (ref 136–145)
SODIUM BLD-SCNC: 122 MMOL/L (ref 136–145)
SODIUM BLD-SCNC: 123 MMOL/L (ref 136–145)
SODIUM BLD-SCNC: 124 MMOL/L (ref 136–145)
WBC # BLD: 3.1 K/UL (ref 4–11)

## 2022-01-16 PROCEDURE — 6370000000 HC RX 637 (ALT 250 FOR IP): Performed by: INTERNAL MEDICINE

## 2022-01-16 PROCEDURE — 80069 RENAL FUNCTION PANEL: CPT

## 2022-01-16 PROCEDURE — 85025 COMPLETE CBC W/AUTO DIFF WBC: CPT

## 2022-01-16 PROCEDURE — 2060000000 HC ICU INTERMEDIATE R&B

## 2022-01-16 PROCEDURE — 6360000002 HC RX W HCPCS: Performed by: INTERNAL MEDICINE

## 2022-01-16 PROCEDURE — 36415 COLL VENOUS BLD VENIPUNCTURE: CPT

## 2022-01-16 PROCEDURE — 2580000003 HC RX 258: Performed by: INTERNAL MEDICINE

## 2022-01-16 PROCEDURE — 84295 ASSAY OF SERUM SODIUM: CPT

## 2022-01-16 RX ADMIN — TAMSULOSIN HYDROCHLORIDE 0.4 MG: 0.4 CAPSULE ORAL at 09:28

## 2022-01-16 RX ADMIN — ENOXAPARIN SODIUM 40 MG: 40 INJECTION SUBCUTANEOUS at 09:27

## 2022-01-16 RX ADMIN — ACETAMINOPHEN 650 MG: 325 TABLET ORAL at 20:39

## 2022-01-16 RX ADMIN — BACLOFEN 5 MG: 10 TABLET ORAL at 20:40

## 2022-01-16 RX ADMIN — FINASTERIDE 5 MG: 5 TABLET, FILM COATED ORAL at 09:28

## 2022-01-16 RX ADMIN — DORZOLAMIDE HYDROCHLORIDE 2 DROP: 20 SOLUTION/ DROPS OPHTHALMIC at 14:58

## 2022-01-16 RX ADMIN — LATANOPROST 1 DROP: 50 SOLUTION OPHTHALMIC at 20:45

## 2022-01-16 RX ADMIN — SODIUM CHLORIDE TAB 1 GM 1 G: 1 TAB at 17:59

## 2022-01-16 RX ADMIN — TIMOLOL MALEATE 1 DROP: 5 SOLUTION OPHTHALMIC at 09:29

## 2022-01-16 RX ADMIN — SODIUM CHLORIDE, PRESERVATIVE FREE 10 ML: 5 INJECTION INTRAVENOUS at 20:46

## 2022-01-16 RX ADMIN — SODIUM CHLORIDE, PRESERVATIVE FREE 10 ML: 5 INJECTION INTRAVENOUS at 09:28

## 2022-01-16 RX ADMIN — SODIUM CHLORIDE TAB 1 GM 1 G: 1 TAB at 09:28

## 2022-01-16 RX ADMIN — SODIUM CHLORIDE TAB 1 GM 1 G: 1 TAB at 12:58

## 2022-01-16 RX ADMIN — TIMOLOL MALEATE 1 DROP: 5 SOLUTION OPHTHALMIC at 20:45

## 2022-01-16 RX ADMIN — BACLOFEN 5 MG: 10 TABLET ORAL at 09:28

## 2022-01-16 RX ADMIN — DORZOLAMIDE HYDROCHLORIDE 2 DROP: 20 SOLUTION/ DROPS OPHTHALMIC at 20:44

## 2022-01-16 RX ADMIN — BACLOFEN 5 MG: 10 TABLET ORAL at 14:56

## 2022-01-16 RX ADMIN — DORZOLAMIDE HYDROCHLORIDE 2 DROP: 20 SOLUTION/ DROPS OPHTHALMIC at 09:28

## 2022-01-16 ASSESSMENT — PAIN SCALES - GENERAL
PAINLEVEL_OUTOF10: 0
PAINLEVEL_OUTOF10: 0

## 2022-01-16 NOTE — PROGRESS NOTES
MD Sheree Trinidad MD Ninette Mac, MD               Office: [phone]                      Fax: [phone]             [address] [place] [address]                   NEPHROLOGY INPATIENT PROGRESS NOTE:     PATIENT NAME: Alethea Keene  : 1940  MRN: [id number]         IMPRESSION:       Admitted for:  Hypochloremia [E87.8]  Hyponatremia [E87.1]      Treatment plan update. Hyponatremiacontinued improvement and appropriate correctionsodium 124acute on chronic problem. Hyponatremia remains refractory.  Discontinue the IV fluids.  Continue salt tablet.   Volume status improveddiscontinue the IV fluids  Multiple electrolyte imbalanceimproving. Hypokalemiaimproved. Improved with replacement  Low phosphate levels replace potassium phosphate 40 mmol IV. Recheck magnesium levelsMay need replacement. Anemia levels are stable. Improved albumin 3.2 high-protein diet    High complexity. Multiple electrolyte imbalance. Not ready for discharge. Anticipate at least 2-3 hospital days as electrolytes need to be gradually corrected including sodium    High complexity                    Hyponatremia :   : Acute on Chronic, Severe 113, mildly symptomatic w/ AMS  -No Reported Severe symptoms: seizures, obtundation, coma, and respiratory arrest.    - Risk factors for hyponatremia:    - NSAIDs use   - recent admission in 2021 - w/ hyponatremia - lowest Na 124 , on d/c 133 on 2021 - had \"URI\" symptoms, first Rx Levaquin + Steroids -> (+) COVID 2021, not hypoxic.    - Patient is at HIGH risk of developing Osmotic Demyelination Syndrome.    - Call us urgently if any/worsening neurological findings.          PLAN:     Work up:  - Serum Osm , UOsm , Mamie , Urine K, CL-, Uric acid, reviewed = likely SIADH   - renal function = Cr <0.5 = low solutes intake, as w/ BUN low,    - BP - not low,   - K WNL  - hypochloremic  - hypomagnesemia   - S.alb-WNL  -LFT not elevated  -mild Anemic of chronic dz  -CXR - No acute process. Management:  - Monitor Serum Na Q 4 hrs, - requested RN to page us results. - Goal Serum Na ~ mild-high 120, by first 24 hrs    - started HT saline - increase rate  - added salt tabs    - no need for dDAVP  proactively     -hold NSAIDs    -monitor bladder scan - as w/ h/o BPH    - keep Flomax, Proscar    - order neuro check, seizure precautions  - Strict I/O with daily weights. - not done yet - -request RN   --- Call us urgently if any/worsening neurological findings. D/C plan from renal stand point: Expect ~ 4-5 days  - once Na >130s, stable w/o neuro. s/s       D/W pt, nurse        Other major problems: Management per primary and other consulting teams. //    Tank (+)  Not hypoxia     Hospital Problems           Last Modified POA    * (Principal) Hyponatremia 1/12/2022 Yes    HTN (hypertension) 1/12/2022 Yes    BPH (benign prostatic hyperplasia) 1/12/2022 Yes    Anemia 1/12/2022 Yes    COVID-19 1/13/2022 Yes    SIADH (syndrome of inappropriate ADH production) [facility] 1/15/2022 Yes        : other supportive care :   - Check daily renal function panel with electrolytes-phosphorus  - Strict monitoring of I/Os, daily weight  - non-enal feeds/diet  - Current medications reviewed. Please refer to the orders. High Complexity. Multiple complex problems. Discussed with patient and treatment team-    Time spent > 30 (~35) minutes. Thank you for allowing me to participate in this patient's care. Please do not hesitate to contact me anytime. We will follow along with you.        Huseyin Cervantes MD,  [facility] [address] [place]: [phone] or Via [facility]  Fax: [phone]        =======================================================================================   =======================================================================================  This patient is COVID-19, so in a strict isolation, as per current protocol; So in order to preserve PPE supply and to avoid unnecessary exposure to prevent transmission of COVID-19; this patient was NOT examined face to face, as risk of contact outweighs the benefits and likely would not change much of my clinical management. HPI, review of system and physical exam was limited, as it was mainly obtained from chart review and the primary team. But all relevant records and diagnostic tests were reviewed, including laboratory and imaging results. Patient's care is being coordinated with the primary service. Seen via doors-windows-glass  SUBJECTIVE:   Per nursing - more oriented, but fluctuating confusion  HTN controlled  On RA  On HT saline  Needs strict I/Os  COVID (+)    Past medical, Surgical, Social, Family medical history reviewed by me. MEDICATIONS: reviewed by me. Medications Prior to Admission:  No current facility-administered medications on file prior to encounter. Current Outpatient Medications on File Prior to Encounter   Medication Sig Dispense Refill    cyanocobalamin 1000 MCG tablet Take 1,000 mcg by mouth daily      magnesium hydroxide (MILK OF MAGNESIA) 400 MG/5ML suspension Take 30 mLs by mouth daily as needed for Constipation      baclofen (LIORESAL) 10 MG tablet Take 1 tablet by mouth 3 times daily (Patient taking differently: Take 10 mg by mouth every 6 hours as needed Indications: muscle pain ) 30 tablet 1    Timolol (TIMOPTIC) 0.5 % (DAILY) SOLN ophthalmic solution 1 drop daily      dorzolamide (TRUSOPT) 2 % ophthalmic solution Place 2 drops into both eyes 3 times daily      latanoprost (XALATAN) 0.005 % ophthalmic solution Place 1 drop into both eyes nightly      finasteride (PROSCAR) 5 MG tablet Take 5 mg by mouth daily      tamsulosin (FLOMAX) 0.4 MG capsule Take 0.4 mg by mouth daily.              Current Facility-Administered Medications:     baclofen (LIORESAL) tablet 5 mg, 5 mg, Oral, TID, Shwetal G Kaitlynn Hanna MD, 5 mg at 01/16/22 4724    sodium chloride tablet 1 g, 1 g, Oral, TID WC, Mary Grimm MD, 1 g at 01/16/22 0928    0.9 % sodium chloride infusion, , IntraVENous, Continuous, Adam Montoya MD, Last Rate: 50 mL/hr at 01/14/22 2358, New Bag at 01/14/22 2358    tamsulosin (FLOMAX) capsule 0.4 mg, 0.4 mg, Oral, Daily, Marita Benitez MD, 0.4 mg at 01/16/22 9323    Timolol (TIMOPTIC) 0.5 % ophthalmic solution 1 drop, 1 drop, Both Eyes, BID, Marita Benitez MD, 1 drop at 01/16/22 0929    dorzolamide (TRUSOPT) 2 % ophthalmic solution 2 drop, 2 drop, Both Eyes, TID, Marita Benitez MD, 2 drop at 01/16/22 0928    latanoprost (XALATAN) 0.005 % ophthalmic solution 1 drop, 1 drop, Both Eyes, Nightly, Marita Benitez MD, 1 drop at 01/15/22 2040    finasteride (PROSCAR) tablet 5 mg, 5 mg, Oral, Daily, Marita Benitez MD, 5 mg at 01/16/22 9816    ibuprofen (ADVIL;MOTRIN) tablet 200 mg, 200 mg, Oral, Q6H PRN, Marita Benitez MD, 200 mg at 01/13/22 1013    sodium chloride flush 0.9 % injection 5-40 mL, 5-40 mL, IntraVENous, 2 times per day, Marita Benitez MD, 10 mL at 01/16/22 0928    sodium chloride flush 0.9 % injection 5-40 mL, 5-40 mL, IntraVENous, PRN, Marita Benitez MD    0.9 % sodium chloride infusion, 25 mL, IntraVENous, PRN, Marita Benitez MD    potassium chloride (KLOR-CON M) extended release tablet 40 mEq, 40 mEq, Oral, PRN **OR** potassium bicarb-citric acid (EFFER-K) effervescent tablet 40 mEq, 40 mEq, Oral, PRN **OR** potassium chloride 10 mEq/100 mL IVPB (Peripheral Line), 10 mEq, IntraVENous, PRN, Marita Benitez MD    enoxaparin (LOVENOX) injection 40 mg, 40 mg, SubCUTAneous, Daily, Marita Benitez MD, 40 mg at 01/16/22 0927    ondansetron (ZOFRAN-ODT) disintegrating tablet 4 mg, 4 mg, Oral, Q8H PRN **OR** ondansetron (ZOFRAN) injection 4 mg, 4 mg, IntraVENous, Q6H PRN, Marita Benitez MD    magnesium hydroxide (MILK OF MAGNESIA) 400 MG/5ML suspension 30 mL, 30 mL, Oral, Daily PRN, Maria Elena Apple MD    acetaminophen (TYLENOL) tablet 650 mg, 650 mg, Oral, Q6H PRN, 650 mg at 01/15/22 2040 **OR** acetaminophen (TYLENOL) suppository 650 mg, 650 mg, Rectal, Q6H PRN, Maria Elena Apple MD    hydrALAZINE (APRESOLINE) injection 10 mg, 10 mg, IntraVENous, Q6H PRN, Maria Elena Apple MD    0.9 % sodium chloride bolus, 500 mL, IntraVENous, PRN, Maria Elena Apple MD      =======================================================================================     PHYSICAL EXAM:  Recent vital signs and recent I/Os reviewed by me. Wt Readings from Last 3 Encounters:   01/14/22 105 lb (47.6 kg)   12/24/21 108 lb 11.2 oz (49.3 kg)   03/09/21 134 lb 3.2 oz (60.9 kg)     BP Readings from Last 3 Encounters:   01/16/22 125/70   12/27/21 112/60   03/12/21 138/84     Patient Vitals for the past 24 hrs:   BP Temp Temp src Pulse Resp SpO2   01/16/22 0714 125/70 98.6 °F (37 °C) Axillary 61 18 96 %   01/16/22 0330 123/70 98.5 °F (36.9 °C) Axillary 52 16 93 %   01/16/22 0013 125/67 97.7 °F (36.5 °C) Axillary (!) 49 18 96 %   01/15/22 1924 106/63 98.5 °F (36.9 °C) Axillary 61 18 95 %   01/15/22 1630 122/66 98.6 °F (37 °C) Axillary 62 18 95 %   01/15/22 1242 134/70 97.6 °F (36.4 °C) Axillary 64 18 96 %       Intake/Output Summary (Last 24 hours) at 1/16/2022 1205  Last data filed at 1/16/2022 0556  Gross per 24 hour   Intake    Output 750 ml   Net -750 ml          Physical exam:    In-person bedside physical examination deferred. Pursuant to the emergency declaration under the [address] [place] [address] and the [employer] Act, this clinical encounter was conducted to provide necessary medical care.    (Also consistent with new provisions and guidance offered by [facility] on March 18, 2020 in setting of COVID 19 outbreak and in order to preserve personal protective equipment in accordance with the flexibilities announced by CMS on March 30, 2020)   References: https://[facility]. [facility]/Portals/0/Resources/COVID-19/3_18%20Telemed%20Guidance%20Updated%20March%2018. pdf?[email or web address]                      https://[facility]/Portals/0/Resources/COVID-19/3_18%20Telemed%20Guidance%20Updated%20March%2018. pdf?gtf=[email or web address]                      [email or web address] pdf    However, I did visually inspect the patient and observed the following: via glass doors-windows    Vitals signs reviewed. Constitutional:       General:  no acute distress. Appearance:  ill-appearing. HENT:      Head: Normocephalic and atraumatic. Nose: Nose normal.      Mouth/Throat:      Mouth: Mucous membranes are moist.   Eyes:      General: No scleral icterus. Conjunctiva/sclera: Conjunctivae dry. Neck:      Musculoskeletal: Neck supple. Cardiovascular:      Rate and Rhythm: Normal rate. Pulmonary:      Effort: Pulmonary effort is  normal.   Abdominal:      General: There is no distension. Musculoskeletal:     lower leg:   edema. Skin:     Coloration: Skin is not jaundiced. Neurological:      General Awake, but disoriented           =======================================================================================     DATA:  Diagnostic tests reviewed by me for today's visit:   (AS NEEDED FOR MY EVALUATION AND MANAGEMENT).        Recent Labs     01/14/22  0320 01/15/22  0503 01/16/22  0541   WBC 3.1* 3.4* 3.1*   HCT 30.6* 30.8* 29.7*    211 211     Iron Saturation:  No components found for: PERCENTFE  FERRITIN:  No results found for: FERRITIN  IRON:  No results found for: IRON  TIBC:  No results found for: TIBC    Recent Labs     01/14/22  0319 01/14/22  0717 01/15/22  0503 01/15/22  1130 01/15/22  1906 01/15/22  2322 01/16/22  0214 01/16/22  0541 01/16/22  0938   *   < > 124*   < > 123* 122* 120* 123* 124*   K 3.9  --  3.8  -- --   --   --  3.9  --    CL 88*  --  91*  --   --   --   --  89*  --    CO2 24  --  27  --   --   --   --  25  --    BUN 6*  --  7  --   --   --   --  7  --    CREATININE <0.5*  --  <0.5*  --   --   --   --  <0.5*  --     < > = values in this interval not displayed. Recent Labs     01/14/22  0319 01/15/22  0503 01/16/22  0541   CALCIUM 7.9* 8.4 8.2*   PHOS 2.1* 2.2* 2.5     No results for input(s): PH, PCO2, PO2 in the last 72 hours.     Invalid input(s): Ada Carter    ABG:  No results found for: PH, PCO2, PO2, HCO3, BE, THGB, TCO2, O2SAT  VBG:  No results found for: PHVEN, OGE7GNQ, BEVEN, Q5QUALNO    LDH:  No results found for: LDH  Uric Acid:    Lab Results   Component Value Date    LABURIC 1.5 01/12/2022       PT/INR:  No results found for: PROTIME, INR  Warfarin PT/INR:  No components found for: PTPATWAR, PTINRWAR  PTT:  No results found for: APTT, PTT[APTT}  Last 3 Troponin:    Lab Results   Component Value Date    TROPONINI <0.01 01/12/2022    TROPONINI <0.01 12/25/2021    TROPONINI <0.01 12/24/2021       U/A:    Lab Results   Component Value Date    COLORU YELLOW 12/24/2021    PROTEINU Negative 12/24/2021    PHUR 5.5 12/24/2021    CLARITYU Clear 12/24/2021    SPECGRAV 1.007 12/24/2021    LEUKOCYTESUR Negative 12/24/2021    UROBILINOGEN 1.0 12/24/2021    BILIRUBINUR Negative 12/24/2021    BLOODU Negative 12/24/2021    GLUCOSEU Negative 12/24/2021     Microalbumen/Creatinine ratio:  No components found for: RUCREAT  24 Hour Urine for Protein:  No components found for: RAWUPRO, UHRS3, HIQI39RM, UTV3  24 Hour Urine for Creatinine Clearance:  No components found for: CREAT4, UHRS10, UTV10  Urine Toxicology:  No components found for: Bryan Seats, IBENZO, ICOCAINE, IMARTHC, IOPIATES, IPHENCYC    HgBA1c:    Lab Results   Component Value Date    LABA1C 5.6 09/03/2019     RPR:  No results found for: RPR  HIV:  No results found for: HIV  HEMAL:  No results found for: ANATITER, HEMAL  RF:  No results found for: RF  DSDNA:  No components found for: DNA  AMYLASE:  No results found for: AMYLASE  LIPASE:    Lab Results   Component Value Date    LIPASE 23.0 04/19/2019     Fibrinogen Level:  No components found for: FIB       BELOW MENTIONED RADIOLOGY STUDY RESULTS BY ME (AS NEEDED FOR MY EVALUATION AND MANAGEMENT). XR CHEST (2 VW)    Result Date: 12/24/2021  EXAMINATION: TWO XRAY VIEWS OF THE CHEST 12/24/2021 12:18 pm COMPARISON: 03/09/2021 HISTORY: ORDERING SYSTEM PROVIDED HISTORY: cough TECHNOLOGIST PROVIDED HISTORY: Reason for exam:->cough Reason for Exam: cough, lethargic; recent contact with +Covid relatives FINDINGS: The lungs are without acute focal process. There is no effusion or pneumothorax. The cardiomediastinal silhouette is stable. The osseous structures are stable. Thoracic scoliosis convex right again noted. No acute process. XR CHEST PORTABLE    Result Date: 1/12/2022  EXAMINATION: ONE XRAY VIEW OF THE CHEST 1/12/2022 4:32 pm COMPARISON: 12/24/2021 HISTORY: ORDERING SYSTEM PROVIDED HISTORY: SOB TECHNOLOGIST PROVIDED HISTORY: Reason for exam:->SOB Reason for Exam: shortness of breath FINDINGS: The lungs are without acute focal process. There is no effusion or pneumothorax. The cardiomediastinal silhouette is stable. The osseous structures are stable. Thoracic scoliosis convex right. No acute process.

## 2022-01-16 NOTE — PLAN OF CARE
Problem: Falls - Risk of:  Goal: Absence of physical injury  Description: Absence of physical injury  Outcome: Ongoing     Problem: Pain:  Goal: Pain level will decrease  Description: Pain level will decrease  Outcome: Ongoing

## 2022-01-17 LAB
ALBUMIN SERPL-MCNC: 3.4 G/DL (ref 3.4–5)
ANION GAP SERPL CALCULATED.3IONS-SCNC: 6 MMOL/L (ref 3–16)
BASOPHILS ABSOLUTE: 0 K/UL (ref 0–0.2)
BASOPHILS RELATIVE PERCENT: 1 %
BUN BLDV-MCNC: 6 MG/DL (ref 7–20)
CALCIUM SERPL-MCNC: 8.7 MG/DL (ref 8.3–10.6)
CHLORIDE BLD-SCNC: 89 MMOL/L (ref 99–110)
CO2: 28 MMOL/L (ref 21–32)
CREAT SERPL-MCNC: <0.5 MG/DL (ref 0.8–1.3)
EOSINOPHILS ABSOLUTE: 0.3 K/UL (ref 0–0.6)
EOSINOPHILS RELATIVE PERCENT: 7.9 %
GFR AFRICAN AMERICAN: >60
GFR NON-AFRICAN AMERICAN: >60
GLUCOSE BLD-MCNC: 85 MG/DL (ref 70–99)
HCT VFR BLD CALC: 32.9 % (ref 40.5–52.5)
HEMOGLOBIN: 11.2 G/DL (ref 13.5–17.5)
LYMPHOCYTES ABSOLUTE: 1 K/UL (ref 1–5.1)
LYMPHOCYTES RELATIVE PERCENT: 30.8 %
MCH RBC QN AUTO: 32.4 PG (ref 26–34)
MCHC RBC AUTO-ENTMCNC: 34.1 G/DL (ref 31–36)
MCV RBC AUTO: 95 FL (ref 80–100)
MONOCYTES ABSOLUTE: 0.4 K/UL (ref 0–1.3)
MONOCYTES RELATIVE PERCENT: 11.5 %
NEUTROPHILS ABSOLUTE: 1.6 K/UL (ref 1.7–7.7)
NEUTROPHILS RELATIVE PERCENT: 48.8 %
PDW BLD-RTO: 14 % (ref 12.4–15.4)
PHOSPHORUS: 2.6 MG/DL (ref 2.5–4.9)
PLATELET # BLD: 227 K/UL (ref 135–450)
PMV BLD AUTO: 5.7 FL (ref 5–10.5)
POTASSIUM SERPL-SCNC: 4.1 MMOL/L (ref 3.5–5.1)
RBC # BLD: 3.47 M/UL (ref 4.2–5.9)
SODIUM BLD-SCNC: 123 MMOL/L (ref 136–145)
WBC # BLD: 3.3 K/UL (ref 4–11)

## 2022-01-17 PROCEDURE — 97530 THERAPEUTIC ACTIVITIES: CPT

## 2022-01-17 PROCEDURE — 85025 COMPLETE CBC W/AUTO DIFF WBC: CPT

## 2022-01-17 PROCEDURE — 92526 ORAL FUNCTION THERAPY: CPT

## 2022-01-17 PROCEDURE — 6360000002 HC RX W HCPCS: Performed by: INTERNAL MEDICINE

## 2022-01-17 PROCEDURE — 2060000000 HC ICU INTERMEDIATE R&B

## 2022-01-17 PROCEDURE — 2580000003 HC RX 258: Performed by: INTERNAL MEDICINE

## 2022-01-17 PROCEDURE — 80069 RENAL FUNCTION PANEL: CPT

## 2022-01-17 PROCEDURE — 97535 SELF CARE MNGMENT TRAINING: CPT

## 2022-01-17 PROCEDURE — 36415 COLL VENOUS BLD VENIPUNCTURE: CPT

## 2022-01-17 PROCEDURE — 6370000000 HC RX 637 (ALT 250 FOR IP): Performed by: INTERNAL MEDICINE

## 2022-01-17 PROCEDURE — 97116 GAIT TRAINING THERAPY: CPT

## 2022-01-17 RX ORDER — BACLOFEN 10 MG/1
5 TABLET ORAL NIGHTLY PRN
Status: DISCONTINUED | OUTPATIENT
Start: 2022-01-17 | End: 2022-01-21 | Stop reason: HOSPADM

## 2022-01-17 RX ORDER — SODIUM CHLORIDE 1000 MG
2 TABLET, SOLUBLE MISCELLANEOUS
Status: DISCONTINUED | OUTPATIENT
Start: 2022-01-17 | End: 2022-01-20

## 2022-01-17 RX ADMIN — DORZOLAMIDE HYDROCHLORIDE 2 DROP: 20 SOLUTION/ DROPS OPHTHALMIC at 15:18

## 2022-01-17 RX ADMIN — LATANOPROST 1 DROP: 50 SOLUTION OPHTHALMIC at 21:11

## 2022-01-17 RX ADMIN — DORZOLAMIDE HYDROCHLORIDE 2 DROP: 20 SOLUTION/ DROPS OPHTHALMIC at 21:11

## 2022-01-17 RX ADMIN — DORZOLAMIDE HYDROCHLORIDE 2 DROP: 20 SOLUTION/ DROPS OPHTHALMIC at 09:19

## 2022-01-17 RX ADMIN — Medication 2 G: at 17:57

## 2022-01-17 RX ADMIN — SODIUM CHLORIDE, PRESERVATIVE FREE 10 ML: 5 INJECTION INTRAVENOUS at 21:11

## 2022-01-17 RX ADMIN — SODIUM CHLORIDE TAB 1 GM 1 G: 1 TAB at 09:18

## 2022-01-17 RX ADMIN — TIMOLOL MALEATE 1 DROP: 5 SOLUTION OPHTHALMIC at 21:11

## 2022-01-17 RX ADMIN — FINASTERIDE 5 MG: 5 TABLET, FILM COATED ORAL at 09:19

## 2022-01-17 RX ADMIN — SODIUM CHLORIDE TAB 1 GM 1 G: 1 TAB at 15:18

## 2022-01-17 RX ADMIN — ENOXAPARIN SODIUM 40 MG: 40 INJECTION SUBCUTANEOUS at 09:19

## 2022-01-17 RX ADMIN — TAMSULOSIN HYDROCHLORIDE 0.4 MG: 0.4 CAPSULE ORAL at 09:19

## 2022-01-17 RX ADMIN — SODIUM CHLORIDE, PRESERVATIVE FREE 10 ML: 5 INJECTION INTRAVENOUS at 09:19

## 2022-01-17 RX ADMIN — TIMOLOL MALEATE 1 DROP: 5 SOLUTION OPHTHALMIC at 09:19

## 2022-01-17 ASSESSMENT — PAIN DESCRIPTION - LOCATION
LOCATION: BUTTOCKS
LOCATION: BACK;BUTTOCKS

## 2022-01-17 ASSESSMENT — PAIN DESCRIPTION - PAIN TYPE
TYPE: ACUTE PAIN
TYPE: ACUTE PAIN

## 2022-01-17 ASSESSMENT — PAIN DESCRIPTION - DESCRIPTORS
DESCRIPTORS: ACHING
DESCRIPTORS: ACHING

## 2022-01-17 ASSESSMENT — PAIN SCALES - GENERAL
PAINLEVEL_OUTOF10: 3
PAINLEVEL_OUTOF10: 4

## 2022-01-17 NOTE — PROGRESS NOTES
Awake, restless, c/o soreness to buttocks, encourage repositioning off of buttocks but declined, Mepilex border in place. Assisted with moving up in bed. Gave Tylenol per prn order. VSS.   Assessment completed, see flow charts.[place]

## 2022-01-17 NOTE — DISCHARGE INSTR - COC
Continuity of Care Form    Patient Name: Evelyn Stokes   :  1940  MRN:  [id number]    Admit date:  2022  Discharge date:  22    Code Status Order: Full Code   Advance Directives:      Admitting Physician:  Hua Infante MD  PCP: Hua Infante MD    Discharging Nurse: [facility] Unit/Room#: 0XU-2875/9157-29  Discharging Unit Phone Number: [phone]    Emergency Contact:   Extended Emergency Contact Information  Primary Emergency Contact: Brenden Flores  Home Phone: [phone]  Relation: Child  [address] Emergency Contact[address] Phone: [phone]  Work Phone: [phone]  Relation: Child    Past Surgical History:  No past surgical history on file. Immunization History: There is no immunization history on file for this patient.     Active Problems:  Patient Active Problem List   Diagnosis Code    HTN (hypertension) I10    BPH (benign prostatic hyperplasia) N40.0    Anemia D64.9    Hyponatremia E87.1    COVID-19 U07.1    SIADH (syndrome of inappropriate ADH production) ([address] E22.2    Malnutrition ([facility] E46       Isolation/Infection:   Isolation            Droplet Plus          Patient Infection Status       Infection Onset Added Last Indicated Last Indicated By Review Planned Expiration Resolved Resolved By    COVID-19 22 COVID-19 22      Resolved    COVID-19 (Rule Out) 22 COVID-19 (Ordered)   22 Rule-Out Test Resulted    COVID-19 21 COVID-19   22     COVID-19 (Rule Out) 21 COVID-19 (Ordered)   21 Rule-Out Test Resulted            Nurse Assessment:  Last Vital Signs: /63   Pulse 64   Temp 97.7 °F (36.5 °C) (Axillary)   Resp 16   Ht 5' 10\" (1.778 m)   Wt 105 lb (47.6 kg)   SpO2 97%   BMI 15.07 kg/m²     Last documented pain score (0-10 scale): Pain Level: 0  Last Weight:   Wt Readings from Last 1 Encounters: 01/14/22 105 lb (47.6 kg)     Mental Status:  disoriented and alert    IV Access:  - None    Nursing Mobility/ADLs:  Walking   Assisted  Transfer  Assisted  Bathing  Assisted  Dressing  Assisted  Toileting  Assisted  Feeding  Assisted  Med Admin  Assisted  Med Delivery   crushed    Wound Care Documentation and Therapy:  Wound 12/25/21 Coccyx Mid pink, non draining open area (Active)   Wound Etiology Pressure Stage  1 01/20/22 2005   Dressing Status Clean;Dry; Intact 01/19/22 1121   Wound Cleansed Cleansed with saline 01/13/22 1737   Dressing/Treatment Zinc paste 01/20/22 2005   Wound Length (cm) 1 cm 01/12/22 2115   Wound Width (cm) 1 cm 01/12/22 2115   Wound Surface Area (cm^2) 1 cm^2 01/12/22 2115   Wound Assessment Pink/red;Non-blanchable erythema; Subcutaneous 01/13/22 1737   Drainage Amount Scant 01/13/22 1737   Drainage Description Serosanguinous 01/13/22 1737   Odor Mild 01/13/22 1737   Lilia-wound Assessment Non-blanchable erythema;Blanchable erythema;Fragile; Maceration; Excoriated 01/13/22 1737   Number of days: 27        Elimination:  Continence: Bowel: Yes  Bladder: Yes  Urinary Catheter: None   Colostomy/Ileostomy/Ileal Conduit: No       Date of Last BM: 1/20/2022    Intake/Output Summary (Last 24 hours) at 1/21/2022 1203  Last data filed at 1/21/2022 0847  Gross per 24 hour   Intake 250 ml   Output 3000 ml   Net -2750 ml     I/O last 3 completed shifts:  In: -   Out: 4000 [Urine:4000]    Safety Concerns: At Risk for Falls    Impairments/Disabilities:      Vision and Hearing    Nutrition Therapy:  Current Nutrition Therapy:   - Oral Diet:  Dysphagia 2 mechanically altered, 5 carbs    Routes of Feeding: Oral  Liquids: No Restrictions  Daily Fluid Restriction: 1200 mL in a 24 hour period  Last Modified Barium Swallow with Video (Video Swallowing Test): not done    Treatments at the Time of Hospital Discharge:   Respiratory Treatments: None  Oxygen Therapy:  is not on home oxygen therapy.   Ventilator: - No ventilator support    Rehab Therapies: Physical Therapy, Occupational Therapy, and Speech/Language Therapy  Weight Bearing Status/Restrictions: No weight bearing restirctions  Other Medical Equipment (for information only, NOT a DME order):  bedside commode  Other Treatments: None    Patient's personal belongings (please select all that are sent with patient):  Glasses, Hearing Aides bilateral    RN SIGNATURE:  Electronically signed by Cheryl Mi RN on 1/21/22 at 11:32 AM EST    CASE MANAGEMENT/SOCIAL WORK SECTION    Inpatient Status Date: 1-12-22    Readmission Risk Assessment Score:  Readmission Risk              Risk of Unplanned Readmission:  15           Discharging to Facility/ Agency   Name:  Junious Gaucher [phone], Vilma Murphy [phone]  at [facility],   Address:  Call report [phone], fax [phone]    / signature: Electronically signed by BERENICE Galvan on 1/17/22 at 2:00 PM EST    PHYSICIAN SECTION    Prognosis: Fair    Condition at Discharge: Stable    Rehab Potential (if transferring to Rehab): Good    Recommended Labs or Other Treatments After Discharge: bmp weekly, send to dr Eulalia Larry    Physician Certification: I certify the above information and transfer of Thorpe Pencil  is necessary for the continuing treatment of the diagnosis listed and that he requires [facility] for greater 30 days.      Update Admission H&P: No change in H&P    PHYSICIAN SIGNATURE:  Electronically signed by Luis Fernando Siddiqui MD on 1/21/22 at 11:58 AM EST

## 2022-01-17 NOTE — PROGRESS NOTES
Occupational Therapy  Facility/Department: [address]  Daily Treatment Note  NAME: Sugey Velázquez  : 1940  MRN: [id number]    Date of Service: 2022    Discharge Recommendations:Kana Flores scored a 10/24 on the AM-PAC ADL Inpatient form. Current research shows that an AM-PAC score of 17 or less is typically not associated with a discharge to the patient's home setting. Based on the patient's AM-PAC score and their current ADL deficits, it is recommended that the patient have 3-5 sessions per week of Occupational Therapy at d/c to increase the patient's independence. Please see assessment section for further patient specific details. If patient discharges prior to next session this note will serve as a discharge summary. Please see below for the latest assessment towards goals. Assessment   Performance deficits / Impairments: Decreased functional mobility ; Decreased balance;Decreased ADL status; Decreased cognition;Decreased endurance;Decreased high-level IADLs;Decreased vision/visual deficit  Assessment: Patient presents below baseline d/t above deficits; OT indicated in order to maximize safety/independence with ADL and IADL  Treatment Diagnosis: Above deficits associated with hypochloremia  Prognosis: Good  Exam: as above  OT Education: OT Role;Plan of Care  Patient Education: eval, discharge--pt requires reinforcement for carryover  REQUIRES OT FOLLOW UP: Yes  Activity Tolerance  Activity Tolerance: Patient limited by fatigue;Treatment limited secondary to decreased cognition  Safety Devices  Safety Devices in place: Yes  Type of devices: All fall risk precautions in place;Nurse notified;Call light within reach; Chair alarm in place; Left in chair         Patient Diagnosis(es): The primary encounter diagnosis was Hyponatremia. A diagnosis of Hypochloremia was also pertinent to this visit. has a past medical history of Glaucoma and Prostate enlargement.    has no past Exceptions  Arousal/Alertness: Inconsistent responses to stimuli  Following Commands: Follows one step commands with increased time; Follows one step commands with repetition  Attention Span: Attends with cues to redirect; Difficulty attending to directions  Memory: Decreased recall of recent events  Insights: Decreased awareness of deficits  Initiation: Requires cues for some  Sequencing: Requires cues for some     Perception  Overall Perceptual Status: Impaired                                   Plan   Plan  Times per week: 3-5  Times per day: Daily    AM-PAC Score        AM-PAC Inpatient Daily Activity Raw Score: 10 (01/17/22 1540)  AM-PAC Inpatient ADL T-Scale Score : 27.31 (01/17/22 1540)  ADL Inpatient CMS 0-100% Score: 74.7 (01/17/22 1540)  ADL Inpatient CMS G-Code Modifier : CL (01/17/22 1540)    Goals  Short term goals  Time Frame for Short term goals: discharge  Short term goal 1: UB ADL min A-Bere 1/17  Short term goal 2: LB ADL mod A-maxA 1/17  Short term goal 3: Fxl transfers min A-CGA to Bere 1/17  Short term goal 4: Fxl mob mod A- CGA with RW 1/17  Short term goal 5:  Toileting mod A-modA 1/17  Long term goals  Time Frame for Long term goals : LTG=STG       Therapy Time   Individual Concurrent Group Co-treatment   Time In       1139   Time Out       1228   Minutes       49        Timed Code Treatment Minutes:  49 Minutes    Total Treatment Minutes:  [address] [facility], OTR/L [id number]

## 2022-01-17 NOTE — CARE COORDINATION
Spoke with son and called [facility], [phone], f. [phone]  at [facility], regarding bed availability tomorrow. No Hens required. Please complete & sign the MAYRA/AVS/Prescriptions,  RN please print MAYRA/AVS once completed.  Thank you, Gee Rodriguez, MSKATYA, [phone]

## 2022-01-17 NOTE — PROGRESS NOTES
Physical Therapy  Facility/Department: [address]  Daily Treatment Note  NAME: Mikki Davis  : 1940  MRN: [id number]    Date of Service: 2022    Discharge Recommendations: Mikki Davis scored a 16/24 on the AM-PAC short mobility form. Current research shows that an AM-PAC score of 17 or less is typically not associated with a discharge to the patient's home setting. Based on the patient's AM-PAC score and their current functional mobility deficits, it is recommended that the patient have 3-5 sessions per week of Physical Therapy at d/c to increase the patient's independence. Please see assessment section for further patient specific details. If patient discharges prior to next session this note will serve as a discharge summary. Please see below for the latest assessment towards goals. 3-5 sessions per week   PT Equipment Recommendations  Equipment Needed: No    Assessment   Body structures, Functions, Activity limitations: Decreased functional mobility ; Decreased strength;Decreased endurance;Decreased safe awareness;Decreased balance;Decreased ROM  Assessment: Pt contintues to present below baseline functioning. Pt is able to complete bed mobility with SBA, transfers with CGA and ambulate with RW and min A. Pt would continue to benefit from skilled PT in order to safely return to [facility]. Treatment Diagnosis: decreased functional mobility, impaired gait, decreased balance  Prognosis: Fair  Decision Making: Medium Complexity  Clinical Presentation: evolving  PT Education: Goals;PT Role;Plan of Care  Patient Education: Pt encouraged to lay on side in bed in order to avoid pressure on buttocks. Pt verbalized understanding  Barriers to Learning: hearing, cognitive  REQUIRES PT FOLLOW UP: Yes  Activity Tolerance  Activity Tolerance: Patient Tolerated treatment well;Patient limited by pain  Activity Tolerance: Pt very [employer] and legally blind.  Pt reports significant pain in buttocks with bed mobility     Patient Diagnosis(es): The primary encounter diagnosis was Hyponatremia. A diagnosis of Hypochloremia was also pertinent to this visit. has a past medical history of Glaucoma and Prostate enlargement. has no past surgical history on file. Restrictions  Restrictions/Precautions  Restrictions/Precautions: Fall Risk,Isolation,Modified Diet (dys mince and moist)  Required Braces or Orthoses?: No  Position Activity Restriction  Other position/activity restrictions: Sarthak Newman is a 80 y.o. male who presents for evaluation of abnormal labs. Apparently had a sodium of 117 at home. Does have a history of hyponatremia. Patient is very hard of hearing a difficult historian. No additional information is able to obtain at this time. Subjective   General  Chart Reviewed: Yes  Family / Caregiver Present: No  Subjective  Subjective: Reported pain in buttocks - states he has a wound. Unable to rate. General Comment  Comments: Supine in bed upon arrival. Pt agreeable to PT/OT treatment          Orientation  Orientation  Overall Orientation Status: Impaired  Orientation Level: Oriented to place;Oriented to situation;Oriented to person (reports year 2021)  Cognition   Cognition  Overall Cognitive Status: Exceptions  Arousal/Alertness: Inconsistent responses to stimuli  Following Commands: Follows one step commands with increased time; Follows one step commands with repetition  Attention Span: Attends with cues to redirect; Difficulty attending to directions  Insights: Decreased awareness of deficits  Initiation: Requires cues for some  Sequencing: Requires cues for some  Objective   Bed mobility  Supine to Sit: Stand by assistance (HOB elevated,grab bars)  Sit to Supine: Stand by assistance (HOB flat)  Scooting: Stand by assistance (EOB)  Transfers  Sit to Stand: Contact guard assistance  Stand to sit: Contact guard assistance  Comment: Slow and effortful.  From EOB and from toilet  Ambulation  Ambulation?: Yes  Ambulation 1  Surface: level tile  Device: Rolling Walker  Assistance: Minimal assistance  Quality of Gait: narrow NAM, shuffling like steps/ minmal foot clearance and step length  Gait Deviations: Slow Erin;Decreased step length;Decreased step height;Shuffles  Distance: 40 ft +10 ft  Comments: Pt ambulates 40 ft from EOB around room and to toilet with RW and min A to help navigate RW safely. After standing at sink ~8 mins completing ADLs with OT, pt ambulates 10 ft to EOB with RW and min A  Stairs/Curb  Stairs?: No     Balance  Posture: Fair (scoliosis)  Sitting - Static: Good  Sitting - Dynamic: Good  Standing - Static: Fair  Standing - Dynamic: Fair  Comments: Pt stands at sink ~8 mins with CGA completing ADLs with OT. Pt sits at toilet ~5 mins attempting BM with SBA. Pt able to don/doff brief in standing with CGA. G-Code     OutComes Score                                                     AM-PAC Score  AM-PAC Inpatient Mobility Raw Score : 16 (01/17/22 1254)  AM-PAC Inpatient T-Scale Score : 40.78 (01/17/22 1254)  Mobility Inpatient CMS 0-100% Score: 54.16 (01/17/22 1254)  Mobility Inpatient CMS G-Code Modifier : CK (01/17/22 1254)          Goals  Short term goals  Time Frame for Short term goals:  To be met prior to discharge  Short term goal 1: Bed mobility with supervision  Short term goal 2: Sit to/from stand with CGA  Short term goal 3: Bed to/from chair with mod A (MET 1/17)  Short term goal 4: Ambulate 13' with AAD and min A (MET 1/17)  Short term goal 5: Pt will be able to ambulate 50 ft with LRAD and CGA  Patient Goals   Patient goals : unable to state  2 GOALS MET THIS DATE    Plan    Plan  Times per week: 3-5  Times per day: Daily  Current Treatment Recommendations: Strengthening,Functional Mobility Training,Transfer Training,Balance Training,Endurance Training,Gait Training,Patient/Caregiver Education & Training,Home Exercise Program,Safety Education & Training  Safety Devices  Type of devices:  All fall risk precautions in place,Call light within reach,Bed alarm in place,Patient at risk for falls,Left in bed,Nurse notified (seizure pads in place)  Restraints  Initially in place: No     Therapy Time   Individual Concurrent Group Co-treatment   Time In       [address]   Time Out       [facility] 95   Minutes       [facility], [address], [place], [facility] 18

## 2022-01-17 NOTE — PROGRESS NOTES
Department of Internal Medicine  General Internal Medicine   Progress Note      SUBJECTIVE:  General weakness , very hard of hearing and incoherent     History obtained from chart review and the patient  General ROS: positive for  - fatigue, malaise, sleep disturbance and weight loss  negative for - chills, fever or night sweats  Psychological ROS: positive for - anxiety and memory difficulties  negative for - disorientation, hallucinations or hostility  Ophthalmic ROS: negative  Respiratory ROS: no cough, shortness of breath, or wheezing  Cardiovascular ROS: positive for - dyspnea on exertion  negative for - chest pain  Gastrointestinal ROS: no abdominal pain, change in bowel habits, or black or bloody stools  Genito-Urinary ROS: has dysuria and trouble trouble voiding,  But no  hematuria  Musculoskeletal ROS: negative  Neurological ROS: no TIA or stroke symptoms  Dermatological ROS: negative    OBJECTIVE      Medications      Current Facility-Administered Medications: baclofen (LIORESAL) tablet 5 mg, 5 mg, Oral, TID  sodium chloride tablet 1 g, 1 g, Oral, TID WC  [Held by provider] 0.9 % sodium chloride infusion, , IntraVENous, Continuous  tamsulosin (FLOMAX) capsule 0.4 mg, 0.4 mg, Oral, Daily  Timolol (TIMOPTIC) 0.5 % ophthalmic solution 1 drop, 1 drop, Both Eyes, BID  dorzolamide (TRUSOPT) 2 % ophthalmic solution 2 drop, 2 drop, Both Eyes, TID  latanoprost (XALATAN) 0.005 % ophthalmic solution 1 drop, 1 drop, Both Eyes, Nightly  finasteride (PROSCAR) tablet 5 mg, 5 mg, Oral, Daily  ibuprofen (ADVIL;MOTRIN) tablet 200 mg, 200 mg, Oral, Q6H PRN  sodium chloride flush 0.9 % injection 5-40 mL, 5-40 mL, IntraVENous, 2 times per day  sodium chloride flush 0.9 % injection 5-40 mL, 5-40 mL, IntraVENous, PRN  0.9 % sodium chloride infusion, 25 mL, IntraVENous, PRN  potassium chloride (KLOR-CON M) extended release tablet 40 mEq, 40 mEq, Oral, PRN **OR** potassium bicarb-citric acid (EFFER-K) effervescent tablet 40 mEq, S4  ABDOMEN:  Soft scaphoid BS +   MUSCULOSKELETAL:  No edema distal pulsation weak   NEUROLOGIC:  Grossly intact without focal findings   SKIN:  Warm dry and Pale  and no bruising or bleeding    Data      Lab Results   Component Value Date    WBC 3.3 (L) 01/17/2022    HGB 11.2 (L) 01/17/2022    HCT 32.9 (L) 01/17/2022    MCV 95.0 01/17/2022     01/17/2022     Lab Results   Component Value Date     01/17/2022    K 4.1 01/17/2022    K 4.5 01/13/2022    CL 89 01/17/2022    CO2 28 01/17/2022    BUN 6 01/17/2022    CREATININE <0.5 01/17/2022    GLUCOSE 85 01/17/2022    CALCIUM 8.7 01/17/2022            ASSESSMENT AND PLAN     Principal Problem:    Hyponatremia  Active Problems:    HTN (hypertension)    BPH (benign prostatic hyperplasia)    Anemia    COVID-19    SIADH (syndrome of inappropriate ADH production) ([address]  Resolved Problems:    * No resolved hospital problems.  *  SIADH      ct Nephrology comanagement   Slow escalation of sodium level to avoid CNS complication

## 2022-01-17 NOTE — PROGRESS NOTES
MD David Benavidez MD Ulus Charter, MD               Office: [phone]                      Fax: [phone]             [facility]                   NEPHROLOGY INPATIENT PROGRESS NOTE:     PATIENT NAME: Mimi Luong  : 1940  MRN: [id number]         IMPRESSION:       Admitted for:  Hypochloremia [E87.8]  Hyponatremia [E87.1]      Treatment plan update. Hyponatremiastill not at goal.  Rate of correction acceptableacute on chronic problem. - no new neurologic symptoms   increase salt tablet. - repeat Uosm. If urine output slower tomorrow, will add Lasix to decrease medullary concentrating gradient. Volume status 5L volume neg    Hypokalemiaimproved  Hypophosphatemia-better  Recheck magnesium level tomorrow  Anemia -hgb stable  Hypoalbuminemia high-protein diet    Not ready for discharge. Discharge once Na 130 and above. Hyponatremia :   : Acute on Chronic, Severe 113, mildly symptomatic w/ AMS. Better. Mental status stable today.   -No Reported Severe symptoms: seizures, obtundation, coma, and respiratory arrest.    - Risk factors for hyponatremia:    - NSAIDs use   - recent admission in 2021 - w/ hyponatremia - lowest Na 124 , on d/c 133 on 2021 - had \"URI\" symptoms, first Rx Levaquin + Steroids -> (+) COVID 2021, not hypoxic.    - Patient is at HIGH risk of developing Osmotic Demyelination Syndrome.    - Call us urgently if any/worsening neurological findings. PLAN:   Increase NaCl tabs  Repeat urine studies. 1.2L fluid restriction  Na daily  Follow neuro status      Other major problems: Management per primary and other consulting teams.    //    Tank (+)  Not hypoxia     Hospital Problems           Last Modified POA    * (Principal) Hyponatremia 2022 Yes    HTN (hypertension) 2022 Yes    BPH (benign prostatic hyperplasia) 2022 Yes    Anemia 2022 Yes    COVID-19 2022 Yes SIADH (syndrome of inappropriate ADH production) [facility] 1/15/2022 Yes            Adriana Elkins MD,  [facility] [address] [place]: [phone] or [address]  Fax: [phone]        =======================================================================================   =======================================================================================  This patient is COVID-19, so in a strict isolation, as per current protocol; So in order to preserve PPE supply and to avoid unnecessary exposure to prevent transmission of COVID-19; this patient was NOT examined face to face, as risk of contact outweighs the benefits and likely would not change much of my clinical management. HPI, review of system and physical exam was limited, as it was mainly obtained from chart review and the primary team. But all relevant records and diagnostic tests were reviewed, including laboratory and imaging results. Seen via doors-windows-glass  SUBJECTIVE:   Per nursing - more oriented, but fluctuating confusion  HTN controlled  On RA    Past medical, Surgical, Social, Family medical history reviewed by me. MEDICATIONS: reviewed by me. Medications Prior to Admission:  No current facility-administered medications on file prior to encounter.      Current Outpatient Medications on File Prior to Encounter   Medication Sig Dispense Refill    cyanocobalamin 1000 MCG tablet Take 1,000 mcg by mouth daily      magnesium hydroxide (MILK OF MAGNESIA) 400 MG/5ML suspension Take 30 mLs by mouth daily as needed for Constipation      baclofen (LIORESAL) 10 MG tablet Take 1 tablet by mouth 3 times daily (Patient taking differently: Take 10 mg by mouth every 6 hours as needed Indications: muscle pain ) 30 tablet 1    Timolol (TIMOPTIC) 0.5 % (DAILY) SOLN ophthalmic solution 1 drop daily      dorzolamide (TRUSOPT) 2 % ophthalmic solution Place 2 drops into both eyes 3 times daily      latanoprost (XALATAN) 0.005 % ophthalmic solution Place 1 drop into both eyes nightly      finasteride (PROSCAR) 5 MG tablet Take 5 mg by mouth daily      tamsulosin (FLOMAX) 0.4 MG capsule Take 0.4 mg by mouth daily.              Current Facility-Administered Medications:     baclofen (LIORESAL) tablet 5 mg, 5 mg, Oral, Nightly PRN, Janine Solis MD    sodium chloride tablet 1 g, 1 g, Oral, TID WC, Janine Solis MD, 1 g at 01/17/22 1518    [Held by provider] 0.9 % sodium chloride infusion, , IntraVENous, Continuous, Farshad Bassett MD, Last Rate: 50 mL/hr at 01/14/22 2358, New Bag at 01/14/22 2358    tamsulosin (FLOMAX) capsule 0.4 mg, 0.4 mg, Oral, Daily, Anitha Grissom MD, 0.4 mg at 01/17/22 0919    Timolol (TIMOPTIC) 0.5 % ophthalmic solution 1 drop, 1 drop, Both Eyes, BID, Anitha Grissom MD, 1 drop at 01/17/22 0919    dorzolamide (TRUSOPT) 2 % ophthalmic solution 2 drop, 2 drop, Both Eyes, TID, Anitha Grissom MD, 2 drop at 01/17/22 1518    latanoprost (XALATAN) 0.005 % ophthalmic solution 1 drop, 1 drop, Both Eyes, Nightly, Anitha Grissom MD, 1 drop at 01/16/22 2045    finasteride (PROSCAR) tablet 5 mg, 5 mg, Oral, Daily, Anitha Grissom MD, 5 mg at 01/17/22 0919    ibuprofen (ADVIL;MOTRIN) tablet 200 mg, 200 mg, Oral, Q6H PRN, Anitha Grissom MD, 200 mg at 01/13/22 1013    sodium chloride flush 0.9 % injection 5-40 mL, 5-40 mL, IntraVENous, 2 times per day, Anitha Grissom MD, 10 mL at 01/17/22 0919    sodium chloride flush 0.9 % injection 5-40 mL, 5-40 mL, IntraVENous, PRN, Anitha Grissom MD    0.9 % sodium chloride infusion, 25 mL, IntraVENous, PRN, Anitha Grissom MD    potassium chloride (KLOR-CON M) extended release tablet 40 mEq, 40 mEq, Oral, PRN **OR** potassium bicarb-citric acid (EFFER-K) effervescent tablet 40 mEq, 40 mEq, Oral, PRN **OR** potassium chloride 10 mEq/100 mL IVPB (Peripheral Line), 10 mEq, IntraVENous, PRN, Anitha Grissom MD    enoxaparin (LOVENOX) injection 40 mg, 40 mg, SubCUTAneous, Daily, Shelly Betancur MD, 40 mg at 01/17/22 0919    ondansetron (ZOFRAN-ODT) disintegrating tablet 4 mg, 4 mg, Oral, Q8H PRN **OR** ondansetron (ZOFRAN) injection 4 mg, 4 mg, IntraVENous, Q6H PRN, Shelly Betancur MD    magnesium hydroxide (MILK OF MAGNESIA) 400 MG/5ML suspension 30 mL, 30 mL, Oral, Daily PRN, Shelly Betancur MD    acetaminophen (TYLENOL) tablet 650 mg, 650 mg, Oral, Q6H PRN, 650 mg at 01/16/22 2039 **OR** acetaminophen (TYLENOL) suppository 650 mg, 650 mg, Rectal, Q6H PRN, Shelly Betancur MD    hydrALAZINE (APRESOLINE) injection 10 mg, 10 mg, IntraVENous, Q6H PRN, Shelly Betancur MD    0.9 % sodium chloride bolus, 500 mL, IntraVENous, PRN, Shelly Betancur MD      =======================================================================================     PHYSICAL EXAM:  Recent vital signs and recent I/Os reviewed by me. Wt Readings from Last 3 Encounters:   01/14/22 105 lb (47.6 kg)   12/24/21 108 lb 11.2 oz (49.3 kg)   03/09/21 134 lb 3.2 oz (60.9 kg)     BP Readings from Last 3 Encounters:   01/17/22 132/76   12/27/21 112/60   03/12/21 138/84     Patient Vitals for the past 24 hrs:   BP Temp Temp src Pulse Resp SpO2   01/17/22 1238 132/76 98 °F (36.7 °C) Oral (!) 46 18 97 %   01/17/22 0730 (!) 145/83 97.5 °F (36.4 °C) Oral (!) 49 16 98 %   01/17/22 0026 127/71 97.3 °F (36.3 °C) Axillary (!) 44 16 96 %   01/16/22 2020 (!) 111/58 98.1 °F (36.7 °C) Oral 51 16 96 %       Intake/Output Summary (Last 24 hours) at 1/17/2022 1620  Last data filed at 1/17/2022 0318  Gross per 24 hour   Intake    Output 3000 ml   Net -3000 ml          Physical exam:    In-person bedside physical examination deferred.   Pursuant to the emergency declaration under the [facility], [address] [place] [address] authority and the [employer] General Act, this clinical encounter was conducted to provide necessary medical care.   (Also consistent with new provisions and guidance offered by [facility] on March 18, 2020 in setting of COVID 19 outbreak and in order to preserve personal protective equipment in accordance with the flexibilities announced by CMS on March 30, 2020)   References: https://[facility]. [facility]/Portals/0/Resources/COVID-19/3_18%20Telemed%20Guidance%20Updated%20March%2018. pdf?[email or web address]                      https://[facility]. [facility]/Portals/0/Resources/COVID-19/3_18%20Telemed%20Guidance%20Updated%20March%2018. pdf?axm=[email or web address]                      http://[employer]/. pdf    However, I did visually inspect the patient and observed the following: via glass doors-windows    Vitals signs reviewed. Constitutional:       General:  no acute distress. Appearance:  ill-appearing. HENT:      Head: Normocephalic and atraumatic. Nose: Nose normal.      Mouth/Throat:      Mouth: Mucous membranes are moist.   Eyes:      General: No scleral icterus. Conjunctiva/sclera: Conjunctivae dry. Neck:      Musculoskeletal: Neck supple. Cardiovascular:      Rate and Rhythm: Normal rate. Pulmonary:      Effort: Pulmonary effort is  normal.   Abdominal:      General: There is no distension. Musculoskeletal:     lower leg:   No edema. Skin:     Coloration: Skin is not jaundiced. Neurological:      General Awake, but disoriented           =======================================================================================     DATA:  Diagnostic tests reviewed by me for today's visit:   (AS NEEDED FOR MY EVALUATION AND MANAGEMENT).        Recent Labs     01/15/22  0503 01/16/22  0541 01/17/22  0326   WBC 3.4* 3.1* 3.3*   HCT 30.8* 29.7* 32.9*    211 227     Iron Saturation:  No components found for: PERCENTFE  FERRITIN:  No results found for: FERRITIN  IRON:  No results found for: IRON  TIBC:  No results found for: TIB    Recent Labs     01/15/22  0503 01/15/22  1130 01/15/22  2322 01/16/22  0214 01/16/22  0541 01/16/22  0938 01/17/22  0326   *   < > 122* 120* 123* 124* 123*   K 3.8  --   --   --  3.9  --  4.1   CL 91*  --   --   --  89*  --  89*   CO2 27  --   --   --  25  --  28   BUN 7  --   --   --  7  --  6*   CREATININE <0.5*  --   --   --  <0.5*  --  <0.5*    < > = values in this interval not displayed. Recent Labs     01/15/22  0503 01/16/22  0541 01/17/22  0326   CALCIUM 8.4 8.2* 8.7   PHOS 2.2* 2.5 2.6     No results for input(s): PH, PCO2, PO2 in the last 72 hours.     Invalid input(s): [place]    ABG:  No results found for: PH, PCO2, PO2, HCO3, BE, THGB, TCO2, O2SAT  VBG:  No results found for: PHVEN, MXH5VCP, BEVEN, M1JRJCKL    LDH:  No results found for: LDH  Uric Acid:    Lab Results   Component Value Date    LABURIC 1.5 01/12/2022       PT/INR:  No results found for: PROTIME, INR  Warfarin PT/INR:  No components found for: PTPATWAR, PTINRWAR  PTT:  No results found for: APTT, PTT[APTT}  Last 3 Troponin:    Lab Results   Component Value Date    TROPONINI <0.01 01/12/2022    TROPONINI <0.01 12/25/2021    TROPONINI <0.01 12/24/2021       U/A:    Lab Results   Component Value Date    COLORU YELLOW 12/24/2021    PROTEINU Negative 12/24/2021    PHUR 5.5 12/24/2021    CLARITYU Clear 12/24/2021    SPECGRAV 1.007 12/24/2021    LEUKOCYTESUR Negative 12/24/2021    UROBILINOGEN 1.0 12/24/2021    BILIRUBINUR Negative 12/24/2021    BLOODU Negative 12/24/2021    GLUCOSEU Negative 12/24/2021     Microalbumen/Creatinine ratio:  No components found for: RUCREAT  24 Hour Urine for Protein:  No components found for: RAWUPRO, UHRS3, BLRV84DI, UTV3  24 Hour Urine for Creatinine Clearance:  No components found for: CREAT4, UHRS10, UTV10  Urine Toxicology:  No components found for: IAMMENTA, IBARBIT, IBENZO, ICOCAINE, IMARTHC, IOPIATES, IPHENCYC    HgBA1c:    Lab Results   Component Value Date    LABA1C 5.6 09/03/2019     RPR:  No results found for: RPR  HIV:  No results found for: HIV  HEMAL:  No results found for: ANATITER, HEMAL  RF:  No results found for: RF  DSDNA:  No components found for: DNA  AMYLASE:  No results found for: AMYLASE  LIPASE:    Lab Results   Component Value Date    LIPASE 23.0 04/19/2019     Fibrinogen Level:  No components found for: FIB       BELOW MENTIONED RADIOLOGY STUDY RESULTS BY ME (AS NEEDED FOR MY EVALUATION AND MANAGEMENT). XR CHEST (2 VW)    Result Date: 12/24/2021  EXAMINATION: TWO XRAY VIEWS OF THE CHEST 12/24/2021 12:18 pm COMPARISON: 03/09/2021 HISTORY: ORDERING SYSTEM PROVIDED HISTORY: cough TECHNOLOGIST PROVIDED HISTORY: Reason for exam:->cough Reason for Exam: cough, lethargic; recent contact with +Covid relatives FINDINGS: The lungs are without acute focal process. There is no effusion or pneumothorax. The cardiomediastinal silhouette is stable. The osseous structures are stable. Thoracic scoliosis convex right again noted. No acute process. XR CHEST PORTABLE    Result Date: 1/12/2022  EXAMINATION: ONE XRAY VIEW OF THE CHEST 1/12/2022 4:32 pm COMPARISON: 12/24/2021 HISTORY: ORDERING SYSTEM PROVIDED HISTORY: SOB TECHNOLOGIST PROVIDED HISTORY: Reason for exam:->SOB Reason for Exam: shortness of breath FINDINGS: The lungs are without acute focal process. There is no effusion or pneumothorax. The cardiomediastinal silhouette is stable. The osseous structures are stable. Thoracic scoliosis convex right. No acute process.

## 2022-01-17 NOTE — PROGRESS NOTES
Speech Language Pathology  Dysphagia Treatment Note    Name:  Umberto Fine  :   1940  Medical Diagnosis:  Hypochloremia [E87.8]  Hyponatremia [E87.1]  Treatment Diagnosis: Oropharyngeal Dysphagia  Pain level: Pt did not report pain    Diet level prior to treatment:   Dysphagia II Minced and Moist with thin liquids, Meds crushed in puree     Tolerance of Current Diet Level:   Per chart review, pt is tolerating diet well. Assessment of Texture Tolerance:  Pt sitting upright in bed on room air. He is hard of hearing and has visual deficits; therefore, comprehension was limited. Pt accepted puree, soft and regular solids and thin liquids via cup and straw. Reported he sometimes has a hard time chewing due to lack of dentition (only three lower teeth). Pt demonstrated prolonged mastication with soft and regular solids, he utilized liquid wash to adequately clear the oral cavity. Pt elicited two swallows per presentation of each consistency. He had adequate laryngeal elevation and appropriate timing for airway protection with combined textures. Pt seemingly tolerated all PO trials with no overt clinical s/s of aspiration. Recommend upgrade to dysphagia soft and bite sized solids. If difficulty with mastication presents, pt may benefit from 'easy to chew' diet. Diet and Treatment Recommendations 2022:  Upgrade to dysphagia soft and bite sized solids   Continue with thin liquids     Compensatory strategies: Alternate solids/liquids , Upright as possible with all PO intake , Assist Feed , Small bites/sips , Eat/feed slowly, Remain upright 30-45 min     Dysphagia Goals:   1.) Pt will functionally tolerate recommended diet with no overt clinical s/s of aspiration    2022 ongoing   2.) Pt will advance to least restrictive diet as indicated   2022 ongoing     PLAN OF CARE:   Speech therapy for dysphagia tx, 1-2 additional follow up sessions to determine diet tolerance.      EDUCATION: Provided education regarding role of SLP, results of assessment, recommendations and general speech pathology plan of care. []? Pt verbalized understanding and agreement   [x]? Pt requires ongoing learning   []? No evidence of comprehension     Discharge Recommendations:   Discharge recommendations to be determined pending ongoing follow-up during acute care stay    Timed Code Treatment:   0 minutes    Total Treatment Time:   15 minutes     If patient discharges prior to next session this note will serve as a discharge summary. Signature:   Hilario Mcconnell M.A.  CCC-SLP S.P. [id number]  Speech-Language Pathologist   1/17/2022 2:15 PM

## 2022-01-17 NOTE — PROGRESS NOTES
Department of Internal Medicine  General Internal Medicine   Progress Note      SUBJECTIVE:   Weak fatigued no obvious distress difficult to communicate with very [place]    History obtained from chart review and the patient  General ROS: positive for  - fatigue, malaise, sleep disturbance and weight loss  negative for - chills, fever or night sweats  Psychological ROS: positive for - anxiety and memory difficulties  negative for - disorientation, hallucinations or hostility  Ophthalmic ROS: negative  Respiratory ROS: no cough, shortness of breath, or wheezing  Cardiovascular ROS: positive for - dyspnea on exertion  negative for - chest pain  Gastrointestinal ROS: no abdominal pain, change in bowel habits, or black or bloody stools  Genito-Urinary ROS: has dysuria and trouble trouble voiding,  But no  hematuria  Musculoskeletal ROS: negative  Neurological ROS: no TIA or stroke symptoms  Dermatological ROS: negative    OBJECTIVE      Medications      Current Facility-Administered Medications: baclofen (LIORESAL) tablet 5 mg, 5 mg, Oral, TID  sodium chloride tablet 1 g, 1 g, Oral, TID WC  [Held by provider] 0.9 % sodium chloride infusion, , IntraVENous, Continuous  tamsulosin (FLOMAX) capsule 0.4 mg, 0.4 mg, Oral, Daily  Timolol (TIMOPTIC) 0.5 % ophthalmic solution 1 drop, 1 drop, Both Eyes, BID  dorzolamide (TRUSOPT) 2 % ophthalmic solution 2 drop, 2 drop, Both Eyes, TID  latanoprost (XALATAN) 0.005 % ophthalmic solution 1 drop, 1 drop, Both Eyes, Nightly  finasteride (PROSCAR) tablet 5 mg, 5 mg, Oral, Daily  ibuprofen (ADVIL;MOTRIN) tablet 200 mg, 200 mg, Oral, Q6H PRN  sodium chloride flush 0.9 % injection 5-40 mL, 5-40 mL, IntraVENous, 2 times per day  sodium chloride flush 0.9 % injection 5-40 mL, 5-40 mL, IntraVENous, PRN  0.9 % sodium chloride infusion, 25 mL, IntraVENous, PRN  potassium chloride (KLOR-CON M) extended release tablet 40 mEq, 40 mEq, Oral, PRN **OR** potassium bicarb-citric acid (EFFER-K) effervescent tablet 40 mEq, 40 mEq, Oral, PRN **OR** potassium chloride 10 mEq/100 mL IVPB (Peripheral Line), 10 mEq, IntraVENous, PRN  enoxaparin (LOVENOX) injection 40 mg, 40 mg, SubCUTAneous, Daily  ondansetron (ZOFRAN-ODT) disintegrating tablet 4 mg, 4 mg, Oral, Q8H PRN **OR** ondansetron (ZOFRAN) injection 4 mg, 4 mg, IntraVENous, Q6H PRN  magnesium hydroxide (MILK OF MAGNESIA) 400 MG/5ML suspension 30 mL, 30 mL, Oral, Daily PRN  acetaminophen (TYLENOL) tablet 650 mg, 650 mg, Oral, Q6H PRN **OR** acetaminophen (TYLENOL) suppository 650 mg, 650 mg, Rectal, Q6H PRN  hydrALAZINE (APRESOLINE) injection 10 mg, 10 mg, IntraVENous, Q6H PRN  0.9 % sodium chloride bolus, 500 mL, IntraVENous, PRN    Physical      Vitals: BP (!) 145/83   Pulse (!) 49   Temp 97.5 °F (36.4 °C) (Oral)   Resp 16   Ht 5' 10\" (1.778 m)   Wt 105 lb (47.6 kg)   SpO2 98%   BMI 15.07 kg/m²   Temp: Temp: 97.5 °F (36.4 °C)  Max: Temp  Av °F (36.7 °C)  Min: 97.3 °F (36.3 °C)  Max: 98.8 °F (37.1 °C)  Respiration range:  Resp  Av.8  Min: 16  Max: 18  Pulse Range:  Pulse  Av  Min: 44  Max: 53  Blood pressure range:  Systolic (68ITQ), SAP:951 , Min:109 , HCP:319   , Diastolic (02VLL), UVI:39, Min:58, Max:83    SpO2  Av.4 %  Min: 95 %  Max: 98 %    Intake/Output Summary (Last 24 hours) at 2022 0710  Last data filed at 2022 0318  Gross per 24 hour   Intake 240 ml   Output 3000 ml   Net -2760 ml       Vent settings:  Pulse  Av.5  Min: 44  Max: 93  Resp  Av.4  Min: 14  Max: 20  SpO2  Av.8 %  Min: 91 %  Max: 100 %    CONSTITUTIONAL:  fatigued, alert, cooperative, mild distress, appears older than stated age and cachectic  EYES:  Unremarkable   NECK:  No JVD  and supple, symmetrical, trachea midline  BACK:  symmetric and no curvature  LUNGS:  no increased work of breathing, good air exchange, no retractions and no crackles or wheezing  CARDIOVASCULAR:  normal apical pulses, regular rate and rhythm, normal S1 and S2, no S3 and no S4  ABDOMEN:  Soft scaphoid BS +   MUSCULOSKELETAL:  No edema distal pulsation weak   NEUROLOGIC:  Grossly intact without focal findings   SKIN:  Warm dry and Pale  and no bruising or bleeding    Data      Lab Results   Component Value Date    WBC 3.3 (L) 01/17/2022    HGB 11.2 (L) 01/17/2022    HCT 32.9 (L) 01/17/2022    MCV 95.0 01/17/2022     01/17/2022     Lab Results   Component Value Date     01/17/2022    K 4.1 01/17/2022    K 4.5 01/13/2022    CL 89 01/17/2022    CO2 28 01/17/2022    BUN 6 01/17/2022    CREATININE <0.5 01/17/2022    GLUCOSE 85 01/17/2022    CALCIUM 8.7 01/17/2022            ASSESSMENT AND PLAN     Principal Problem:    Hyponatremia  Active Problems:    HTN (hypertension)    BPH (benign prostatic hyperplasia)    Anemia    COVID-19    SIADH (syndrome of inappropriate ADH production) ([facility]  Resolved Problems:    * No resolved hospital problems.  *  SIADH        ct present management of hyponatremia SIADH per Nephrologist guidelines    hemodynamically stable   covid infection so far not critical at all    decrease Baclofen dose to avoid side effects or even d/c   DVT prophylaxis

## 2022-01-18 LAB
ALBUMIN SERPL-MCNC: 3.6 G/DL (ref 3.4–5)
ANION GAP SERPL CALCULATED.3IONS-SCNC: 6 MMOL/L (ref 3–16)
BASOPHILS ABSOLUTE: 0 K/UL (ref 0–0.2)
BASOPHILS RELATIVE PERCENT: 1.2 %
BUN BLDV-MCNC: 7 MG/DL (ref 7–20)
CALCIUM SERPL-MCNC: 9 MG/DL (ref 8.3–10.6)
CHLORIDE BLD-SCNC: 91 MMOL/L (ref 99–110)
CO2: 29 MMOL/L (ref 21–32)
CREAT SERPL-MCNC: <0.5 MG/DL (ref 0.8–1.3)
EOSINOPHILS ABSOLUTE: 0.3 K/UL (ref 0–0.6)
EOSINOPHILS RELATIVE PERCENT: 8.5 %
GFR AFRICAN AMERICAN: >60
GFR NON-AFRICAN AMERICAN: >60
GLUCOSE BLD-MCNC: 88 MG/DL (ref 70–99)
HCT VFR BLD CALC: 33.2 % (ref 40.5–52.5)
HEMOGLOBIN: 11.5 G/DL (ref 13.5–17.5)
LYMPHOCYTES ABSOLUTE: 0.7 K/UL (ref 1–5.1)
LYMPHOCYTES RELATIVE PERCENT: 23.7 %
MAGNESIUM: 1.7 MG/DL (ref 1.8–2.4)
MAGNESIUM: 1.7 MG/DL (ref 1.8–2.4)
MCH RBC QN AUTO: 32.6 PG (ref 26–34)
MCHC RBC AUTO-ENTMCNC: 34.5 G/DL (ref 31–36)
MCV RBC AUTO: 94.4 FL (ref 80–100)
MONOCYTES ABSOLUTE: 0.4 K/UL (ref 0–1.3)
MONOCYTES RELATIVE PERCENT: 12.2 %
NEUTROPHILS ABSOLUTE: 1.6 K/UL (ref 1.7–7.7)
NEUTROPHILS RELATIVE PERCENT: 54.4 %
OSMOLALITY URINE: 456 MOSM/KG (ref 390–1070)
PDW BLD-RTO: 14.1 % (ref 12.4–15.4)
PHOSPHORUS: 2.8 MG/DL (ref 2.5–4.9)
PLATELET # BLD: 236 K/UL (ref 135–450)
PMV BLD AUTO: 5.9 FL (ref 5–10.5)
POTASSIUM SERPL-SCNC: 4.2 MMOL/L (ref 3.5–5.1)
POTASSIUM, UR: 13.3 MMOL/L
RBC # BLD: 3.52 M/UL (ref 4.2–5.9)
SODIUM BLD-SCNC: 126 MMOL/L (ref 136–145)
SODIUM URINE: 172 MMOL/L
WBC # BLD: 3 K/UL (ref 4–11)

## 2022-01-18 PROCEDURE — 2060000000 HC ICU INTERMEDIATE R&B

## 2022-01-18 PROCEDURE — 97116 GAIT TRAINING THERAPY: CPT

## 2022-01-18 PROCEDURE — 84133 ASSAY OF URINE POTASSIUM: CPT

## 2022-01-18 PROCEDURE — 36415 COLL VENOUS BLD VENIPUNCTURE: CPT

## 2022-01-18 PROCEDURE — 80069 RENAL FUNCTION PANEL: CPT

## 2022-01-18 PROCEDURE — 2580000003 HC RX 258: Performed by: INTERNAL MEDICINE

## 2022-01-18 PROCEDURE — 85025 COMPLETE CBC W/AUTO DIFF WBC: CPT

## 2022-01-18 PROCEDURE — 6370000000 HC RX 637 (ALT 250 FOR IP): Performed by: INTERNAL MEDICINE

## 2022-01-18 PROCEDURE — 97530 THERAPEUTIC ACTIVITIES: CPT

## 2022-01-18 PROCEDURE — 6360000002 HC RX W HCPCS: Performed by: INTERNAL MEDICINE

## 2022-01-18 PROCEDURE — 83735 ASSAY OF MAGNESIUM: CPT

## 2022-01-18 PROCEDURE — 84300 ASSAY OF URINE SODIUM: CPT

## 2022-01-18 PROCEDURE — 83935 ASSAY OF URINE OSMOLALITY: CPT

## 2022-01-18 RX ORDER — LANOLIN ALCOHOL/MO/W.PET/CERES
400 CREAM (GRAM) TOPICAL 2 TIMES DAILY
Status: DISCONTINUED | OUTPATIENT
Start: 2022-01-18 | End: 2022-01-21 | Stop reason: HOSPADM

## 2022-01-18 RX ORDER — MAGNESIUM SULFATE 1 G/100ML
1000 INJECTION INTRAVENOUS ONCE
Status: COMPLETED | OUTPATIENT
Start: 2022-01-18 | End: 2022-01-18

## 2022-01-18 RX ADMIN — FINASTERIDE 5 MG: 5 TABLET, FILM COATED ORAL at 09:21

## 2022-01-18 RX ADMIN — DORZOLAMIDE HYDROCHLORIDE 2 DROP: 20 SOLUTION/ DROPS OPHTHALMIC at 20:15

## 2022-01-18 RX ADMIN — TIMOLOL MALEATE 1 DROP: 5 SOLUTION OPHTHALMIC at 20:15

## 2022-01-18 RX ADMIN — Medication 2 G: at 12:43

## 2022-01-18 RX ADMIN — ACETAMINOPHEN 650 MG: 325 TABLET ORAL at 20:21

## 2022-01-18 RX ADMIN — ENOXAPARIN SODIUM 30 MG: 30 INJECTION SUBCUTANEOUS at 20:15

## 2022-01-18 RX ADMIN — Medication 2 G: at 09:22

## 2022-01-18 RX ADMIN — Medication 400 MG: at 20:15

## 2022-01-18 RX ADMIN — LATANOPROST 1 DROP: 50 SOLUTION OPHTHALMIC at 20:15

## 2022-01-18 RX ADMIN — ENOXAPARIN SODIUM 40 MG: 40 INJECTION SUBCUTANEOUS at 09:21

## 2022-01-18 RX ADMIN — Medication 400 MG: at 12:43

## 2022-01-18 RX ADMIN — DORZOLAMIDE HYDROCHLORIDE 2 DROP: 20 SOLUTION/ DROPS OPHTHALMIC at 15:42

## 2022-01-18 RX ADMIN — SODIUM CHLORIDE, PRESERVATIVE FREE 10 ML: 5 INJECTION INTRAVENOUS at 20:15

## 2022-01-18 RX ADMIN — DORZOLAMIDE HYDROCHLORIDE 2 DROP: 20 SOLUTION/ DROPS OPHTHALMIC at 09:22

## 2022-01-18 RX ADMIN — SODIUM CHLORIDE 25 ML: 9 INJECTION, SOLUTION INTRAVENOUS at 09:41

## 2022-01-18 RX ADMIN — TAMSULOSIN HYDROCHLORIDE 0.4 MG: 0.4 CAPSULE ORAL at 09:21

## 2022-01-18 RX ADMIN — Medication 2 G: at 17:23

## 2022-01-18 RX ADMIN — ACETAMINOPHEN 650 MG: 325 TABLET ORAL at 12:43

## 2022-01-18 RX ADMIN — SODIUM CHLORIDE, PRESERVATIVE FREE 10 ML: 5 INJECTION INTRAVENOUS at 09:21

## 2022-01-18 RX ADMIN — MAGNESIUM SULFATE HEPTAHYDRATE 1000 MG: 1 INJECTION, SOLUTION INTRAVENOUS at 09:43

## 2022-01-18 RX ADMIN — TIMOLOL MALEATE 1 DROP: 5 SOLUTION OPHTHALMIC at 09:21

## 2022-01-18 ASSESSMENT — PAIN SCALES - GENERAL
PAINLEVEL_OUTOF10: 0
PAINLEVEL_OUTOF10: 3
PAINLEVEL_OUTOF10: 0
PAINLEVEL_OUTOF10: 3

## 2022-01-18 ASSESSMENT — PAIN DESCRIPTION - LOCATION: LOCATION: HEAD

## 2022-01-18 ASSESSMENT — PAIN DESCRIPTION - PAIN TYPE: TYPE: ACUTE PAIN

## 2022-01-18 NOTE — PROGRESS NOTES
Physician Progress Note      Charles Romero  CSN #:                  [id number]  :                       1940  ADMIT DATE:       2022 4:09 PM  [address] [place] DATE:  RESPONDING  PROVIDER #:        Claudean Dus MD          QUERY TEXT:    Patient admitted with hyponatremia. If possible, please document in progress   notes and discharge summary if you are evaluating and /or treating any of the   following: The medical record reflects the following:  Risk Factors: Chronic Illness,  Clinical Indicators: Patient admitted with hyponatremia and BMI of 15.07. Patient has PMH of chronic illness- HTN, BPH. Treatment: Weights and monitoring    ASPEN Criteria:    https://aspenjournals. onlinelibrary. archer. com/doi/full/10.1177/471294445026318  5  Options provided:  -- Underweight with BMI 15.07  -- Other - I will add my own diagnosis  -- Disagree - Not applicable / Not valid  -- Disagree - Clinically unable to determine / Unknown  -- Refer to Clinical Documentation Reviewer    PROVIDER RESPONSE TEXT:    This patient is underweight with a BMI of 15.07.     Query created by: Robel Villarreal on 2022 11:06 AM      Electronically signed by:  Claudean Dus MD 2022 11:29 AM

## 2022-01-18 NOTE — PROGRESS NOTES
Physical Therapy  Facility/Department: [address]  Daily Treatment Note  NAME: Sarthak Newman  : 1940  MRN: [id number]    Date of Service: 2022    Discharge Recommendations:  Sarthak Newman scored a 15/24 on the AM-PAC short mobility form. Current research shows that an AM-PAC score of 17 or less is typically not associated with a discharge to the patient's home setting. Based on the patient's AM-PAC score and their current functional mobility deficits, it is recommended that the patient have 3-5 sessions per week of Physical Therapy at d/c to increase the patient's independence. Please see assessment section for further patient specific details. If patient discharges prior to next session this note will serve as a discharge summary. Please see below for the latest assessment towards goals. 3-5 sessions per week   PT Equipment Recommendations  Equipment Needed: No    Assessment   Body structures, Functions, Activity limitations: Decreased functional mobility ; Decreased strength;Decreased endurance;Decreased safe awareness;Decreased balance;Decreased ROM  Assessment: Pt contintues to present below baseline functioning. Pt is able to complete bed mobility with SBA, transfers with min A and ambulate with RW and CGA. Pt would continue to benefit from skilled PT in order to safely return to [facility]. Treatment Diagnosis: decreased functional mobility, impaired gait, decreased balance  Clinical Presentation: evolving  PT Education: Goals;PT Role;Plan of Care  Patient Education: Pt encouraged to lay on side in bed in order to avoid pressure on buttocks. Pt verbalized understanding  Barriers to Learning: hearing, cognitive  REQUIRES PT FOLLOW UP: Yes  Activity Tolerance  Activity Tolerance: Patient Tolerated treatment well     Patient Diagnosis(es): The primary encounter diagnosis was Hyponatremia. A diagnosis of Hypochloremia was also pertinent to this visit.      has a past medical history of Glaucoma and Prostate enlargement. has no past surgical history on file. Restrictions  Restrictions/Precautions  Restrictions/Precautions: Fall Risk (dys soft and bite sized, medium fall risk)  Required Braces or Orthoses?: No  Position Activity Restriction  Other position/activity restrictions: Roosevelt Dill is a 80 y.o. male who presents for evaluation of abnormal labs. Apparently had a sodium of 117 at home. Does have a history of hyponatremia. Patient is very hard of hearing a difficult historian. No additional information is able to obtain at this time. Subjective   General  Chart Reviewed: Yes  Family / Caregiver Present: No  Subjective  Subjective: Reported headache - nursing present and aware. Did not rate. States he feels like he is dying. General Comment  Comments: supine in bed upon arrival with seizure precautions in place          Orientation  Orientation  Orientation Level: Oriented to place;Oriented to situation;Oriented to person  Cognition      Objective   Bed mobility  Supine to Sit: Stand by assistance  Sit to Supine: Stand by assistance  Scooting: Stand by assistance  Transfers  Sit to Stand: Minimal Assistance (from bed x 2, from toilet)  Stand to sit: Contact guard assistance  Ambulation 1  Surface: level tile  Device: Rolling Walker  Assistance: Contact guard assistance  Quality of Gait: narrow NAM, shuffling like steps/ minmal foot clearance and step length  Distance: 10 feet x 4  Comments: seated rest break needed between each trial     Balance  Sitting - Static: Good  Sitting - Dynamic: Good  Standing - Static: Fair  Standing - Dynamic: Fair  Comments: Reported neck discomfort in bed. Trialed different cervical positions with no stated improvement.         AM-PAC Score  AM-PAC Inpatient Mobility Raw Score : 15 (01/18/22 1307)  AM-PAC Inpatient T-Scale Score : 39.45 (01/18/22 1307)  Mobility Inpatient CMS 0-100% Score: 57.7 (01/18/22 1307)  Mobility Inpatient CMS G-Code Modifier : CK (01/18/22 3282)          Goals  Short term goals  Time Frame for Short term goals: To be met prior to discharge  Short term goal 1: Bed mobility with supervision  Short term goal 2: Sit to/from stand with CGA  Short term goal 3: Bed to/from chair with mod A (MET 1/17)  Short term goal 4: Ambulate 13' with AAD and min A (MET 1/17)  Short term goal 5: Pt will be able to ambulate 50 ft with LRAD and CGA  Patient Goals   Patient goals : unable to state  No goals met this treatment. Plan    Plan  Times per week: 3-5  Times per day: Daily  Current Treatment Recommendations: Strengthening,Functional Mobility Training,Transfer Training,Balance Training,Endurance Training,Gait Training,Patient/Caregiver Education & [employer] Exercise Program,Safety Education & Training  Safety Devices  Type of devices:  All fall risk precautions in place,Call light within reach,Bed alarm in place,Patient at risk for falls,Left in bed,Nurse notified  Restraints  Initially in place: No (seizure pads in place)     Therapy Time   Individual Concurrent Group Co-treatment   Time In 1129         Time Out 1207         Minutes 38         Timed Code Treatment Minutes: 821 Pranav Lamas, PT    Thanks, Reji Boudreuax, PT, DPT [id number]

## 2022-01-18 NOTE — PROGRESS NOTES
Department of Internal Medicine  General Internal Medicine   Progress Note  SUBJECTIVE:   Very weak and undernourished    very Hard of hearing communication virtually impossible     History obtained from chart review and the patient  General ROS: positive for  - fatigue, malaise, sleep disturbance and weight loss  negative for - chills, fever or night sweats  Psychological ROS: positive for - anxiety and memory difficulties  negative for - disorientation, hallucinations or hostility  Ophthalmic ROS: negative  Respiratory ROS: no cough, shortness of breath, or wheezing  Cardiovascular ROS: positive for - dyspnea on exertion  negative for - chest pain  Gastrointestinal ROS: no abdominal pain, change in bowel habits, or black or bloody stools  Genito-Urinary ROS: has dysuria and trouble trouble voiding,  But no  hematuria  Musculoskeletal ROS: negative  Neurological ROS: no TIA or stroke symptoms  Dermatological ROS: negative    OBJECTIVE      Medications      Current Facility-Administered Medications: baclofen (LIORESAL) tablet 5 mg, 5 mg, Oral, Nightly PRN  sodium chloride tablet 2 g, 2 g, Oral, TID WC  [Held by provider] 0.9 % sodium chloride infusion, , IntraVENous, Continuous  tamsulosin (FLOMAX) capsule 0.4 mg, 0.4 mg, Oral, Daily  Timolol (TIMOPTIC) 0.5 % ophthalmic solution 1 drop, 1 drop, Both Eyes, BID  dorzolamide (TRUSOPT) 2 % ophthalmic solution 2 drop, 2 drop, Both Eyes, TID  latanoprost (XALATAN) 0.005 % ophthalmic solution 1 drop, 1 drop, Both Eyes, Nightly  finasteride (PROSCAR) tablet 5 mg, 5 mg, Oral, Daily  ibuprofen (ADVIL;MOTRIN) tablet 200 mg, 200 mg, Oral, Q6H PRN  sodium chloride flush 0.9 % injection 5-40 mL, 5-40 mL, IntraVENous, 2 times per day  sodium chloride flush 0.9 % injection 5-40 mL, 5-40 mL, IntraVENous, PRN  0.9 % sodium chloride infusion, 25 mL, IntraVENous, PRN  potassium chloride (KLOR-CON M) extended release tablet 40 mEq, 40 mEq, Oral, PRN **OR** potassium bicarb-citric acid (EFFER-K) effervescent tablet 40 mEq, 40 mEq, Oral, PRN **OR** potassium chloride 10 mEq/100 mL IVPB (Peripheral Line), 10 mEq, IntraVENous, PRN  enoxaparin (LOVENOX) injection 40 mg, 40 mg, SubCUTAneous, Daily  ondansetron (ZOFRAN-ODT) disintegrating tablet 4 mg, 4 mg, Oral, Q8H PRN **OR** ondansetron (ZOFRAN) injection 4 mg, 4 mg, IntraVENous, Q6H PRN  magnesium hydroxide (MILK OF MAGNESIA) 400 MG/5ML suspension 30 mL, 30 mL, Oral, Daily PRN  acetaminophen (TYLENOL) tablet 650 mg, 650 mg, Oral, Q6H PRN **OR** acetaminophen (TYLENOL) suppository 650 mg, 650 mg, Rectal, Q6H PRN  hydrALAZINE (APRESOLINE) injection 10 mg, 10 mg, IntraVENous, Q6H PRN  0.9 % sodium chloride bolus, 500 mL, IntraVENous, PRN    Physical      Vitals: /69   Pulse 54   Temp 98.1 °F (36.7 °C) (Oral)   Resp 18   Ht 5' 10\" (1.778 m)   Wt 105 lb (47.6 kg)   SpO2 94%   BMI 15.07 kg/m²   Temp: Temp: 98.1 °F (36.7 °C)  Max: Temp  Av.8 °F (36.6 °C)  Min: 97.3 °F (36.3 °C)  Max: 98.2 °F (36.8 °C)  Respiration range:  Resp  Av.6  Min: 16  Max: 20  Pulse Range:  Pulse  Av.4  Min: 44  Max: 54  Blood pressure range:  Systolic (77OBG), PRA:139 , Min:112 , LRR:014   , Diastolic (43YRQ), ULU:81, Min:69, Max:88    SpO2  Av.3 %  Min: 94 %  Max: 98 %    Intake/Output Summary (Last 24 hours) at 2022 7510  Last data filed at 2022 0318  Gross per 24 hour   Intake    Output 2050 ml   Net -2050 ml       Vent settings:  Pulse  Av  Min: 44  Max: 93  Resp  Av.5  Min: 14  Max: 20  SpO2  Av.8 %  Min: 91 %  Max: 100 %    CONSTITUTIONAL:  fatigued, alert, cooperative, mild distress, appears older than stated age and cachectic  EYES:  Unremarkable   NECK:  No JVD  and supple, symmetrical, trachea midline  BACK:  symmetric and no curvature  LUNGS:  no increased work of breathing, good air exchange, no retractions and no crackles or wheezing  CARDIOVASCULAR:  normal apical pulses, regular rate and rhythm, normal S1 and S2, no S3 and no S4  ABDOMEN:  Soft scaphoid BS +   MUSCULOSKELETAL:  No edema distal pulsation weak   NEUROLOGIC:  Grossly intact without focal findings   SKIN:  Warm dry and Pale  and no bruising or bleeding    Data      Lab Results   Component Value Date    WBC 3.3 (L) 01/17/2022    HGB 11.2 (L) 01/17/2022    HCT 32.9 (L) 01/17/2022    MCV 95.0 01/17/2022     01/17/2022     Lab Results   Component Value Date     01/17/2022    K 4.1 01/17/2022    K 4.5 01/13/2022    CL 89 01/17/2022    CO2 28 01/17/2022    BUN 6 01/17/2022    CREATININE <0.5 01/17/2022    GLUCOSE 85 01/17/2022    CALCIUM 8.7 01/17/2022            ASSESSMENT AND PLAN     Principal Problem:    Hyponatremia  Active Problems:    HTN (hypertension)    BPH (benign prostatic hyperplasia)    Anemia    COVID-19    SIADH (syndrome of inappropriate ADH production) ([facility] [address]  Resolved Problems:    * No resolved hospital problems.  *  SIADH        ct present  Care sodium improvement appears rather on standstill , will defer further treatment decision to nephrology

## 2022-01-18 NOTE — PROGRESS NOTES
Progress Note - Dr. Walter Johns - Internal Medicine  PCP: Anitha Grissom MD [id number] / [place] [address] [phone]    Hospital Day: 6  Code Status: Full Code  Current Diet: ADULT DIET; Dysphagia - Soft and Bite Sized; 5 carb choices (75 gm/meal); 1200 ml        CC: follow up on medical issues    Subjective:   Melida Amado is a 80 y.o. male. Pt seen and examined  Chart reviewed since last visit, labs and imaging below        Resting comfortably  Confused  Na better, but still low  Case d/w dr Shelley Rascon    He denies chest pain, denies shortness of breath, denies nausea,  denies emesis. 10 system Review of Systems is reviewed with patient, and pertinent positives are noted in HPI above . Otherwise, Review of systems is negative. I have reviewed the patient's medical and social history in detail and updated the computerized patient record. To recap: He  has a past medical history of Glaucoma and Prostate enlargement. . He  has no past surgical history on file. Adria Rivas He  reports that he has never smoked. He has never used smokeless tobacco. He reports that he does not drink alcohol and does not use drugs. .        Active Hospital Problems    Diagnosis Date Noted    SIADH (syndrome of inappropriate ADH production) ([facility] [address] [E22.2] 01/15/2022    COVID-19 [U07.1] 12/27/2021    Hyponatremia [E87.1] 12/24/2021    HTN (hypertension) [I10] 03/09/2021    BPH (benign prostatic hyperplasia) [N40.0] 03/09/2021    Anemia [D64.9] 03/09/2021       Current Facility-Administered Medications: magnesium sulfate 1000 mg in dextrose 5% 100 mL IVPB, 1,000 mg, IntraVENous, Once  baclofen (LIORESAL) tablet 5 mg, 5 mg, Oral, Nightly PRN  sodium chloride tablet 2 g, 2 g, Oral, TID WC  [Held by provider] 0.9 % sodium chloride infusion, , IntraVENous, Continuous  tamsulosin (FLOMAX) capsule 0.4 mg, 0.4 mg, Oral, Daily  Timolol (TIMOPTIC) 0.5 % ophthalmic solution 1 drop, 1 drop, Both Eyes, BID  dorzolamide (TRUSOPT) 2 % ophthalmic solution 2 drop, 2 drop, Both Eyes, TID  latanoprost (XALATAN) 0.005 % ophthalmic solution 1 drop, 1 drop, Both Eyes, Nightly  finasteride (PROSCAR) tablet 5 mg, 5 mg, Oral, Daily  ibuprofen (ADVIL;MOTRIN) tablet 200 mg, 200 mg, Oral, Q6H PRN  sodium chloride flush 0.9 % injection 5-40 mL, 5-40 mL, IntraVENous, 2 times per day  sodium chloride flush 0.9 % injection 5-40 mL, 5-40 mL, IntraVENous, PRN  0.9 % sodium chloride infusion, 25 mL, IntraVENous, PRN  potassium chloride (KLOR-CON M) extended release tablet 40 mEq, 40 mEq, Oral, PRN **OR** potassium bicarb-citric acid (EFFER-K) effervescent tablet 40 mEq, 40 mEq, Oral, PRN **OR** potassium chloride 10 mEq/100 mL IVPB (Peripheral Line), 10 mEq, IntraVENous, PRN  enoxaparin (LOVENOX) injection 40 mg, 40 mg, SubCUTAneous, Daily  ondansetron (ZOFRAN-ODT) disintegrating tablet 4 mg, 4 mg, Oral, Q8H PRN **OR** ondansetron (ZOFRAN) injection 4 mg, 4 mg, IntraVENous, Q6H PRN  magnesium hydroxide (MILK OF MAGNESIA) 400 MG/5ML suspension 30 mL, 30 mL, Oral, Daily PRN  acetaminophen (TYLENOL) tablet 650 mg, 650 mg, Oral, Q6H PRN **OR** acetaminophen (TYLENOL) suppository 650 mg, 650 mg, Rectal, Q6H PRN  hydrALAZINE (APRESOLINE) injection 10 mg, 10 mg, IntraVENous, Q6H PRN  0.9 % sodium chloride bolus, 500 mL, IntraVENous, PRN         Objective:  BP (!) 150/79   Pulse (!) 48   Temp 97.7 °F (36.5 °C) (Oral)   Resp 18   Ht 5' 10\" (1.778 m)   Wt 105 lb (47.6 kg)   SpO2 95%   BMI 15.07 kg/m²      Patient Vitals for the past 24 hrs:   BP Temp Temp src Pulse Resp SpO2   01/18/22 0730 (!) 150/79 97.7 °F (36.5 °C) Oral (!) 48 18 95 %   01/18/22 0010 120/68 97.5 °F (36.4 °C) Axillary (!) 44 18 95 %   01/17/22 1930 112/69 98.1 °F (36.7 °C) Oral 54 18 94 %   01/17/22 1655 138/88 98.2 °F (36.8 °C) Oral (!) 49 20    01/17/22 1238 132/76 98 °F (36.7 °C) Oral (!) 46 18 97 %     No data found.         Intake/Output Summary (Last 24 hours) at 1/18/2022 0820  Last data filed at 1/18/2022 0423  Gross per 24 hour   Intake 120 ml   Output 1550 ml   Net -1430 ml         Physical Exam:   Vitals as above  General appearance: alert, appears stated age and cooperative    Head: Normocephalic, without obvious abnormality, atraumatic    Lungs: clear to auscultation bilaterally    Heart: regular rate and rhythm, S1, S2 normal, no murmur    Abdomen: soft, non-tender; bowel sounds normal; no masses, no organomegaly    Extremities: extremities normal, atraumatic, no cyanosis, no edema      Labs:  Lab Results   Component Value Date    WBC 3.0 (L) 01/18/2022    HGB 11.5 (L) 01/18/2022    HCT 33.2 (L) 01/18/2022     01/18/2022    CHOL 124 09/03/2019    TRIG 40 09/03/2019    HDL 64 (H) 09/03/2019    ALT 11 01/13/2022    AST 15 01/13/2022     (L) 01/18/2022    K 4.2 01/18/2022    CL 91 (L) 01/18/2022    CREATININE <0.5 (L) 01/18/2022    BUN 7 01/18/2022    CO2 29 01/18/2022    TSH 1.02 09/03/2019    PSA 0.87 09/03/2019    LABA1C 5.6 09/03/2019    LABMICR Not Indicated 12/24/2021     Lab Results   Component Value Date    TROPONINI <0.01 01/12/2022       Recent Imaging Results are Reviewed:  XR CHEST (2 VW)    Result Date: 12/24/2021  EXAMINATION: TWO XRAY VIEWS OF THE CHEST 12/24/2021 12:18 pm COMPARISON: 03/09/2021 HISTORY: ORDERING SYSTEM PROVIDED HISTORY: cough TECHNOLOGIST PROVIDED HISTORY: Reason for exam:->cough Reason for Exam: cough, lethargic; recent contact with +Covid relatives FINDINGS: The lungs are without acute focal process. There is no effusion or pneumothorax. The cardiomediastinal silhouette is stable. The osseous structures are stable. Thoracic scoliosis convex right again noted. No acute process.      XR CHEST PORTABLE    Result Date: 1/12/2022  EXAMINATION: ONE XRAY VIEW OF THE CHEST 1/12/2022 4:32 pm COMPARISON: 12/24/2021 HISTORY: ORDERING SYSTEM PROVIDED HISTORY: SOB TECHNOLOGIST PROVIDED HISTORY: Reason for exam:->SOB Reason for Exam: shortness of breath FINDINGS: The lungs are without acute focal process. There is no effusion or pneumothorax. The cardiomediastinal silhouette is stable. The osseous structures are stable. Thoracic scoliosis convex right. No acute process. Lab Results   Component Value Date    GLUCOSE 88 01/18/2022     No results found for: POCGLU  BP (!) 150/79   Pulse (!) 48   Temp 97.7 °F (36.5 °C) (Oral)   Resp 18   Ht 5' 10\" (1.778 m)   Wt 105 lb (47.6 kg)   SpO2 95%   BMI 15.07 kg/m²     Assessment and Plan:  Principal Problem:    Hyponatremia -Established problem. Stable. 126  Plan: Continue present orders/plan. Active Problems:    HTN (hypertension) -Established problem. Stable. 150/79  Plan: Continue present orders/plan. BPH (benign prostatic hyperplasia) -Established problem. Stable. Plan: Continue present orders/plan. Anemia Established problem. Stable. Plan: No indication for transfusion. Cont to monitor h/h to assess progression of anemia. Recommend ferrous sulfate or MVI as outpatient. COVID-19 -Established problem. Stable.   No o2 requirement  Plan: cont supportive tx    SIADH (syndrome of inappropriate ADH production) ([facility] [address]  Plan: per renal    Disp - eventually back to SNF, would like to see Na stable above 130 first      (Please note that portions of this note were completed with a voice recognition program.  Efforts were made to edit the dictations but occasionally words are mis-transcribed.)        Puja Rabago MD  1/18/2022

## 2022-01-18 NOTE — PROGRESS NOTES
MD David Benavidez MD Ulus Charter, MD               Office: [phone]                      Fax: [phone]             [facility]                   NEPHROLOGY INPATIENT PROGRESS NOTE:     PATIENT NAME: Mimi Luong  : 1940  MRN: [id number]         IMPRESSION:       Admitted for:  Hypochloremia [E87.8]  Hyponatremia [E87.1]      Treatment plan update. Hyponatremiastill not at goal.  Rate of correction acceptableacute on chronic problem. - no new neurologic symptoms   increased salt tablet. Na better today. - repeat Uosm pending. Volume status 7L volume neg    Hypokalemiaimproved  Hypophosphatemia-better  Hypomagnesemia-replace  Anemia -hgb stable  Hypoalbuminemia high-protein diet    Not ready for discharge. Discharge once Na 130 and above. Hyponatremia :  Better  : Acute on Chronic, Severe 113, mildly symptomatic w/ AMS. Better. Mental status stable today.   -No Reported Severe symptoms: seizures, obtundation, coma, and respiratory arrest.    - Risk factors for hyponatremia:    - NSAIDs use   - recent admission in 2021 - w/ hyponatremia - lowest Na 124 , on d/c 133 on 2021 - had \"URI\" symptoms, first Rx Levaquin + Steroids -> (+) COVID 2021, not hypoxic.    - Patient is at HIGH risk of developing Osmotic Demyelination Syndrome.    - Call us urgently if any/worsening neurological findings. PLAN:   Increased NaCl tabs  Repeat urine studies. , pending  1.2L fluid restriction  Na daily  Follow neuro status      Other major problems: Management per primary and other consulting teams.    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) ([facility] 1/15/2022 Yes            Jerome Gonzalez MD,  [facility] [address] [place]: [phone] or [employer]  Fax: [phone]        =======================================================================================   =======================================================================================  This patient is COVID-19, so in a strict isolation, as per current protocol; So in order to preserve PPE supply and to avoid unnecessary exposure to prevent transmission of COVID-19; this patient was NOT examined face to face, as risk of contact outweighs the benefits and likely would not change much of my clinical management. HPI, review of system and physical exam was limited, as it was mainly obtained from chart review and the primary team. But all relevant records and diagnostic tests were reviewed, including laboratory and imaging results. SUBJECTIVE:   Seen inside room  On RA  Follow bp    Past medical, Surgical, Social, Family medical history reviewed by me. MEDICATIONS: reviewed by me. Medications Prior to Admission:  No current facility-administered medications on file prior to encounter.      Current Outpatient Medications on File Prior to Encounter   Medication Sig Dispense Refill    cyanocobalamin 1000 MCG tablet Take 1,000 mcg by mouth daily      magnesium hydroxide (MILK OF MAGNESIA) 400 MG/5ML suspension Take 30 mLs by mouth daily as needed for Constipation      baclofen (LIORESAL) 10 MG tablet Take 1 tablet by mouth 3 times daily (Patient taking differently: Take 10 mg by mouth every 6 hours as needed Indications: muscle pain ) 30 tablet 1    Timolol (TIMOPTIC) 0.5 % (DAILY) SOLN ophthalmic solution 1 drop daily      dorzolamide (TRUSOPT) 2 % ophthalmic solution Place 2 drops into both eyes 3 times daily      latanoprost (XALATAN) 0.005 % ophthalmic solution Place 1 drop into both eyes nightly      finasteride (PROSCAR) 5 MG tablet Take 5 mg by mouth daily      tamsulosin (FLOMAX) 0.4 MG capsule Take 0.4 mg by mouth daily.              Current Facility-Administered Medications:     enoxaparin (LOVENOX) injection 30 mg, 30 mg, SubCUTAneous, BID, Nunu Treadwell MD    baclofen (LIORESAL) tablet 5 mg, 5 mg, Oral, Nightly PRN, Cherie Aguillon MD    sodium chloride tablet 2 g, 2 g, Oral, TID WC, Cory Barragan MD, 2 g at 01/18/22 7402    [Held by provider] 0.9 % sodium chloride infusion, , IntraVENous, Continuous, Taniya Kaufman MD, Last Rate: 50 mL/hr at 01/14/22 2358, New Bag at 01/14/22 2358    tamsulosin (FLOMAX) capsule 0.4 mg, 0.4 mg, Oral, Daily, Nunu Treadwell MD, 0.4 mg at 01/18/22 6953    Timolol (TIMOPTIC) 0.5 % ophthalmic solution 1 drop, 1 drop, Both Eyes, BID, Nunu Treadwell MD, 1 drop at 01/18/22 0921    dorzolamide (TRUSOPT) 2 % ophthalmic solution 2 drop, 2 drop, Both Eyes, TID, Nunu Treadwell MD, 2 drop at 01/18/22 0922    latanoprost (XALATAN) 0.005 % ophthalmic solution 1 drop, 1 drop, Both Eyes, Nightly, Nunu Treadwell MD, 1 drop at 01/17/22 2111    finasteride (PROSCAR) tablet 5 mg, 5 mg, Oral, Daily, Nunu Treadwell MD, 5 mg at 01/18/22 8793    ibuprofen (ADVIL;MOTRIN) tablet 200 mg, 200 mg, Oral, Q6H PRN, Nunu Treadwell MD, 200 mg at 01/13/22 1013    sodium chloride flush 0.9 % injection 5-40 mL, 5-40 mL, IntraVENous, 2 times per day, Nunu Treadwell MD, 10 mL at 01/18/22 0921    sodium chloride flush 0.9 % injection 5-40 mL, 5-40 mL, IntraVENous, PRN, Nunu Treadwell MD    0.9 % sodium chloride infusion, 25 mL, IntraVENous, PRN, Nunu Treadwell MD, Last Rate: 100 mL/hr at 01/18/22 0941, 25 mL at 01/18/22 0941    potassium chloride (KLOR-CON M) extended release tablet 40 mEq, 40 mEq, Oral, PRN **OR** potassium bicarb-citric acid (EFFER-K) effervescent tablet 40 mEq, 40 mEq, Oral, PRN **OR** potassium chloride 10 mEq/100 mL IVPB (Peripheral Line), 10 mEq, IntraVENous, PRN, MD Elle Lockhart ondansetron (ZOFRAN-ODT) disintegrating tablet 4 mg, 4 mg, Oral, Q8H PRN **OR** ondansetron (ZOFRAN) injection 4 mg, 4 mg, IntraVENous, Q6H PRN, Randal Medel MD    magnesium hydroxide (MILK OF MAGNESIA) 400 MG/5ML suspension 30 mL, 30 mL, Oral, Daily PRN, Randal Medel MD    acetaminophen (TYLENOL) tablet 650 mg, 650 mg, Oral, Q6H PRN, 650 mg at 01/16/22 2039 **OR** acetaminophen (TYLENOL) suppository 650 mg, 650 mg, Rectal, Q6H PRN, Randal Medel MD    hydrALAZINE (APRESOLINE) injection 10 mg, 10 mg, IntraVENous, Q6H PRN, Randal Medel MD    0.9 % sodium chloride bolus, 500 mL, IntraVENous, PRN, Randal Medel MD      =======================================================================================     PHYSICAL EXAM:  Recent vital signs and recent I/Os reviewed by me. Wt Readings from Last 3 Encounters:   01/14/22 105 lb (47.6 kg)   12/24/21 108 lb 11.2 oz (49.3 kg)   03/09/21 134 lb 3.2 oz (60.9 kg)     BP Readings from Last 3 Encounters:   01/18/22 (!) 150/79   12/27/21 112/60   03/12/21 138/84     Patient Vitals for the past 24 hrs:   BP Temp Temp src Pulse Resp SpO2   01/18/22 0730 (!) 150/79 97.7 °F (36.5 °C) Oral (!) 48 18 95 %   01/18/22 0010 120/68 97.5 °F (36.4 °C) Axillary (!) 44 18 95 %   01/17/22 1930 112/69 98.1 °F (36.7 °C) Oral 54 18 94 %   01/17/22 1655 138/88 98.2 °F (36.8 °C) Oral (!) 49 20    01/17/22 1238 132/76 98 °F (36.7 °C) Oral (!) 46 18 97 %       Intake/Output Summary (Last 24 hours) at 1/18/2022 1109  Last data filed at 1/18/2022 9790  Gross per 24 hour   Intake 120 ml   Output 1870 ml   Net -1750 ml          Physical exam:    In-person bedside physical examination deferred. Pursuant to the emergency declaration under the [facility], [address] [place] [address] authority and the [employer] Act, this clinical encounter was conducted to provide necessary medical care.    (Also consistent with new provisions and guidance offered by [facility] on March 18, 2020 in setting of COVID 19 outbreak and in order to preserve personal protective equipment in accordance with the flexibilities announced by CMS on March 30, 2020)   References: [email or web address]/[email or web address]/Resources/COVID-19/3_18%20Telemed%20Guidance%20Updated%20March%2018. pdf?qti=[email or web address]                      [email or web address]/Resources/COVID-19/3_18%20Telemed%20Guidance%20Updated%20March%2018. pdf?plr=[email or web address]                      [email or web address] pdf    However, I did visually inspect the patient and observed the following: via glass doors-windows    Vitals signs reviewed. Constitutional:       General:  no acute distress. Appearance:  ill-appearing. HENT:      Head: Normocephalic and atraumatic. Nose: Nose normal.      Mouth/Throat:      Mouth: Mucous membranes are moist.   Eyes:      General: No scleral icterus. Conjunctiva/sclera: Conjunctivae dry. Neck:      Musculoskeletal: Neck supple. Cardiovascular:      Rate and Rhythm: Normal rate. Pulmonary:      Effort: Pulmonary effort is  normal.   Abdominal:      General: There is no distension. Musculoskeletal:     lower leg:   No edema. Skin:     Coloration: Skin is not jaundiced. Neurological:      General Awake, but disoriented           =======================================================================================     DATA:  Diagnostic tests reviewed by me for today's visit:   (AS NEEDED FOR MY EVALUATION AND MANAGEMENT).        Recent Labs     01/16/22  0541 01/17/22  0326 01/18/22  0440   WBC 3.1* 3.3* 3.0*   HCT 29.7* 32.9* 33.2*    227 236     Iron Saturation:  No components found for: PERCENTFE  FERRITIN:  No results found for: FERRITIN  IRON:  No results found for: IRON  TIBC:  No results found for: TIBC    Recent Labs 01/16/22  0214 01/16/22  0541 01/16/22  0938 01/17/22  0326 01/18/22  0440   * 123* 124* 123* 126*   K  --  3.9  --  4.1 4.2   CL  --  89*  --  89* 91*   CO2  --  25  --  28 29   BUN  --  7  --  6* 7   CREATININE  --  <0.5*  --  <0.5* <0.5*     Recent Labs     01/16/22  0541 01/17/22  0326 01/18/22  0440 01/18/22  0831   CALCIUM 8.2* 8.7 9.0  --    MG  --   --  1.70* 1.70*   PHOS 2.5 2.6 2.8  --      No results for input(s): PH, PCO2, PO2 in the last 72 hours.     Invalid input(s): Lois Hines    ABG:  No results found for: PH, PCO2, PO2, HCO3, BE, THGB, TCO2, O2SAT  VBG:  No results found for: PHVEN, MTQ9JXP, BEVEN, S0NAJZPE    LDH:  No results found for: LDH  Uric Acid:    Lab Results   Component Value Date    LABURIC 1.5 01/12/2022       PT/INR:  No results found for: PROTIME, INR  Warfarin PT/INR:  No components found for: PTPATWAR, PTINRWAR  PTT:  No results found for: APTT, PTT[APTT}  Last 3 Troponin:    Lab Results   Component Value Date    TROPONINI <0.01 01/12/2022    TROPONINI <0.01 12/25/2021    TROPONINI <0.01 12/24/2021       U/A:    Lab Results   Component Value Date    COLORU YELLOW 12/24/2021    PROTEINU Negative 12/24/2021    PHUR 5.5 12/24/2021    CLARITYU Clear 12/24/2021    SPECGRAV 1.007 12/24/2021    LEUKOCYTESUR Negative 12/24/2021    UROBILINOGEN 1.0 12/24/2021    BILIRUBINUR Negative 12/24/2021    BLOODU Negative 12/24/2021    GLUCOSEU Negative 12/24/2021     Microalbumen/Creatinine ratio:  No components found for: RUCREAT  24 Hour Urine for Protein:  No components found for: RAWUPRO, UHRS3, UYJO49IX, UTV3  24 Hour Urine for Creatinine Clearance:  No components found for: CREAT4, UHRS10, UTV10  Urine Toxicology:  No components found for: Gradie Amanuel, IBENZO, ICOCAINE, IMARTHC, IOPIATES, IPHENCYC    HgBA1c:    Lab Results   Component Value Date    LABA1C 5.6 09/03/2019     RPR:  No results found for: RPR  HIV:  No results found for: HIV  HEMAL:  No results found for: ANATITER, HEMAL  RF:  No results found for: RF  DSDNA:  No components found for: DNA  AMYLASE:  No results found for: AMYLASE  LIPASE:    Lab Results   Component Value Date    LIPASE 23.0 04/19/2019     Fibrinogen Level:  No components found for: FIB       BELOW MENTIONED RADIOLOGY STUDY RESULTS BY ME (AS NEEDED FOR MY EVALUATION AND MANAGEMENT). XR CHEST (2 VW)    Result Date: 12/24/2021  EXAMINATION: TWO XRAY VIEWS OF THE CHEST 12/24/2021 12:18 pm COMPARISON: 03/09/2021 HISTORY: ORDERING SYSTEM PROVIDED HISTORY: cough TECHNOLOGIST PROVIDED HISTORY: Reason for exam:->cough Reason for Exam: cough, lethargic; recent contact with +Covid relatives FINDINGS: The lungs are without acute focal process. There is no effusion or pneumothorax. The cardiomediastinal silhouette is stable. The osseous structures are stable. Thoracic scoliosis convex right again noted. No acute process. XR CHEST PORTABLE    Result Date: 1/12/2022  EXAMINATION: ONE XRAY VIEW OF THE CHEST 1/12/2022 4:32 pm COMPARISON: 12/24/2021 HISTORY: ORDERING SYSTEM PROVIDED HISTORY: SOB TECHNOLOGIST PROVIDED HISTORY: Reason for exam:->SOB Reason for Exam: shortness of breath FINDINGS: The lungs are without acute focal process. There is no effusion or pneumothorax. The cardiomediastinal silhouette is stable. The osseous structures are stable. Thoracic scoliosis convex right. No acute process.

## 2022-01-19 PROBLEM — E46 MALNUTRITION (HCC): Status: ACTIVE | Noted: 2022-01-19

## 2022-01-19 LAB
ALBUMIN SERPL-MCNC: 3.6 G/DL (ref 3.4–5)
ANION GAP SERPL CALCULATED.3IONS-SCNC: 5 MMOL/L (ref 3–16)
BASOPHILS ABSOLUTE: 0 K/UL (ref 0–0.2)
BASOPHILS RELATIVE PERCENT: 1.1 %
BUN BLDV-MCNC: 5 MG/DL (ref 7–20)
C-REACTIVE PROTEIN: <3 MG/L (ref 0–5.1)
CALCIUM SERPL-MCNC: 8.9 MG/DL (ref 8.3–10.6)
CHLORIDE BLD-SCNC: 91 MMOL/L (ref 99–110)
CO2: 30 MMOL/L (ref 21–32)
CREAT SERPL-MCNC: <0.5 MG/DL (ref 0.8–1.3)
D DIMER: 207 NG/ML DDU (ref 0–229)
EOSINOPHILS ABSOLUTE: 0.3 K/UL (ref 0–0.6)
EOSINOPHILS RELATIVE PERCENT: 9.4 %
GFR AFRICAN AMERICAN: >60
GFR NON-AFRICAN AMERICAN: >60
GLUCOSE BLD-MCNC: 83 MG/DL (ref 70–99)
HCT VFR BLD CALC: 34.8 % (ref 40.5–52.5)
HEMOGLOBIN: 12 G/DL (ref 13.5–17.5)
LYMPHOCYTES ABSOLUTE: 0.8 K/UL (ref 1–5.1)
LYMPHOCYTES RELATIVE PERCENT: 26.1 %
MAGNESIUM: 2 MG/DL (ref 1.8–2.4)
MCH RBC QN AUTO: 32.9 PG (ref 26–34)
MCHC RBC AUTO-ENTMCNC: 34.6 G/DL (ref 31–36)
MCV RBC AUTO: 95 FL (ref 80–100)
MONOCYTES ABSOLUTE: 0.4 K/UL (ref 0–1.3)
MONOCYTES RELATIVE PERCENT: 12.1 %
NEUTROPHILS ABSOLUTE: 1.5 K/UL (ref 1.7–7.7)
NEUTROPHILS RELATIVE PERCENT: 51.3 %
PDW BLD-RTO: 14 % (ref 12.4–15.4)
PHOSPHORUS: 3.2 MG/DL (ref 2.5–4.9)
PLATELET # BLD: 240 K/UL (ref 135–450)
PMV BLD AUTO: 5.9 FL (ref 5–10.5)
POTASSIUM SERPL-SCNC: 4.6 MMOL/L (ref 3.5–5.1)
RBC # BLD: 3.66 M/UL (ref 4.2–5.9)
SODIUM BLD-SCNC: 126 MMOL/L (ref 136–145)
WBC # BLD: 2.9 K/UL (ref 4–11)

## 2022-01-19 PROCEDURE — 97116 GAIT TRAINING THERAPY: CPT

## 2022-01-19 PROCEDURE — 2580000003 HC RX 258: Performed by: INTERNAL MEDICINE

## 2022-01-19 PROCEDURE — 6370000000 HC RX 637 (ALT 250 FOR IP): Performed by: INTERNAL MEDICINE

## 2022-01-19 PROCEDURE — 2060000000 HC ICU INTERMEDIATE R&B

## 2022-01-19 PROCEDURE — 86140 C-REACTIVE PROTEIN: CPT

## 2022-01-19 PROCEDURE — 83735 ASSAY OF MAGNESIUM: CPT

## 2022-01-19 PROCEDURE — 80069 RENAL FUNCTION PANEL: CPT

## 2022-01-19 PROCEDURE — 85379 FIBRIN DEGRADATION QUANT: CPT

## 2022-01-19 PROCEDURE — 6360000002 HC RX W HCPCS: Performed by: INTERNAL MEDICINE

## 2022-01-19 PROCEDURE — 97530 THERAPEUTIC ACTIVITIES: CPT

## 2022-01-19 PROCEDURE — 85025 COMPLETE CBC W/AUTO DIFF WBC: CPT

## 2022-01-19 PROCEDURE — 36415 COLL VENOUS BLD VENIPUNCTURE: CPT

## 2022-01-19 RX ADMIN — DORZOLAMIDE HYDROCHLORIDE 2 DROP: 20 SOLUTION/ DROPS OPHTHALMIC at 20:01

## 2022-01-19 RX ADMIN — Medication 400 MG: at 20:00

## 2022-01-19 RX ADMIN — SODIUM CHLORIDE, PRESERVATIVE FREE 10 ML: 5 INJECTION INTRAVENOUS at 20:01

## 2022-01-19 RX ADMIN — Medication 2 G: at 16:56

## 2022-01-19 RX ADMIN — Medication 15 G: at 09:27

## 2022-01-19 RX ADMIN — ENOXAPARIN SODIUM 30 MG: 30 INJECTION SUBCUTANEOUS at 09:27

## 2022-01-19 RX ADMIN — Medication 2 G: at 09:26

## 2022-01-19 RX ADMIN — SODIUM CHLORIDE, PRESERVATIVE FREE 10 ML: 5 INJECTION INTRAVENOUS at 09:34

## 2022-01-19 RX ADMIN — DORZOLAMIDE HYDROCHLORIDE 2 DROP: 20 SOLUTION/ DROPS OPHTHALMIC at 09:34

## 2022-01-19 RX ADMIN — BACLOFEN 5 MG: 10 TABLET ORAL at 20:00

## 2022-01-19 RX ADMIN — ENOXAPARIN SODIUM 30 MG: 30 INJECTION SUBCUTANEOUS at 20:01

## 2022-01-19 RX ADMIN — SODIUM CHLORIDE, PRESERVATIVE FREE 10 ML: 5 INJECTION INTRAVENOUS at 11:23

## 2022-01-19 RX ADMIN — LATANOPROST 1 DROP: 50 SOLUTION OPHTHALMIC at 20:01

## 2022-01-19 RX ADMIN — Medication 15 G: at 20:01

## 2022-01-19 RX ADMIN — DORZOLAMIDE HYDROCHLORIDE 2 DROP: 20 SOLUTION/ DROPS OPHTHALMIC at 16:56

## 2022-01-19 RX ADMIN — TIMOLOL MALEATE 1 DROP: 5 SOLUTION OPHTHALMIC at 09:34

## 2022-01-19 RX ADMIN — Medication 2 G: at 11:21

## 2022-01-19 RX ADMIN — FINASTERIDE 5 MG: 5 TABLET, FILM COATED ORAL at 09:26

## 2022-01-19 RX ADMIN — TAMSULOSIN HYDROCHLORIDE 0.4 MG: 0.4 CAPSULE ORAL at 09:26

## 2022-01-19 RX ADMIN — TIMOLOL MALEATE 1 DROP: 5 SOLUTION OPHTHALMIC at 20:01

## 2022-01-19 RX ADMIN — Medication 400 MG: at 09:26

## 2022-01-19 ASSESSMENT — PAIN SCALES - GENERAL
PAINLEVEL_OUTOF10: 0
PAINLEVEL_OUTOF10: 0

## 2022-01-19 NOTE — PROGRESS NOTES
Comprehensive Nutrition Assessment    Type and Reason for Visit:  Initial (LOS)    Nutrition Recommendations/Plan:   Ensure Compact  Monitor PO intake    Nutrition Assessment:  Pt currently in isolation d/t COVID. Admitted with [facility]. He is under weight with a BMI 15. He weighed 134 lbs 10 months ago showing a 29 pound weight loss (22%) per notes. PO intake 51-75% of meals. 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 Will offer Ensure Compact BID to help with wound healing. Continue to monitor po intake throughout hospital stay. Malnutrition Assessment:  Malnutrition Status: Moderate malnutrition    Context:  Acute Illness     Findings of the 6 clinical characteristics of malnutrition:  Energy Intake:  7 - 50% or less of estimated energy requirements for 5 or more days  Weight Loss:   (22% weight loss in 10 months)     Body Fat Loss:  Unable to assess (Covid isolation)     Muscle Mass Loss:  Unable to assess    Fluid Accumulation:  Unable to assess     Strength:  Not Performed    Estimated Daily Nutrient Needs:  Energy (kcal):  4943-6805 (30-35 kcal/47.6kg); Weight Used for Energy Requirements:  Current     Protein (g):  71-95g (1.5-2g/47.6); Weight Used for Protein Requirements:  Current        Fluid (ml/day):  1200 ml; Method Used for Fluid Requirements:         Nutrition Related Findings:  BM 1/19, Glucose 83,       Wounds:  Stage III,Pressure Injury       Current Nutrition Therapies:    ADULT DIET;  Dysphagia - Soft and Bite Sized; 5 carb choices (75 gm/meal); 1200 ml  ADULT ORAL NUTRITION SUPPLEMENT; Lunch, Dinner; Standard 4 oz Oral Supplement    Anthropometric Measures:  · Height: 5' 10\" (177.8 cm)  · Current Body Weight: 105 lb (47.6 kg)   · Ideal Body Weight: 166 lbs; % Ideal Body Weight 63.3 %   · BMI: 15.1  · BMI Categories: Underweight (BMI less than 18.5)       Nutrition Diagnosis:   · Increased nutrient needs related to increase demand for energy/nutrients as evidenced by wounds,weight loss      Nutrition Interventions:   Food and/or Nutrient Delivery:  Continue Current Diet,Start Oral Nutrition Supplement  Nutrition Education/Counseling:  Education not indicated   Coordination of Nutrition Care:  Continue to monitor while inpatient    Goals:  PO intake 75% or greater at meals and supplements       Nutrition Monitoring and Evaluation:   Behavioral-Environmental Outcomes:  None Identified   Food/Nutrient Intake Outcomes:  Food and Nutrient Intake,Supplement Intake  Physical Signs/Symptoms Outcomes:  Weight,Skin,Fluid Status or Edema     Discharge Planning:    Continue Oral Nutrition Supplement     Electronically signed by Adonay Collins RD, LD on 1/19/22 at 12:56 PM EST    Contact: [phone]

## 2022-01-19 NOTE — PROGRESS NOTES
MD Crissy Rajan MD Burnis Fanny, MD               Office: [phone]                      Fax: [phone]             [facility]                   NEPHROLOGY INPATIENT PROGRESS NOTE:     PATIENT NAME: Moise Manriquez  : 1940  MRN: [id number]         IMPRESSION:       Admitted for:  Hypochloremia [E87.8]  Hyponatremia [E87.1]      Treatment plan update. Hyponatremiastill not at goal.  Rate of correction acceptableacute on chronic problem. - no new neurologic symptoms   increased salt tablet. Na still not at goal.  Start urea. If not at goal tomorrow, will give Tolvaptan. - repeat Uosm 456 with Mamie of 172    Volume status 7.4L volume neg  BP at goal    Hypokalemiaimproved  Hypophosphatemia-better  Hypomagnesemia-replaced  Anemia -hgb stable  Hypoalbuminemia high-protein diet    Not ready for discharge. Discharge once Na 130 and above. Hyponatremia :  Better  : Acute on Chronic, Severe 113, mildly symptomatic w/ AMS. Better. Mental status stable today.   -No Reported Severe symptoms: seizures, obtundation, coma, and respiratory arrest.    - Risk factors for hyponatremia:    - NSAIDs use   - recent admission in 2021 - w/ hyponatremia - lowest Na 124 , on d/c 133 on 2021 - had \"URI\" symptoms, first Rx Levaquin + Steroids -> (+) COVID 2021, not hypoxic.    - Patient is at HIGH risk of developing Osmotic Demyelination Syndrome.    - Call us urgently if any/worsening neurological findings. PLAN:   Increased NaCl tabs  Add urea bid. Tolvaptan tomorrow if still not at goal  1.2L fluid restriction  Na daily  Follow neuro status      Other major problems: Management per primary and other consulting teams.    //    Tank (+)  Not hypoxia     Hospital Problems           Last Modified POA    * (Principal) Hyponatremia 2022 Yes    HTN (hypertension) 2022 Yes    BPH (benign prostatic hyperplasia) 1/12/2022 Yes    Anemia 1/12/2022 Yes    COVID-19 1/13/2022 Yes    SIADH (syndrome of inappropriate ADH production) ([facility] 1/15/2022 Yes            Tuan Mario MD,  [facility] [address] [place]: [phone] or Via [employer]  Fax: [phone]        =======================================================================================   =======================================================================================  This patient is COVID-19, so in a strict isolation, as per current protocol; So in order to preserve PPE supply and to avoid unnecessary exposure to prevent transmission of COVID-19; this patient was NOT examined face to face, as risk of contact outweighs the benefits and likely would not change much of my clinical management. HPI, review of system and physical exam was limited, as it was mainly obtained from chart review and the primary team. But all relevant records and diagnostic tests were reviewed, including laboratory and imaging results. SUBJECTIVE:   Seen outside room      Past medical, Surgical, Social, Family medical history reviewed by me. MEDICATIONS: reviewed by me. Medications Prior to Admission:  No current facility-administered medications on file prior to encounter.      Current Outpatient Medications on File Prior to Encounter   Medication Sig Dispense Refill    cyanocobalamin 1000 MCG tablet Take 1,000 mcg by mouth daily      magnesium hydroxide (MILK OF MAGNESIA) 400 MG/5ML suspension Take 30 mLs by mouth daily as needed for Constipation      baclofen (LIORESAL) 10 MG tablet Take 1 tablet by mouth 3 times daily (Patient taking differently: Take 10 mg by mouth every 6 hours as needed Indications: muscle pain ) 30 tablet 1    Timolol (TIMOPTIC) 0.5 % (DAILY) SOLN ophthalmic solution 1 drop daily      dorzolamide (TRUSOPT) 2 % ophthalmic solution Place 2 drops into both eyes 3 times daily      latanoprost (XALATAN) 0.005 % ophthalmic solution Place 1 drop into both eyes nightly      finasteride (PROSCAR) 5 MG tablet Take 5 mg by mouth daily      tamsulosin (FLOMAX) 0.4 MG capsule Take 0.4 mg by mouth daily.              Current Facility-Administered Medications:     urea (URE-NA) packet 15 g, 15 g, Oral, BID, Cory Quezada MD, 15 g at 01/19/22 0927    enoxaparin (LOVENOX) injection 30 mg, 30 mg, SubCUTAneous, BID, Leticia Fuller MD, 30 mg at 01/19/22 8225    magnesium oxide (MAG-OX) tablet 400 mg, 400 mg, Oral, BID, Cory Quezada MD, 400 mg at 01/19/22 2789    baclofen (LIORESAL) tablet 5 mg, 5 mg, Oral, Nightly PRN, Darling Archuleta MD    sodium chloride tablet 2 g, 2 g, Oral, TID WC, Cory Quezada MD, 2 g at 01/19/22 1121    [Held by provider] 0.9 % sodium chloride infusion, , IntraVENous, Continuous, Jomar Byrd MD, Stopped at 01/16/22 1455    tamsulosin (FLOMAX) capsule 0.4 mg, 0.4 mg, Oral, Daily, Leticia Fuller MD, 0.4 mg at 01/19/22 0926    Timolol (TIMOPTIC) 0.5 % ophthalmic solution 1 drop, 1 drop, Both Eyes, BID, Leticia Fuller MD, 1 drop at 01/19/22 0934    dorzolamide (TRUSOPT) 2 % ophthalmic solution 2 drop, 2 drop, Both Eyes, TID, Leticia Fuller MD, 2 drop at 01/19/22 0934    latanoprost (XALATAN) 0.005 % ophthalmic solution 1 drop, 1 drop, Both Eyes, Nightly, Leticia Fuller MD, 1 drop at 01/18/22 2015    finasteride (PROSCAR) tablet 5 mg, 5 mg, Oral, Daily, Leticia Fuller MD, 5 mg at 01/19/22 0926    ibuprofen (ADVIL;MOTRIN) tablet 200 mg, 200 mg, Oral, Q6H PRN, Leticia Fuller MD, 200 mg at 01/13/22 1013    sodium chloride flush 0.9 % injection 5-40 mL, 5-40 mL, IntraVENous, 2 times per day, Leticia Fuller MD, 10 mL at 01/19/22 1123    sodium chloride flush 0.9 % injection 5-40 mL, 5-40 mL, IntraVENous, PRN, Leticia Fuller MD, 10 mL at 01/19/22 0934    0.9 % sodium chloride infusion, 25 mL, IntraVENous, PRN, Leticia Fuller MD, Stopped at 01/18/22 1138   potassium chloride (KLOR-CON M) extended release tablet 40 mEq, 40 mEq, Oral, PRN **OR** potassium bicarb-citric acid (EFFER-K) effervescent tablet 40 mEq, 40 mEq, Oral, PRN **OR** potassium chloride 10 mEq/100 mL IVPB (Peripheral Line), 10 mEq, IntraVENous, PRN, Tyrell Forde MD    ondansetron (ZOFRAN-ODT) disintegrating tablet 4 mg, 4 mg, Oral, Q8H PRN **OR** ondansetron (ZOFRAN) injection 4 mg, 4 mg, IntraVENous, Q6H PRN, Tyrell Forde MD    magnesium hydroxide (MILK OF MAGNESIA) 400 MG/5ML suspension 30 mL, 30 mL, Oral, Daily PRN, Tyrell Forde MD    acetaminophen (TYLENOL) tablet 650 mg, 650 mg, Oral, Q6H PRN, 650 mg at 01/18/22 2021 **OR** acetaminophen (TYLENOL) suppository 650 mg, 650 mg, Rectal, Q6H PRN, Tyrell Forde MD    hydrALAZINE (APRESOLINE) injection 10 mg, 10 mg, IntraVENous, Q6H PRN, Tyrell Forde MD    0.9 % sodium chloride bolus, 500 mL, IntraVENous, PRN, Tyrell Forde MD      =======================================================================================     PHYSICAL EXAM:  Recent vital signs and recent I/Os reviewed by me.      Wt Readings from Last 3 Encounters:   01/14/22 105 lb (47.6 kg)   12/24/21 108 lb 11.2 oz (49.3 kg)   03/09/21 134 lb 3.2 oz (60.9 kg)     BP Readings from Last 3 Encounters:   01/19/22 130/74   12/27/21 112/60   03/12/21 138/84     Patient Vitals for the past 24 hrs:   BP Temp Temp src Pulse Resp SpO2   01/19/22 0700 130/74 97.6 °F (36.4 °C) Oral 55 18 98 %   01/19/22 0426 127/73 97.4 °F (36.3 °C) Oral 51 18 97 %   01/19/22 0010 115/68 98 °F (36.7 °C) Oral 50 16 96 %   01/18/22 1914 101/61 98.3 °F (36.8 °C) Oral 53 16 98 %   01/18/22 1530 105/65 98.2 °F (36.8 °C) Oral 50 18 95 %   01/18/22 1230 135/74 97.4 °F (36.3 °C) Oral 52 18 96 %       Intake/Output Summary (Last 24 hours) at 1/19/2022 1132  Last data filed at 1/19/2022 1103  Gross per 24 hour   Intake 335.22 ml   Output 720 ml   Net -384.78 ml          Physical exam:    In-person bedside physical examination deferred. Pursuant to the emergency declaration under the [facility], [address] [place] [address] authority and the [employer] Act, this clinical encounter was conducted to provide necessary medical care. (Also consistent with new provisions and guidance offered by [facility] on March 18, 2020 in setting of COVID 19 outbreak and in order to preserve personal protective equipment in accordance with the flexibilities announced by CMS on March 30, 2020)   References: https://[facility]. [facility]/Portals/0/Resources/COVID-19/3_18%20Telemed%20Guidance%20Updated%20March%2018. pdf?grd=[email or web address]                      https://[facility]. [facility]/Portals/0/Resources/COVID-19/3_18%20Telemed%20Guidance%20Updated%20March%2018. pdf?pux=[phone]                      http://[employer]/. pdf    However, I did visually inspect the patient and observed the following: via glass doors-windows    Vitals signs reviewed. Constitutional:       General:  no acute distress. Appearance:  ill-appearing. HENT:      Head: Normocephalic and atraumatic. Nose: Nose normal.      Mouth/Throat:      Mouth: Mucous membranes are moist.   Eyes:      General: No scleral icterus. Conjunctiva/sclera: Conjunctivae dry. Neck:      Musculoskeletal: Neck supple. Cardiovascular:      Rate and Rhythm: Normal rate. Pulmonary:      Effort: Pulmonary effort is  normal.   Abdominal:      General: There is no distension. Musculoskeletal:     lower leg:   No edema. Skin:     Coloration: Skin is not jaundiced. Neurological:      General Awake, but disoriented           =======================================================================================     DATA:  Diagnostic tests reviewed by me for today's visit:   (AS NEEDED FOR MY EVALUATION AND MANAGEMENT). Recent Labs     01/17/22 0326 01/18/22  0440 01/19/22  0455   WBC 3.3* 3.0* 2.9*   HCT 32.9* 33.2* 34.8*    236 240     Iron Saturation:  No components found for: PERCENTFE  FERRITIN:  No results found for: FERRITIN  IRON:  No results found for: IRON  TIBC:  No results found for: TIBC    Recent Labs     01/17/22 0326 01/18/22  0440 01/19/22  0455   * 126* 126*   K 4.1 4.2 4.6   CL 89* 91* 91*   CO2 28 29 30   BUN 6* 7 5*   CREATININE <0.5* <0.5* <0.5*     Recent Labs     01/17/22 0326 01/18/22 0440 01/18/22  0831 01/19/22  0455   CALCIUM 8.7 9.0  --  8.9   MG  --  1.70* 1.70* 2.00   PHOS 2.6 2.8  --  3.2     No results for input(s): PH, PCO2, PO2 in the last 72 hours.     Invalid input(s): Germain Yi    ABG:  No results found for: PH, PCO2, PO2, HCO3, BE, THGB, TCO2, O2SAT  VBG:  No results found for: PHVEN, GNY1RGW, BEVEN, V5HSTFEJ    LDH:  No results found for: LDH  Uric Acid:    Lab Results   Component Value Date    LABURIC 1.5 01/12/2022       PT/INR:  No results found for: PROTIME, INR  Warfarin PT/INR:  No components found for: PTPATWAR, PTINRWAR  PTT:  No results found for: APTT, PTT[APTT}  Last 3 Troponin:    Lab Results   Component Value Date    TROPONINI <0.01 01/12/2022    TROPONINI <0.01 12/25/2021    TROPONINI <0.01 12/24/2021       U/A:    Lab Results   Component Value Date    COLORU YELLOW 12/24/2021    PROTEINU Negative 12/24/2021    PHUR 5.5 12/24/2021    CLARITYU Clear 12/24/2021    SPECGRAV 1.007 12/24/2021    LEUKOCYTESUR Negative 12/24/2021    UROBILINOGEN 1.0 12/24/2021    BILIRUBINUR Negative 12/24/2021    BLOODU Negative 12/24/2021    GLUCOSEU Negative 12/24/2021     Microalbumen/Creatinine ratio:  No components found for: RUCREAT  24 Hour Urine for Protein:  No components found for: RAWUPRO, UHRS3, MUFB80RY, UTV3  24 Hour Urine for Creatinine Clearance:  No components found for: CREAT4, UHRS10, UTV10  Urine Toxicology:  No components found for: IAMMENTA, IBARBIT, IBENZO, ICOCAINE, IMARTHC, IOPIATES, IPHENCYC    HgBA1c:    Lab Results   Component Value Date    LABA1C 5.6 09/03/2019     RPR:  No results found for: RPR  HIV:  No results found for: HIV  HEMAL:  No results found for: ANATITER, HEMAL  RF:  No results found for: RF  DSDNA:  No components found for: DNA  AMYLASE:  No results found for: AMYLASE  LIPASE:    Lab Results   Component Value Date    LIPASE 23.0 04/19/2019     Fibrinogen Level:  No components found for: FIB       BELOW MENTIONED RADIOLOGY STUDY RESULTS BY ME (AS NEEDED FOR MY EVALUATION AND MANAGEMENT). XR CHEST (2 VW)    Result Date: 12/24/2021  EXAMINATION: TWO XRAY VIEWS OF THE CHEST 12/24/2021 12:18 pm COMPARISON: 03/09/2021 HISTORY: ORDERING SYSTEM PROVIDED HISTORY: cough TECHNOLOGIST PROVIDED HISTORY: Reason for exam:->cough Reason for Exam: cough, lethargic; recent contact with +Covid relatives FINDINGS: The lungs are without acute focal process. There is no effusion or pneumothorax. The cardiomediastinal silhouette is stable. The osseous structures are stable. Thoracic scoliosis convex right again noted. No acute process. XR CHEST PORTABLE    Result Date: 1/12/2022  EXAMINATION: ONE XRAY VIEW OF THE CHEST 1/12/2022 4:32 pm COMPARISON: 12/24/2021 HISTORY: ORDERING SYSTEM PROVIDED HISTORY: SOB TECHNOLOGIST PROVIDED HISTORY: Reason for exam:->SOB Reason for Exam: shortness of breath FINDINGS: The lungs are without acute focal process. There is no effusion or pneumothorax. The cardiomediastinal silhouette is stable. The osseous structures are stable. Thoracic scoliosis convex right. No acute process.

## 2022-01-19 NOTE — CARE COORDINATION
Discharge planning note:    Accepted for admission at:    [facility]  [id number]    Phone: [phone]  Fax: [phone]      Transport scheduled for tomorrow, 1/20/22 at 1 pm via:    [address] [place] [address] MSN, BSN, RN  Case Management   [phone]

## 2022-01-19 NOTE — PLAN OF CARE
Nutrition Problem #1: Increased nutrient needs  Intervention: Food and/or Nutrient Delivery: Continue Current Diet,Start Oral Nutrition Supplement  Nutritional Goals: PO intake 75% or greater at meals and supplements

## 2022-01-19 NOTE — PROGRESS NOTES
Physical Therapy  Facility/Department: [address]  Daily Treatment Note  NAME: Alise Tuttle  : 1940  MRN: [id number]    Date of Service: 2022    Discharge Recommendations:  Alise Tuttle scored a 15/24 on the AM-PAC short mobility form. Current research shows that an AM-PAC score of 17 or less is typically not associated with a discharge to the patient's home setting. Based on the patient's AM-PAC score and their current functional mobility deficits, it is recommended that the patient have 3-5 sessions per week of Physical Therapy at d/c to increase the patient's independence. Please see assessment section for further patient specific details. If patient discharges prior to next session this note will serve as a discharge summary. Please see below for the latest assessment towards goals. 3-5 sessions per week   PT Equipment Recommendations  Equipment Needed: No    Assessment   Body structures, Functions, Activity limitations: Decreased functional mobility ; Decreased strength;Decreased endurance;Decreased safe awareness;Decreased balance;Decreased ROM  Assessment: Pt contintues to present below baseline functioning. Pt is able to complete bed mobility with SBA, transfers with min A and ambulate with RW and CGA. Pt would continue to benefit from skilled PT in order to safely return to [facility]. Treatment Diagnosis: decreased functional mobility, impaired gait, decreased balance  Clinical Presentation: evolving  PT Education: Goals;PT Role;Plan of Care  Patient Education: Pt encouraged to lay on side in bed in order to avoid pressure on buttocks (). Pt verbalized understanding  Barriers to Learning: hearing, visual, cognitive  REQUIRES PT FOLLOW UP: Yes  Activity Tolerance  Activity Tolerance: Patient limited by fatigue     Patient Diagnosis(es): The primary encounter diagnosis was Hyponatremia. A diagnosis of Hypochloremia was also pertinent to this visit.      has a past medical history of Glaucoma and Prostate enlargement. has no past surgical history on file. Restrictions  Restrictions/Precautions  Restrictions/Precautions: Fall Risk (dys soft and bite sized, medium fall risk)  Required Braces or Orthoses?: No  Position Activity Restriction  Other position/activity restrictions: Melida Amado is a 80 y.o. male who presents for evaluation of abnormal labs. Apparently had a sodium of 117 at home. Does have a history of hyponatremia. Patient is very hard of hearing a difficult historian. No additional information is able to obtain at this time. Subjective   General  Chart Reviewed: Yes  Family / Caregiver Present: No  Subjective  Subjective: Reported generally not feeling well. States he hasn't been able to have BM. Very [place] - communication difficult  General Comment  Comments: supine in bed upon arrival with seizure precautions in place  Pain Screening  Patient Currently in Pain: Denies  Vital Signs  Patient Currently in Pain: Denies       Orientation  Orientation  Orientation Level: Oriented to place;Oriented to situation;Oriented to person  Cognition      Objective   Bed mobility  Supine to Sit: Stand by assistance  Sit to Supine: Stand by assistance  Scooting: Stand by assistance  Transfers  Sit to Stand: Contact guard assistance;Minimal Assistance (CGA from bed, min A from toilet)  Stand to sit: Minimal Assistance  Ambulation 1  Surface: level tile  Device: Rolling Walker  Assistance: Contact guard assistance  Quality of Gait: narrow NAM, shuffling like steps/ minmal foot clearance and step length  Distance: 10 feet x 2  Comments: seated rest break needed between each trial     Balance  Sitting - Static: Good  Sitting - Dynamic: Good  Standing - Dynamic: Fair  Comments: Reported neck discomfort in bed. Trialed different cervical positions with no stated improvement.     AM-PAC Score  AM-PAC Inpatient Mobility Raw Score : 15 (01/19/22 1400)  AM-PAC Inpatient T-Scale Score : 39.45 (01/19/22 1400)  Mobility Inpatient CMS 0-100% Score: 57.7 (01/19/22 1400)  Mobility Inpatient CMS G-Code Modifier : CK (01/19/22 1400)          Goals  Short term goals  Time Frame for Short term goals: To be met prior to discharge  Short term goal 1: Bed mobility with supervision  Short term goal 2: Sit to/from stand with CGA  Short term goal 3: Bed to/from chair with mod A (MET 1/17)  Short term goal 4: Ambulate 13' with AAD and min A (MET 1/17)  Short term goal 5: Pt will be able to ambulate 50 ft with LRAD and CGA  Patient Goals   Patient goals : unable to state  No goals met this treatment. Plan    Plan  Times per week: 3-5  Times per day: Daily  Current Treatment Recommendations: Strengthening,Functional Mobility Training,Transfer Training,Balance Training,Endurance Training,Gait Training,Patient/Caregiver Education & [employer] Exercise Program,Safety Education & Training  Safety Devices  Type of devices:  All fall risk precautions in place,Call light within reach,Bed alarm in place,Patient at risk for falls,Left in bed,Nurse notified,Gait belt (seizure pads in place)  Restraints  Initially in place: No (seizure pads in place)     Therapy Time   Individual Concurrent Group Co-treatment   Time In 1305         Time Out 1330         Minutes 25         Timed Code Treatment Minutes: [address], PT    Thanks, Pasha Nicholson, PT, DPT [id number]

## 2022-01-19 NOTE — PROGRESS NOTES
Progress Note - Dr. Donovan Barcenas - Internal Medicine  PCP: Tirso Aguilera MD [id number] / David Olvera [phone]    Hospital Day: 7  Code Status: Full Code  Current Diet: ADULT DIET; Dysphagia - Soft and Bite Sized; 5 carb choices (75 gm/meal); 1200 ml        CC: follow up on medical issues    Subjective:   Day Alexis is a 80 y.o. male. Pt seen and examined  Chart reviewed since last visit, labs and imaging below        Resting comfortably  Confused  Na better, but still low (126)  Case d/w dr Eleanor Winchester - goal would be around 130    He denies chest pain, denies shortness of breath, denies nausea,  denies emesis. 10 system Review of Systems is reviewed with patient, and pertinent positives are noted in HPI above . Otherwise, Review of systems is negative. I have reviewed the patient's medical and social history in detail and updated the computerized patient record. To recap: He  has a past medical history of Glaucoma and Prostate enlargement. . He  has no past surgical history on file. Huseyin Felipe He  reports that he has never smoked. He has never used smokeless tobacco. He reports that he does not drink alcohol and does not use drugs. .        Active Hospital Problems    Diagnosis Date Noted    [facility]synd[facility] [E22.2] 01/15/2022    COVID-19 [U07.1] 12/27/2021    Hyponatremia [E87.1] 12/24/2021    HTN (hypertension) [I10] 03/09/2021    BPH (benign prostatic hyperplasia) [N40.0] 03/09/2021    Anemia [D64.9] 03/09/2021       Current Facility-Administered Medications: enoxaparin (LOVENOX) injection 30 mg, 30 mg, SubCUTAneous, BID  magnesium oxide (MAG-OX) tablet 400 mg, 400 mg, Oral, BID  baclofen (LIORESAL) tablet 5 mg, 5 mg, Oral, Nightly PRN  sodium chloride tablet 2 g, 2 g, Oral, TID WC  [Held by provider] 0.9 % sodium chloride infusion, , IntraVENous, Continuous  tamsulosin (FLOMAX) capsule 0.4 mg, 0.4 mg, Oral, Daily  Timolol (TIMOPTIC) 0.5 % ophthalmic solution 1 1446  Last data filed at 1/19/2022 0543  Gross per 24 hour   Intake    Output 820 ml   Net -820 ml         Physical Exam:   Vitals as above  General appearance: alert, appears stated age and cooperative    Head: Normocephalic, without obvious abnormality, atraumatic    Lungs: clear to auscultation bilaterally    Heart: regular rate and rhythm, S1, S2 normal, no murmur    Abdomen: soft, non-tender; bowel sounds normal; no masses, no organomegaly    Extremities: extremities normal, atraumatic, no cyanosis, no edema      Labs:  Lab Results   Component Value Date    WBC 2.9 (L) 01/19/2022    HGB 12.0 (L) 01/19/2022    HCT 34.8 (L) 01/19/2022     01/19/2022    CHOL 124 09/03/2019    TRIG 40 09/03/2019    HDL 64 (H) 09/03/2019    ALT 11 01/13/2022    AST 15 01/13/2022     (L) 01/19/2022    K 4.6 01/19/2022    CL 91 (L) 01/19/2022    CREATININE <0.5 (L) 01/19/2022    BUN 5 (L) 01/19/2022    CO2 30 01/19/2022    TSH 1.02 09/03/2019    PSA 0.87 09/03/2019    LABA1C 5.6 09/03/2019    LABMICR Not Indicated 12/24/2021     Lab Results   Component Value Date    TROPONINI <0.01 01/12/2022       Recent Imaging Results are Reviewed:  XR CHEST (2 VW)    Result Date: 12/24/2021  EXAMINATION: TWO XRAY VIEWS OF THE CHEST 12/24/2021 12:18 pm COMPARISON: 03/09/2021 HISTORY: ORDERING SYSTEM PROVIDED HISTORY: cough TECHNOLOGIST PROVIDED HISTORY: Reason for exam:->cough Reason for Exam: cough, lethargic; recent contact with +Covid relatives FINDINGS: The lungs are without acute focal process. There is no effusion or pneumothorax. The cardiomediastinal silhouette is stable. The osseous structures are stable. Thoracic scoliosis convex right again noted. No acute process.      XR CHEST PORTABLE    Result Date: 1/12/2022  EXAMINATION: ONE XRAY VIEW OF THE CHEST 1/12/2022 4:32 pm COMPARISON: 12/24/2021 HISTORY: ORDERING SYSTEM PROVIDED HISTORY: SOB TECHNOLOGIST PROVIDED HISTORY: Reason for exam:->SOB Reason for Exam: shortness of breath FINDINGS: The lungs are without acute focal process. There is no effusion or pneumothorax. The cardiomediastinal silhouette is stable. The osseous structures are stable. Thoracic scoliosis convex right. No acute process. Lab Results   Component Value Date    GLUCOSE 83 01/19/2022     No results found for: POCGLU  /74   Pulse 55   Temp 97.6 °F (36.4 °C) (Oral)   Resp 18   Ht 5' 10\" (1.778 m)   Wt 105 lb (47.6 kg)   SpO2 98%   BMI 15.07 kg/m²     Assessment and Plan:  Principal Problem:    Hyponatremia -Established problem. Stable. 126 again today  Plan: Continue present orders/plan. Active Problems:    HTN (hypertension) -Established problem. Stable. 130/74  Plan: Continue present orders/plan. BPH (benign prostatic hyperplasia) -Established problem. Stable. Plan: Continue present orders/plan. Anemia Established problem. Stable. Plan: No indication for transfusion. Cont to monitor h/h to assess progression of anemia. Recommend ferrous sulfate or MVI as outpatient. COVID-19 -Established problem. Stable.   No o2 requirement  Plan: cont supportive tx    SIADH (syndrome of inappropriate ADH production) ([facility]  Plan: per renal    Disp - eventually back to SNF, would like to see Na stable above 130 first.  Hopefully next 1-2d      (Please note that portions of this note were completed with a voice recognition program.  Efforts were made to edit the dictations but occasionally words are mis-transcribed.)        Ap Guevara MD  1/19/2022

## 2022-01-20 LAB
ANION GAP SERPL CALCULATED.3IONS-SCNC: 6 MMOL/L (ref 3–16)
BASOPHILS ABSOLUTE: 0 K/UL (ref 0–0.2)
BASOPHILS RELATIVE PERCENT: 1.3 %
BUN BLDV-MCNC: 20 MG/DL (ref 7–20)
CALCIUM SERPL-MCNC: 8.7 MG/DL (ref 8.3–10.6)
CHLORIDE BLD-SCNC: 91 MMOL/L (ref 99–110)
CO2: 27 MMOL/L (ref 21–32)
CREAT SERPL-MCNC: <0.5 MG/DL (ref 0.8–1.3)
EOSINOPHILS ABSOLUTE: 0.3 K/UL (ref 0–0.6)
EOSINOPHILS RELATIVE PERCENT: 8.8 %
GFR AFRICAN AMERICAN: >60
GFR NON-AFRICAN AMERICAN: >60
GLUCOSE BLD-MCNC: 79 MG/DL (ref 70–99)
HCT VFR BLD CALC: 32.6 % (ref 40.5–52.5)
HEMOGLOBIN: 11.4 G/DL (ref 13.5–17.5)
LYMPHOCYTES ABSOLUTE: 0.6 K/UL (ref 1–5.1)
LYMPHOCYTES RELATIVE PERCENT: 21 %
MCH RBC QN AUTO: 33.1 PG (ref 26–34)
MCHC RBC AUTO-ENTMCNC: 34.8 G/DL (ref 31–36)
MCV RBC AUTO: 95 FL (ref 80–100)
MONOCYTES ABSOLUTE: 0.4 K/UL (ref 0–1.3)
MONOCYTES RELATIVE PERCENT: 14.7 %
NEUTROPHILS ABSOLUTE: 1.6 K/UL (ref 1.7–7.7)
NEUTROPHILS RELATIVE PERCENT: 54.2 %
PDW BLD-RTO: 14.7 % (ref 12.4–15.4)
PLATELET # BLD: 227 K/UL (ref 135–450)
PMV BLD AUTO: 6.2 FL (ref 5–10.5)
POTASSIUM SERPL-SCNC: 4.4 MMOL/L (ref 3.5–5.1)
RBC # BLD: 3.43 M/UL (ref 4.2–5.9)
SODIUM BLD-SCNC: 124 MMOL/L (ref 136–145)
WBC # BLD: 3 K/UL (ref 4–11)

## 2022-01-20 PROCEDURE — 2060000000 HC ICU INTERMEDIATE R&B

## 2022-01-20 PROCEDURE — 36415 COLL VENOUS BLD VENIPUNCTURE: CPT

## 2022-01-20 PROCEDURE — 97530 THERAPEUTIC ACTIVITIES: CPT

## 2022-01-20 PROCEDURE — 6370000000 HC RX 637 (ALT 250 FOR IP): Performed by: INTERNAL MEDICINE

## 2022-01-20 PROCEDURE — 2580000003 HC RX 258: Performed by: INTERNAL MEDICINE

## 2022-01-20 PROCEDURE — 6360000002 HC RX W HCPCS: Performed by: INTERNAL MEDICINE

## 2022-01-20 PROCEDURE — 85025 COMPLETE CBC W/AUTO DIFF WBC: CPT

## 2022-01-20 PROCEDURE — 80048 BASIC METABOLIC PNL TOTAL CA: CPT

## 2022-01-20 PROCEDURE — 92526 ORAL FUNCTION THERAPY: CPT

## 2022-01-20 PROCEDURE — 97535 SELF CARE MNGMENT TRAINING: CPT

## 2022-01-20 RX ORDER — TOLVAPTAN 15 MG/1
15 TABLET ORAL ONCE
Status: COMPLETED | OUTPATIENT
Start: 2022-01-20 | End: 2022-01-20

## 2022-01-20 RX ADMIN — DORZOLAMIDE HYDROCHLORIDE 2 DROP: 20 SOLUTION/ DROPS OPHTHALMIC at 19:55

## 2022-01-20 RX ADMIN — DORZOLAMIDE HYDROCHLORIDE 2 DROP: 20 SOLUTION/ DROPS OPHTHALMIC at 15:05

## 2022-01-20 RX ADMIN — LATANOPROST 1 DROP: 50 SOLUTION OPHTHALMIC at 19:56

## 2022-01-20 RX ADMIN — TAMSULOSIN HYDROCHLORIDE 0.4 MG: 0.4 CAPSULE ORAL at 10:19

## 2022-01-20 RX ADMIN — TIMOLOL MALEATE 1 DROP: 5 SOLUTION OPHTHALMIC at 10:20

## 2022-01-20 RX ADMIN — TIMOLOL MALEATE 1 DROP: 5 SOLUTION OPHTHALMIC at 19:55

## 2022-01-20 RX ADMIN — TOLVAPTAN 15 MG: 15 TABLET ORAL at 13:27

## 2022-01-20 RX ADMIN — FINASTERIDE 5 MG: 5 TABLET, FILM COATED ORAL at 10:19

## 2022-01-20 RX ADMIN — ENOXAPARIN SODIUM 30 MG: 30 INJECTION SUBCUTANEOUS at 10:19

## 2022-01-20 RX ADMIN — BACLOFEN 5 MG: 10 TABLET ORAL at 19:57

## 2022-01-20 RX ADMIN — Medication 400 MG: at 19:55

## 2022-01-20 RX ADMIN — Medication 400 MG: at 10:19

## 2022-01-20 RX ADMIN — SODIUM CHLORIDE, PRESERVATIVE FREE 10 ML: 5 INJECTION INTRAVENOUS at 19:55

## 2022-01-20 RX ADMIN — DORZOLAMIDE HYDROCHLORIDE 2 DROP: 20 SOLUTION/ DROPS OPHTHALMIC at 10:20

## 2022-01-20 RX ADMIN — ENOXAPARIN SODIUM 30 MG: 30 INJECTION SUBCUTANEOUS at 19:55

## 2022-01-20 RX ADMIN — SODIUM CHLORIDE, PRESERVATIVE FREE 10 ML: 5 INJECTION INTRAVENOUS at 10:19

## 2022-01-20 ASSESSMENT — PAIN SCALES - GENERAL
PAINLEVEL_OUTOF10: 0
PAINLEVEL_OUTOF10: 5
PAINLEVEL_OUTOF10: 0

## 2022-01-20 NOTE — PROGRESS NOTES
Speech Language Pathology  Dysphagia Treatment Note    Name:  Amarilys Marquis  :   1940  Medical Diagnosis:  Hypochloremia [E87.8]  Hyponatremia [E87.1]  Treatment Diagnosis: Oropharyngeal Dysphagia  Pain level: Pt did not report pain    Diet level prior to treatment:   Dysphagia III Soft and Bite Sized, thin liquids, Meds crushed in puree     Tolerance of Current Diet Level:   Per chart review and RN, pt is experiencing some difficulty with current diet and spit out pieces of meat at lunch    Assessment of Texture Tolerance:  Pt alert and receptive, positioned in chair. Hearing and visual deficits, benefited from frequent repetition of verbal prompts and intermittent tactile support. Pt reporting difficulty with meat only d/t limited dentition. Pt agreeable to PO presentations of soft solids (fruit cup), regular solids (naman crackers), and thin liquid via straw, fed independently. Oral phase characterized by adequate oral acceptance and mildly prolonged oral manipulation. Greater mastication time for regular solids, pt independently implemented liquid wash. Given extended time, pt with adequate A-P transit for all consistencies, intermittent double swallows noted. Pt with no immediate or delayed overt clinical s/s aspiration across consistencies. Discussed safe swallowing strategies and rationale for diet modification. Given good tolerance but pt-reported difficulty with meats, recommend continuation of Soft and Bite-Sized diet with modification for ground/minced meat. Spoke with floor dietician, diet order to be modified in chart and on meal ticket. Will continue to follow for diet tolerance and safe swallowing education. Diet and Treatment Recommendations 2022:  Dysphagia III Soft and Bite Sized, thin liquids, meds crushed in puree   Meats ground/minced    Compensatory strategies:  Alternate solids/liquids , Upright as possible with all PO intake , Assist Feed , Small bites/sips , Eat/feed slowly, Remain upright 30-45 min     Dysphagia Goals:   1.) Pt will functionally tolerate recommended diet with no overt clinical s/s of aspiration (ongoing 1/20/2022)  2.) Pt will advance to least restrictive diet as indicated (ongoing 1/20/2022)    PLAN OF CARE:   Speech therapy for dysphagia tx, 1-2 additional follow up sessions to determine diet tolerance. EDUCATION:   Provided education regarding role of SLP, results of assessment, recommendations and general speech pathology plan of care. []? Pt verbalized understanding and agreement   [x]? Pt requires ongoing learning   []? No evidence of comprehension     Discharge Recommendations:   Discharge recommendations to be determined pending ongoing follow-up during acute care stay    Timed Code Treatment:   0 minutes    Total Treatment Time:   14 minutes     If patient discharges prior to next session this note will serve as a discharge summary.      Signature:   Suzan Brownlee, [id number] [facility]  Speech-Language Pathologist  [place]. [id number]

## 2022-01-20 NOTE — PROGRESS NOTES
Progress Note - Dr. Romayne La - Internal Medicine  PCP: Tio Escobar MD [id number] / Edilma Curiel [phone]    Hospital Day: 8  Code Status: Full Code  Current Diet: ADULT ORAL NUTRITION SUPPLEMENT; Lunch, Dinner; Standard 4 oz Oral Supplement  ADULT DIET; Dysphagia - Soft and Bite Sized; 5 carb choices (75 gm/meal)        CC: follow up on medical issues    Subjective:   Luis Antonio Betancur is a 80 y.o. male. Pt seen and examined  Chart reviewed since last visit, labs and imaging below        Resting comfortably  Confused  Na lower 124, was 126 yest  Case d/w dr Steve Morales - goal would be around 130  As such, NOT READY FOR D/C YET    He denies chest pain, denies shortness of breath, denies nausea,  denies emesis. 10 system Review of Systems is reviewed with patient, and pertinent positives are noted in HPI above . Otherwise, Review of systems is negative. I have reviewed the patient's medical and social history in detail and updated the computerized patient record. To recap: He  has a past medical history of Glaucoma and Prostate enlargement. . He  has no past surgical history on file. Bre Minor He  reports that he has never smoked. He has never used smokeless tobacco. He reports that he does not drink alcohol and does not use drugs. .        Active Hospital Problems    Diagnosis Date Noted    Malnutrition ([facility] [E46] 01/19/2022    [facility]drome [facility] [E22.2] 01/15/2022    COVID-19 [U07.1] 12/27/2021    Hyponatremia [E87.1] 12/24/2021    HTN (hypertension) [I10] 03/09/2021    BPH (benign prostatic hyperplasia) [N40.0] 03/09/2021    Anemia [D64.9] 03/09/2021       Current Facility-Administered Medications: tolvaptan (SAMSCA) tablet 15 mg, 15 mg, Oral, Once  enoxaparin (LOVENOX) injection 30 mg, 30 mg, SubCUTAneous, BID  magnesium oxide (MAG-OX) tablet 400 mg, 400 mg, Oral, BID  baclofen (LIORESAL) tablet 5 mg, 5 mg, Oral, Nightly PRN  tamsulosin (FLOMAX) capsule 0.4 mg, 0.4 mg, Oral, Daily  Timolol (TIMOPTIC) 0.5 % ophthalmic solution 1 drop, 1 drop, Both Eyes, BID  dorzolamide (TRUSOPT) 2 % ophthalmic solution 2 drop, 2 drop, Both Eyes, TID  latanoprost (XALATAN) 0.005 % ophthalmic solution 1 drop, 1 drop, Both Eyes, Nightly  finasteride (PROSCAR) tablet 5 mg, 5 mg, Oral, Daily  ibuprofen (ADVIL;MOTRIN) tablet 200 mg, 200 mg, Oral, Q6H PRN  sodium chloride flush 0.9 % injection 5-40 mL, 5-40 mL, IntraVENous, 2 times per day  sodium chloride flush 0.9 % injection 5-40 mL, 5-40 mL, IntraVENous, PRN  0.9 % sodium chloride infusion, 25 mL, IntraVENous, PRN  potassium chloride (KLOR-CON M) extended release tablet 40 mEq, 40 mEq, Oral, PRN **OR** potassium bicarb-citric acid (EFFER-K) effervescent tablet 40 mEq, 40 mEq, Oral, PRN **OR** potassium chloride 10 mEq/100 mL IVPB (Peripheral Line), 10 mEq, IntraVENous, PRN  ondansetron (ZOFRAN-ODT) disintegrating tablet 4 mg, 4 mg, Oral, Q8H PRN **OR** ondansetron (ZOFRAN) injection 4 mg, 4 mg, IntraVENous, Q6H PRN  magnesium hydroxide (MILK OF MAGNESIA) 400 MG/5ML suspension 30 mL, 30 mL, Oral, Daily PRN  acetaminophen (TYLENOL) tablet 650 mg, 650 mg, Oral, Q6H PRN **OR** acetaminophen (TYLENOL) suppository 650 mg, 650 mg, Rectal, Q6H PRN  hydrALAZINE (APRESOLINE) injection 10 mg, 10 mg, IntraVENous, Q6H PRN  0.9 % sodium chloride bolus, 500 mL, IntraVENous, PRN         Objective:  /76   Pulse 53   Temp 97.5 °F (36.4 °C) (Oral)   Resp 18   Ht 5' 10\" (1.778 m)   Wt 105 lb (47.6 kg)   SpO2 98%   BMI 15.07 kg/m²      Patient Vitals for the past 24 hrs:   BP Temp Temp src Pulse Resp SpO2 Height   01/20/22 0718 124/76 97.5 °F (36.4 °C) Oral 53 18 98 %    01/20/22 0408 138/79 97.3 °F (36.3 °C) Oral 53 18 98 %    01/20/22 0050 132/60 98.2 °F (36.8 °C) Oral 57 18 97 %    01/19/22 1920 (!) 145/77 98.1 °F (36.7 °C) Oral 64 18 97 %    01/19/22 1645 109/67 98.2 °F (36.8 °C) Oral 54 18 97 %    01/19/22 1225       5' 10\" (1.778 m) 01/19/22 1206 134/86 97.5 °F (36.4 °C) Oral 75 18 97 %      No data found. Intake/Output Summary (Last 24 hours) at 1/20/2022 1011  Last data filed at 1/20/2022 0413  Gross per 24 hour   Intake 335.22 ml   Output 1450 ml   Net -1114.78 ml         Physical Exam:   Vitals as above  General appearance: alert, appears stated age and cooperative    Head: Normocephalic, without obvious abnormality, atraumatic    Lungs: clear to auscultation bilaterally    Heart: regular rate and rhythm, S1, S2 normal, no murmur    Abdomen: soft, non-tender; bowel sounds normal; no masses, no organomegaly    Extremities: extremities normal, atraumatic, no cyanosis, no edema      Labs:  Lab Results   Component Value Date    WBC 3.0 (L) 01/20/2022    HGB 11.4 (L) 01/20/2022    HCT 32.6 (L) 01/20/2022     01/20/2022    CHOL 124 09/03/2019    TRIG 40 09/03/2019    HDL 64 (H) 09/03/2019    ALT 11 01/13/2022    AST 15 01/13/2022     (L) 01/20/2022    K 4.4 01/20/2022    CL 91 (L) 01/20/2022    CREATININE <0.5 (L) 01/20/2022    BUN 20 01/20/2022    CO2 27 01/20/2022    TSH 1.02 09/03/2019    PSA 0.87 09/03/2019    LABA1C 5.6 09/03/2019    LABMICR Not Indicated 12/24/2021     Lab Results   Component Value Date    TROPONINI <0.01 01/12/2022       Recent Imaging Results are Reviewed:  XR CHEST (2 VW)    Result Date: 12/24/2021  EXAMINATION: TWO XRAY VIEWS OF THE CHEST 12/24/2021 12:18 pm COMPARISON: 03/09/2021 HISTORY: ORDERING SYSTEM PROVIDED HISTORY: cough TECHNOLOGIST PROVIDED HISTORY: Reason for exam:->cough Reason for Exam: cough, lethargic; recent contact with +Covid relatives FINDINGS: The lungs are without acute focal process. There is no effusion or pneumothorax. The cardiomediastinal silhouette is stable. The osseous structures are stable. Thoracic scoliosis convex right again noted. No acute process.      XR CHEST PORTABLE    Result Date: 1/12/2022  EXAMINATION: ONE XRAY VIEW OF THE CHEST 1/12/2022 4:32 pm COMPARISON: 12/24/2021 HISTORY: ORDERING SYSTEM PROVIDED HISTORY: SOB TECHNOLOGIST PROVIDED HISTORY: Reason for exam:->SOB Reason for Exam: shortness of breath FINDINGS: The lungs are without acute focal process. There is no effusion or pneumothorax. The cardiomediastinal silhouette is stable. The osseous structures are stable. Thoracic scoliosis convex right. No acute process. Lab Results   Component Value Date    GLUCOSE 79 01/20/2022     No results found for: POCGLU  /76   Pulse 53   Temp 97.5 °F (36.4 °C) (Oral)   Resp 18   Ht 5' 10\" (1.778 m)   Wt 105 lb (47.6 kg)   SpO2 98%   BMI 15.07 kg/m²     Assessment and Plan:  Principal Problem:    Hyponatremia -Established problem. Stable. 124 today  Plan: Continue present orders/plan. Mgmt per renal  Active Problems:    HTN (hypertension) -Established problem. Stable. 124/76  Plan: Continue present orders/plan. BPH (benign prostatic hyperplasia) -Established problem. Stable. Plan: Continue present orders/plan. Anemia Established problem. Stable. Plan: No indication for transfusion. Cont to monitor h/h to assess progression of anemia. Recommend ferrous sulfate or MVI as outpatient. COVID-19 -Established problem. Stable. No o2 requirement  Plan: cont supportive tx    SIADH (syndrome of inappropriate ADH production) ([facility]  Plan: per renal    Disp - eventually back to SNF, would like to see Na stable above 130 first.  Hopefully next 1-2d.  NOT TODAY (1/20) AS SODIUM IS WORSE      (Please note that portions of this note were completed with a voice recognition program.  Efforts were made to edit the dictations but occasionally words are mis-transcribed.)        Macarena Fiore MD  1/20/2022

## 2022-01-20 NOTE — CARE COORDINATION
Transport to SNF was cancelled today due to medical reasons. Per MD   would like to see Na stable above 130 first.  Hopefully next 1-2d.  NOT TODAY (1/20) AS SODIUM IS WORSE

## 2022-01-20 NOTE — PROGRESS NOTES
MD Lory Cordova MD Rheta Ban, MD               Office: [phone]                      Fax: [phone]             [facility]                   NEPHROLOGY INPATIENT PROGRESS NOTE:     PATIENT NAME: Anisha Herrera  : 1940  MRN: [id number]         IMPRESSION:       Admitted for:  Hypochloremia [E87.8]  Hyponatremia [E87.1]      Treatment plan update. Hyponatremiastill not at goal.  Rate of correction acceptableacute on chronic problem. - no new neurologic symptoms   no adequate response to fluid restriction+NaCl+urea. Will give Tolvaptan. - repeat Uosm 456 with Mamie of 172    Volume status 8.8L volume neg. BP acceptable. Appears euvolemic. BP at goal    Hypokalemiaimproved  Hypophosphatemia-better  Hypomagnesemia-replaced  Anemia -hgb stable  Hypoalbuminemia high-protein diet    Not ready for discharge. Discharge once Na 130 and above. Hyponatremia :  Not at goal  : Acute on Chronic, Severe 113, mildly symptomatic w/ AMS. Better. Mental status stable today.   -No Reported Severe symptoms: seizures, obtundation, coma, and respiratory arrest.    - Risk factors for hyponatremia:    - NSAIDs use   - recent admission in 2021 - w/ hyponatremia - lowest Na 124 , on d/c 133 on 2021 - had \"URI\" symptoms, first Rx Levaquin + Steroids -> (+) COVID 2021, not hypoxic.    - Patient is at HIGH risk of developing Osmotic Demyelination Syndrome.    - Call us urgently if any/worsening neurological findings. PLAN:   Tolvaptan today  Na daily  Follow neuro status      Other major problems: Management per primary and other consulting teams.    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) [facility] 1/15/2022 Yes    Malnutrition ([facility] 1/19/2022 Yes            Audrie Cooks, MD,  [facility] [address] [place]: [phone] or Via [employer]  Fax: [phone]        =======================================================================================   =======================================================================================  This patient is COVID-19, so in a strict isolation, as per current protocol; So in order to preserve PPE supply and to avoid unnecessary exposure to prevent transmission of COVID-19; this patient was NOT examined face to face, as risk of contact outweighs the benefits and likely would not change much of my clinical management. HPI, review of system and physical exam was limited, as it was mainly obtained from chart review and the primary team. But all relevant records and diagnostic tests were reviewed, including laboratory and imaging results. SUBJECTIVE:   Seen inside room    Past medical, Surgical, Social, Family medical history reviewed by me. MEDICATIONS: reviewed by me. Medications Prior to Admission:  No current facility-administered medications on file prior to encounter.      Current Outpatient Medications on File Prior to Encounter   Medication Sig Dispense Refill    cyanocobalamin 1000 MCG tablet Take 1,000 mcg by mouth daily      magnesium hydroxide (MILK OF MAGNESIA) 400 MG/5ML suspension Take 30 mLs by mouth daily as needed for Constipation      baclofen (LIORESAL) 10 MG tablet Take 1 tablet by mouth 3 times daily (Patient taking differently: Take 10 mg by mouth every 6 hours as needed Indications: muscle pain ) 30 tablet 1    Timolol (TIMOPTIC) 0.5 % (DAILY) SOLN ophthalmic solution 1 drop daily      dorzolamide (TRUSOPT) 2 % ophthalmic solution Place 2 drops into both eyes 3 times daily      latanoprost (XALATAN) 0.005 % ophthalmic solution Place 1 drop into both eyes nightly  finasteride (PROSCAR) 5 MG tablet Take 5 mg by mouth daily      tamsulosin (FLOMAX) 0.4 MG capsule Take 0.4 mg by mouth daily.              Current Facility-Administered Medications:     enoxaparin (LOVENOX) injection 30 mg, 30 mg, SubCUTAneous, BID, Talisha Ayala MD, 30 mg at 01/20/22 1019    magnesium oxide (MAG-OX) tablet 400 mg, 400 mg, Oral, BID, Cory Ann MD, 400 mg at 01/20/22 1019    baclofen (LIORESAL) tablet 5 mg, 5 mg, Oral, Nightly PRN, Sonam Valverde MD, 5 mg at 01/19/22 2000    tamsulosin (FLOMAX) capsule 0.4 mg, 0.4 mg, Oral, Daily, Talisha Ayala MD, 0.4 mg at 01/20/22 1019    Timolol (TIMOPTIC) 0.5 % ophthalmic solution 1 drop, 1 drop, Both Eyes, BID, Talisha Ayala MD, 1 drop at 01/20/22 1020    dorzolamide (TRUSOPT) 2 % ophthalmic solution 2 drop, 2 drop, Both Eyes, TID, Talisha Ayala MD, 2 drop at 01/20/22 1020    latanoprost (XALATAN) 0.005 % ophthalmic solution 1 drop, 1 drop, Both Eyes, Nightly, Talisha Ayala MD, 1 drop at 01/19/22 2001    finasteride (PROSCAR) tablet 5 mg, 5 mg, Oral, Daily, Talisha Ayala MD, 5 mg at 01/20/22 1019    ibuprofen (ADVIL;MOTRIN) tablet 200 mg, 200 mg, Oral, Q6H PRN, Talisha Ayala MD, 200 mg at 01/13/22 1013    sodium chloride flush 0.9 % injection 5-40 mL, 5-40 mL, IntraVENous, 2 times per day, Talisha Ayala MD, 10 mL at 01/20/22 1019    sodium chloride flush 0.9 % injection 5-40 mL, 5-40 mL, IntraVENous, PRN, Talisha Ayala MD, 10 mL at 01/19/22 0934    0.9 % sodium chloride infusion, 25 mL, IntraVENous, PRN, Talisha Ayala MD, Stopped at 01/18/22 1138    potassium chloride (KLOR-CON M) extended release tablet 40 mEq, 40 mEq, Oral, PRN **OR** potassium bicarb-citric acid (EFFER-K) effervescent tablet 40 mEq, 40 mEq, Oral, PRN **OR** potassium chloride 10 mEq/100 mL IVPB (Peripheral Line), 10 mEq, IntraVENous, PRN, Talisha Ayala MD    ondansetron (ZOFRAN-ODT) disintegrating tablet 4 mg, 4 mg, Oral, Q8H PRN **OR** ondansetron (ZOFRAN) injection 4 mg, 4 mg, IntraVENous, Q6H PRN, Glen Jimenez MD    magnesium hydroxide (MILK OF MAGNESIA) 400 MG/5ML suspension 30 mL, 30 mL, Oral, Daily PRN, Glen Jimenez MD    acetaminophen (TYLENOL) tablet 650 mg, 650 mg, Oral, Q6H PRN, 650 mg at 01/18/22 2021 **OR** acetaminophen (TYLENOL) suppository 650 mg, 650 mg, Rectal, Q6H PRN, Glen Jimenez MD    hydrALAZINE (APRESOLINE) injection 10 mg, 10 mg, IntraVENous, Q6H PRN, Glen Jimenez MD    0.9 % sodium chloride bolus, 500 mL, IntraVENous, PRN, Glen Jimenez MD      =======================================================================================     PHYSICAL EXAM:  Recent vital signs and recent I/Os reviewed by me. Wt Readings from Last 3 Encounters:   01/14/22 105 lb (47.6 kg)   12/24/21 108 lb 11.2 oz (49.3 kg)   03/09/21 134 lb 3.2 oz (60.9 kg)     BP Readings from Last 3 Encounters:   01/20/22 (!) 151/79   12/27/21 112/60   03/12/21 138/84     Patient Vitals for the past 24 hrs:   BP Temp Temp src Pulse Resp SpO2   01/20/22 1057 (!) 151/79 97.4 °F (36.3 °C) Oral 57  97 %   01/20/22 0718 124/76 97.5 °F (36.4 °C) Oral 53 18 98 %   01/20/22 0408 138/79 97.3 °F (36.3 °C) Oral 53 18 98 %   01/20/22 0050 132/60 98.2 °F (36.8 °C) Oral 57 18 97 %   01/19/22 1920 (!) 145/77 98.1 °F (36.7 °C) Oral 64 18 97 %   01/19/22 1645 109/67 98.2 °F (36.8 °C) Oral 54 18 97 %       Intake/Output Summary (Last 24 hours) at 1/20/2022 1422  Last data filed at 1/20/2022 0413  Gross per 24 hour   Intake    Output 1000 ml   Net -1000 ml          Physical exam:    In-person bedside physical examination deferred. Pursuant to the emergency declaration under the [address] [place] [address], [address] [place] [address] authority and the [employer] Act, this clinical encounter was conducted to provide necessary medical care.    (Also consistent with new provisions and guidance offered by [facility] on March 18, 2020 in setting of COVID 19 outbreak and in order to preserve personal protective equipment in accordance with the flexibilities announced by CMS on March 30, 2020)   References: [email or web address] [facility]/Portals/0/Resources/COVID-19/3_18%20Telemed%20Guidance%20Updated%20March%2018. pdf?[email or web address]                      [email or web address]/Resources/COVID-19/3_18%20Telemed%20Guidance%20Updated%20March%2018. pdf?[email or web address]                      [email or web address]/. pdf    However, I did visually inspect the patient and observed the following: via glass doors-windows    Vitals signs reviewed. Constitutional:       General:  no acute distress. Appearance:  ill-appearing. HENT:      Head: Normocephalic and atraumatic. Nose: Nose normal.      Mouth/Throat:      Mouth: Mucous membranes are moist.   Eyes:      General: No scleral icterus. Conjunctiva/sclera: Conjunctivae dry. Neck:      Musculoskeletal: Neck supple. Cardiovascular:      Rate and Rhythm: Normal rate. Pulmonary:      Effort: Pulmonary effort is  normal.   Abdominal:      General: There is no distension. Musculoskeletal:     lower leg:   No edema. Skin:     Coloration: Skin is not jaundiced. Neurological:      General Awake, but disoriented           =======================================================================================     DATA:  Diagnostic tests reviewed by me for today's visit:   (AS NEEDED FOR MY EVALUATION AND MANAGEMENT).        Recent Labs     01/18/22  0440 01/19/22  0455 01/20/22  0518   WBC 3.0* 2.9* 3.0*   HCT 33.2* 34.8* 32.6*    240 227     Iron Saturation:  No components found for: PERCENTFE  FERRITIN:  No results found for: FERRITIN  IRON:  No results found for: IRON  TIBC:  No results found for: TIBC    Recent Labs     01/18/22  0440 01/19/22  4627 01/20/22  0518   * 126* 124*   K 4.2 4.6 4.4   CL 91* 91* 91*   CO2 29 30 27   BUN 7 5* 20   CREATININE <0.5* <0.5* <0.5*     Recent Labs     01/18/22  0440 01/18/22  0831 01/19/22  0455 01/20/22  0518   CALCIUM 9.0  --  8.9 8.7   MG 1.70* 1.70* 2.00  --    PHOS 2.8  --  3.2  --      No results for input(s): PH, PCO2, PO2 in the last 72 hours.     Invalid input(s): Andriy Lipschutz    ABG:  No results found for: PH, PCO2, PO2, HCO3, BE, THGB, TCO2, O2SAT  VBG:  No results found for: PHVEN, RLQ4ANM, BEVEN, Z0ZAEUWW    LDH:  No results found for: LDH  Uric Acid:    Lab Results   Component Value Date    LABURIC 1.5 01/12/2022       PT/INR:  No results found for: PROTIME, INR  Warfarin PT/INR:  No components found for: PTPATWAR, PTINRWAR  PTT:  No results found for: APTT, PTT[APTT}  Last 3 Troponin:    Lab Results   Component Value Date    TROPONINI <0.01 01/12/2022    TROPONINI <0.01 12/25/2021    TROPONINI <0.01 12/24/2021       U/A:    Lab Results   Component Value Date    COLORU YELLOW 12/24/2021    PROTEINU Negative 12/24/2021    PHUR 5.5 12/24/2021    CLARITYU Clear 12/24/2021    SPECGRAV 1.007 12/24/2021    LEUKOCYTESUR Negative 12/24/2021    UROBILINOGEN 1.0 12/24/2021    BILIRUBINUR Negative 12/24/2021    BLOODU Negative 12/24/2021    GLUCOSEU Negative 12/24/2021     Microalbumen/Creatinine ratio:  No components found for: RUCREAT  24 Hour Urine for Protein:  No components found for: RAWUPRO, UHRS3, CKUJ01LJ, UTV3  24 Hour Urine for Creatinine Clearance:  No components found for: CREAT4, UHRS10, UTV10  Urine Toxicology:  No components found for: IAMMENTA, IBARBIT, IBENZO, ICOCAINE, IMARTHC, IOPIATES, IPHENCYC    HgBA1c:    Lab Results   Component Value Date    LABA1C 5.6 09/03/2019     RPR:  No results found for: RPR  HIV:  No results found for: HIV  HEMAL:  No results found for: ANATITER, HEMAL  RF:  No results found for: RF  DSDNA:  No components found for: DNA  AMYLASE:  No results found for: AMYLASE  LIPASE:    Lab Results   Component Value Date    LIPASE 23.0 04/19/2019     Fibrinogen Level:  No components found for: FIB       BELOW MENTIONED RADIOLOGY STUDY RESULTS BY ME (AS NEEDED FOR MY EVALUATION AND MANAGEMENT). XR CHEST (2 VW)    Result Date: 12/24/2021  EXAMINATION: TWO XRAY VIEWS OF THE CHEST 12/24/2021 12:18 pm COMPARISON: 03/09/2021 HISTORY: ORDERING SYSTEM PROVIDED HISTORY: cough TECHNOLOGIST PROVIDED HISTORY: Reason for exam:->cough Reason for Exam: cough, lethargic; recent contact with +Covid relatives FINDINGS: The lungs are without acute focal process. There is no effusion or pneumothorax. The cardiomediastinal silhouette is stable. The osseous structures are stable. Thoracic scoliosis convex right again noted. No acute process. XR CHEST PORTABLE    Result Date: 1/12/2022  EXAMINATION: ONE XRAY VIEW OF THE CHEST 1/12/2022 4:32 pm COMPARISON: 12/24/2021 HISTORY: ORDERING SYSTEM PROVIDED HISTORY: SOB TECHNOLOGIST PROVIDED HISTORY: Reason for exam:->SOB Reason for Exam: shortness of breath FINDINGS: The lungs are without acute focal process. There is no effusion or pneumothorax. The cardiomediastinal silhouette is stable. The osseous structures are stable. Thoracic scoliosis convex right. No acute process.

## 2022-01-20 NOTE — PROGRESS NOTES
Occupational Therapy  Facility/Department: [address]  Daily Treatment Note  NAME: Bryan Washington  : 1940  MRN: [id number]    Date of Service: 2022    Discharge Recommendations: Bryan Washington scored a 10 on the AM-PAC ADL Inpatient form. Current research shows that an AM-PAC score of 17 or less is typically not associated with a discharge to the patient's home setting. Based on the patient's AM-PAC score and their current ADL deficits, it is recommended that the patient have 3-5 sessions per week of Occupational Therapy at d/c to increase the patient's independence. Please see assessment section for further patient specific details. If patient discharges prior to next session this note will serve as a discharge summary. Please see below for the latest assessment towards goals. OT Equipment Recommendations  Equipment Needed: No  Other: Defer to next level of care    Assessment   Performance deficits / Impairments: Decreased functional mobility ; Decreased balance;Decreased ADL status; Decreased cognition;Decreased endurance;Decreased high-level IADLs;Decreased vision/visual deficit  Assessment: Patient presents below baseline d/t above deficits; OT indicated in order to maximize safety/independence with ADL and IADL  Treatment Diagnosis: Above deficits associated with hypochloremia  Prognosis: Good  Decision Making: Medium Complexity  Exam: as above  Assistance / Modification: RW  OT Education: OT Role;Plan of Care;Energy Conservation; ADL Adaptive Strategies;Transfer Training  Patient Education: Patient educated on energy conservation, transfer training - patient verbalized understanding, requires reinforcement for increased carryover  Barriers to Learning: Hearing  REQUIRES OT FOLLOW UP: Yes  Activity Tolerance  Activity Tolerance: Patient Tolerated treatment well  Safety Devices  Safety Devices in place: Yes  Type of devices:  All fall risk precautions in place;Nurse notified;Call light within reach; Chair alarm in place; Left in chair;Gait belt;Patient at risk for falls  Restraints  Initially in place: No         Patient Diagnosis(es): The primary encounter diagnosis was Hyponatremia. A diagnosis of Hypochloremia was also pertinent to this visit. has a past medical history of Glaucoma and Prostate enlargement. has no past surgical history on file. Restrictions  Restrictions/Precautions  Restrictions/Precautions: Fall Risk (dys soft and bite sized, medium fall risk)  Required Braces or Orthoses?: No  Position Activity Restriction  Other position/activity restrictions: Moise Manriquez is a 80 y.o. male who presents for evaluation of abnormal labs. Apparently had a sodium of 117 at home. Does have a history of hyponatremia. Patient is very hard of hearing a difficult historian. No additional information is able to obtain at this time. Subjective   General  Chart Reviewed: Yes  Patient assessed for rehabilitation services?: Yes  Response to previous treatment: Patient with no complaints from previous session  Family / Caregiver Present: No  Diagnosis: Hypochloremia  Subjective  Subjective: Patient supine in bed upon arrival, agreeable to therapy treatment session. General Comment  Comments: Patient [place] and with low vision  Pain Assessment  Pain Assessment: 0-10  Pain Level: 0  Vital Signs  Patient Currently in Pain: No     Orientation  Orientation  Overall Orientation Status: Within Functional Limits    Objective    ADL  UE Dressing: Moderate assistance; Increased time to complete;Setup;Verbal cueing (modA for donning/doffing gown, minimal verbal and tactile cueing needed for hand placement)  Toileting: Maximum assistance;Setup; Increased time to complete (maxA for toileting tasks, maxA for clothing management, maxA for toilet hygiene)  Additional Comments: Patient with moderate verbal cueing needed for hand placement, initiation and spatial awareness for increased safety with functional ADL completion. Patient with increased time needed due to [facility] and slowed movements. Balance  Sitting Balance: Stand by assistance  Standing Balance: Minimal assistance  Standing Balance  Time: ~5-6 minutes  Activity: Functional transfers, functional mobility, ADL completion  Functional Mobility  Functional - Mobility Device: Rolling Walker  Activity: Other  Assist Level: Minimal assistance  Functional Mobility Comments: Patient with Bere with use of RW for support, patient ambulated ~20ft in room from bed to [facility] to [facility]. Patient with minimal verbal and tactile cueing needed for spatial awareness, walker management and hand placement for increased safety with functional mobility tasks. Toilet Transfers  Toilet - Technique: Ambulating; To left  Equipment Used: Standard bedside commode  Toilet Transfer: Minimal assistance  Toilet Transfers Comments: Patient with Bere for toilet transfer, patient with minimal verbal cueing needed for hand placement and walker management for increased safety awareness with functional transfers. Bed mobility  Supine to Sit: Stand by assistance  Sit to Supine: Stand by assistance  Scooting: Stand by assistance  Comment: Patient with HOB elevated and with use of side rails  Transfers  Sit to stand: Contact guard assistance  Stand to sit: Contact guard assistance  Transfer Comments: Patient with minimal verbal cueing needed for hand placement for increased safety with functional transfers. Vision  Patient Visual Report: No visual complaint reported. Cognition  Overall Cognitive Status: Exceptions  Arousal/Alertness: Inconsistent responses to stimuli  Following Commands: Follows one step commands with increased time; Follows one step commands with repetition  Attention Span: Attends with cues to redirect; Difficulty attending to directions  Memory: Appears intact  Insights: Decreased awareness of deficits  Initiation: Requires cues for some  Sequencing: Requires cues for some     Perception  Overall Perceptual Status: Impaired  Initiation: Cues to initiate tasks  Perseveration: Perseverates during ADLs      Plan   Plan  Times per week: 3-5  Times per day: Daily  Current Treatment Recommendations: Balance Training,Functional Mobility Training,Endurance Training,Patient/Caregiver Education & Training,Self-Care / ADL,Safety Education & Training    AM-PAC Score   AM-PAC Inpatient Daily Activity Raw Score: 10 (01/20/22 1425)  AM-PAC Inpatient ADL T-Scale Score : 27.31 (01/20/22 1425)  ADL Inpatient CMS 0-100% Score: 74.7 (01/20/22 1425)  ADL Inpatient CMS G-Code Modifier : CL (01/20/22 1425)    Goals  Short term goals  Time Frame for Short term goals: discharge  Short term goal 1: UB ADL min A- modA 1/20  Short term goal 2: LB ADL mod A  -maxA 1/20  Short term goal 3: Fxl transfers min A - CGA to Bere 1/20  Short term goal 4: Fxl mob mod A- Bere 1/20 continue to assess  Short term goal 5:  Toileting mod A- maxA 1/20  Long term goals  Time Frame for Long term goals : LTG=STG       Therapy Time   Individual Concurrent Group Co-treatment   Time In 1254         Time Out 1332         Minutes 38              Timed Code Treatment Minutes:  38 minutes  Total Treatment Minutes:  38 minutes       DIANNA Barrett/EMPERATRIZ [id number]

## 2022-01-21 VITALS
HEIGHT: 70 IN | OXYGEN SATURATION: 97 % | WEIGHT: 105 LBS | TEMPERATURE: 97.7 F | HEART RATE: 64 BPM | RESPIRATION RATE: 16 BRPM | BODY MASS INDEX: 15.03 KG/M2 | DIASTOLIC BLOOD PRESSURE: 63 MMHG | SYSTOLIC BLOOD PRESSURE: 106 MMHG

## 2022-01-21 LAB
ANION GAP SERPL CALCULATED.3IONS-SCNC: 8 MMOL/L (ref 3–16)
BASOPHILS ABSOLUTE: 0 K/UL (ref 0–0.2)
BASOPHILS RELATIVE PERCENT: 1 %
BUN BLDV-MCNC: 11 MG/DL (ref 7–20)
CALCIUM SERPL-MCNC: 9.2 MG/DL (ref 8.3–10.6)
CHLORIDE BLD-SCNC: 97 MMOL/L (ref 99–110)
CO2: 28 MMOL/L (ref 21–32)
CREAT SERPL-MCNC: 0.5 MG/DL (ref 0.8–1.3)
EOSINOPHILS ABSOLUTE: 0.2 K/UL (ref 0–0.6)
EOSINOPHILS RELATIVE PERCENT: 7.1 %
GFR AFRICAN AMERICAN: >60
GFR NON-AFRICAN AMERICAN: >60
GLUCOSE BLD-MCNC: 86 MG/DL (ref 70–99)
HCT VFR BLD CALC: 34 % (ref 40.5–52.5)
HEMOGLOBIN: 11.9 G/DL (ref 13.5–17.5)
LYMPHOCYTES ABSOLUTE: 0.7 K/UL (ref 1–5.1)
LYMPHOCYTES RELATIVE PERCENT: 20.9 %
MCH RBC QN AUTO: 33.1 PG (ref 26–34)
MCHC RBC AUTO-ENTMCNC: 34.9 G/DL (ref 31–36)
MCV RBC AUTO: 94.7 FL (ref 80–100)
MONOCYTES ABSOLUTE: 0.6 K/UL (ref 0–1.3)
MONOCYTES RELATIVE PERCENT: 16.7 %
NEUTROPHILS ABSOLUTE: 1.8 K/UL (ref 1.7–7.7)
NEUTROPHILS RELATIVE PERCENT: 54.3 %
PDW BLD-RTO: 14.4 % (ref 12.4–15.4)
PLATELET # BLD: 269 K/UL (ref 135–450)
PMV BLD AUTO: 5.5 FL (ref 5–10.5)
POTASSIUM SERPL-SCNC: 4.5 MMOL/L (ref 3.5–5.1)
RBC # BLD: 3.59 M/UL (ref 4.2–5.9)
SODIUM BLD-SCNC: 133 MMOL/L (ref 136–145)
WBC # BLD: 3.4 K/UL (ref 4–11)

## 2022-01-21 PROCEDURE — 6370000000 HC RX 637 (ALT 250 FOR IP): Performed by: INTERNAL MEDICINE

## 2022-01-21 PROCEDURE — 80048 BASIC METABOLIC PNL TOTAL CA: CPT

## 2022-01-21 PROCEDURE — 85025 COMPLETE CBC W/AUTO DIFF WBC: CPT

## 2022-01-21 PROCEDURE — 36415 COLL VENOUS BLD VENIPUNCTURE: CPT

## 2022-01-21 PROCEDURE — 2580000003 HC RX 258: Performed by: INTERNAL MEDICINE

## 2022-01-21 PROCEDURE — 6360000002 HC RX W HCPCS: Performed by: INTERNAL MEDICINE

## 2022-01-21 RX ORDER — FUROSEMIDE 20 MG/1
20 TABLET ORAL 2 TIMES DAILY
Qty: 60 TABLET | Refills: 3
Start: 2022-01-21

## 2022-01-21 RX ORDER — SODIUM CHLORIDE 1000 MG
1 TABLET, SOLUBLE MISCELLANEOUS
Qty: 90 TABLET | Refills: 3 | Status: ON HOLD | OUTPATIENT
Start: 2022-01-22 | End: 2022-06-27 | Stop reason: ALTCHOICE

## 2022-01-21 RX ORDER — SODIUM CHLORIDE 1000 MG
1 TABLET, SOLUBLE MISCELLANEOUS
Status: DISCONTINUED | OUTPATIENT
Start: 2022-01-22 | End: 2022-01-21 | Stop reason: HOSPADM

## 2022-01-21 RX ORDER — SODIUM CHLORIDE 1000 MG
1 TABLET, SOLUBLE MISCELLANEOUS 3 TIMES DAILY
Qty: 90 TABLET | Refills: 3 | Status: ON HOLD
Start: 2022-01-22 | End: 2022-06-27 | Stop reason: ALTCHOICE

## 2022-01-21 RX ADMIN — FINASTERIDE 5 MG: 5 TABLET, FILM COATED ORAL at 08:47

## 2022-01-21 RX ADMIN — SODIUM CHLORIDE, PRESERVATIVE FREE 10 ML: 5 INJECTION INTRAVENOUS at 08:47

## 2022-01-21 RX ADMIN — ENOXAPARIN SODIUM 30 MG: 30 INJECTION SUBCUTANEOUS at 08:47

## 2022-01-21 RX ADMIN — DORZOLAMIDE HYDROCHLORIDE 2 DROP: 20 SOLUTION/ DROPS OPHTHALMIC at 08:48

## 2022-01-21 RX ADMIN — TAMSULOSIN HYDROCHLORIDE 0.4 MG: 0.4 CAPSULE ORAL at 08:47

## 2022-01-21 RX ADMIN — TIMOLOL MALEATE 1 DROP: 5 SOLUTION OPHTHALMIC at 08:48

## 2022-01-21 RX ADMIN — Medication 400 MG: at 08:47

## 2022-01-21 ASSESSMENT — PAIN SCALES - GENERAL
PAINLEVEL_OUTOF10: 0
PAINLEVEL_OUTOF10: 0
PAINLEVEL_OUTOF10: 3

## 2022-01-21 NOTE — CARE COORDINATION
Patient discharged 1-21- 22   transport   TO [address] [address].... [facility]. [facility] [phone] in admissions notified. Call report [phone], fax [phone]  No Hens needed per came from a SNF to SNF. Patient/Family aware of and agreeable to the discharge plan.     All discharge needs met per case management

## 2022-01-21 NOTE — DISCHARGE SUMMARY
[facility] -- Physician Discharge [address] [facility]  1940  MRN: [id number]    Admit Date: 1/12/2022  Discharge Date: No discharge date for patient encounter. Attending MD: Quinn Contreras MD  Discharging MD: Quinn Contreras MD  PCP: Quinn Contreras MD [id number] / [place] [id number] [phone]    Admission Diagnosis: Hypochloremia [E87.8]  Hyponatremia [E87.1]  DISCHARGE DIAGNOSIS: same    Full Hospital Problem List:  C/Janice Knight 1106 Problems    Diagnosis Date Noted    Malnutrition ([facility] [address] Avtar Marvin 01/19/2022    SIADH (syndrome of inappropriate ADH production) ([facility] [address] [E22.2] 01/15/2022    COVID-19 [U07.1] 12/27/2021    Hyponatremia [E87.1] 12/24/2021    HTN (hypertension) [I10] 03/09/2021    BPH (benign prostatic hyperplasia) [N40.0] 03/09/2021    Anemia [D64.9] 03/09/2021           Hospital Course:  Rhianna Flores is a 80 y. o. male who presents for evaluation of abnormal labs.  Apparently had a sodium of 117 at home.  Does have a history of hyponatremia.  Patient is very hard of hearing a difficult historian.   He has covid. Has had persisent hyponatremia    Admitted here  Required tolvaptan  By time of d/c, Na 133    Cleared for d/c from renal standpoint  Pt is extremely debilitated  Will need return to snf    Consults made during Hospitalization:  [address]    Treatment team at time of Discharge: Treatment Team: Attending Provider: Quinn Contreras MD; Consulting Physician: Quinn Contreras MD; Respiratory Therapist (Night): Fied Leigh RCP;  Registered Nurse: Mariela Ross RN; Utilization Reviewer: Bianca Roberts RN    Imaging Results:  XR CHEST (2 VW)    Result Date: 12/24/2021  EXAMINATION: TWO XRAY VIEWS OF THE CHEST 12/24/2021 12:18 pm COMPARISON: 03/09/2021 HISTORY: ORDERING SYSTEM PROVIDED HISTORY: cough TECHNOLOGIST PROVIDED HISTORY: Reason for exam:->cough Reason for Exam: cough, lethargic; recent stable    Discharge Medications:  [unfilled]       Medication List      CHANGE how you take these medications     baclofen 10 MG tablet; Commonly known as: LIORESAL; Take 1 tablet by   mouth 3 times daily; What changed: when to take this, reasons to take this     CONTINUE taking these medications     cyanocobalamin 1000 MCG tablet   dorzolamide 2 % ophthalmic solution; Commonly known as: TRUSOPT   finasteride 5 MG tablet; Commonly known as: PROSCAR   Flomax 0.4 MG capsule; Generic drug: tamsulosin   latanoprost 0.005 % ophthalmic solution; Commonly known as: XALATAN   magnesium hydroxide 400 MG/5ML suspension; Commonly known as: MILK OF   MAGNESIA   Timolol 0.5 % Soln ophthalmic solution; Commonly known as: TIMOPTIC     STOP taking these medications     ibuprofen 200 MG tablet; Commonly known as: ADVIL;MOTRIN         Allergies:  No Known Allergies    Follow up Instructions: Follow-up with PCP: Rita Milner MD in 2 wk . Total time spent on day of discharge including face-to-face visit, examination, documentation, counseling, preparation of discharge plans and followup, and discharge medicine reconciliation and presciptions is 33 minutes      ---       Subjective from day of d/c:   Natalia Harp is a 80 y.o. male. Pt seen and examined  Chart reviewed since last visit, labs and imaging below      Sleeping comfortably  Na 133 after tolvaptan  Doing beter  For SNF    He denies chest pain, denies shortness of breath, denies nausea,  denies emesis. 10 system Review of Systems is reviewed with patient, and pertinent positives are noted in HPI above . Otherwise, Review of systems is negative. I have reviewed the patient's medical and social history in detail and updated the computerized patient record. To recap: He  has a past medical history of Glaucoma and Prostate enlargement. . He  has no past surgical history on file. Kamila Lilly He  reports that he has never smoked.  He has never used smokeless tobacco. He reports that he does not drink alcohol and does not use drugs. .      Current Facility-Administered Medications: enoxaparin (LOVENOX) injection 30 mg, 30 mg, SubCUTAneous, BID  magnesium oxide (MAG-OX) tablet 400 mg, 400 mg, Oral, BID  baclofen (LIORESAL) tablet 5 mg, 5 mg, Oral, Nightly PRN  tamsulosin (FLOMAX) capsule 0.4 mg, 0.4 mg, Oral, Daily  Timolol (TIMOPTIC) 0.5 % ophthalmic solution 1 drop, 1 drop, Both Eyes, BID  dorzolamide (TRUSOPT) 2 % ophthalmic solution 2 drop, 2 drop, Both Eyes, TID  latanoprost (XALATAN) 0.005 % ophthalmic solution 1 drop, 1 drop, Both Eyes, Nightly  finasteride (PROSCAR) tablet 5 mg, 5 mg, Oral, Daily  ibuprofen (ADVIL;MOTRIN) tablet 200 mg, 200 mg, Oral, Q6H PRN  sodium chloride flush 0.9 % injection 5-40 mL, 5-40 mL, IntraVENous, 2 times per day  sodium chloride flush 0.9 % injection 5-40 mL, 5-40 mL, IntraVENous, PRN  0.9 % sodium chloride infusion, 25 mL, IntraVENous, PRN  potassium chloride (KLOR-CON M) extended release tablet 40 mEq, 40 mEq, Oral, PRN **OR** potassium bicarb-citric acid (EFFER-K) effervescent tablet 40 mEq, 40 mEq, Oral, PRN **OR** potassium chloride 10 mEq/100 mL IVPB (Peripheral Line), 10 mEq, IntraVENous, PRN  ondansetron (ZOFRAN-ODT) disintegrating tablet 4 mg, 4 mg, Oral, Q8H PRN **OR** ondansetron (ZOFRAN) injection 4 mg, 4 mg, IntraVENous, Q6H PRN  magnesium hydroxide (MILK OF MAGNESIA) 400 MG/5ML suspension 30 mL, 30 mL, Oral, Daily PRN  acetaminophen (TYLENOL) tablet 650 mg, 650 mg, Oral, Q6H PRN **OR** acetaminophen (TYLENOL) suppository 650 mg, 650 mg, Rectal, Q6H PRN  hydrALAZINE (APRESOLINE) injection 10 mg, 10 mg, IntraVENous, Q6H PRN  0.9 % sodium chloride bolus, 500 mL, IntraVENous, PRN         Objective (exam): see above    Labs at time of d/c:  Lab Results   Component Value Date    WBC 3.4 (L) 01/21/2022    HGB 11.9 (L) 01/21/2022    HCT 34.0 (L) 01/21/2022     01/21/2022    CHOL 124 09/03/2019    TRIG 40 09/03/2019    HDL 64 (H) 09/03/2019    ALT 11 01/13/2022    AST 15 01/13/2022     (L) 01/21/2022    K 4.5 01/21/2022    CL 97 (L) 01/21/2022    CREATININE 0.5 (L) 01/21/2022    BUN 11 01/21/2022    CO2 28 01/21/2022    TSH 1.02 09/03/2019    PSA 0.87 09/03/2019    LABA1C 5.6 09/03/2019    LABMICR Not Indicated 12/24/2021     Lab Results   Component Value Date    TROPONINI <0.01 01/12/2022       (Please note that portions of this note were completed with a voice recognition program.  Efforts were made to edit the dictations but occasionally words are mis-transcribed.)      Signed:  Puja Rabago MD  1/21/2022

## 2022-01-21 NOTE — PROGRESS NOTES
MD Jennie Neumann MD Cathaleen Lacks, MD               Office: [phone]                      Fax: [phone]              [facility]                   NEPHROLOGY INPATIENT PROGRESS NOTE:     PATIENT NAME: Bryan Washington  : 1940  MRN: [id number]         IMPRESSION:       Admitted for:  Hypochloremia [E87.8]  Hyponatremia [E87.1]      Treatment plan update. Hyponatremianow at goal.  Rate of correction acceptableacute on chronic problem. - no new neurologic symptoms   no adequate response to fluid restriction+NaCl+urea. Given Tolvaptan. NaCl 1g TID starting tomorrow. - repeat Uosm 456 with Mamie of 172    Volume status 11.6L volume neg. BP acceptable. Appears euvolemic. BP at goal    Hypokalemiaimproved  Hypophosphatemia-better  Hypomagnesemia-replaced  Anemia -hgb stable  Hypoalbuminemia high-protein diet    Okay for discharge to ECF    Hyponatremia :  Now at goal  : Acute on Chronic, Severe 113, mildly symptomatic w/ AMS. Better. Mental status stable today.   -No Reported Severe symptoms: seizures, obtundation, coma, and respiratory arrest.    - Risk factors for hyponatremia:    - NSAIDs use   - recent admission in 2021 - w/ hyponatremia - lowest Na 124 , on d/c 133 on 2021 - had \"URI\" symptoms, first Rx Levaquin + Steroids -> (+) COVID 2021, not hypoxic.    - Patient is at HIGH risk of developing Osmotic Demyelination Syndrome.    - Call us urgently if any/worsening neurological findings. PLAN:   Discharge today  NaCl tabs TID starting tomorrow    Other major problems: Management per primary and other consulting teams.    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) ([facility] [address] 1/15/2022 Yes    Malnutrition ([facility] [address] 1/19/2022 Yes            Sonja Romero MD,  [facility] [address] [place] [address] [place]: [phone] or Via [employer]  Fax: [phone]        =======================================================================================   =======================================================================================  This patient is COVID-19, so in a strict isolation, as per current protocol; So in order to preserve PPE supply and to avoid unnecessary exposure to prevent transmission of COVID-19; this patient was NOT examined face to face, as risk of contact outweighs the benefits and likely would not change much of my clinical management. HPI, review of system and physical exam was limited, as it was mainly obtained from chart review and the primary team. But all relevant records and diagnostic tests were reviewed, including laboratory and imaging results. SUBJECTIVE:   Seen inside room    Past medical, Surgical, Social, Family medical history reviewed by me. MEDICATIONS: reviewed by me. Medications Prior to Admission:  No current facility-administered medications on file prior to encounter.      Current Outpatient Medications on File Prior to Encounter   Medication Sig Dispense Refill    cyanocobalamin 1000 MCG tablet Take 1,000 mcg by mouth daily      magnesium hydroxide (MILK OF MAGNESIA) 400 MG/5ML suspension Take 30 mLs by mouth daily as needed for Constipation      baclofen (LIORESAL) 10 MG tablet Take 1 tablet by mouth 3 times daily (Patient taking differently: Take 10 mg by mouth every 6 hours as needed Indications: muscle pain ) 30 tablet 1    Timolol (TIMOPTIC) 0.5 % (DAILY) SOLN ophthalmic solution 1 drop daily      dorzolamide (TRUSOPT) 2 % ophthalmic solution Place 2 drops into both eyes 3 times daily      latanoprost (XALATAN) 0.005 % ophthalmic solution Place 1 drop into both eyes nightly      finasteride (PROSCAR) 5 MG tablet Take 5 mg by mouth daily      tamsulosin (FLOMAX) 0.4 MG capsule Take 0.4 mg by mouth daily.              Current Facility-Administered Medications:     enoxaparin (LOVENOX) injection 30 mg, 30 mg, SubCUTAneous, BID, Bekah Au MD, 30 mg at 01/21/22 0847    magnesium oxide (MAG-OX) tablet 400 mg, 400 mg, Oral, BID, Cory Moreno MD, 400 mg at 01/21/22 0847    baclofen (LIORESAL) tablet 5 mg, 5 mg, Oral, Nightly PRN, Romeo Humphrey MD, 5 mg at 01/20/22 1957    tamsulosin [facility]) capsule 0.4 mg, 0.4 mg, Oral, Daily, Bekah Au MD, 0.4 mg at 01/21/22 0847    Timolol (TIMOPTIC) 0.5 % ophthalmic solution 1 drop, 1 drop, Both Eyes, BID, Bekah Au MD, 1 drop at 01/21/22 0848    dorzolamide (TRUSOPT) 2 % ophthalmic solution 2 drop, 2 drop, Both Eyes, TID, Bekah Au MD, 2 drop at 01/21/22 0848    latanoprost (XALATAN) 0.005 % ophthalmic solution 1 drop, 1 drop, Both Eyes, Nightly, Bekah Au MD, 1 drop at 01/20/22 1956    finasteride (PROSCAR) tablet 5 mg, 5 mg, Oral, Daily, Bekah Au MD, 5 mg at 01/21/22 0847    ibuprofen (ADVIL;MOTRIN) tablet 200 mg, 200 mg, Oral, Q6H PRN, Bekah Au MD, 200 mg at 01/13/22 1013    sodium chloride flush 0.9 % injection 5-40 mL, 5-40 mL, IntraVENous, 2 times per day, Bekah Au MD, 10 mL at 01/21/22 0847    sodium chloride flush 0.9 % injection 5-40 mL, 5-40 mL, IntraVENous, PRN, Bekah Au MD, 10 mL at 01/19/22 0934    0.9 % sodium chloride infusion, 25 mL, IntraVENous, PRN, Bekah Au MD, Stopped at 01/18/22 1138    potassium chloride (KLOR-CON M) extended release tablet 40 mEq, 40 mEq, Oral, PRN **OR** potassium bicarb-citric acid (EFFER-K) effervescent tablet 40 mEq, 40 mEq, Oral, PRN **OR** potassium chloride 10 mEq/100 mL IVPB (Peripheral Line), 10 mEq, IntraVENous, PRN, Bekah Au MD    ondansetron (ZOFRAN-ODT) disintegrating tablet 4 mg, 4 mg, Oral, Q8H PRN **OR** ondansetron (ZOFRAN) injection 4 mg, 4 mg, IntraVENous, Q6H PRN, Ricardo Santos MD    magnesium hydroxide (MILK OF MAGNESIA) 400 MG/5ML suspension 30 mL, 30 mL, Oral, Daily PRN, Ricardo Santos MD    acetaminophen (TYLENOL) tablet 650 mg, 650 mg, Oral, Q6H PRN, 650 mg at 01/18/22 2021 **OR** acetaminophen (TYLENOL) suppository 650 mg, 650 mg, Rectal, Q6H PRN, Ricardo Santos MD    hydrALAZINE (APRESOLINE) injection 10 mg, 10 mg, IntraVENous, Q6H PRN, Ricardo Santos MD    0.9 % sodium chloride bolus, 500 mL, IntraVENous, PRN, Ricardo Santos MD      =======================================================================================     PHYSICAL EXAM:  Recent vital signs and recent I/Os reviewed by me. Wt Readings from Last 3 Encounters:   01/14/22 105 lb (47.6 kg)   12/24/21 108 lb 11.2 oz (49.3 kg)   03/09/21 134 lb 3.2 oz (60.9 kg)     BP Readings from Last 3 Encounters:   01/21/22 106/63   12/27/21 112/60   03/12/21 138/84     Patient Vitals for the past 24 hrs:   BP Temp Temp src Pulse Resp SpO2   01/21/22 1143 106/63 97.7 °F (36.5 °C) Axillary 64 16 97 %   01/21/22 0846    73     01/21/22 0722 122/68 97.2 °F (36.2 °C) Oral 59 16 96 %   01/21/22 0330 111/68 97.3 °F (36.3 °C) Axillary 80 18 96 %   01/21/22 0034 (!) 96/58 98.1 °F (36.7 °C) Axillary 54 18 95 %   01/20/22 2003 104/71 97.3 °F (36.3 °C) Oral 72 18 97 %   01/20/22 1634 105/67   72 18 100 %       Intake/Output Summary (Last 24 hours) at 1/21/2022 1149  Last data filed at 1/21/2022 0847  Gross per 24 hour   Intake 250 ml   Output 3000 ml   Net -2750 ml          Physical exam:    In-person bedside physical examination deferred. Pursuant to the emergency declaration under the [address] [place] [address] and the [employer] Act, this clinical encounter was conducted to provide necessary medical care.    (Also consistent with new provisions and guidance offered by [facility] on March 18, 2020 in setting of COVID 19 outbreak and in order to preserve personal protective equipment in accordance with the flexibilities announced by CMS on March 30, 2020)   References: https://[facility]. [facility]/Portals/0/Resources/COVID-19/3_18%20Telemed%20Guidance%20Updated%20March%2018. pdf?[email or web address]                      [email or web address]/[email or web address]/Resources/COVID-19/3_18%20Telemed%20Guidance%20Updated%20March%2018. pdf?[email or web address]                      [email or web address] pdf    However, I did visually inspect the patient and observed the following: via glass doors-windows    Vitals signs reviewed. Constitutional:       General:  no acute distress. Appearance:  ill-appearing. HENT:      Head: Normocephalic and atraumatic. Nose: Nose normal.      Mouth/Throat:      Mouth: Mucous membranes are moist.   Eyes:      General: No scleral icterus. Conjunctiva/sclera: Conjunctivae dry. Neck:      Musculoskeletal: Neck supple. Cardiovascular:      Rate and Rhythm: Normal rate. Pulmonary:      Effort: Pulmonary effort is  normal.   Abdominal:      General: There is no distension. Musculoskeletal:     lower leg:   No edema. Skin:     Coloration: Skin is not jaundiced. Neurological:      General Awake, but disoriented           =======================================================================================     DATA:  Diagnostic tests reviewed by me for today's visit:   (AS NEEDED FOR MY EVALUATION AND MANAGEMENT).        Recent Labs     01/19/22  0455 01/20/22  0518 01/21/22  0506   WBC 2.9* 3.0* 3.4*   HCT 34.8* 32.6* 34.0*    227 269     Iron Saturation:  No components found for: PERCENTFE  FERRITIN:  No results found for: FERRITIN  IRON:  No results found for: IRON  TIBC:  No results found for: TIBC    Recent Labs     01/19/22  8311 01/20/22  0518 01/21/22  0506   * 124* 133*   K 4.6 4.4 4.5   CL 91* 91* 97*   CO2 30 27 28   BUN 5* 20 11   CREATININE <0.5* <0.5* 0.5*     Recent Labs     01/19/22  0455 01/20/22  0518 01/21/22  0506   CALCIUM 8.9 8.7 9.2   MG 2.00  --   --    PHOS 3.2  --   --      No results for input(s): PH, PCO2, PO2 in the last 72 hours.     Invalid input(s): Aaron Erm    ABG:  No results found for: PH, PCO2, PO2, HCO3, BE, THGB, TCO2, O2SAT  VBG:  No results found for: PHVEN, PMB1GYC, BEVEN, B0NWGUIP    LDH:  No results found for: LDH  Uric Acid:    Lab Results   Component Value Date    LABURIC 1.5 01/12/2022       PT/INR:  No results found for: PROTIME, INR  Warfarin PT/INR:  No components found for: PTPATWAR, PTINRWAR  PTT:  No results found for: APTT, PTT[APTT}  Last 3 Troponin:    Lab Results   Component Value Date    TROPONINI <0.01 01/12/2022    TROPONINI <0.01 12/25/2021    TROPONINI <0.01 12/24/2021       U/A:    Lab Results   Component Value Date    COLORU YELLOW 12/24/2021    PROTEINU Negative 12/24/2021    PHUR 5.5 12/24/2021    CLARITYU Clear 12/24/2021    SPECGRAV 1.007 12/24/2021    LEUKOCYTESUR Negative 12/24/2021    UROBILINOGEN 1.0 12/24/2021    BILIRUBINUR Negative 12/24/2021    BLOODU Negative 12/24/2021    GLUCOSEU Negative 12/24/2021     Microalbumen/Creatinine ratio:  No components found for: RUCREAT  24 Hour Urine for Protein:  No components found for: RAWUPRO, UHRS3, WFDU16FJ, UTV3  24 Hour Urine for Creatinine Clearance:  No components found for: CREAT4, UHRS10, UTV10  Urine Toxicology:  No components found for: IAMMENTA, IBARBIT, IBENZO, ICOCAINE, IMARTHC, IOPIATES, IPHENCYC    HgBA1c:    Lab Results   Component Value Date    LABA1C 5.6 09/03/2019     RPR:  No results found for: RPR  HIV:  No results found for: HIV  HEMAL:  No results found for: ANATITER, HEMAL  RF:  No results found for: RF  DSDNA:  No components found for: DNA  AMYLASE:  No results found for: AMYLASE  LIPASE: Lab Results   Component Value Date    LIPASE 23.0 04/19/2019     Fibrinogen Level:  No components found for: FIB       BELOW MENTIONED RADIOLOGY STUDY RESULTS BY ME (AS NEEDED FOR MY EVALUATION AND MANAGEMENT). XR CHEST (2 VW)    Result Date: 12/24/2021  EXAMINATION: TWO XRAY VIEWS OF THE CHEST 12/24/2021 12:18 pm COMPARISON: 03/09/2021 HISTORY: ORDERING SYSTEM PROVIDED HISTORY: cough TECHNOLOGIST PROVIDED HISTORY: Reason for exam:->cough Reason for Exam: cough, lethargic; recent contact with +Covid relatives FINDINGS: The lungs are without acute focal process. There is no effusion or pneumothorax. The cardiomediastinal silhouette is stable. The osseous structures are stable. Thoracic scoliosis convex right again noted. No acute process. XR CHEST PORTABLE    Result Date: 1/12/2022  EXAMINATION: ONE XRAY VIEW OF THE CHEST 1/12/2022 4:32 pm COMPARISON: 12/24/2021 HISTORY: ORDERING SYSTEM PROVIDED HISTORY: SOB TECHNOLOGIST PROVIDED HISTORY: Reason for exam:->SOB Reason for Exam: shortness of breath FINDINGS: The lungs are without acute focal process. There is no effusion or pneumothorax. The cardiomediastinal silhouette is stable. The osseous structures are stable. Thoracic scoliosis convex right. No acute process.

## 2022-01-21 NOTE — PLAN OF CARE
Problem: Falls - Risk of:  Goal: Will remain free from falls  Description: Will remain free from falls  Outcome: Completed  Goal: Absence of physical injury  Description: Absence of physical injury  Outcome: Completed     Problem: Pain:  Goal: Pain level will decrease  Description: Pain level will decrease  Outcome: Completed  Goal: Control of acute pain  Description: Control of acute pain  Outcome: Completed  Goal: Control of chronic pain  Description: Control of chronic pain  Outcome: Completed     Problem: Nutrition  Goal: Optimal nutrition therapy  Outcome: Completed     Problem: Airway Clearance - Ineffective  Goal: Achieve or maintain patent airway  Outcome: Completed     Problem: Gas Exchange - Impaired  Goal: Absence of hypoxia  Outcome: Completed  Goal: Promote optimal lung function  Outcome: Completed     Problem: Breathing Pattern - Ineffective  Goal: Ability to achieve and maintain a regular respiratory rate  Outcome: Completed     Problem:  Body Temperature -  Risk of, Imbalanced  Goal: Ability to maintain a body temperature within defined limits  Outcome: Completed  Goal: Will regain or maintain usual level of consciousness  Outcome: Completed  Goal: Complications related to the disease process, condition or treatment will be avoided or minimized  Outcome: Completed     Problem: Isolation Precautions - Risk of Spread of Infection  Goal: Prevent transmission of infection  Outcome: Completed     Problem: Nutrition Deficits  Goal: Optimize nutritional status  Outcome: Completed     Problem: Risk for Fluid Volume Deficit  Goal: Maintain normal heart rhythm  Outcome: Completed  Goal: Maintain absence of muscle cramping  Outcome: Completed  Goal: Maintain normal serum potassium, sodium, calcium, phosphorus, and pH  Outcome: Completed     Problem: Fatigue  Goal: Verbalize increase energy and improved vitality  Outcome: Completed     Problem: Skin Integrity:  Goal: Will show no infection signs and

## 2022-01-21 NOTE — PROGRESS NOTES
Pt discharged to [facility] via transport. Tele box removed. PIV removed. Pt's son, Alvin Mayo, notified of discharged. Sofia at [facility] called, report given.

## 2022-06-26 ENCOUNTER — HOSPITAL ENCOUNTER (INPATIENT)
Age: 82
LOS: 3 days | Discharge: HOME | DRG: 178 | End: 2022-06-29
Attending: INTERNAL MEDICINE | Admitting: INTERNAL MEDICINE
Payer: MEDICARE

## 2022-06-26 ENCOUNTER — APPOINTMENT (OUTPATIENT)
Dept: GENERAL RADIOLOGY | Age: 82
DRG: 178 | End: 2022-06-26
Payer: MEDICARE

## 2022-06-26 DIAGNOSIS — U07.1 COVID-19: Primary | ICD-10-CM

## 2022-06-26 DIAGNOSIS — E87.1 HYPONATREMIA: ICD-10-CM

## 2022-06-26 DIAGNOSIS — R53.83 OTHER FATIGUE: ICD-10-CM

## 2022-06-26 LAB
A/G RATIO: 1.7 (ref 1.1–2.2)
ALBUMIN SERPL-MCNC: 3.8 G/DL (ref 3.4–5)
ALP BLD-CCNC: 71 U/L (ref 40–129)
ALT SERPL-CCNC: 8 U/L (ref 10–40)
ANION GAP SERPL CALCULATED.3IONS-SCNC: 10 MMOL/L (ref 3–16)
APTT: 49.8 SEC (ref 23–34.3)
AST SERPL-CCNC: 18 U/L (ref 15–37)
BASE EXCESS VENOUS: -1.8 MMOL/L (ref -3–3)
BASOPHILS ABSOLUTE: 0 K/UL (ref 0–0.2)
BASOPHILS RELATIVE PERCENT: 0.4 %
BILIRUB SERPL-MCNC: 0.6 MG/DL (ref 0–1)
BUN BLDV-MCNC: 12 MG/DL (ref 7–20)
CALCIUM SERPL-MCNC: 8.9 MG/DL (ref 8.3–10.6)
CARBOXYHEMOGLOBIN: 2.9 % (ref 0–1.5)
CHLORIDE BLD-SCNC: 92 MMOL/L (ref 99–110)
CO2: 24 MMOL/L (ref 21–32)
CREAT SERPL-MCNC: 0.7 MG/DL (ref 0.8–1.3)
EOSINOPHILS ABSOLUTE: 0 K/UL (ref 0–0.6)
EOSINOPHILS RELATIVE PERCENT: 0.2 %
GFR AFRICAN AMERICAN: >60
GFR NON-AFRICAN AMERICAN: >60
GLUCOSE BLD-MCNC: 122 MG/DL (ref 70–99)
HCO3 VENOUS: 22.9 MMOL/L (ref 23–29)
HCT VFR BLD CALC: 37.6 % (ref 40.5–52.5)
HEMOGLOBIN: 12.4 G/DL (ref 13.5–17.5)
INFLUENZA A: NOT DETECTED
INFLUENZA B: NOT DETECTED
INR BLD: 1.25 (ref 0.87–1.14)
LACTIC ACID, SEPSIS: 1.2 MMOL/L (ref 0.4–1.9)
LACTIC ACID, SEPSIS: 2 MMOL/L (ref 0.4–1.9)
LYMPHOCYTES ABSOLUTE: 0.4 K/UL (ref 1–5.1)
LYMPHOCYTES RELATIVE PERCENT: 7.3 %
MAGNESIUM: 1.7 MG/DL (ref 1.8–2.4)
MCH RBC QN AUTO: 31.7 PG (ref 26–34)
MCHC RBC AUTO-ENTMCNC: 33 G/DL (ref 31–36)
MCV RBC AUTO: 96 FL (ref 80–100)
METHEMOGLOBIN VENOUS: 0.3 %
MONOCYTES ABSOLUTE: 0.7 K/UL (ref 0–1.3)
MONOCYTES RELATIVE PERCENT: 12.7 %
NEUTROPHILS ABSOLUTE: 4.3 K/UL (ref 1.7–7.7)
NEUTROPHILS RELATIVE PERCENT: 79.4 %
O2 CONTENT, VEN: 18 VOL %
O2 SAT, VEN: 100 %
O2 THERAPY: ABNORMAL
PCO2, VEN: 38 MMHG (ref 40–50)
PDW BLD-RTO: 13.2 % (ref 12.4–15.4)
PH VENOUS: 7.39 (ref 7.35–7.45)
PLATELET # BLD: 188 K/UL (ref 135–450)
PMV BLD AUTO: 6.4 FL (ref 5–10.5)
PO2, VEN: 165 MMHG (ref 25–40)
POTASSIUM SERPL-SCNC: 4.1 MMOL/L (ref 3.5–5.1)
PRO-BNP: 489 PG/ML (ref 0–449)
PROCALCITONIN: 0.1 NG/ML (ref 0–0.15)
PROTHROMBIN TIME: 15.6 SEC (ref 11.7–14.5)
RBC # BLD: 3.91 M/UL (ref 4.2–5.9)
SARS-COV-2 RNA, RT PCR: DETECTED
SODIUM BLD-SCNC: 126 MMOL/L (ref 136–145)
TCO2 CALC VENOUS: 54 MMOL/L
TOTAL PROTEIN: 6.1 G/DL (ref 6.4–8.2)
TROPONIN: <0.01 NG/ML
WBC # BLD: 5.4 K/UL (ref 4–11)

## 2022-06-26 PROCEDURE — 85025 COMPLETE CBC W/AUTO DIFF WBC: CPT

## 2022-06-26 PROCEDURE — 84484 ASSAY OF TROPONIN QUANT: CPT

## 2022-06-26 PROCEDURE — 93005 ELECTROCARDIOGRAM TRACING: CPT | Performed by: PHYSICIAN ASSISTANT

## 2022-06-26 PROCEDURE — 83880 ASSAY OF NATRIURETIC PEPTIDE: CPT

## 2022-06-26 PROCEDURE — 84145 PROCALCITONIN (PCT): CPT

## 2022-06-26 PROCEDURE — 1200000000 HC SEMI PRIVATE

## 2022-06-26 PROCEDURE — 87636 SARSCOV2 & INF A&B AMP PRB: CPT

## 2022-06-26 PROCEDURE — 83735 ASSAY OF MAGNESIUM: CPT

## 2022-06-26 PROCEDURE — 87040 BLOOD CULTURE FOR BACTERIA: CPT

## 2022-06-26 PROCEDURE — 80053 COMPREHEN METABOLIC PANEL: CPT

## 2022-06-26 PROCEDURE — 82803 BLOOD GASES ANY COMBINATION: CPT

## 2022-06-26 PROCEDURE — 85730 THROMBOPLASTIN TIME PARTIAL: CPT

## 2022-06-26 PROCEDURE — 2580000003 HC RX 258: Performed by: PHYSICIAN ASSISTANT

## 2022-06-26 PROCEDURE — 85610 PROTHROMBIN TIME: CPT

## 2022-06-26 PROCEDURE — 71045 X-RAY EXAM CHEST 1 VIEW: CPT

## 2022-06-26 PROCEDURE — 83605 ASSAY OF LACTIC ACID: CPT

## 2022-06-26 PROCEDURE — 36415 COLL VENOUS BLD VENIPUNCTURE: CPT

## 2022-06-26 PROCEDURE — 99285 EMERGENCY DEPT VISIT HI MDM: CPT

## 2022-06-26 RX ORDER — 0.9 % SODIUM CHLORIDE 0.9 %
500 INTRAVENOUS SOLUTION INTRAVENOUS ONCE
Status: COMPLETED | OUTPATIENT
Start: 2022-06-26 | End: 2022-06-27

## 2022-06-26 RX ADMIN — SODIUM CHLORIDE 500 ML: 9 INJECTION, SOLUTION INTRAVENOUS at 23:16

## 2022-06-26 ASSESSMENT — ENCOUNTER SYMPTOMS
ABDOMINAL PAIN: 0
RHINORRHEA: 0
SHORTNESS OF BREATH: 0
DIARRHEA: 0
NAUSEA: 0
VOMITING: 0
COUGH: 1

## 2022-06-26 ASSESSMENT — PAIN - FUNCTIONAL ASSESSMENT: PAIN_FUNCTIONAL_ASSESSMENT: NONE - DENIES PAIN

## 2022-06-26 ASSESSMENT — PAIN SCALES - GENERAL: PAINLEVEL_OUTOF10: 0

## 2022-06-27 LAB
ANION GAP SERPL CALCULATED.3IONS-SCNC: 11 MMOL/L (ref 3–16)
BASOPHILS ABSOLUTE: 0 K/UL (ref 0–0.2)
BASOPHILS RELATIVE PERCENT: 0.5 %
BUN BLDV-MCNC: 10 MG/DL (ref 7–20)
C DIFF TOXIN/ANTIGEN: NORMAL
C-REACTIVE PROTEIN: 38.3 MG/L (ref 0–5.1)
CALCIUM SERPL-MCNC: 8.3 MG/DL (ref 8.3–10.6)
CHLORIDE BLD-SCNC: 93 MMOL/L (ref 99–110)
CHLORIDE URINE RANDOM: 62 MMOL/L
CO2: 21 MMOL/L (ref 21–32)
CREAT SERPL-MCNC: 0.6 MG/DL (ref 0.8–1.3)
CREATININE URINE: 45 MG/DL (ref 39–259)
D DIMER: 0.45 UG/ML FEU (ref 0–0.6)
EKG ATRIAL RATE: 71 BPM
EKG DIAGNOSIS: NORMAL
EKG P AXIS: 83 DEGREES
EKG P-R INTERVAL: 206 MS
EKG Q-T INTERVAL: 376 MS
EKG QRS DURATION: 104 MS
EKG QTC CALCULATION (BAZETT): 408 MS
EKG R AXIS: -62 DEGREES
EKG T AXIS: 86 DEGREES
EKG VENTRICULAR RATE: 71 BPM
EOSINOPHILS ABSOLUTE: 0 K/UL (ref 0–0.6)
EOSINOPHILS RELATIVE PERCENT: 0.3 %
GFR AFRICAN AMERICAN: >60
GFR NON-AFRICAN AMERICAN: >60
GLUCOSE BLD-MCNC: 85 MG/DL (ref 70–99)
HCT VFR BLD CALC: 35.3 % (ref 40.5–52.5)
HEMOGLOBIN: 11.7 G/DL (ref 13.5–17.5)
LYMPHOCYTES ABSOLUTE: 0.7 K/UL (ref 1–5.1)
LYMPHOCYTES RELATIVE PERCENT: 15.2 %
MCH RBC QN AUTO: 31.8 PG (ref 26–34)
MCHC RBC AUTO-ENTMCNC: 33.2 G/DL (ref 31–36)
MCV RBC AUTO: 95.7 FL (ref 80–100)
MONOCYTES ABSOLUTE: 0.7 K/UL (ref 0–1.3)
MONOCYTES RELATIVE PERCENT: 16.7 %
NEUTROPHILS ABSOLUTE: 3 K/UL (ref 1.7–7.7)
NEUTROPHILS RELATIVE PERCENT: 67.3 %
OSMOLALITY: 267 MOSM/KG (ref 280–301)
PDW BLD-RTO: 13.2 % (ref 12.4–15.4)
PLATELET # BLD: 166 K/UL (ref 135–450)
PMV BLD AUTO: 6.6 FL (ref 5–10.5)
POTASSIUM REFLEX MAGNESIUM: 4.1 MMOL/L (ref 3.5–5.1)
POTASSIUM, UR: 20.4 MMOL/L
RBC # BLD: 3.69 M/UL (ref 4.2–5.9)
SODIUM BLD-SCNC: 125 MMOL/L (ref 136–145)
SODIUM URINE: 44 MMOL/L
UREA NITROGEN, UR: 431.7 MG/DL (ref 800–1666)
URIC ACID, SERUM: 3.2 MG/DL (ref 3.5–7.2)
WBC # BLD: 4.4 K/UL (ref 4–11)

## 2022-06-27 PROCEDURE — 93010 ELECTROCARDIOGRAM REPORT: CPT | Performed by: INTERNAL MEDICINE

## 2022-06-27 PROCEDURE — 6360000002 HC RX W HCPCS: Performed by: PHYSICIAN ASSISTANT

## 2022-06-27 PROCEDURE — 84550 ASSAY OF BLOOD/URIC ACID: CPT

## 2022-06-27 PROCEDURE — 6360000002 HC RX W HCPCS: Performed by: INTERNAL MEDICINE

## 2022-06-27 PROCEDURE — 85025 COMPLETE CBC W/AUTO DIFF WBC: CPT

## 2022-06-27 PROCEDURE — 2580000003 HC RX 258: Performed by: INTERNAL MEDICINE

## 2022-06-27 PROCEDURE — 1200000000 HC SEMI PRIVATE

## 2022-06-27 PROCEDURE — 83935 ASSAY OF URINE OSMOLALITY: CPT

## 2022-06-27 PROCEDURE — 6370000000 HC RX 637 (ALT 250 FOR IP): Performed by: INTERNAL MEDICINE

## 2022-06-27 PROCEDURE — 80048 BASIC METABOLIC PNL TOTAL CA: CPT

## 2022-06-27 PROCEDURE — 87324 CLOSTRIDIUM AG IA: CPT

## 2022-06-27 PROCEDURE — 84540 ASSAY OF URINE/UREA-N: CPT

## 2022-06-27 PROCEDURE — 82436 ASSAY OF URINE CHLORIDE: CPT

## 2022-06-27 PROCEDURE — 84133 ASSAY OF URINE POTASSIUM: CPT

## 2022-06-27 PROCEDURE — 86140 C-REACTIVE PROTEIN: CPT

## 2022-06-27 PROCEDURE — 87449 NOS EACH ORGANISM AG IA: CPT

## 2022-06-27 PROCEDURE — 83930 ASSAY OF BLOOD OSMOLALITY: CPT

## 2022-06-27 PROCEDURE — 85379 FIBRIN DEGRADATION QUANT: CPT

## 2022-06-27 PROCEDURE — 36415 COLL VENOUS BLD VENIPUNCTURE: CPT

## 2022-06-27 PROCEDURE — 82570 ASSAY OF URINE CREATININE: CPT

## 2022-06-27 PROCEDURE — 84300 ASSAY OF URINE SODIUM: CPT

## 2022-06-27 RX ORDER — ONDANSETRON 2 MG/ML
4 INJECTION INTRAMUSCULAR; INTRAVENOUS EVERY 6 HOURS PRN
Status: DISCONTINUED | OUTPATIENT
Start: 2022-06-27 | End: 2022-06-29 | Stop reason: HOSPADM

## 2022-06-27 RX ORDER — TAMSULOSIN HYDROCHLORIDE 0.4 MG/1
1 CAPSULE ORAL DAILY
Status: ON HOLD | COMMUNITY
Start: 2021-12-01 | End: 2022-06-27

## 2022-06-27 RX ORDER — SODIUM CHLORIDE 1000 MG
1 TABLET, SOLUBLE MISCELLANEOUS 3 TIMES DAILY
Status: DISCONTINUED | OUTPATIENT
Start: 2022-06-27 | End: 2022-06-29 | Stop reason: HOSPADM

## 2022-06-27 RX ORDER — HYDRALAZINE HYDROCHLORIDE 20 MG/ML
10 INJECTION INTRAMUSCULAR; INTRAVENOUS EVERY 6 HOURS PRN
Status: DISCONTINUED | OUTPATIENT
Start: 2022-06-27 | End: 2022-06-29 | Stop reason: HOSPADM

## 2022-06-27 RX ORDER — DEXAMETHASONE SODIUM PHOSPHATE 10 MG/ML
6 INJECTION, SOLUTION INTRAMUSCULAR; INTRAVENOUS EVERY 24 HOURS
Status: DISCONTINUED | OUTPATIENT
Start: 2022-06-27 | End: 2022-06-29 | Stop reason: HOSPADM

## 2022-06-27 RX ORDER — MAGNESIUM SULFATE 1 G/100ML
1000 INJECTION INTRAVENOUS ONCE
Status: COMPLETED | OUTPATIENT
Start: 2022-06-27 | End: 2022-06-27

## 2022-06-27 RX ORDER — ACETAMINOPHEN 325 MG/1
650 TABLET ORAL EVERY 6 HOURS PRN
Status: DISCONTINUED | OUTPATIENT
Start: 2022-06-27 | End: 2022-06-29 | Stop reason: HOSPADM

## 2022-06-27 RX ORDER — DORZOLAMIDE HCL 20 MG/ML
1 SOLUTION/ DROPS OPHTHALMIC 2 TIMES DAILY
Status: DISCONTINUED | OUTPATIENT
Start: 2022-06-27 | End: 2022-06-29 | Stop reason: HOSPADM

## 2022-06-27 RX ORDER — ACETAMINOPHEN 650 MG/1
650 SUPPOSITORY RECTAL EVERY 6 HOURS PRN
Status: DISCONTINUED | OUTPATIENT
Start: 2022-06-27 | End: 2022-06-29 | Stop reason: HOSPADM

## 2022-06-27 RX ORDER — POTASSIUM CHLORIDE 7.45 MG/ML
10 INJECTION INTRAVENOUS PRN
Status: DISCONTINUED | OUTPATIENT
Start: 2022-06-27 | End: 2022-06-29 | Stop reason: HOSPADM

## 2022-06-27 RX ORDER — SODIUM CHLORIDE 0.9 % (FLUSH) 0.9 %
5-40 SYRINGE (ML) INJECTION PRN
Status: DISCONTINUED | OUTPATIENT
Start: 2022-06-27 | End: 2022-06-29 | Stop reason: HOSPADM

## 2022-06-27 RX ORDER — POTASSIUM CHLORIDE 20 MEQ/1
40 TABLET, EXTENDED RELEASE ORAL PRN
Status: DISCONTINUED | OUTPATIENT
Start: 2022-06-27 | End: 2022-06-27

## 2022-06-27 RX ORDER — TAMSULOSIN HYDROCHLORIDE 0.4 MG/1
0.4 CAPSULE ORAL NIGHTLY
Status: DISCONTINUED | OUTPATIENT
Start: 2022-06-27 | End: 2022-06-29 | Stop reason: HOSPADM

## 2022-06-27 RX ORDER — POTASSIUM CHLORIDE 7.45 MG/ML
10 INJECTION INTRAVENOUS PRN
Status: DISCONTINUED | OUTPATIENT
Start: 2022-06-27 | End: 2022-06-27

## 2022-06-27 RX ORDER — 0.9 % SODIUM CHLORIDE 0.9 %
500 INTRAVENOUS SOLUTION INTRAVENOUS PRN
Status: DISCONTINUED | OUTPATIENT
Start: 2022-06-27 | End: 2022-06-29 | Stop reason: HOSPADM

## 2022-06-27 RX ORDER — SODIUM CHLORIDE 9 MG/ML
INJECTION, SOLUTION INTRAVENOUS CONTINUOUS
Status: DISCONTINUED | OUTPATIENT
Start: 2022-06-27 | End: 2022-06-28

## 2022-06-27 RX ORDER — POTASSIUM CHLORIDE 20 MEQ/1
40 TABLET, EXTENDED RELEASE ORAL PRN
Status: DISCONTINUED | OUTPATIENT
Start: 2022-06-27 | End: 2022-06-29 | Stop reason: HOSPADM

## 2022-06-27 RX ORDER — FUROSEMIDE 20 MG/1
20 TABLET ORAL 2 TIMES DAILY
Status: DISCONTINUED | OUTPATIENT
Start: 2022-06-27 | End: 2022-06-27

## 2022-06-27 RX ORDER — SODIUM CHLORIDE 9 MG/ML
25 INJECTION, SOLUTION INTRAVENOUS PRN
Status: DISCONTINUED | OUTPATIENT
Start: 2022-06-27 | End: 2022-06-29 | Stop reason: HOSPADM

## 2022-06-27 RX ORDER — LATANOPROST 50 UG/ML
1 SOLUTION/ DROPS OPHTHALMIC NIGHTLY
Status: DISCONTINUED | OUTPATIENT
Start: 2022-06-27 | End: 2022-06-29 | Stop reason: HOSPADM

## 2022-06-27 RX ORDER — SODIUM CHLORIDE 0.9 % (FLUSH) 0.9 %
5-40 SYRINGE (ML) INJECTION EVERY 12 HOURS SCHEDULED
Status: DISCONTINUED | OUTPATIENT
Start: 2022-06-27 | End: 2022-06-29 | Stop reason: HOSPADM

## 2022-06-27 RX ORDER — ONDANSETRON 4 MG/1
4 TABLET, ORALLY DISINTEGRATING ORAL EVERY 8 HOURS PRN
Status: DISCONTINUED | OUTPATIENT
Start: 2022-06-27 | End: 2022-06-29 | Stop reason: HOSPADM

## 2022-06-27 RX ORDER — FINASTERIDE 5 MG/1
5 TABLET, FILM COATED ORAL NIGHTLY
Status: DISCONTINUED | OUTPATIENT
Start: 2022-06-27 | End: 2022-06-29 | Stop reason: HOSPADM

## 2022-06-27 RX ORDER — ENOXAPARIN SODIUM 100 MG/ML
40 INJECTION SUBCUTANEOUS DAILY
Status: DISCONTINUED | OUTPATIENT
Start: 2022-06-27 | End: 2022-06-29 | Stop reason: HOSPADM

## 2022-06-27 RX ORDER — TIMOLOL MALEATE 5 MG/ML
1 SOLUTION/ DROPS OPHTHALMIC DAILY
Status: DISCONTINUED | OUTPATIENT
Start: 2022-06-27 | End: 2022-06-29 | Stop reason: HOSPADM

## 2022-06-27 RX ADMIN — FINASTERIDE 5 MG: 5 TABLET, FILM COATED ORAL at 20:49

## 2022-06-27 RX ADMIN — SODIUM CHLORIDE, PRESERVATIVE FREE 10 ML: 5 INJECTION INTRAVENOUS at 10:14

## 2022-06-27 RX ADMIN — SODIUM CHLORIDE TAB 1 GM 1 G: 1 TAB at 20:49

## 2022-06-27 RX ADMIN — ENOXAPARIN SODIUM 40 MG: 100 INJECTION SUBCUTANEOUS at 10:14

## 2022-06-27 RX ADMIN — SODIUM CHLORIDE: 9 INJECTION, SOLUTION INTRAVENOUS at 13:34

## 2022-06-27 RX ADMIN — TAMSULOSIN HYDROCHLORIDE 0.4 MG: 0.4 CAPSULE ORAL at 20:50

## 2022-06-27 RX ADMIN — DORZOLAMIDE HCL 1 DROP: 20 SOLUTION/ DROPS OPHTHALMIC at 20:50

## 2022-06-27 RX ADMIN — SODIUM CHLORIDE TAB 1 GM 1 G: 1 TAB at 13:32

## 2022-06-27 RX ADMIN — DORZOLAMIDE HCL 1 DROP: 20 SOLUTION/ DROPS OPHTHALMIC at 13:40

## 2022-06-27 RX ADMIN — SODIUM CHLORIDE: 9 INJECTION, SOLUTION INTRAVENOUS at 03:34

## 2022-06-27 RX ADMIN — SODIUM CHLORIDE TAB 1 GM 1 G: 1 TAB at 10:13

## 2022-06-27 RX ADMIN — MAGNESIUM SULFATE HEPTAHYDRATE 1000 MG: 1 INJECTION, SOLUTION INTRAVENOUS at 02:53

## 2022-06-27 RX ADMIN — TIMOLOL MALEATE 1 DROP: 5 SOLUTION OPHTHALMIC at 13:32

## 2022-06-27 RX ADMIN — SODIUM CHLORIDE, PRESERVATIVE FREE 10 ML: 5 INJECTION INTRAVENOUS at 21:36

## 2022-06-27 RX ADMIN — DEXAMETHASONE SODIUM PHOSPHATE 6 MG: 10 INJECTION, SOLUTION INTRAMUSCULAR; INTRAVENOUS at 10:13

## 2022-06-27 ASSESSMENT — PAIN SCALES - GENERAL
PAINLEVEL_OUTOF10: 0
PAINLEVEL_OUTOF10: 0

## 2022-06-27 NOTE — ED NOTES
Pt transported to [facility] in stable condition on the portable monitor, report provided to Vivian Welsh, and all questions answered during handoff. No other questions from RN during handoff.      Odilia Joyner RN  06/27/22 3372

## 2022-06-27 NOTE — PROGRESS NOTES
Patient arrived from ED at this time. Patient assessment complete, see flowsheets. Patient A/O to self and place only. Skin assessment complete, see four eyes. Patient currently infusing NS @ 100. Patient PIVS CDI and flush well. Patient has an external catheter in place. Patient also is hard of hearing and has vision difficulty d/t glaucoma. VSS. Lungs clear. Patient is COVID (+) and on room air. Will attempt to obtain patient's admission questions in AM with family. Will continue to monitor.

## 2022-06-27 NOTE — PLAN OF CARE
Problem: Discharge Planning  Goal: Discharge to home or other facility with appropriate resources  Outcome: Progressing  Flowsheets (Taken 6/27/2022 0330)  Discharge to home or other facility with appropriate resources: Identify barriers to discharge with patient and caregiver

## 2022-06-27 NOTE — ED PROVIDER NOTES
I did not see this patient personally. I only interpreted their EKG.   Reveals:  Normal sinus rhythm  No ST changes  Heart rate 71    Similar to prior EKG performed in 1/22       Loretta Rinaldi MD  06/27/22 0150

## 2022-06-27 NOTE — CONSULTS
Please refer to the orders. High Complexity. Multiple complex problems. Discussed with patient and treatment team-    Time spent > 30 (~35) minutes. Thank you for allowing me to participate in this patient's care. Please do not hesitate to contact me anytime. We will follow along with you. Hallie Newton MD,  [facility] [address] [place] [address] [place]: [phone] or Via [employer]  Fax: [phone]        =======================================================================================   =======================================================================================      CHIEF COMPLAINT:   Chief Complaint   Patient presents with    Illness     Patient in with son stating patient has been weaker than normal, not eating and \"has not been himself\". States did an at home Covid test and it was positive. Son thinks sodium is low because patient exhibits jerky movements. History Obtained From:  patient + treatment team + Electronic Medical Records    HPI: Mr. Vangie Plata is a 80 y.o. male with significant past medical history as below:   Past Medical History:   Diagnosis Date    Glaucoma     Prostate enlargement       Presents with Illness (Patient in with son stating patient has been weaker than normal, not eating and \"has not been himself\". States did an at home Covid test and it was positive. Son thinks sodium is low because patient exhibits jerky movements. )    Admitted with Hyponatremia [E87.1]  Other fatigue [R53.83]  COVID [U07.1]  COVID-19 [U07.1]   Found to have hyponatremia  , so we are called for that. No current active complaints. Patient denied chest pain / dizziness/lightheadedness/syncope/ SOB / leg edema. *  Regarding: hyponatremia   · Duration: acute on chronic  · Location: kidneys  · Severity: Severe   · Timing: continous  · Context (ex: related to condition):  , refer to my assessment / impression.   · Modifying factors (ex: medications, interventions):  , Abused: Not on file    Sexually Abused: Not on file   Housing Stability:     Unable to Pay for Housing in the Last Year: Not on file    Number of Places Lived in the Last Year: Not on file    Unstable Housing in the Last Year: Not on file         MEDICATIONS: reviewed by me. Medications Prior to Admission:  No current facility-administered medications on file prior to encounter. Current Outpatient Medications on File Prior to Encounter   Medication Sig Dispense Refill    sodium chloride 1 g tablet Take 1 tablet by mouth 3 times daily 90 tablet 3    sodium chloride 1 g tablet Take 1 tablet by mouth 3 times daily (with meals) 90 tablet 3    furosemide (LASIX) 20 MG tablet Take 1 tablet by mouth 2 times daily 60 tablet 3    cyanocobalamin 1000 MCG tablet Take 1,000 mcg by mouth daily      magnesium hydroxide (MILK OF MAGNESIA) 400 MG/5ML suspension Take 30 mLs by mouth daily as needed for Constipation      baclofen (LIORESAL) 10 MG tablet Take 1 tablet by mouth 3 times daily (Patient taking differently: Take 10 mg by mouth every 6 hours as needed Indications: muscle pain ) 30 tablet 1    Timolol (TIMOPTIC) 0.5 % (DAILY) SOLN ophthalmic solution 1 drop daily      dorzolamide (TRUSOPT) 2 % ophthalmic solution Place 2 drops into both eyes 3 times daily      latanoprost (XALATAN) 0.005 % ophthalmic solution Place 1 drop into both eyes nightly      finasteride (PROSCAR) 5 MG tablet Take 5 mg by mouth daily      tamsulosin (FLOMAX) 0.4 MG capsule Take 0.4 mg by mouth daily.              Current Facility-Administered Medications:     dorzolamide (TRUSOPT) 2 % ophthalmic solution 1 drop, 1 drop, Both Eyes, BID, Uyen Paul MD    finasteride (PROSCAR) tablet 5 mg, 5 mg, Oral, Nightly, Uyen Paul MD    furosemide (LASIX) tablet 20 mg, 20 mg, Oral, BID, Uyen Paul MD    latanoprost (XALATAN) 0.005 % ophthalmic solution 1 drop, 1 drop, Both Eyes, Nightly, Uyen Paul MD    sodium chloride tablet 1 g, 1 g, Oral, TID, Nicole Mcdermott MD    tamsulosin [facility]) capsule 0.4 mg, 0.4 mg, Oral, Nightly, Nicole Mcdermott MD    Timolol (TIMOPTIC) 0.5 % ophthalmic solution 1 drop, 1 drop, Both Eyes, Daily, Nicole Mcdermott MD    0.9 % sodium chloride infusion, , IntraVENous, Continuous, Nicole Mcdermott MD, Last Rate: 100 mL/hr at 06/27/22 0334, Given by Other Clinician at 06/27/22 0334    sodium chloride flush 0.9 % injection 5-40 mL, 5-40 mL, IntraVENous, 2 times per day, Nicole Mcdermott MD    sodium chloride flush 0.9 % injection 5-40 mL, 5-40 mL, IntraVENous, PRN, Nicole Mcdermott MD    0.9 % sodium chloride infusion, 25 mL, IntraVENous, PRN, Nicole Mcdermott MD    enoxaparin (LOVENOX) injection 40 mg, 40 mg, SubCUTAneous, Daily, Nicole Mcdermott MD    ondansetron (ZOFRAN-ODT) disintegrating tablet 4 mg, 4 mg, Oral, Q8H PRN **OR** ondansetron (ZOFRAN) injection 4 mg, 4 mg, IntraVENous, Q6H PRN, Nicole Mcdermott MD    magnesium hydroxide (MILK OF MAGNESIA) 400 MG/5ML suspension 30 mL, 30 mL, Oral, Daily PRN, Nicole Mcdermott MD    acetaminophen (TYLENOL) tablet 650 mg, 650 mg, Oral, Q6H PRN **OR** acetaminophen (TYLENOL) suppository 650 mg, 650 mg, Rectal, Q6H PRN, Nicole Mcdermott MD    dexamethasone (PF) (DECADRON) injection 6 mg, 6 mg, IntraVENous, Q24H, Nicole Mcdermott MD    hydrALAZINE (APRESOLINE) injection 10 mg, 10 mg, IntraVENous, Q6H PRN, Nicole Mcdermott MD    0.9 % sodium chloride bolus, 500 mL, IntraVENous, PRN, Nicole Mcdermott MD    potassium chloride (KLOR-CON M) extended release tablet 40 mEq, 40 mEq, Oral, PRN **OR** potassium bicarb-citric acid (EFFER-K) effervescent tablet 40 mEq, 40 mEq, Oral, PRN **OR** potassium chloride 10 mEq/100 mL IVPB (Peripheral Line), 10 mEq, IntraVENous, PRN, Nicole Mcdermott MD      Allergies reviewed by me: Patient has no known allergies.     REVIEW OF SYSTEMS:  As mentioned in chief complaint and HPI , Subjective   All other 10-point 0431  Gross per 24 hour   Intake 338.64 ml   Output --   Net 338.64 ml         Physical Exam  Vitals reviewed. Constitutional:       General: He is not in acute distress. Appearance: Normal appearance. HENT:      Head: Normocephalic and atraumatic. Right Ear: External ear normal.      Left Ear: External ear normal.      Nose: Nose normal.      Mouth/Throat:      Mouth: Mucous membranes are moist. Mucous membranes are not dry. Eyes:      General: No scleral icterus. Conjunctiva/sclera: Conjunctivae normal.   Neck:      Vascular: No JVD. Cardiovascular:      Rate and Rhythm: Normal rate and regular rhythm. Heart sounds: S1 normal and S2 normal.   Pulmonary:      Effort: Pulmonary effort is normal. No respiratory distress. Breath sounds: Normal breath sounds. Abdominal:      General: Bowel sounds are normal. There is no distension. Musculoskeletal:         General: No swelling or deformity. Cervical back: Normal range of motion and neck supple. Skin:     General: Skin is dry. Coloration: Skin is not jaundiced. Neurological:      Mental Status: He is alert and oriented to person, place, and time. Mental status is at baseline. Psychiatric:         Mood and Affect: Mood normal.         Behavior: Behavior normal.              =======================================================================================     DATA:  Diagnostic tests reviewed by me for today's visit:   (AS NEEDED FOR MY EVALUATION AND MANAGEMENT).        Recent Labs     06/26/22 2108 06/27/22  0519   WBC 5.4 4.4   HCT 37.6* 35.3*    166     Iron Saturation:  No components found for: PERCENTFE  FERRITIN:  No results found for: FERRITIN  IRON:  No results found for: IRON  TIBC:  No results found for: TIBC    Recent Labs     06/26/22 2108   *   K 4.1   CL 92*   CO2 24   BUN 12   CREATININE 0.7*     Recent Labs     06/26/22 2108   CALCIUM 8.9   MG 1.70*     No results for input(s): PH, PCO2, PO2 in the last 72 hours.     Invalid input(s): Brie Epps    ABG:  No results found for: PH, PCO2, PO2, HCO3, BE, THGB, TCO2, O2SAT  VBG:    Lab Results   Component Value Date    PHVEN 7.389 06/26/2022    ACM8KVU 38.0 06/26/2022    BEVEN -1.8 06/26/2022    V4DCMIVJ 100 06/26/2022       LDH:  No results found for: LDH  Uric Acid:    Lab Results   Component Value Date    LABURIC 1.5 01/12/2022       PT/INR:    Lab Results   Component Value Date    PROTIME 15.6 06/26/2022    INR 1.25 06/26/2022     Warfarin PT/INR:  No components found for: Becky Gentile  PTT:    Lab Results   Component Value Date    APTT 49.8 06/26/2022   [APTT}  Last 3 Troponin:    Lab Results   Component Value Date    TROPONINI <0.01 06/26/2022    TROPONINI <0.01 01/12/2022    TROPONINI <0.01 12/25/2021       U/A:    Lab Results   Component Value Date    COLORU YELLOW 12/24/2021    PROTEINU Negative 12/24/2021    PHUR 5.5 12/24/2021    CLARITYU Clear 12/24/2021    SPECGRAV 1.007 12/24/2021    LEUKOCYTESUR Negative 12/24/2021    UROBILINOGEN 1.0 12/24/2021    BILIRUBINUR Negative 12/24/2021    BLOODU Negative 12/24/2021    GLUCOSEU Negative 12/24/2021     Microalbumen/Creatinine ratio:  No components found for: RUCREAT  24 Hour Urine for Protein:  No components found for: RAWUPRO, UHRS3, ADCR75OG, UTV3  24 Hour Urine for Creatinine Clearance:  No components found for: CREAT4, UHRS10, UTV10  Urine Toxicology:  No components found for: Levie Fresh, IBENZO, ICOCAINE, IMARTHC, IOPIATES, IPHENCYC    HgBA1c:    Lab Results   Component Value Date    LABA1C 5.6 09/03/2019     RPR:  No results found for: RPR  HIV:  No results found for: HIV  HEMAL:  No results found for: ANATITER, HEMAL  RF:  No results found for: RF  DSDNA:  No components found for: DNA  AMYLASE:  No results found for: AMYLASE  LIPASE:    Lab Results   Component Value Date    LIPASE 23.0 04/19/2019     Fibrinogen Level:  No components found for: FIB       BELOW MENTIONED RADIOLOGY STUDY RESULTS BY ME (AS NEEDED FOR MY EVALUATION AND MANAGEMENT). XR CHEST PORTABLE    Result Date: 6/26/2022  EXAMINATION: ONE XRAY VIEW OF THE CHEST 6/26/2022 9:50 pm COMPARISON: 01/12/2022 HISTORY: ORDERING SYSTEM PROVIDED HISTORY: cough, concern for covid TECHNOLOGIST PROVIDED HISTORY: Reason for exam:->cough, concern for covid FINDINGS: The cardiomediastinal silhouette is unchanged in appearance. There is no consolidation, pneumothorax, or evidence of edema. No effusion is appreciated. The osseous structures are unchanged in appearance. Thoracic dextroscoliosis again demonstrated. Chronic findings in the chest without acute airspace disease identified.

## 2022-06-27 NOTE — PROGRESS NOTES
4 Eyes Skin Assessment     NAME:  Meche Linn  YOB: 1940  MEDICAL RECORD NUMBER:  [id number]    The patient is being assess for  Admission    I agree that 2 RN's have performed a thorough Head to Toe Skin Assessment on the patient. ALL assessment sites listed below have been assessed. Areas assessed by both nurses:    Head, Face, Ears, Shoulders, Back, Chest, Arms, Elbows, Hands, Sacrum. Buttock, Coccyx, Ischium and Legs. Feet and Heels        Does the Patient have a Wound?  No noted wound(s)       [place] Prevention initiated: Yes  Wound Care Orders initiated:  No    Pressure Injury (Stage 3,4, Unstageable, DTI, NWPT, and Complex wounds) if present place referral/consult order under [de-identified] No    New and Established Ostomies if present place consult order under : No      Nurse 1 eSignature: Electronically signed by Corrine Haley RN on 6/27/22 at 3:38 AM EDT    **SHARE this note so that the co-signing nurse is able to place an eSignature**    Nurse 2 eSignature: Electronically signed by Christin Judd RN on 6/27/22 at 3:49 AM EDT

## 2022-06-27 NOTE — PLAN OF CARE
Problem: Discharge Planning  Goal: Discharge to home or other facility with appropriate resources  6/27/2022 1158 by Caitlin Mcnally, RN  Outcome: Progressing  6/27/2022 0346 by Elina Snow RN  Outcome: Progressing  Flowsheets (Taken 6/27/2022 0330)  Discharge to home or other facility with appropriate resources: Identify barriers to discharge with patient and caregiver     Problem: Anxiety  Goal: Will report anxiety at manageable levels  Description: INTERVENTIONS:  1. Administer medication as ordered  2. Teach and rehearse alternative coping skills  3. Provide emotional support with 1:1 interaction with staff  Outcome: Progressing     Problem: Coping  Goal: Pt/Family able to verbalize concerns and demonstrate effective coping strategies  Description: INTERVENTIONS:  1. Assist patient/family to identify coping skills, available support systems and cultural and spiritual values  2. Provide emotional support, including active listening and acknowledgement of concerns of patient and caregivers  3. Reduce environmental stimuli, as able  4. Instruct patient/family in relaxation techniques, as appropriate  5. Assess for spiritual pain/suffering and initiate Spiritual Care, Psychosocial Clinical Specialist consults as needed  Outcome: Progressing     Problem: Decision Making  Goal: Pt/Family able to effectively weigh alternatives and participate in decision making related to treatment and care  Description: INTERVENTIONS:  1. Determine when there are differences between patient's view, family's view, and healthcare provider's view of condition  2. Facilitate patient and family articulation of goals for care  3. Help patient and family identify pros/cons of alternative solutions  4. Provide information as requested by patient/family  5. Respect patient/family right to receive or not to receive information  6. Serve as a liaison between patient and family and health care team  7.  Initiate Consults from Ethics, Palliative Care or initiate Family Care Conference as is appropriate  Outcome: Progressing     Problem: Confusion  Goal: Confusion, delirium, dementia, or psychosis is improved or at baseline  Description: INTERVENTIONS:  1. Assess for possible contributors to thought disturbance, including medications, impaired vision or hearing, underlying metabolic abnormalities, dehydration, psychiatric diagnoses, and notify attending LIP  2. [place] high risk fall precautions, as indicated  3. Provide frequent short contacts to provide reality reorientation, refocusing and direction  4. Decrease environmental stimuli, including noise as appropriate  5. Monitor and intervene to maintain adequate nutrition, hydration, elimination, sleep and activity  6. If unable to ensure safety without constant attention obtain sitter and review sitter guidelines with assigned personnel  7. Initiate Psychosocial CNS and Spiritual Care consult, as indicated  Outcome: Progressing     Problem: Behavior  Goal: Pt/Family maintain appropriate behavior and adhere to behavioral management agreement, if implemented  Description: INTERVENTIONS:  1. Assess patient/family's coping skills and  non-compliant behavior (including use of illegal substances)  2. Notify security of behavior or suspected illegal substances which indicate the need for search of the patient and/or belongings  3. Encourage verbalization of thoughts and concerns in a socially appropriate manner  4. Utilize positive, consistent limit setting strategies supporting safety of patient, staff and others  5. Encourage participation in the decision making process about the behavioral management agreement  6. If a visitor's behavior poses a threat to safety call refer to organization policy.   7. Initiate consult with , Psychosocial CNS, Spiritual Care as appropriate  Outcome: Progressing     Problem: Neurosensory - Adult  Goal: Achieves stable or improved neurological status  Outcome: Progressing  Goal: Achieves maximal functionality and self care  Outcome: Progressing     Problem: Respiratory - Adult  Goal: Achieves optimal ventilation and oxygenation  Outcome: Progressing     Problem: Cardiovascular - Adult  Goal: Maintains optimal cardiac output and hemodynamic stability  Outcome: Progressing  Goal: Absence of cardiac dysrhythmias or at baseline  Outcome: Progressing     Problem: Skin/Tissue Integrity - Adult  Goal: Skin integrity remains intact  Outcome: Progressing  Goal: Oral mucous membranes remain intact  Outcome: Progressing     Problem: Musculoskeletal - Adult  Goal: Return mobility to safest level of function  Outcome: Progressing  Goal: Maintain proper alignment of affected body part  Outcome: Progressing  Goal: Return ADL status to a safe level of function  Outcome: Progressing     Problem: Gastrointestinal - Adult  Goal: Minimal or absence of nausea and vomiting  Outcome: Progressing  Goal: Maintains or returns to baseline bowel function  Outcome: Progressing  Goal: Maintains adequate nutritional intake  Outcome: Progressing  Goal: Establish and maintain optimal ostomy function  Outcome: Progressing     Problem: Genitourinary - Adult  Goal: Absence of urinary retention  Outcome: Progressing     Problem: Infection - Adult  Goal: Absence of infection at discharge  Outcome: Progressing  Goal: Absence of infection during hospitalization  Outcome: Progressing     Problem: Metabolic/Fluid and Electrolytes - Adult  Goal: Electrolytes maintained within normal limits  Outcome: Progressing  Goal: Hemodynamic stability and optimal renal function maintained  Outcome: Progressing     Problem: Hematologic - Adult  Goal: Maintains hematologic stability  Outcome: Progressing   Patient alert but confused. Oriented to self and placed only. Hard of hearing but easily reoriented. Patient assisted to bedside commode where diarrhea was noted.  C. Diff rule out due to admitted with diarrhea. C. Diff culture sent. VSS. SR on the monitor. 2L NC due to desaturation with cough and activity. See flowsheets for further details.

## 2022-06-27 NOTE — CONSULTS
MD Subhash Conner MD Brown Peels, MD                Office: [phone]                      Fax: [phone]               [email or web address]                   Nephrology consult received. Brief Summary Note -- full consult report will follow. Chart reviewed. Discussed with Dr Sandhya Love  . Thank you for allowing me to participate in this patient's care. Please do not hesitate to contact us anytime. We will follow along with you. Zoltan Gamez MD  [facility] [address] [place] [address]   [phone] or Via [employer]    ==================================================       Principal Problem:    COVID  Active Problems:    HTN (hypertension)    Anemia    Hyponatremia  Resolved Problems:    * No resolved hospital problems. *          Labs reviewed by me     CBC: Recent Labs     06/26/22 2108 06/27/22  0519   WBC 5.4 4.4   HGB 12.4* 11.7*   HCT 37.6* 35.3*   MCV 96.0 95.7    166     BMP:   Recent Labs     06/26/22 2108   *   K 4.1   CL 92*   CO2 24   BUN 12   CREATININE 0.7*     Magnesium:   Lab Results   Component Value Date    MG 1.70 06/26/2022    MG 2.00 01/19/2022    MG 1.70 01/18/2022       Arterial Blood Gasses  No results for input(s): PH, PCO2, PO2 in the last 72 hours. Invalid input(s): Delano Cuellar    UA:No results for input(s): NITRITE, COLORU, PHUR, LABCAST, WBCUA, RBCUA, MUCUS, TRICHOMONAS, YEAST, BACTERIA, CLARITYU, SPECGRAV, LEUKOCYTESUR, UROBILINOGEN, BILIRUBINUR, BLOODU, GLUCOSEU, AMORPHOUS in the last 72 hours.     Invalid input(s): Elena Vaca    LIVER PROFILE:   Recent Labs     06/26/22 2108   AST 18   ALT 8*   BILITOT 0.6   ALKPHOS 71     PT/INR:    Lab Results   Component Value Date    PROTIME 15.6 06/26/2022    INR 1.25 06/26/2022     PTT:    Lab Results   Component Value Date    APTT 49.8 06/26/2022     HEMAL:  No results found for: ANATITER, HEMAL        RADIOLOGY:    XR CHEST PORTABLE    Result Date: 6/26/2022  EXAMINATION: ONE XRAY VIEW OF THE CHEST 6/26/2022 9:50 pm COMPARISON: 01/12/2022 HISTORY: ORDERING SYSTEM PROVIDED HISTORY: cough, concern for covid TECHNOLOGIST PROVIDED HISTORY: Reason for exam:->cough, concern for covid FINDINGS: The cardiomediastinal silhouette is unchanged in appearance. There is no consolidation, pneumothorax, or evidence of edema. No effusion is appreciated. The osseous structures are unchanged in appearance. Thoracic dextroscoliosis again demonstrated. Chronic findings in the chest without acute airspace disease identified. Imaging Results.   Chest X Ray reviwed by me

## 2022-06-27 NOTE — H&P
History and Physical  Dr. Karey Siddiqui  6/27/2022    PCP: Sonia Abreu MD    Cc:   Chief Complaint   Patient presents with    Illness     Patient in with son stating patient has been weaker than normal, not eating and \"has not been himself\". States did an at home Covid test and it was positive. Son thinks sodium is low because patient exhibits jerky movements. HPI:  Duke Canavan is a 80 y.o. male who has a past medical history of Glaucoma and Prostate enlargement. Patient presents with COVID. HPI  (1-3 for expanded problem focused, ?4 for detailed/comprehensive)     80 y.o. male who presents emergency department today with his son for evaluation for concern of COVID-19. The son states that the patient started with a dry cough, on Wednesday, and into Thursday. He states that the patient started with worsening fatigue last night and into today. He states that the patient has essentially slept all day as he is reported that he is not feeling well. He states that he did do an at home COVID test which was positive, and he states that his daughter at home was positive as well. He states that the patient does have a history of hyponatremia, and with the patient's worsening fatigue since yesterday, the son states that they called the patient's primary care physician, and he was told to come to the ED for further care and evaluation. He is extremely confused/somnolent  He has been declining slowly but surely as outpt over last year    Given covid +, hyponatremia, decreased mentation - to be admitted    Problem list of hospitalization thus far:   Active Hospital Problems    Diagnosis     COVID [U07.1]      Priority: Medium    Hyponatremia [E87.1]     HTN (hypertension) [I10]     Anemia [D64.9]          Review of Systems: (1 system for EPF, 2-9 for detailed, 10+ for comprehensive)    Cannot get from pt due to decreased mentation    Past Medical History:   Past Medical History:   Diagnosis Date    Glaucoma     Prostate enlargement        Past Surgical History: History reviewed. No pertinent surgical history. Social History:   Social History     Tobacco History     Smoking Status  Never Smoker    Smokeless Tobacco Use  Never Used          Alcohol History     Alcohol Use Status  No          Drug Use     Drug Use Status  No          Sexual Activity     Sexually Active  Not Asked                Fam History: History reviewed. No pertinent family history. PFSH: The above PMHx, PSHx, SocHx, FamHx has been reviewed by myself. (1 area for detailed, 2-3 for comprehensive)      Code Status: Full Code    Meds - following list of home medications fromelectronic chart has been reviewed by myself  Prior to Admission medications    Medication Sig Start Date End Date Taking?  Authorizing Provider   sodium chloride 1 g tablet Take 1 tablet by mouth 3 times daily 1/22/22   Susan Gomez MD   sodium chloride 1 g tablet Take 1 tablet by mouth 3 times daily (with meals) 1/22/22   Cory Ramirez MD   furosemide (LASIX) 20 MG tablet Take 1 tablet by mouth 2 times daily 1/21/22   Susan Gomez MD   cyanocobalamin 1000 MCG tablet Take 1,000 mcg by mouth daily    Historical Provider, MD   magnesium hydroxide (MILK OF MAGNESIA) 400 MG/5ML suspension Take 30 mLs by mouth daily as needed for Constipation    Historical Provider, MD   baclofen (LIORESAL) 10 MG tablet Take 1 tablet by mouth 3 times daily  Patient taking differently: Take 10 mg by mouth every 6 hours as needed Indications: muscle pain  3/12/21   Susan Gomez MD   Timolol (TIMOPTIC) 0.5 % (DAILY) SOLN ophthalmic solution 1 drop daily    Historical Provider, MD   dorzolamide (TRUSOPT) 2 % ophthalmic solution Place 2 drops into both eyes 3 times daily    Historical Provider, MD   latanoprost (XALATAN) 0.005 % ophthalmic solution Place 1 drop into both eyes nightly    Historical Provider, MD   finasteride (PROSCAR) 5 MG tablet Take 5 mg by mouth daily Historical Provider, MD   tamsulosin (FLOMAX) 0.4 MG capsule Take 0.4 mg by mouth daily. Historical Provider, MD         No Known Allergies          EXAM: (2-7 system for EPF/Detailed, ?8 for Comprehensive)  /60   Pulse 58   Temp 97.9 °F (36.6 °C) (Temporal)   Resp 22   Wt 121 lb 11.1 oz (55.2 kg)   SpO2 97%   BMI 17.46 kg/m²   Constitutional: vitals as above: alert, appears stated age and cooperative    Head: Normocephalic, without obvious abnormality, atraumatic    Eyes:lids and lashes normal, conjunctivae and sclerae normal and pupils equal, round, reactive to light and accomodation    EMNT: external ears normal, nares midline    Neck: no carotid bruit, supple, symmetrical, trachea midline and thyroid not enlarged, symmetric, no tenderness/mass/nodules     Respiratory: crackles bilaterally with normal respiratory effort    Cardiovascular: normal rate, regular rhythm, normal S1 and S2 and no murmurs    Gastrointestinal: soft, non-tender, non-distended, normal bowel sounds, no masses or organomegaly    Extremities: no clubbing, no edema    Skin:No rashes or nodules noted.     Neurologic:negative         LABS:  Labs Reviewed   COVID-19 & INFLUENZA COMBO - Abnormal; Notable for the following components:       Result Value    SARS-CoV-2 RNA, RT PCR DETECTED (*)     All other components within normal limits   CBC WITH AUTO DIFFERENTIAL - Abnormal; Notable for the following components:    RBC 3.91 (*)     Hemoglobin 12.4 (*)     Hematocrit 37.6 (*)     Lymphocytes Absolute 0.4 (*)     All other components within normal limits   COMPREHENSIVE METABOLIC PANEL - Abnormal; Notable for the following components:    Sodium 126 (*)     Chloride 92 (*)     Glucose 122 (*)     CREATININE 0.7 (*)     Total Protein 6.1 (*)     ALT 8 (*)     All other components within normal limits   BRAIN NATRIURETIC PEPTIDE - Abnormal; Notable for the following components:    Pro- (*)     All other components within normal limits   PROTIME-INR - Abnormal; Notable for the following components:    Protime 15.6 (*)     INR 1.25 (*)     All other components within normal limits   APTT - Abnormal; Notable for the following components:    aPTT 49.8 (*)     All other components within normal limits   LACTATE, SEPSIS - Abnormal; Notable for the following components:    Lactic Acid, Sepsis 2.0 (*)     All other components within normal limits   BLOOD GAS, VENOUS - Abnormal; Notable for the following components:    pCO2, Claude 38.0 (*)     pO2, Claude 165.0 (*)     HCO3, Venous 22.9 (*)     Carboxyhemoglobin 2.9 (*)     All other components within normal limits   MAGNESIUM - Abnormal; Notable for the following components:    Magnesium 1.70 (*)     All other components within normal limits   CBC WITH AUTO DIFFERENTIAL - Abnormal; Notable for the following components:    RBC 3.69 (*)     Hemoglobin 11.7 (*)     Hematocrit 35.3 (*)     Lymphocytes Absolute 0.7 (*)     All other components within normal limits   C-REACTIVE PROTEIN - Abnormal; Notable for the following components:    CRP 38.3 (*)     All other components within normal limits   CULTURE, BLOOD 2   CULTURE, BLOOD 1   TROPONIN   PROCALCITONIN   LACTATE, SEPSIS   D-DIMER, QUANTITATIVE   URINALYSIS WITH REFLEX TO CULTURE         IMAGING:  Imaging results from the ER have been reviewed in the computerized chart. XR CHEST PORTABLE    Result Date: 6/26/2022  EXAMINATION: ONE XRAY VIEW OF THE CHEST 6/26/2022 9:50 pm COMPARISON: 01/12/2022 HISTORY: ORDERING SYSTEM PROVIDED HISTORY: cough, concern for covid TECHNOLOGIST PROVIDED HISTORY: Reason for exam:->cough, concern for covid FINDINGS: The cardiomediastinal silhouette is unchanged in appearance. There is no consolidation, pneumothorax, or evidence of edema. No effusion is appreciated. The osseous structures are unchanged in appearance. Thoracic dextroscoliosis again demonstrated.      Chronic findings in the chest without acute airspace disease identified. EKG:   EKG from ER, reviewed by self - it shows normal sinus at 70  Old chart reviewed, EKG dated 12/25/21 is reviewed, there is not difference noted. Old study shows sinus at 78    Lab Results   Component Value Date    GLUCOSE 122 06/26/2022     No results found for: POCGLU  /60   Pulse 58   Temp 97.9 °F (36.6 °C) (Temporal)   Resp 22   Wt 121 lb 11.1 oz (55.2 kg)   SpO2 97%   BMI 17.46 kg/m²     MEDICAL DECISION MAKING:    Principal Problem:    COVID -New Problem to me. Pt with positive test. CRP only mildly elevated, 39  Plan: admit, steroids. At this time, no actemra as crp is fairly low. Active Problems:    HTN (hypertension) -Established problem. Stable. 110/60  Plan: Pt home BP meds reviewed and will be continued. IV Hydralazine ordered for control of extremely high blood pressures. Will monitor labs to assess Creat/K for possible complications of medications. Anemia -Established problem. Stable. Plan: Continue present orders/plan. Hyponatremia -New Problem to me. Na 130  Plan: consult renal - has seen dr Ronny Chavira in past        I have discussed with the son the rationale for blood component transfusion; its benefits in treating or preventing fatigue, organ damage, or death; and its risk which includes mild transfusion reactions, rare risk of blood borne infection, or more serious but rare reactions. I have discussed the alternatives to transfusion, including the risk and consequences of not receiving transfusion. The son had an opportunity to ask questions and had agreed to proceed with transfusion of blood components. Diagnoses as listed above, designated as new or established and plan outlined for each. Data Reviewed:   (1) Lab tests were reviewed or ordered. (1) Radiology tests were reviewed or ordered. (1) Medical test (Echo, EKG, PFT/veronica) were ordered.   (1)History was not obtained from someone other than patient  (1) Old records were reviewed - see HPI/MDM for pertinent details if review done. (2) Case was discussed with another health care provider: Ely SOSA  (2) Imaging was viewed by myself. (2) EKG  was viewed by myself. The patient is being placed in inpatient status with the expectation of requiring a hospital stay spanning at least two midnights for care and treatment of the problems noted in the problem list.  This determination is also based on thepatients comorbidities and past medical history, the severity and timing of the signs and symptoms upon presentation.     (Please note that portions of this note were completed with a voice recognition program.  Efforts were made to edit the dictations but occasionally words are mis-transcribed.)      Electronically signed by: Jose De Jesus Romero MD 6/27/2022

## 2022-06-27 NOTE — ED PROVIDER NOTES
[facility]        Pt Name: Rajesh See  MRN: [id number]  [place] 1940  Date of evaluation: 6/26/2022  Provider: Linda Vo PA-C  PCP: Epifanio Del Rosario MD  Note Started: 10:01 PM EDT       CARA. I have evaluated this patient. My supervising physician was available for consultation. CHIEF COMPLAINT       Chief Complaint   Patient presents with    Illness     Patient in with son stating patient has been weaker than normal, not eating and \"has not been himself\". States did an at home Covid test and it was positive. Son thinks sodium is low because patient exhibits jerky movements. HISTORY OF PRESENT ILLNESS   (Location, Timing/Onset, Context/Setting, Quality, Duration, Modifying Factors, Severity, Associated Signs and Symptoms)  Note limiting factors. Chief Complaint: Concern for COVID-19    Rajesh See is a 80 y.o. male who presents emergency department today with his son for evaluation for concern of COVID-19. The son states that the patient started with a dry cough, on Wednesday, and into Thursday. He states that the patient started with worsening fatigue last night and into today. He states that the patient has essentially slept all day as he is reported that he is not feeling well. He states that he did do an at home COVID test which was positive, and he states that his daughter at home was positive as well. He states that the patient does have a history of hyponatremia, and with the patient's worsening fatigue since yesterday, the son states that they called the patient's primary care physician, and he was told to come to the ED for further care and evaluation. The patient has not had any chest pain. He is not any shortness of breath. He denies any abdominal pain, nausea, vomiting or diarrhea. No urinary symptoms. No other complaints.     Nursing Notes were all reviewed and agreed with or any disagreements were addressed in the HPI. REVIEW OF SYSTEMS    (2-9 systems for level 4, 10 or more for level 5)     Review of Systems   Constitutional: Positive for fatigue. Negative for activity change, appetite change, chills and fever. HENT: Negative for congestion and rhinorrhea. Respiratory: Positive for cough. Negative for shortness of breath. Cardiovascular: Negative for chest pain. Gastrointestinal: Negative for abdominal pain, diarrhea, nausea and vomiting. Genitourinary: Negative for difficulty urinating, dysuria and hematuria. Positives and Pertinent negatives as per HPI. Except as noted above in the ROS, all other systems were reviewed and negative. PAST MEDICAL HISTORY     Past Medical History:   Diagnosis Date    Glaucoma     Prostate enlargement          SURGICAL HISTORY   History reviewed. No pertinent surgical history. CURRENTMEDICATIONS       Previous Medications    BACLOFEN (LIORESAL) 10 MG TABLET    Take 1 tablet by mouth 3 times daily    CYANOCOBALAMIN 1000 MCG TABLET    Take 1,000 mcg by mouth daily    DORZOLAMIDE (TRUSOPT) 2 % OPHTHALMIC SOLUTION    Place 2 drops into both eyes 3 times daily    FINASTERIDE (PROSCAR) 5 MG TABLET    Take 5 mg by mouth daily    FUROSEMIDE (LASIX) 20 MG TABLET    Take 1 tablet by mouth 2 times daily    LATANOPROST (XALATAN) 0.005 % OPHTHALMIC SOLUTION    Place 1 drop into both eyes nightly    MAGNESIUM HYDROXIDE (MILK OF MAGNESIA) 400 MG/5ML SUSPENSION    Take 30 mLs by mouth daily as needed for Constipation    SODIUM CHLORIDE 1 G TABLET    Take 1 tablet by mouth 3 times daily    SODIUM CHLORIDE 1 G TABLET    Take 1 tablet by mouth 3 times daily (with meals)    TAMSULOSIN (FLOMAX) 0.4 MG CAPSULE    Take 0.4 mg by mouth daily. TIMOLOL (TIMOPTIC) 0.5 % (DAILY) SOLN OPHTHALMIC SOLUTION    1 drop daily         ALLERGIES     Patient has no known allergies. FAMILYHISTORY     History reviewed. No pertinent family history. SOCIAL HISTORY       Social History     Tobacco Use    Smoking status: Never Smoker    Smokeless tobacco: Never Used   Substance Use Topics    Alcohol use: No    Drug use: No       SCREENINGS    Jessica Coma Scale  Eye Opening: Spontaneous  Best Verbal Response: Confused  Best Motor Response: Obeys commands  Jessica Coma Scale Score: 14        PHYSICAL EXAM    (up to 7 for level 4, 8 or more for level 5)     ED Triage Vitals [06/26/22 2132]   BP Temp Temp Source Heart Rate Resp SpO2 Height Weight   (!) 145/65 98.6 °F (37 °C) Oral 82 20 98 % -- --       Physical Exam  Vitals and nursing note reviewed. Constitutional:       Appearance: He is well-developed. He is ill-appearing. He is not diaphoretic. Comments: Ill-appearing although nontoxic   HENT:      Head: Normocephalic and atraumatic. Right Ear: External ear normal.      Left Ear: External ear normal.      Nose: Nose normal.   Eyes:      General:         Right eye: No discharge. Left eye: No discharge. Neck:      Trachea: No tracheal deviation. Cardiovascular:      Rate and Rhythm: Normal rate and regular rhythm. Pulmonary:      Effort: Pulmonary effort is normal. No respiratory distress. Breath sounds: No wheezing. Comments: Diminished aeration to bilateral bases  Abdominal:      General: Bowel sounds are normal. There is no distension. Palpations: Abdomen is soft. Tenderness: There is no abdominal tenderness. There is no guarding. Musculoskeletal:         General: Normal range of motion. Cervical back: Normal range of motion and neck supple. Skin:     General: Skin is warm and dry. Neurological:      Mental Status: He is alert and oriented to person, place, and time.    Psychiatric:         Behavior: Behavior normal.         DIAGNOSTIC RESULTS   LABS:    Labs Reviewed   COVID-19 & INFLUENZA COMBO - Abnormal; Notable for the following components:       Result Value    SARS-CoV-2 RNA, RT PCR DETECTED (*)     All other components within normal limits   CBC WITH AUTO DIFFERENTIAL - Abnormal; Notable for the following components:    RBC 3.91 (*)     Hemoglobin 12.4 (*)     Hematocrit 37.6 (*)     Lymphocytes Absolute 0.4 (*)     All other components within normal limits   COMPREHENSIVE METABOLIC PANEL - Abnormal; Notable for the following components:    Sodium 126 (*)     Chloride 92 (*)     Glucose 122 (*)     CREATININE 0.7 (*)     Total Protein 6.1 (*)     ALT 8 (*)     All other components within normal limits   BRAIN NATRIURETIC PEPTIDE - Abnormal; Notable for the following components:    Pro- (*)     All other components within normal limits   PROTIME-INR - Abnormal; Notable for the following components:    Protime 15.6 (*)     INR 1.25 (*)     All other components within normal limits   APTT - Abnormal; Notable for the following components:    aPTT 49.8 (*)     All other components within normal limits   LACTATE, SEPSIS - Abnormal; Notable for the following components:    Lactic Acid, Sepsis 2.0 (*)     All other components within normal limits   BLOOD GAS, VENOUS - Abnormal; Notable for the following components:    pCO2, Claude 38.0 (*)     pO2, Claude 165.0 (*)     HCO3, Venous 22.9 (*)     Carboxyhemoglobin 2.9 (*)     All other components within normal limits   MAGNESIUM - Abnormal; Notable for the following components:    Magnesium 1.70 (*)     All other components within normal limits   CULTURE, BLOOD 2   CULTURE, BLOOD 1   TROPONIN   PROCALCITONIN   LACTATE, SEPSIS   URINALYSIS WITH REFLEX TO CULTURE       When ordered only abnormal lab results are displayed. All other labs were within normal range or not returned as of this dictation. EKG: When ordered, EKG's are interpreted by the Emergency Department Physician in the absence of a cardiologist.  Please see their note for interpretation of EKG.     RADIOLOGY:   Non-plain film images such as CT, Ultrasound and MRI are read by the radiologist. Chago Wh[place] radiographic images are visualized and preliminarily interpreted by the ED Provider with the below findings:        Interpretation per the Radiologist below, if available at the time of this note:    XR CHEST PORTABLE   Final Result   Chronic findings in the chest without acute airspace disease identified. No results found. PROCEDURES   Unless otherwise noted below, none     Procedures    CRITICAL CARE TIME       CONSULTS:  IP CONSULT TO PRIMARY CARE PROVIDER      EMERGENCY DEPARTMENT COURSE and DIFFERENTIAL DIAGNOSIS/MDM:   Vitals:    Vitals:    06/26/22 2300 06/26/22 2315 06/26/22 2330 06/26/22 2345   BP: 108/63 (!) 108/56 106/60 (!) 104/58   Pulse: 64 62 58 60   Resp: 21 21 20 18   Temp:       TempSrc:       SpO2: 96% 96% 97% 99%       Patient was given the following medications:  Medications   0.9 % sodium chloride bolus (500 mLs IntraVENous New Bag 6/26/22 2316)         Is this patient to be included in the SEP-1 Core Measure due to severe sepsis or septic shock? No   Exclusion criteria - the patient is NOT to be included for SEP-1 Core Measure due to:  Viral etiology found or highly suspected (including COVID-19) without concomitant bacterial infection    Briefly, this is a 68-year-old male who presents to the emergency department today for evaluation for concerns of COVID-19. Patient started with a dry cough on Wednesday, and has had worsening fatigue over the past 2 days, family members at home tested positive for COVID-19 he did have a COVID-19 test at home which was positive. Patient has had worsening fatigue over the past 24 to 48 hours, and he does have a history of hyponatremia. On examination, the patient is ill-appearing although nontoxic. He is diminished aeration to bilateral bases. EKG will be documented by my attending, please see his note for further details  CBC shows no evidence of leukocytosis, there is a stable anemia.   CMP does show a hyponatremia of 126, IV fluids given. Troponin is negative. Procalcitonin is normal.  Magnesium is low 1.7, will replace IV. COVID-19 is positive. Chest x-ray is negative. Due to the patient's increasing fatigue, and COVID-19, I feel that he will need to be admitted for further care and evaluation, Dr. Landon Owen has agreed for admission, patient, and son are updated, agreeable with plan, stable for admission      FINAL IMPRESSION      1. COVID-19    2. Other fatigue    3. Hyponatremia          DISPOSITION/PLAN   DISPOSITION Admitted 06/26/2022 11:33:10 PM      PATIENT REFERRED TO:  No follow-up provider specified.     DISCHARGE MEDICATIONS:  New Prescriptions    No medications on file       DISCONTINUED MEDICATIONS:  Discontinued Medications    No medications on file              (Please note that portions of this note were completed with a voice recognition program.  Efforts were made to edit the dictations but occasionally words are mis-transcribed.)    Delgado Duque PA-C (electronically signed)            Delgado Duque PA-C  06/27/22 0105

## 2022-06-28 LAB
ANION GAP SERPL CALCULATED.3IONS-SCNC: 8 MMOL/L (ref 3–16)
BASOPHILS ABSOLUTE: 0 K/UL (ref 0–0.2)
BASOPHILS RELATIVE PERCENT: 0.1 %
BUN BLDV-MCNC: 9 MG/DL (ref 7–20)
CALCIUM SERPL-MCNC: 8.9 MG/DL (ref 8.3–10.6)
CHLORIDE BLD-SCNC: 98 MMOL/L (ref 99–110)
CO2: 28 MMOL/L (ref 21–32)
CREAT SERPL-MCNC: <0.5 MG/DL (ref 0.8–1.3)
EOSINOPHILS ABSOLUTE: 0 K/UL (ref 0–0.6)
EOSINOPHILS RELATIVE PERCENT: 0 %
GFR AFRICAN AMERICAN: >60
GFR NON-AFRICAN AMERICAN: >60
GLUCOSE BLD-MCNC: 105 MG/DL (ref 70–99)
HCT VFR BLD CALC: 38.1 % (ref 40.5–52.5)
HEMOGLOBIN: 12.7 G/DL (ref 13.5–17.5)
L. PNEUMOPHILA SEROGP 1 UR AG: NORMAL
LYMPHOCYTES ABSOLUTE: 0.6 K/UL (ref 1–5.1)
LYMPHOCYTES RELATIVE PERCENT: 10.1 %
MCH RBC QN AUTO: 31.5 PG (ref 26–34)
MCHC RBC AUTO-ENTMCNC: 33.3 G/DL (ref 31–36)
MCV RBC AUTO: 94.7 FL (ref 80–100)
MONOCYTES ABSOLUTE: 0.7 K/UL (ref 0–1.3)
MONOCYTES RELATIVE PERCENT: 12.2 %
NEUTROPHILS ABSOLUTE: 4.6 K/UL (ref 1.7–7.7)
NEUTROPHILS RELATIVE PERCENT: 77.6 %
PDW BLD-RTO: 13.4 % (ref 12.4–15.4)
PLATELET # BLD: 205 K/UL (ref 135–450)
PMV BLD AUTO: 6.5 FL (ref 5–10.5)
POTASSIUM SERPL-SCNC: 4 MMOL/L (ref 3.5–5.1)
RBC # BLD: 4.02 M/UL (ref 4.2–5.9)
SODIUM BLD-SCNC: 134 MMOL/L (ref 136–145)
WBC # BLD: 5.9 K/UL (ref 4–11)

## 2022-06-28 PROCEDURE — 80048 BASIC METABOLIC PNL TOTAL CA: CPT

## 2022-06-28 PROCEDURE — 6370000000 HC RX 637 (ALT 250 FOR IP): Performed by: INTERNAL MEDICINE

## 2022-06-28 PROCEDURE — 36415 COLL VENOUS BLD VENIPUNCTURE: CPT

## 2022-06-28 PROCEDURE — 6360000002 HC RX W HCPCS: Performed by: INTERNAL MEDICINE

## 2022-06-28 PROCEDURE — 2580000003 HC RX 258: Performed by: INTERNAL MEDICINE

## 2022-06-28 PROCEDURE — 1200000000 HC SEMI PRIVATE

## 2022-06-28 PROCEDURE — 85025 COMPLETE CBC W/AUTO DIFF WBC: CPT

## 2022-06-28 RX ADMIN — TIMOLOL MALEATE 1 DROP: 5 SOLUTION OPHTHALMIC at 08:09

## 2022-06-28 RX ADMIN — SODIUM CHLORIDE: 9 INJECTION, SOLUTION INTRAVENOUS at 00:23

## 2022-06-28 RX ADMIN — DORZOLAMIDE HCL 1 DROP: 20 SOLUTION/ DROPS OPHTHALMIC at 21:37

## 2022-06-28 RX ADMIN — DEXAMETHASONE SODIUM PHOSPHATE 6 MG: 10 INJECTION, SOLUTION INTRAMUSCULAR; INTRAVENOUS at 08:09

## 2022-06-28 RX ADMIN — TAMSULOSIN HYDROCHLORIDE 0.4 MG: 0.4 CAPSULE ORAL at 21:37

## 2022-06-28 RX ADMIN — SODIUM CHLORIDE: 9 INJECTION, SOLUTION INTRAVENOUS at 11:49

## 2022-06-28 RX ADMIN — ENOXAPARIN SODIUM 40 MG: 100 INJECTION SUBCUTANEOUS at 08:09

## 2022-06-28 RX ADMIN — FINASTERIDE 5 MG: 5 TABLET, FILM COATED ORAL at 21:36

## 2022-06-28 RX ADMIN — SODIUM CHLORIDE, PRESERVATIVE FREE 10 ML: 5 INJECTION INTRAVENOUS at 21:36

## 2022-06-28 RX ADMIN — SODIUM CHLORIDE TAB 1 GM 1 G: 1 TAB at 15:37

## 2022-06-28 RX ADMIN — SODIUM CHLORIDE TAB 1 GM 1 G: 1 TAB at 08:09

## 2022-06-28 RX ADMIN — DORZOLAMIDE HCL 1 DROP: 20 SOLUTION/ DROPS OPHTHALMIC at 08:09

## 2022-06-28 RX ADMIN — SODIUM CHLORIDE, PRESERVATIVE FREE 10 ML: 5 INJECTION INTRAVENOUS at 08:09

## 2022-06-28 RX ADMIN — SODIUM CHLORIDE TAB 1 GM 1 G: 1 TAB at 21:36

## 2022-06-28 ASSESSMENT — PAIN DESCRIPTION - ORIENTATION: ORIENTATION: UPPER

## 2022-06-28 ASSESSMENT — PAIN SCALES - GENERAL
PAINLEVEL_OUTOF10: 0
PAINLEVEL_OUTOF10: 7

## 2022-06-28 ASSESSMENT — PAIN DESCRIPTION - PAIN TYPE: TYPE: ACUTE PAIN

## 2022-06-28 ASSESSMENT — PAIN DESCRIPTION - FREQUENCY: FREQUENCY: INTERMITTENT

## 2022-06-28 ASSESSMENT — PAIN DESCRIPTION - DESCRIPTORS: DESCRIPTORS: ACHING

## 2022-06-28 ASSESSMENT — PAIN DESCRIPTION - ONSET: ONSET: ON-GOING

## 2022-06-28 ASSESSMENT — PAIN DESCRIPTION - LOCATION: LOCATION: ABDOMEN

## 2022-06-28 NOTE — PROGRESS NOTES
MD Modesta Houston MD Milas Risk, MD               Office: [phone]                      Fax: [phone]             [address] [place] [address]                   NEPHROLOGY INPATIENT PROGRESS NOTE:     PATIENT NAME: Cynthia Palacios  : 1940  MRN: [id number]       RECOMMENDATIONS:   - holding Lasix  - stop NS   - Salt tabs - BP is tolerating, so monitoring BP.   - COVID mx, not hypoxic   - trend NA - goal <6-8 points rise in 24 hrs   - monitor PVR w/ bladder scan for ledbetter insertion need. - at higher risk for decompensation, needing closer monitoring. D/C plan from renal stand point: improving   - once Na >130s, w/o neurological s/s,     D/w pt's team - Dr Edie George also       IMPRESSION:       Admitted for:  Hyponatremia [E87.1]  Other fatigue [R53.83]  COVID [U07.1]  COVID-19 [U07.1]    ICD-10-CM    1. COVID-19  U07.1    2. Other fatigue  R53.83    3. Hyponatremia  E87.1      Hyponatremia   - BL Na low 130s -> in past in to 110s -  -: Etiology in past - NSAIDs, URIs, w/ COVID , w/o hypoxia   - no major neuro s/s reported   - likely hypovolemic this time       Associated problems:   - Volume status: mild hypo-volemic  : BP: no need for tight control   - Electrolytes: K: WNL  Hypomagnesemia     - Acid-Base: Lactic acidosis - d/to hypovolemia      - Anemia: of chronic disease mild        Other major problems: Management per primary and other consulting teams.   //         Hospital Problems           Last Modified POA    * (Principal) COVID 2022 Yes    HTN (hypertension) 2022 Yes    Anemia 2022 Yes    Hyponatremia 2022 Yes        : other supportive care :   - Check daily renal function panel with electrolytes-phosphorus  - Strict monitoring of I/Os, daily weight  - non renal feeds/diet  - Current medications reviewed. Please refer to the orders. High Complexity. Multiple complex problems.   Discussed with patient and treatment team-    Time spent > 30 (~35) minutes. Thank you for allowing me to participate in this patient's care. Please do not hesitate to contact me anytime. We will follow along with you. Jose Pugh MD,  [facility] [address] [place] [address] [place]: [phone] or [facility]  Fax: [phone]        =======================================================================================   =======================================================================================  Subjective  Resting in bed  No distress - complaints  NC to 1L now   Making more UOP      Past medical, Surgical, Social, Family medical history reviewed by me. MEDICATIONS: reviewed by me. Medications Prior to Admission:  No current facility-administered medications on file prior to encounter. Current Outpatient Medications on File Prior to Encounter   Medication Sig Dispense Refill    furosemide (LASIX) 20 MG tablet Take 1 tablet by mouth 2 times daily (Patient not taking: Reported on 6/27/2022) 60 tablet 3    cyanocobalamin 1000 MCG tablet Take 1,000 mcg by mouth daily      magnesium hydroxide (MILK OF MAGNESIA) 400 MG/5ML suspension Take 30 mLs by mouth daily as needed for Constipation (Patient not taking: Reported on 6/27/2022)      baclofen (LIORESAL) 10 MG tablet Take 1 tablet by mouth 3 times daily (Patient taking differently: Take 10 mg by mouth every 6 hours as needed Indications: muscle pain ) 30 tablet 1    Timolol (TIMOPTIC) 0.5 % (DAILY) SOLN ophthalmic solution 1 drop daily      dorzolamide (TRUSOPT) 2 % ophthalmic solution Place 2 drops into both eyes 3 times daily      latanoprost (XALATAN) 0.005 % ophthalmic solution Place 1 drop into both eyes nightly      finasteride (PROSCAR) 5 MG tablet Take 5 mg by mouth daily      tamsulosin (FLOMAX) 0.4 MG capsule Take 0.4 mg by mouth daily.              Current Facility-Administered Medications:     dorzolamide (TRUSOPT) 2 % ophthalmic solution 1 drop, 1 drop, Both Eyes, BID, Artelia Halsted, MD, 1 drop at 06/28/22 0809    finasteride (PROSCAR) tablet 5 mg, 5 mg, Oral, Nightly, Artelia Halsted, MD, 5 mg at 06/27/22 2049    latanoprost (XALATAN) 0.005 % ophthalmic solution 1 drop, 1 drop, Both Eyes, Nightly, Artelia Halsted, MD    sodium chloride tablet 1 g, 1 g, Oral, TID, Artelia Halsted, MD, 1 g at 06/28/22 1537    tamsulosin (FLOMAX) capsule 0.4 mg, 0.4 mg, Oral, Nightly, Artelia Halsted, MD, 0.4 mg at 06/27/22 2050    Timolol (TIMOPTIC) 0.5 % ophthalmic solution 1 drop, 1 drop, Both Eyes, Daily, Artelia Halsted, MD, 1 drop at 06/28/22 0809    0.9 % sodium chloride infusion, , IntraVENous, Continuous, Artelia Halsted, MD, Last Rate: 100 mL/hr at 06/28/22 1149, New Bag at 06/28/22 1149    sodium chloride flush 0.9 % injection 5-40 mL, 5-40 mL, IntraVENous, 2 times per day, Artelia Halsted, MD, 10 mL at 06/28/22 0809    sodium chloride flush 0.9 % injection 5-40 mL, 5-40 mL, IntraVENous, PRN, Artelia Halsted, MD    0.9 % sodium chloride infusion, 25 mL, IntraVENous, PRN, Artelia Halsted, MD    enoxaparin (LOVENOX) injection 40 mg, 40 mg, SubCUTAneous, Daily, Artelia Halsted, MD, 40 mg at 06/28/22 0809    ondansetron (ZOFRAN-ODT) disintegrating tablet 4 mg, 4 mg, Oral, Q8H PRN **OR** ondansetron (ZOFRAN) injection 4 mg, 4 mg, IntraVENous, Q6H PRN, Artelia Halsted, MD    magnesium hydroxide (MILK OF MAGNESIA) 400 MG/5ML suspension 30 mL, 30 mL, Oral, Daily PRN, Artelia Halsted, MD    acetaminophen (TYLENOL) tablet 650 mg, 650 mg, Oral, Q6H PRN **OR** acetaminophen (TYLENOL) suppository 650 mg, 650 mg, Rectal, Q6H PRN, Artelia Halsted, MD    dexamethasone (PF) (DECADRON) injection 6 mg, 6 mg, IntraVENous, Q24H, Artelia Halsted, MD, 6 mg at 06/28/22 0809    hydrALAZINE (APRESOLINE) injection 10 mg, 10 mg, IntraVENous, Q6H PRN, Artelia Halsted, MD    0.9 % sodium chloride bolus, 500 mL, IntraVENous, PRN, Artelia Halsted, MD    potassium chloride (KLOR-CON M) extended release tablet 40 mEq, 40 mEq, Oral, PRN **OR** potassium bicarb-citric acid (EFFER-K) effervescent tablet 40 mEq, 40 mEq, Oral, PRN **OR** potassium chloride 10 mEq/100 mL IVPB (Peripheral Line), 10 mEq, IntraVENous, PRN, Artelia Halsted, MD        REVIEW OF SYSTEMS:  As mentioned in chief complaint and HPI , Subjective   All other 10-point review of systems: negative.          =======================================================================================     PHYSICAL EXAM:  Recent vital signs and recent I/Os reviewed by me.      Wt Readings from Last 3 Encounters:   06/28/22 122 lb 9.2 oz (55.6 kg)   01/14/22 105 lb (47.6 kg)   12/24/21 108 lb 11.2 oz (49.3 kg)     BP Readings from Last 3 Encounters:   06/28/22 (!) 140/84   01/21/22 106/63   12/27/21 112/60     Patient Vitals for the past 24 hrs:   BP Temp Temp src Pulse Resp SpO2 Height Weight   06/28/22 1504 (!) 140/84 -- -- 94 22 -- -- --   06/28/22 1500 (!) 208/194 -- -- -- -- -- -- --   06/28/22 1400 -- -- -- 72 23 97 % -- --   06/28/22 1150 (!) 125/90 97.4 °F (36.3 °C) Temporal 60 16 98 % -- --   06/28/22 1105 -- -- -- -- -- -- 6' (1.829 m) --   06/28/22 1100 116/70 -- -- 56 16 97 % -- --   06/28/22 1000 (!) 151/76 -- -- 81 (!) 40 (!) 88 % -- --   06/28/22 0900 (!) 142/71 -- -- 72 16 94 % -- --   06/28/22 0800 123/71 97.8 °F (36.6 °C) Temporal 68 19 98 % -- --   06/28/22 0700 (!) 160/132 -- -- 96 18 96 % -- --   06/28/22 0450 137/83 97.6 °F (36.4 °C) Temporal -- -- 98 % -- 122 lb 9.2 oz (55.6 kg)   06/27/22 2340 -- 97.9 °F (36.6 °C) Temporal -- -- -- -- --   06/27/22 2300 (!) 134/91 -- -- 67 16 100 % -- --   06/27/22 2046 131/72 97.9 °F (36.6 °C) Temporal 56 19 96 % -- --   06/27/22 1839 -- -- -- -- -- -- 6' (1.829 m) --   06/27/22 1800 123/73 -- -- 68 19 96 % -- --   06/27/22 1700 111/60 -- -- 54 19 98 % -- --       Intake/Output Summary (Last 24 hours) at 6/28/2022 1650  Last data filed at 6/28/2022 0900  Gross per 24 hour   Intake 3280 ml Output 2450 ml   Net 830 ml         Physical Exam  Vitals reviewed. Constitutional:       General: He is not in acute distress. Appearance: Normal appearance. HENT:      Head: Normocephalic and atraumatic. Right Ear: External ear normal.      Left Ear: External ear normal.      Nose: Nose normal.      Mouth/Throat:      Mouth: Mucous membranes are moist. Mucous membranes are not dry. Eyes:      General: No scleral icterus. Conjunctiva/sclera: Conjunctivae normal.   Neck:      Vascular: No JVD. Cardiovascular:      Rate and Rhythm: Normal rate and regular rhythm. Heart sounds: S1 normal and S2 normal.   Pulmonary:      Effort: Pulmonary effort is normal. No respiratory distress. Breath sounds: Normal breath sounds. Abdominal:      General: Bowel sounds are normal. There is no distension. Musculoskeletal:         General: No swelling or deformity. Cervical back: Normal range of motion and neck supple. Skin:     General: Skin is dry. Coloration: Skin is not jaundiced. Neurological:      Mental Status: He is alert and oriented to person, place, and time. Mental status is at baseline. Psychiatric:         Mood and Affect: Mood normal.         Behavior: Behavior normal.              =======================================================================================     DATA:  Diagnostic tests reviewed by me for today's visit:   (AS NEEDED FOR MY EVALUATION AND MANAGEMENT).        Recent Labs     06/26/22 2108 06/27/22 0519 06/28/22  0456   WBC 5.4 4.4 5.9   HCT 37.6* 35.3* 38.1*    166 205     Iron Saturation:  No components found for: PERCENTFE  FERRITIN:  No results found for: FERRITIN  IRON:  No results found for: IRON  TIBC:  No results found for: TIBC    Recent Labs     06/26/22 2108 06/27/22 0519 06/28/22 0456   * 125* 134*   K 4.1 4.1 4.0   CL 92* 93* 98*   CO2 24 21 28   BUN 12 10 9   CREATININE 0.7* 0.6* <0.5*     Recent Labs 06/26/22 2108 06/27/22  0519 06/28/22  0456   CALCIUM 8.9 8.3 8.9   MG 1.70*  --   --      No results for input(s): PH, PCO2, PO2 in the last 72 hours.     Invalid input(s): Tamia Rumps    ABG:  No results found for: PH, PCO2, PO2, HCO3, BE, THGB, TCO2, O2SAT  VBG:    Lab Results   Component Value Date    PHVEN 7.389 06/26/2022    GAW8QSP 38.0 06/26/2022    BEVEN -1.8 06/26/2022    [id number] 100 06/26/2022       LDH:  No results found for: LDH  Uric Acid:    Lab Results   Component Value Date    LABURIC 3.2 06/27/2022       PT/INR:    Lab Results   Component Value Date    PROTIME 15.6 06/26/2022    INR 1.25 06/26/2022     Warfarin PT/INR:  No components found for: Chhaya Floyd  PTT:    Lab Results   Component Value Date    APTT 49.8 06/26/2022   [APTT}  Last 3 Troponin:    Lab Results   Component Value Date    TROPONINI <0.01 06/26/2022    TROPONINI <0.01 01/12/2022    TROPONINI <0.01 12/25/2021       U/A:    Lab Results   Component Value Date    COLORU YELLOW 12/24/2021    PROTEINU Negative 12/24/2021    PHUR 5.5 12/24/2021    CLARITYU Clear 12/24/2021    SPECGRAV 1.007 12/24/2021    LEUKOCYTESUR Negative 12/24/2021    UROBILINOGEN 1.0 12/24/2021    BILIRUBINUR Negative 12/24/2021    BLOODU Negative 12/24/2021    GLUCOSEU Negative 12/24/2021     Microalbumen/Creatinine ratio:  No components found for: RUCREAT  24 Hour Urine for Protein:  No components found for: RAWUPRO, UHRS3, EIKQ71JQ, UTV3  24 Hour Urine for Creatinine Clearance:  No components found for: CREAT4, UHRS10, UTV10  Urine Toxicology:  No components found for: Musa Carrillo, IBENZO, ICOCAINE, IMARTHC, IOPIATES, IPHENCYC    HgBA1c:    Lab Results   Component Value Date    LABA1C 5.6 09/03/2019     RPR:  No results found for: RPR  HIV:  No results found for: HIV  HEMAL:  No results found for: ANATITER, HEMAL  RF:  No results found for: RF  DSDNA:  No components found for: DNA  AMYLASE:  No results found for: AMYLASE  LIPASE:    Lab

## 2022-06-28 NOTE — PROGRESS NOTES
Patient's son called for update. Requested that patient be discharged with [facility] for home therapy due to prior history with that company and daughter in law is stay-at-home wife, able to care for patient full time. Patient's son states he was concerned regarding EKG results, informed that patient's heart rhythm has been normal and free of signs of significant injury. No other questions from son at this time.

## 2022-06-28 NOTE — PROGRESS NOTES
Nutrition Note    RECOMMENDATIONS  1. PO Diet: Continue current diet   2. ONS: Offer Ensure Enlive BID  3. Nutrition Support: None      NUTRITION ASSESSMENT   Pt triggered positive nursing nutrition screen for MST 2. Pt currently in droplet plus isolation for Covid-19. Per chart review, pt was not eating well prior to admission. MD note indicates pt has been declining over the past year. Weight fluctuations noted since March 2021 from 105 lbs to 134 lbs.  lbs with BMI 16.62. PO intake 26-50% of meals on regular diet. Will offer Ensure Enlive BID.  Nutrition Related Findings: No edema noted. Na+ 134. LBM 6/27.  Wounds: None   Nutrition Education:  Education not indicated    Nutrition Goals: PO intake 50% or greater,by next RD assessment     MALNUTRITION ASSESSMENT   Malnutrition Status: Insufficient data (Unable to perform NFPE d/t droplet plus isolation)    NUTRITION DIAGNOSIS   · Inadequate protein-energy intake related to inadequate protein-energy intake as evidenced by intake 26-50%    CURRENT NUTRITION THERAPIES  ADULT DIET; Regular     PO Intake: 26-50%   PO Supplement Intake:None Ordered    ANTHROPOMETRICS   Current Height: 6' (182.9 cm)   Current Weight: 122 lb 9.2 oz (55.6 kg)     Ideal Body Weight (IBW): 178 lbs  (81 kg)        BMI: 16.6    COMPARATIVE STANDARDS  Energy (kcal):  5225-1363     Protein (g):   grams       Fluid (mL/day):  0201-1223 mL    The patient will be monitored per nutrition standards of care. Consult dietitian if additional nutrition interventions are needed prior to RD reassessment.      Jose Dunbar, [address] [place] [address],     Contact: [phone]

## 2022-06-28 NOTE — PROGRESS NOTES
Shift assessment completed, VSS, SR on monitor. Patient able to answer orientation questions appropriately x4. Patient given breakfast tray. Call light within reach. Bed alarm engaged. See flowsheets for further details.

## 2022-06-28 NOTE — PROGRESS NOTES
Progress Note - Dr. Elizabeth Blackwood - Internal Medicine  PCP: Tricia Rivas MD [id number] [facility] Shilo Sloan [phone]    Hospital Day: 2  Code Status: Full Code  Current Diet: ADULT DIET; Regular        CC: follow up on medical issues    Subjective:   Johann Weinstein is a 80 y.o. male. Pt seen and examined  Chart reviewed since last visit, labs and imaging below        Pt still confused  Weak  Na better, 134    He denies chest pain, denies shortness of breath, denies nausea,  denies emesis. 10 system Review of Systems is reviewed with patient, and pertinent positives are noted in HPI above . Otherwise, Review of systems is negative. I have reviewed the patient's medical and social history in detail and updated the computerized patient record. To recap: He  has a past medical history of Glaucoma and Prostate enlargement. . He  has no past surgical history on file. Louie Jakob He  reports that he has never smoked. He has never used smokeless tobacco. He reports that he does not drink alcohol and does not use drugs. .        Active Hospital Problems    Diagnosis Date Noted    COVID [U07.1] 06/26/2022     Priority: Medium    Hyponatremia [E87.1] 12/24/2021    HTN (hypertension) [I10] 03/09/2021    Anemia [D64.9] 03/09/2021       Current Facility-Administered Medications: dorzolamide (TRUSOPT) 2 % ophthalmic solution 1 drop, 1 drop, Both Eyes, BID  finasteride (PROSCAR) tablet 5 mg, 5 mg, Oral, Nightly  latanoprost (XALATAN) 0.005 % ophthalmic solution 1 drop, 1 drop, Both Eyes, Nightly  sodium chloride tablet 1 g, 1 g, Oral, TID  tamsulosin (FLOMAX) capsule 0.4 mg, 0.4 mg, Oral, Nightly  Timolol (TIMOPTIC) 0.5 % ophthalmic solution 1 drop, 1 drop, Both Eyes, Daily  0.9 % sodium chloride infusion, , IntraVENous, Continuous  sodium chloride flush 0.9 % injection 5-40 mL, 5-40 mL, IntraVENous, 2 times per day  sodium chloride flush 0.9 % injection 5-40 mL, 5-40 mL, IntraVENous, PRN  0.9 % sodium chloride infusion, 25 mL, IntraVENous, PRN  enoxaparin (LOVENOX) injection 40 mg, 40 mg, SubCUTAneous, Daily  ondansetron (ZOFRAN-ODT) disintegrating tablet 4 mg, 4 mg, Oral, Q8H PRN **OR** ondansetron (ZOFRAN) injection 4 mg, 4 mg, IntraVENous, Q6H PRN  magnesium hydroxide (MILK OF MAGNESIA) 400 MG/5ML suspension 30 mL, 30 mL, Oral, Daily PRN  acetaminophen (TYLENOL) tablet 650 mg, 650 mg, Oral, Q6H PRN **OR** acetaminophen (TYLENOL) suppository 650 mg, 650 mg, Rectal, Q6H PRN  dexamethasone (PF) (DECADRON) injection 6 mg, 6 mg, IntraVENous, Q24H  hydrALAZINE (APRESOLINE) injection 10 mg, 10 mg, IntraVENous, Q6H PRN  0.9 % sodium chloride bolus, 500 mL, IntraVENous, PRN  potassium chloride (KLOR-CON M) extended release tablet 40 mEq, 40 mEq, Oral, PRN **OR** potassium bicarb-citric acid (EFFER-K) effervescent tablet 40 mEq, 40 mEq, Oral, PRN **OR** potassium chloride 10 mEq/100 mL IVPB (Peripheral Line), 10 mEq, IntraVENous, PRN         Objective:  /71   Pulse 68   Temp 97.8 °F (36.6 °C) (Temporal)   Resp 19   Ht 6' (1.829 m)   Wt 122 lb 9.2 oz (55.6 kg)   SpO2 98%   BMI 16.62 kg/m²      Patient Vitals for the past 24 hrs:   BP Temp Temp src Pulse Resp SpO2 Height Weight   06/28/22 0800 123/71 97.8 °F (36.6 °C) Temporal 68 19 98 % -- --   06/28/22 0700 (!) 160/132 -- -- 96 18 96 % -- --   06/28/22 0450 137/83 97.6 °F (36.4 °C) Temporal -- -- 98 % -- 122 lb 9.2 oz (55.6 kg)   06/27/22 2340 -- 97.9 °F (36.6 °C) Temporal -- -- -- -- --   06/27/22 2300 (!) 134/91 -- -- 67 16 100 % -- --   06/27/22 2046 131/72 97.9 °F (36.6 °C) Temporal 56 19 96 % -- --   06/27/22 1839 -- -- -- -- -- -- 6' (1.829 m) --   06/27/22 1800 123/73 -- -- 68 19 96 % -- --   06/27/22 1700 111/60 -- -- 54 19 98 % -- --   06/27/22 1600 113/69 -- -- 58 13 92 % -- --   06/27/22 1540 118/68 97.7 °F (36.5 °C) Temporal 67 15 -- -- --   06/27/22 1500 -- -- -- 65 19 99 % -- --   06/27/22 1400 -- -- -- 70 20 96 % -- --   06/27/22 1300 124/64 -- -- 60 15 96 % -- -- 06/27/22 1200 116/64 -- -- 72 22 98 % -- --   06/27/22 1100 137/73 98 °F (36.7 °C) Temporal 75 25 (!) 88 % -- --     Patient Vitals for the past 96 hrs (Last 3 readings):   Weight   06/28/22 0450 122 lb 9.2 oz (55.6 kg)   06/27/22 0431 121 lb 11.1 oz (55.2 kg)           Intake/Output Summary (Last 24 hours) at 6/28/2022 1040  Last data filed at 6/28/2022 0800  Gross per 24 hour   Intake 3040 ml   Output 2850 ml   Net 190 ml         Physical Exam:   Vitals as above  General appearance: alert, appears stated age and cooperative    Head: Normocephalic, without obvious abnormality, atraumatic    Lungs: crackles bilaterally    Heart: regular rate and rhythm, S1, S2 normal, no murmur    Abdomen: soft, non-tender; bowel sounds normal; no masses, no organomegaly    Extremities: extremities normal, atraumatic, no cyanosis, no edema      Labs:  Lab Results   Component Value Date    WBC 5.9 06/28/2022    HGB 12.7 (L) 06/28/2022    HCT 38.1 (L) 06/28/2022     06/28/2022    CHOL 124 09/03/2019    TRIG 40 09/03/2019    HDL 64 (H) 09/03/2019    ALT 8 (L) 06/26/2022    AST 18 06/26/2022     (L) 06/28/2022    K 4.0 06/28/2022    CL 98 (L) 06/28/2022    CREATININE <0.5 (L) 06/28/2022    BUN 9 06/28/2022    CO2 28 06/28/2022    TSH 1.02 09/03/2019    PSA 0.87 09/03/2019    INR 1.25 (H) 06/26/2022    LABA1C 5.6 09/03/2019    LABMICR Not Indicated 12/24/2021     Lab Results   Component Value Date    TROPONINI <0.01 06/26/2022       Recent Imaging Results are Reviewed:  XR CHEST PORTABLE    Result Date: 6/26/2022  EXAMINATION: ONE XRAY VIEW OF THE CHEST 6/26/2022 9:50 pm COMPARISON: 01/12/2022 HISTORY: ORDERING SYSTEM PROVIDED HISTORY: cough, concern for covid TECHNOLOGIST PROVIDED HISTORY: Reason for exam:->cough, concern for covid FINDINGS: The cardiomediastinal silhouette is unchanged in appearance. There is no consolidation, pneumothorax, or evidence of edema. No effusion is appreciated.  The osseous structures are unchanged in appearance. Thoracic dextroscoliosis again demonstrated. Chronic findings in the chest without acute airspace disease identified. Lab Results   Component Value Date    GLUCOSE 105 06/28/2022     No results found for: POCGLU  /71   Pulse 68   Temp 97.8 °F (36.6 °C) (Temporal)   Resp 19   Ht 6' (1.829 m)   Wt 122 lb 9.2 oz (55.6 kg)   SpO2 98%   BMI 16.62 kg/m²     Assessment and Plan:  Principal Problem:    COVID - Established problem. Stable. Plan:  Cont steroids. Recheck crp  Active Problems:    HTN (hypertension) -Established problem. Stable. 123/71  Plan: cont home meds    Anemia -Established problem. Stable. Plan: No indication for transfusion. Cont to monitor h/h to assess progression of anemia. Recommend ferrous sulfate or MVI as outpatient. Hyponatremia -Established problem.  better  Plan: appreciate renal eval    Disp - await pt/ot eval. May need placement      (Please note that portions of this note were completed with a voice recognition program.  Efforts were made to edit the dictations but occasionally words are mis-transcribed.)        Bonilla Castillo MD  6/28/2022

## 2022-06-28 NOTE — PLAN OF CARE
Problem: Discharge Planning  Goal: Discharge to home or other facility with appropriate resources  6/27/2022 2310 by Edmund Munroe RN  Outcome: Progressing     Problem: Anxiety  Goal: Will report anxiety at manageable levels  Description: INTERVENTIONS:  1. Administer medication as ordered  2. Teach and rehearse alternative coping skills  3. Provide emotional support with 1:1 interaction with staff  6/27/2022 2310 by Edmund Munroe RN  Outcome: Progressing     Problem: Coping  Goal: Pt/Family able to verbalize concerns and demonstrate effective coping strategies  Description: INTERVENTIONS:  1. Assist patient/family to identify coping skills, available support systems and cultural and spiritual values  2. Provide emotional support, including active listening and acknowledgement of concerns of patient and caregivers  3. Reduce environmental stimuli, as able  4. Instruct patient/family in relaxation techniques, as appropriate  5. Assess for spiritual pain/suffering and initiate Spiritual Care, Psychosocial Clinical Specialist consults as needed  6/27/2022 2310 by Edmund Munroe RN  Outcome: Progressing     Problem: Decision Making  Goal: Pt/Family able to effectively weigh alternatives and participate in decision making related to treatment and care  Description: INTERVENTIONS:  1. Determine when there are differences between patient's view, family's view, and healthcare provider's view of condition  2. Facilitate patient and family articulation of goals for care  3. Help patient and family identify pros/cons of alternative solutions  4. Provide information as requested by patient/family  5. Respect patient/family right to receive or not to receive information  6. Serve as a liaison between patient and family and health care team  7.  Initiate Consults from Ethics, Palliative Care or initiate [address] as is appropriate  6/27/2022 2310 by Edmund Munroe RN  Outcome: Progressing

## 2022-06-28 NOTE — PROGRESS NOTES
Assessment completed, see doc flowsheets. Pt is A&Ox2. Lung sounds are diminished. VSS. Tele on. Medication given per [facility]. Patient has no needs at this time. Call light within in reach, will continue to monitor.

## 2022-06-29 VITALS
OXYGEN SATURATION: 89 % | SYSTOLIC BLOOD PRESSURE: 165 MMHG | HEART RATE: 105 BPM | RESPIRATION RATE: 26 BRPM | TEMPERATURE: 96.1 F | WEIGHT: 122.14 LBS | HEIGHT: 72 IN | BODY MASS INDEX: 16.54 KG/M2 | DIASTOLIC BLOOD PRESSURE: 84 MMHG

## 2022-06-29 LAB
ANION GAP SERPL CALCULATED.3IONS-SCNC: 6 MMOL/L (ref 3–16)
BASOPHILS ABSOLUTE: 0 K/UL (ref 0–0.2)
BASOPHILS RELATIVE PERCENT: 0.1 %
BUN BLDV-MCNC: 9 MG/DL (ref 7–20)
C-REACTIVE PROTEIN: 14.8 MG/L (ref 0–5.1)
CALCIUM SERPL-MCNC: 8.9 MG/DL (ref 8.3–10.6)
CHLORIDE BLD-SCNC: 98 MMOL/L (ref 99–110)
CO2: 28 MMOL/L (ref 21–32)
CREAT SERPL-MCNC: <0.5 MG/DL (ref 0.8–1.3)
EOSINOPHILS ABSOLUTE: 0 K/UL (ref 0–0.6)
EOSINOPHILS RELATIVE PERCENT: 0 %
GFR AFRICAN AMERICAN: >60
GFR NON-AFRICAN AMERICAN: >60
GLUCOSE BLD-MCNC: 99 MG/DL (ref 70–99)
HCT VFR BLD CALC: 35.9 % (ref 40.5–52.5)
HEMOGLOBIN: 12.1 G/DL (ref 13.5–17.5)
LYMPHOCYTES ABSOLUTE: 0.8 K/UL (ref 1–5.1)
LYMPHOCYTES RELATIVE PERCENT: 12.9 %
MAGNESIUM: 1.7 MG/DL (ref 1.8–2.4)
MCH RBC QN AUTO: 31.6 PG (ref 26–34)
MCHC RBC AUTO-ENTMCNC: 33.7 G/DL (ref 31–36)
MCV RBC AUTO: 93.7 FL (ref 80–100)
MONOCYTES ABSOLUTE: 0.7 K/UL (ref 0–1.3)
MONOCYTES RELATIVE PERCENT: 11.5 %
NEUTROPHILS ABSOLUTE: 4.7 K/UL (ref 1.7–7.7)
NEUTROPHILS RELATIVE PERCENT: 75.5 %
OSMOLALITY URINE: 335 MOSM/KG (ref 390–1070)
PDW BLD-RTO: 12.8 % (ref 12.4–15.4)
PHOSPHORUS: 2.5 MG/DL (ref 2.5–4.9)
PLATELET # BLD: 202 K/UL (ref 135–450)
PMV BLD AUTO: 6.3 FL (ref 5–10.5)
POTASSIUM SERPL-SCNC: 3.8 MMOL/L (ref 3.5–5.1)
RBC # BLD: 3.83 M/UL (ref 4.2–5.9)
SODIUM BLD-SCNC: 132 MMOL/L (ref 136–145)
WBC # BLD: 6.2 K/UL (ref 4–11)

## 2022-06-29 PROCEDURE — 80048 BASIC METABOLIC PNL TOTAL CA: CPT

## 2022-06-29 PROCEDURE — 97530 THERAPEUTIC ACTIVITIES: CPT

## 2022-06-29 PROCEDURE — 85025 COMPLETE CBC W/AUTO DIFF WBC: CPT

## 2022-06-29 PROCEDURE — 6360000002 HC RX W HCPCS: Performed by: INTERNAL MEDICINE

## 2022-06-29 PROCEDURE — 36415 COLL VENOUS BLD VENIPUNCTURE: CPT

## 2022-06-29 PROCEDURE — 84100 ASSAY OF PHOSPHORUS: CPT

## 2022-06-29 PROCEDURE — 97161 PT EVAL LOW COMPLEX 20 MIN: CPT

## 2022-06-29 PROCEDURE — 97116 GAIT TRAINING THERAPY: CPT

## 2022-06-29 PROCEDURE — 97535 SELF CARE MNGMENT TRAINING: CPT

## 2022-06-29 PROCEDURE — 6370000000 HC RX 637 (ALT 250 FOR IP): Performed by: INTERNAL MEDICINE

## 2022-06-29 PROCEDURE — 2580000003 HC RX 258: Performed by: INTERNAL MEDICINE

## 2022-06-29 PROCEDURE — 83735 ASSAY OF MAGNESIUM: CPT

## 2022-06-29 PROCEDURE — 97165 OT EVAL LOW COMPLEX 30 MIN: CPT

## 2022-06-29 PROCEDURE — 86140 C-REACTIVE PROTEIN: CPT

## 2022-06-29 PROCEDURE — 94760 N-INVAS EAR/PLS OXIMETRY 1: CPT

## 2022-06-29 RX ORDER — PREDNISONE 20 MG/1
20 TABLET ORAL 2 TIMES DAILY
Qty: 10 TABLET | Refills: 0 | Status: SHIPPED | OUTPATIENT
Start: 2022-06-29 | End: 2022-07-04

## 2022-06-29 RX ADMIN — DEXAMETHASONE SODIUM PHOSPHATE 6 MG: 10 INJECTION, SOLUTION INTRAMUSCULAR; INTRAVENOUS at 08:25

## 2022-06-29 RX ADMIN — TIMOLOL MALEATE 1 DROP: 5 SOLUTION OPHTHALMIC at 08:27

## 2022-06-29 RX ADMIN — DORZOLAMIDE HCL 1 DROP: 20 SOLUTION/ DROPS OPHTHALMIC at 08:27

## 2022-06-29 RX ADMIN — ENOXAPARIN SODIUM 40 MG: 100 INJECTION SUBCUTANEOUS at 08:25

## 2022-06-29 RX ADMIN — SODIUM CHLORIDE, PRESERVATIVE FREE 10 ML: 5 INJECTION INTRAVENOUS at 08:28

## 2022-06-29 RX ADMIN — SODIUM CHLORIDE TAB 1 GM 1 G: 1 TAB at 08:25

## 2022-06-29 NOTE — PLAN OF CARE
Problem: Discharge Planning  Goal: Discharge to home or other facility with appropriate resources  Outcome: Progressing     Problem: Anxiety  Goal: Will report anxiety at manageable levels  Description: INTERVENTIONS:  1. Administer medication as ordered  2. Teach and rehearse alternative coping skills  3. Provide emotional support with 1:1 interaction with staff  Outcome: Progressing     Problem: Coping  Goal: Pt/Family able to verbalize concerns and demonstrate effective coping strategies  Description: INTERVENTIONS:  1. Assist patient/family to identify coping skills, available support systems and cultural and spiritual values  2. Provide emotional support, including active listening and acknowledgement of concerns of patient and caregivers  3. Reduce environmental stimuli, as able  4. Instruct patient/family in relaxation techniques, as appropriate  5. Assess for spiritual pain/suffering and initiate Spiritual Care, Psychosocial Clinical Specialist consults as needed  Outcome: Progressing     Problem: Decision Making  Goal: Pt/Family able to effectively weigh alternatives and participate in decision making related to treatment and care  Description: INTERVENTIONS:  1. Determine when there are differences between patient's view, family's view, and healthcare provider's view of condition  2. Facilitate patient and family articulation of goals for care  3. Help patient and family identify pros/cons of alternative solutions  4. Provide information as requested by patient/family  5. Respect patient/family right to receive or not to receive information  6. Serve as a liaison between patient and family and health care team  7. Initiate Consults from Ethics, Palliative Care or initiate [facility] as is appropriate  Outcome: Progressing     Problem: Confusion  Goal: Confusion, delirium, dementia, or psychosis is improved or at baseline  Description: INTERVENTIONS:  1.  Assess for possible contributors to thought disturbance, including medications, impaired vision or hearing, underlying metabolic abnormalities, dehydration, psychiatric diagnoses, and notify attending LIP  2. [place] high risk fall precautions, as indicated  3. Provide frequent short contacts to provide reality reorientation, refocusing and direction  4. Decrease environmental stimuli, including noise as appropriate  5. Monitor and intervene to maintain adequate nutrition, hydration, elimination, sleep and activity  6. If unable to ensure safety without constant attention obtain sitter and review sitter guidelines with assigned personnel  7. Initiate Psychosocial CNS and Spiritual Care consult, as indicated  Outcome: Progressing     Problem: Behavior  Goal: Pt/Family maintain appropriate behavior and adhere to behavioral management agreement, if implemented  Description: INTERVENTIONS:  1. Assess patient/family's coping skills and  non-compliant behavior (including use of illegal substances)  2. Notify security of behavior or suspected illegal substances which indicate the need for search of the patient and/or belongings  3. Encourage verbalization of thoughts and concerns in a socially appropriate manner  4. Utilize positive, consistent limit setting strategies supporting safety of patient, staff and others  5. Encourage participation in the decision making process about the behavioral management agreement  6. If a visitor's behavior poses a threat to safety call refer to organization policy.   7. Initiate consult with , Psychosocial CNS, Spiritual Care as appropriate  Outcome: Progressing

## 2022-06-29 NOTE — PROGRESS NOTES
Shift assessment complete; see flowsheet for details. VSS. Patient alert and oriented, but is forgetful. Medications given per MAR. Bed alarm on. Call light within reach.

## 2022-06-29 NOTE — PROGRESS NOTES
MD Nilsa Degroot MD Scott Mu, MD               Office: [phone]                      Fax: [phone]             [facility]                   NEPHROLOGY INPATIENT PROGRESS NOTE:     PATIENT NAME: Lillie Nichols  : 1940  MRN: [id number]       RECOMMENDATIONS:   - holding Lasix  - stoped NS   - Salt tabs - BP is tolerating, so monitoring BP.   - Mag repletion   - COVID mx, not hypoxic   - trend NA - goal <6-8 points rise in 24 hrs   - monitor PVR w/ bladder scan for ledbetter insertion need. - at higher risk for decompensation, needing closer monitoring. D/C plan from renal stand point: improving   - once Na >130s, w/o neurological s/s,     D/w pt's team - Dr Tamia Montoya also       IMPRESSION:       Admitted for:  Hyponatremia [E87.1]  Other fatigue [R53.83]  COVID [U07.1]  COVID-19 [U07.1]    ICD-10-CM    1. COVID-19  U07.1    2. Other fatigue  R53.83    3. Hyponatremia  E87.1      Hyponatremia   - BL Na low 130s -> in past in to 110s -  -: Etiology in past - NSAIDs, URIs, w/ COVID , w/o hypoxia   - no major neuro s/s reported   - likely hypovolemic this time       Associated problems:   - Volume status: mild hypo-volemic  : BP: no need for tight control   - Electrolytes: K: WNL  Hypomagnesemia     - Acid-Base: Lactic acidosis - d/to hypovolemia      - Anemia: of chronic disease mild        Other major problems: Management per primary and other consulting teams.   //         Hospital Problems           Last Modified POA    * (Principal) COVID 2022 Yes    HTN (hypertension) 2022 Yes    Anemia 2022 Yes    Hyponatremia 2022 Yes        : other supportive care :   - Check daily renal function panel with electrolytes-phosphorus  - Strict monitoring of I/Os, daily weight  - non renal feeds/diet  - Current medications reviewed. Please refer to the orders. High Complexity. Multiple complex problems.   Discussed with patient and treatment team-    Time spent > 30 (~35) minutes. Thank you for allowing me to participate in this patient's care. Please do not hesitate to contact me anytime. We will follow along with you. Johan Kuhn MD,  [facility] [address] [place] [address] [place]: [phone] or Via [employer]  Fax: [phone]        =======================================================================================   =======================================================================================  Subjective  Noted resting in bed   No distress - complaints  NC to 1L now   Making more UOP      Past medical, Surgical, Social, Family medical history reviewed by me. MEDICATIONS: reviewed by me. Medications Prior to Admission:  No current facility-administered medications on file prior to encounter. Current Outpatient Medications on File Prior to Encounter   Medication Sig Dispense Refill    furosemide (LASIX) 20 MG tablet Take 1 tablet by mouth 2 times daily (Patient not taking: Reported on 6/27/2022) 60 tablet 3    cyanocobalamin 1000 MCG tablet Take 1,000 mcg by mouth daily      magnesium hydroxide (MILK OF MAGNESIA) 400 MG/5ML suspension Take 30 mLs by mouth daily as needed for Constipation (Patient not taking: Reported on 6/27/2022)      baclofen (LIORESAL) 10 MG tablet Take 1 tablet by mouth 3 times daily (Patient taking differently: Take 10 mg by mouth every 6 hours as needed Indications: muscle pain ) 30 tablet 1    Timolol (TIMOPTIC) 0.5 % (DAILY) SOLN ophthalmic solution 1 drop daily      dorzolamide (TRUSOPT) 2 % ophthalmic solution Place 2 drops into both eyes 3 times daily      latanoprost (XALATAN) 0.005 % ophthalmic solution Place 1 drop into both eyes nightly      finasteride (PROSCAR) 5 MG tablet Take 5 mg by mouth daily      tamsulosin (FLOMAX) 0.4 MG capsule Take 0.4 mg by mouth daily.              Current Facility-Administered Medications:     dorzolamide (TRUSOPT) 2 % ophthalmic solution 1 drop, 1 drop, Both Eyes, BID, Kymberly Lawrence MD, 1 drop at 06/29/22 0827    finasteride (PROSCAR) tablet 5 mg, 5 mg, Oral, Nightly, Kymberly Lawrence MD, 5 mg at 06/28/22 2136    latanoprost (XALATAN) 0.005 % ophthalmic solution 1 drop, 1 drop, Both Eyes, Nightly, Kymberly Lawrence MD    sodium chloride tablet 1 g, 1 g, Oral, TID, Kymberly Lawrence MD, 1 g at 06/29/22 0825    tamsulosin [facility]) capsule 0.4 mg, 0.4 mg, Oral, Nightly, Kymberly Lawrence MD, 0.4 mg at 06/28/22 2137    Timolol (TIMOPTIC) 0.5 % ophthalmic solution 1 drop, 1 drop, Both Eyes, Daily, Kymberly Lawrence MD, 1 drop at 06/29/22 0827    sodium chloride flush 0.9 % injection 5-40 mL, 5-40 mL, IntraVENous, 2 times per day, Kymberly Lawrence MD, 10 mL at 06/29/22 0828    sodium chloride flush 0.9 % injection 5-40 mL, 5-40 mL, IntraVENous, PRN, Kymberly Lawrence MD    0.9 % sodium chloride infusion, 25 mL, IntraVENous, PRN, Kymberly Lawrence MD    enoxaparin (LOVENOX) injection 40 mg, 40 mg, SubCUTAneous, Daily, Kymberly Lawrence MD, 40 mg at 06/29/22 0825    ondansetron (ZOFRAN-ODT) disintegrating tablet 4 mg, 4 mg, Oral, Q8H PRN **OR** ondansetron (ZOFRAN) injection 4 mg, 4 mg, IntraVENous, Q6H PRN, Kymberly Lawrence MD    magnesium hydroxide (MILK OF MAGNESIA) 400 MG/5ML suspension 30 mL, 30 mL, Oral, Daily PRN, Kymberly Lawrence MD    acetaminophen (TYLENOL) tablet 650 mg, 650 mg, Oral, Q6H PRN **OR** acetaminophen (TYLENOL) suppository 650 mg, 650 mg, Rectal, Q6H PRN, Kymberly Lawrence MD    dexamethasone (PF) (DECADRON) injection 6 mg, 6 mg, IntraVENous, Q24H, Kymberly Lawrence MD, 6 mg at 06/29/22 0825    hydrALAZINE (APRESOLINE) injection 10 mg, 10 mg, IntraVENous, Q6H PRN, Kymberly Lawrence MD    0.9 % sodium chloride bolus, 500 mL, IntraVENous, PRN, Kymberly Lawrence MD    potassium chloride (KLOR-CON M) extended release tablet 40 mEq, 40 mEq, Oral, PRN **OR** potassium bicarb-citric acid (EFFER-K) effervescent tablet 40 mEq, 40 mEq, Oral, PRN **OR** potassium chloride 10 mEq/100 mL IVPB (Peripheral Line), 10 mEq, IntraVENous, PRN, Margaret Abreu MD        REVIEW OF SYSTEMS:  As mentioned in chief complaint and HPI , Subjective   All other 10-point review of systems: negative.          =======================================================================================     PHYSICAL EXAM:  Recent vital signs and recent I/Os reviewed by me. Wt Readings from Last 3 Encounters:   06/29/22 122 lb 2.2 oz (55.4 kg)   01/14/22 105 lb (47.6 kg)   12/24/21 108 lb 11.2 oz (49.3 kg)     BP Readings from Last 3 Encounters:   06/29/22 126/67   01/21/22 106/63   12/27/21 112/60     Patient Vitals for the past 24 hrs:   BP Temp Temp src Pulse Resp SpO2 Weight   06/29/22 0830 -- -- -- 66 -- -- --   06/29/22 0815 -- (!) 96.1 °F (35.6 °C) Temporal (!) 47 13 97 % --   06/29/22 0400 126/67 97.4 °F (36.3 °C) Temporal 64 21 98 % 122 lb 2.2 oz (55.4 kg)   06/29/22 0005 -- 97.4 °F (36.3 °C) Temporal 80 25 98 % --   06/28/22 2300 139/78 -- -- 50 15 -- --   06/28/22 2200 (!) 161/92 -- -- 60 21 -- --   06/28/22 2100 136/71 -- -- 50 17 -- --   06/28/22 2000 128/76 97.3 °F (36.3 °C) Temporal 50 15 100 % --   06/28/22 1504 (!) 140/84 -- -- 94 22 -- --   06/28/22 1500 (!) 208/194 -- -- -- -- -- --   06/28/22 1400 -- -- -- 72 23 97 % --       Intake/Output Summary (Last 24 hours) at 6/29/2022 1301  Last data filed at 6/29/2022 0523  Gross per 24 hour   Intake 240 ml   Output --   Net 240 ml         Physical Exam  Vitals reviewed. Constitutional:       General: He is not in acute distress. Appearance: Normal appearance. HENT:      Head: Normocephalic and atraumatic. Right Ear: External ear normal.      Left Ear: External ear normal.      Nose: Nose normal.      Mouth/Throat:      Mouth: Mucous membranes are moist. Mucous membranes are not dry. Eyes:      General: No scleral icterus.      Conjunctiva/sclera: Conjunctivae normal. -1.8 06/26/2022    H1ZBUQHL 100 06/26/2022       LDH:  No results found for: LDH  Uric Acid:    Lab Results   Component Value Date    LABURIC 3.2 06/27/2022       PT/INR:    Lab Results   Component Value Date    PROTIME 15.6 06/26/2022    INR 1.25 06/26/2022     Warfarin PT/INR:  No components found for: [place]  PTT:    Lab Results   Component Value Date    APTT 49.8 06/26/2022   [APTT}  Last 3 Troponin:    Lab Results   Component Value Date    TROPONINI <0.01 06/26/2022    TROPONINI <0.01 01/12/2022    TROPONINI <0.01 12/25/2021       U/A:    Lab Results   Component Value Date    COLORU YELLOW 12/24/2021    PROTEINU Negative 12/24/2021    PHUR 5.5 12/24/2021    CLARITYU Clear 12/24/2021    SPECGRAV 1.007 12/24/2021    LEUKOCYTESUR Negative 12/24/2021    UROBILINOGEN 1.0 12/24/2021    BILIRUBINUR Negative 12/24/2021    BLOODU Negative 12/24/2021    GLUCOSEU Negative 12/24/2021     Microalbumen/Creatinine ratio:  No components found for: RUCREAT  24 Hour Urine for Protein:  No components found for: RAWUPRO, UHRS3, RGRY33HT, UTV3  24 Hour Urine for Creatinine Clearance:  No components found for: CREAT4, UHRS10, UTV10  Urine Toxicology:  No components found for: [place], IBENZO, ICOCAINE, IMARTHC, IOPIATES, IPHENCYC    HgBA1c:    Lab Results   Component Value Date    LABA1C 5.6 09/03/2019     RPR:  No results found for: RPR  HIV:  No results found for: HIV  HEMAL:  No results found for: ANATITER, HEMAL  RF:  No results found for: RF  DSDNA:  No components found for: DNA  AMYLASE:  No results found for: AMYLASE  LIPASE:    Lab Results   Component Value Date    LIPASE 23.0 04/19/2019     Fibrinogen Level:  No components found for: FIB       BELOW MENTIONED RADIOLOGY STUDY RESULTS BY ME (AS NEEDED FOR MY EVALUATION AND MANAGEMENT).      XR CHEST PORTABLE    Result Date: 6/26/2022  EXAMINATION: ONE XRAY VIEW OF THE CHEST 6/26/2022 9:50 pm COMPARISON: 01/12/2022 HISTORY: ORDERING SYSTEM PROVIDED HISTORY:

## 2022-06-29 NOTE — PROGRESS NOTES
treatment frequency of 2-3x/wk. Please see assessment section for further patient specific details. If patient discharges prior to next session this note will serve as a discharge summary. Please see below for the latest assessment towards goals. HOME HEALTH CARE: LEVEL 3 SAFETY (recommend 24 hour assist)     - Initial home health evaluation to occur within 24-48 hours, in patient home   - Therapy evaluations in home within 24-48 hours of discharge; including DME and home safety   - Frontload therapy 5 days, then 3x a week   - Therapy to evaluate if patient has [address] needs for personal care   -  evaluation within 24-48 hours, includes evaluation of resources and insurance to determine [place], [place], LTC, and Medicaid options       DME Required For Discharge: no DME required at discharge    Precautions/Restrictions: high fall risk, Isolation precautions, up as tolerated (COVID +)  Weight Bearing Restrictions: no restrictions  [] Right Upper Extremity  [] Left Upper Extremity [] Right Lower Extremity  [] Left Lower Extremity     Required Braces/Orthotics: no braces required   [] Right  [] Left  Positional Restrictions:no positional restrictions    Pre-Admission Information   Lives With: alone    Type of Home: house  Home Layout: one level  Home Access: level entry  [facility] 45: walk in shower  Toilet Height: standard height  Bathroom Equipment: grab bars around toilet, shower chair  Home Equipment: rolling walker  Transfer Assistance: Independent without use of device  Ambulation Assistance:Independent without use of device  ADL Assistance: independent with all ADL's  IADL Assistance: independent with homemaking tasks  Active :        [] Yes  [x] No  Hand Dominance: [] Left  [] Right  Current Employment: retired.   Occupation: not asked  Hobbies:   Recent Falls: endorses no falls    Examination   Vision:   Vision Gross Assessment: Impaired and Vision Corrective Device: wears glasses at all times  Hearing:   hard of hearing  Perception:   WFL  Observation:   General Observation:  Patient [place], rounded shoulders  ROM:   (B) UE AROM WFL  Strength:   (B) UE strength grossly WFL    Decision Making: low complexity  Clinical Presentation: stable      Subjective  General: Patient supine in bed upon arrival, agreeable to evaluation. Patient pleasant and cooperative, verbose and [place], needing intermittent redirection  Pain: 0/10  Pain Interventions: not applicable        Activities of Daily Living  Basic Activities of Daily Living  Feeding: setup assistance  Feeding Comments: Patient requiring maximal increased time to eat breakfast despite soft textures. Patient with intermittent throat clearing, 1 instance of coughing with small sip of water. Patient did state \"I can't use a straw\"  Grooming: stand by assistance contact guard assistance  Grooming Comments: CGA progressing to SBA in stance at sink to complete oral care, face washing   Upper Extremity Dressing: minimal assistance. Equipment: none  Lower Extremity Dressing: moderate assistance. Equipment: none  Dressing Comments: donning socks and depends  Toileting: stand by assistance contact guard assistance. Equipment: none  Toileting Comments: urination on toilet, assist with clothing up  Instrumental Activities of Daily Living  No IADL completed on this date. Functional Mobility  Bed Mobility  Supine to Sit: stand by assistance  Scooting: stand by assistance  Comments:  Transfers  Sit to stand transfer:contact guard assistance  Stand to sit transfer: contact guard assistance  Bed / Chair Transfer: contact guard assistance. Mobility technique: ambulating. Equipment utilized: rolling walker  Toilet transfer: contact guard assistance. Mobility technique: ambulating. Equipment utilized: standard toilet  Comments: Patient tolerated stance at sink 10-12 minutes to complete ADLs  Functional Mobility:  Functional Mobility: rolling walker.   contact Short Term Goals:  Time Frame: Upon discharge  Patient will complete lower body ADL at minimal assistance   Patient will complete grooming at supervision   Patient will complete functional transfers at supervision   Patient will complete functional mobility at stand by assistance   Patient will increase functional standing balance to 15-20 minutes SBA for improved ADL completion  Patient will complete bed mobility at modified independent     Therapy Session Time     Individual Group Co-treatment   Time In    0922   Time Out    1035   Minutes    73        Timed Code Treatment Minutes:   58  Total Treatment Minutes:  73       Electronically Signed By: DIANNA Harris/L [id number]

## 2022-06-29 NOTE — PROGRESS NOTES
[facility] [address]   Phone: [phone]    Physical Therapy    [x] Initial Evaluation            [] Daily Treatment Note         [] Discharge Summary      Patient: Irving Duran   : 1940   MRN: [id number]   Date of Service:  2022  Admitting Diagnosis: COVID  Current Admission Summary: 80 y. o. male who presents emergency department today with his son for evaluation for concern of COVID-19.  The son states that the patient started with a dry cough, on Wednesday, and into Thursday. Faizan Solorzano states that the patient started with worsening fatigue last night and into today. Faizan Solorzano states that the patient has essentially slept all day as he is reported that he is not feeling well. Faizan Solorzano states that he did do an at home COVID test which was positive, and he states that his daughter at home was positive as well. Faizan Solorzano states that the patient does have a history of hyponatremia, and with the patient's worsening fatigue since yesterday, the son states that they called the patient's primary care physician, and he was told to come to the ED for further care and evaluation.   Past Medical History:  has a past medical history of Glaucoma and Prostate enlargement. Past Surgical History:  has no past surgical history on file. Discharge Recommendations: Irving Duran scored a 18/24 on the AM-PAC short mobility form. Current research shows that an AM-PAC score of 18 or greater is typically associated with a discharge to the patient's home setting. Based on the patient's AM-PAC score and their current functional mobility deficits, it is recommended that the patient have 2-3 sessions per week of Physical Therapy at d/c to increase the patient's independence. At this time, this patient demonstrates the endurance and safety to discharge home with HHPT and a follow up treatment frequency of 2-3x/wk. Please see assessment section for further patient specific details.     If patient discharges prior to next session this note will serve as a discharge summary. Please see below for the latest assessment towards goals. HOME HEALTH CARE: LEVEL 3 SAFETY     - Initial home health evaluation to occur within 24-48 hours, in patient home   - Therapy evaluations in home within 24-48 hours of discharge; including DME and home safety   - Frontload therapy 5 days, then 3x a week   - Therapy to evaluate if patient has [address] needs for personal care   -  evaluation within 24-48 hours, includes evaluation of resources and insurance to determine [place], [place], LTC, and Medicaid options       DME Required For Discharge: no DME required at discharge  Precautions/Restrictions: high fall risk, up as tolerated  Weight Bearing Restrictions: no restrictions  [] Right Upper Extremity  [] Left Upper Extremity [] Right Lower Extremity  [] Left Lower Extremity     Required Braces/Orthotics: no braces required   [] Right  [] Left  Positional Restrictions:no positional restrictions    Pre-Admission Information    Lives With: alone                     Type of Home: house  Home Layout: one level  Home Access: level entry  [facility] 45: walk in shower  Toilet Height: standard height  Bathroom Equipment: grab bars around toilet, shower chair  Home Equipment: rolling walker  Transfer Assistance: Independent without use of device  Ambulation Assistance:Independent without use of device  ADL Assistance: independent with all ADL's  IADL Assistance: independent with homemaking tasks  Active :        []? Yes                 [x]? No  Hand Dominance: []? Left                 []? Right  Current Employment: retired.   Occupation: not asked  Hobbies:   Recent Falls: endorses no falls    Examination   Vision:   Vision Gross Assessment: Impaired and Vision Corrective Device: wears glasses at all times  Hearing:   hard of hearing    Sensation:   WFL  ROM:   (B) LE AROM WFL  Strength:   (B) LE strength grossly [facility]  Decision Making: low complexity  Clinical Presentation: stable      Subjective    General: Patient supine in bed upon arrival, agreeable to evaluation. Patient pleasant and cooperative, verbose and [place], needing intermittent redirection  Pain: 0/10  Pain Interventions: not applicable    Functional Mobility  Bed Mobility  Supine to Sit: stand by assistance  Scooting: stand by assistance  Comments:  Transfers  Sit to stand transfer: contact guard assistance  Stand to sit transfer: contact guard assistance  Comments:  Ambulation  Assistive Device: rolling walker  Assistance: contact guard assistance  Distance: 15 ft, 20 ft  Gait Mechanics: decreased step lengths and alva; flexed posture  Comments:    Stair Mobility  Stair mobility not completed on this date.   Comments:  Balance  Static Sitting Balance: fair (+): maintains balance at SBA/supervision without use of UE support  Dynamic Sitting Balance: fair (+): maintains balance at SBA/supervision without use of UE support  Static Standing Balance: fair (-): maintains balance at CGA with use of UE support  Dynamic Standing Balance: fair (-): maintains balance at CGA with use of UE support  Comments: Pt stood at sink for 10-12 minutes to complete ADLs    Other Therapeutic Interventions    Functional Outcomes  AM-PAC Inpatient Mobility Raw Score : 18              Cognition  Overall Cognitive Status: Impaired  Memory: decreased short term memory  Safety Judgement: decreased awareness of need for assistance   Orientation:    alert and oriented x 4  Command Following:   accurately follows one step commands    Education  Barriers To Learning: cognition, visual and hearing  Patient Education: patient educated on goals, PT role and benefits, plan of care, discharge recommendations  Learning Assessment:  patient verbalizes understanding, would benefit from continued reinforcement    Assessment  Activity Tolerance: Pt tolerated well  Impairments Requiring Therapeutic Intervention: decreased functional mobility, decreased strength, decreased safety awareness, decreased cognition, decreased endurance, decreased balance  Prognosis: good  Clinical Assessment:  Pt is limited by the above deficits and would benefit from skilled PT services to maximize pt functional mobility and safety prior to discharge.     Safety Interventions: patient left in chair, chair alarm in place, call light within reach, gait belt, patient at risk for falls and nurse notified    Plan  Frequency: 3-5 x/per week  Current Treatment Recommendations: strengthening, balance training, functional mobility training, transfer training, gait training and patient/caregiver education    Goals  Patient Goals: discharge home with family   Short Term Goals:  Time Frame: discharge  Patient will complete bed mobility at supervision   Patient will complete transfers at supervision   Patient will ambulate 100 ft with use of rolling walker at supervision    Therapy Session Time      Individual Group Co-treatment   Time In     0922   Time Out     1035   Minutes     73     Timed Code Treatment Minutes:  58 Minutes  Total Treatment Minutes:  73       Electronically Signed By: Janet Kirkpatrick, PT

## 2022-06-29 NOTE — DISCHARGE INSTR - DIET

## 2022-06-29 NOTE — PROGRESS NOTES
Progress Note - Dr. Abdiaziz Armstrong - Internal Medicine  PCP: Go Casey MD [id number]  / [employer] [facility] [phone]    Hospital Day: 3  Code Status: Full Code  Current Diet: ADULT DIET; Regular  ADULT ORAL NUTRITION SUPPLEMENT; Lunch, Dinner; Standard High Calorie/High Protein Oral Supplement        CC: follow up on medical issues    Subjective:   Andry Sauer is a 80 y.o. male. Pt seen and examined  Chart reviewed since last visit, labs and imaging below        Pt a little confused but better than yest  Weak  Na a little worse, 132    Covid +  CRP remains low, 15  On steroids only    He denies chest pain, denies shortness of breath, denies nausea,  denies emesis. 10 system Review of Systems is reviewed with patient, and pertinent positives are noted in HPI above . Otherwise, Review of systems is negative. I have reviewed the patient's medical and social history in detail and updated the computerized patient record. To recap: He  has a past medical history of Glaucoma and Prostate enlargement. . He  has no past surgical history on file. Becca Alvarado He  reports that he has never smoked. He has never used smokeless tobacco. He reports that he does not drink alcohol and does not use drugs. .        Active Hospital Problems    Diagnosis Date Noted    COVID [U07.1] 06/26/2022     Priority: Medium    Hyponatremia [E87.1] 12/24/2021    HTN (hypertension) [I10] 03/09/2021    Anemia [D64.9] 03/09/2021       Current Facility-Administered Medications: dorzolamide (TRUSOPT) 2 % ophthalmic solution 1 drop, 1 drop, Both Eyes, BID  finasteride (PROSCAR) tablet 5 mg, 5 mg, Oral, Nightly  latanoprost (XALATAN) 0.005 % ophthalmic solution 1 drop, 1 drop, Both Eyes, Nightly  sodium chloride tablet 1 g, 1 g, Oral, TID  tamsulosin (FLOMAX) capsule 0.4 mg, 0.4 mg, Oral, Nightly  Timolol (TIMOPTIC) 0.5 % ophthalmic solution 1 drop, 1 drop, Both Eyes, Daily  sodium chloride flush 0.9 % injection 5-40 mL, 5-40 mL, IntraVENous, 2 times per day  sodium chloride flush 0.9 % injection 5-40 mL, 5-40 mL, IntraVENous, PRN  0.9 % sodium chloride infusion, 25 mL, IntraVENous, PRN  enoxaparin (LOVENOX) injection 40 mg, 40 mg, SubCUTAneous, Daily  ondansetron (ZOFRAN-ODT) disintegrating tablet 4 mg, 4 mg, Oral, Q8H PRN **OR** ondansetron (ZOFRAN) injection 4 mg, 4 mg, IntraVENous, Q6H PRN  magnesium hydroxide (MILK OF MAGNESIA) 400 MG/5ML suspension 30 mL, 30 mL, Oral, Daily PRN  acetaminophen (TYLENOL) tablet 650 mg, 650 mg, Oral, Q6H PRN **OR** acetaminophen (TYLENOL) suppository 650 mg, 650 mg, Rectal, Q6H PRN  dexamethasone (PF) (DECADRON) injection 6 mg, 6 mg, IntraVENous, Q24H  hydrALAZINE (APRESOLINE) injection 10 mg, 10 mg, IntraVENous, Q6H PRN  0.9 % sodium chloride bolus, 500 mL, IntraVENous, PRN  potassium chloride (KLOR-CON M) extended release tablet 40 mEq, 40 mEq, Oral, PRN **OR** potassium bicarb-citric acid (EFFER-K) effervescent tablet 40 mEq, 40 mEq, Oral, PRN **OR** potassium chloride 10 mEq/100 mL IVPB (Peripheral Line), 10 mEq, IntraVENous, PRN         Objective:  /67   Pulse 64   Temp 97.4 °F (36.3 °C) (Temporal)   Resp 21   Ht 6' (1.829 m)   Wt 122 lb 2.2 oz (55.4 kg)   SpO2 98%   BMI 16.56 kg/m²      Patient Vitals for the past 24 hrs:   BP Temp Temp src Pulse Resp SpO2 Height Weight   06/29/22 0400 126/67 97.4 °F (36.3 °C) Temporal 64 21 98 % -- 122 lb 2.2 oz (55.4 kg)   06/29/22 0005 -- 97.4 °F (36.3 °C) Temporal 80 25 98 % -- --   06/28/22 2300 139/78 -- -- 50 15 -- -- --   06/28/22 2200 (!) 161/92 -- -- 60 21 -- -- --   06/28/22 2100 136/71 -- -- 50 17 -- -- --   06/28/22 2000 128/76 97.3 °F (36.3 °C) Temporal 50 15 100 % -- --   06/28/22 1504 (!) 140/84 -- -- 94 22 -- -- --   06/28/22 1500 (!) 208/194 -- -- -- -- -- -- --   06/28/22 1400 -- -- -- 72 23 97 % -- --   06/28/22 1150 (!) 125/90 97.4 °F (36.3 °C) Temporal 60 16 98 % -- --   06/28/22 1105 -- -- -- -- -- -- 6' (1.829 m) --   06/28/22 1100 116/70 -- -- 56 16 97 % -- --   06/28/22 1000 (!) 151/76 -- -- 81 (!) 40 (!) 88 % -- --   06/28/22 0900 (!) 142/71 -- -- 72 16 94 % -- --   06/28/22 0800 123/71 97.8 °F (36.6 °C) Temporal 68 19 98 % -- --     Patient Vitals for the past 96 hrs (Last 3 readings):   Weight   06/29/22 0400 122 lb 2.2 oz (55.4 kg)   06/28/22 0450 122 lb 9.2 oz (55.6 kg)   06/27/22 0431 121 lb 11.1 oz (55.2 kg)           Intake/Output Summary (Last 24 hours) at 6/29/2022 8646  Last data filed at 6/29/2022 7583  Gross per 24 hour   Intake 1025 ml   Output --   Net 1025 ml         Physical Exam:   Vitals as above  General appearance: alert, appears stated age and cooperative    Head: Normocephalic, without obvious abnormality, atraumatic    Lungs: crackles bilaterally    Heart: regular rate and rhythm, S1, S2 normal, no murmur    Abdomen: soft, non-tender; bowel sounds normal; no masses, no organomegaly    Extremities: extremities normal, atraumatic, no cyanosis, no edema      Labs:  Lab Results   Component Value Date    WBC 6.2 06/29/2022    HGB 12.1 (L) 06/29/2022    HCT 35.9 (L) 06/29/2022     06/29/2022    CHOL 124 09/03/2019    TRIG 40 09/03/2019    HDL 64 (H) 09/03/2019    ALT 8 (L) 06/26/2022    AST 18 06/26/2022     (L) 06/29/2022    K 3.8 06/29/2022    CL 98 (L) 06/29/2022    CREATININE <0.5 (L) 06/29/2022    BUN 9 06/29/2022    CO2 28 06/29/2022    TSH 1.02 09/03/2019    PSA 0.87 09/03/2019    INR 1.25 (H) 06/26/2022    LABA1C 5.6 09/03/2019    LABMICR Not Indicated 12/24/2021     Lab Results   Component Value Date    TROPONINI <0.01 06/26/2022       Recent Imaging Results are Reviewed:  XR CHEST PORTABLE    Result Date: 6/26/2022  EXAMINATION: ONE XRAY VIEW OF THE CHEST 6/26/2022 9:50 pm COMPARISON: 01/12/2022 HISTORY: ORDERING SYSTEM PROVIDED HISTORY: cough, concern for covid TECHNOLOGIST PROVIDED HISTORY: Reason for exam:->cough, concern for covid FINDINGS: The cardiomediastinal silhouette is unchanged in appearance.   There is no consolidation, pneumothorax, or evidence of edema. No effusion is appreciated. The osseous structures are unchanged in appearance. Thoracic dextroscoliosis again demonstrated. Chronic findings in the chest without acute airspace disease identified. Lab Results   Component Value Date    GLUCOSE 99 06/29/2022     No results found for: POCGLU  /67   Pulse 64   Temp 97.4 °F (36.3 °C) (Temporal)   Resp 21   Ht 6' (1.829 m)   Wt 122 lb 2.2 oz (55.4 kg)   SpO2 98%   BMI 16.56 kg/m²     Assessment and Plan:  Principal Problem:    COVID - Established problem. Stable. Plan:  Cont steroids. Recheck crp is still low, so no indication for actemra/baricitnib  Active Problems:    HTN (hypertension) -Established problem. Stable. 126/67  Plan: cont home meds    Anemia -Established problem. Stable.  hgb 12.1  Plan: No indication for transfusion. Cont to monitor h/h to assess progression of anemia. Recommend ferrous sulfate or MVI as outpatient. Hyponatremia -Established problem.  better  Plan: appreciate renal eval    Disp - await pt/ot eval. May need placement      (Please note that portions of this note were completed with a voice recognition program.  Efforts were made to edit the dictations but occasionally words are mis-transcribed.)        Sedrick Abbasi MD  6/29/2022

## 2022-06-29 NOTE — PROGRESS NOTES
Shift assessment completed, see flowsheets. Medications administered, see MAR. Vital signs stable. Plan of care discussed with patient. Fall precautions in place. Call light and bedside table within reach. Pt denies further needs at this time. Will continue to monitor.   Karis Schlatter, RN

## 2022-06-29 NOTE — PROGRESS NOTES
Patient discharged to home with son. All IV's removed. Discharge instructions explained all questions answered. Belongings sent with patient.

## 2022-06-30 ENCOUNTER — CARE COORDINATION (OUTPATIENT)
Dept: CASE MANAGEMENT | Age: 82
End: 2022-06-30

## 2022-06-30 LAB
BLOOD CULTURE, ROUTINE: NORMAL
CULTURE, BLOOD 2: NORMAL

## 2022-06-30 NOTE — CARE COORDINATION
[facility] Transitions Initial Follow Up Call    Call within 2 business days of discharge: Yes    Patient: Lashay Loya Patient : 1940   MRN: [id number]  Reason for Admission: COVID+  Discharge Date: 22 RARS: Readmission Risk Score: 14.1 ( )    First attempt at 24 hour discharge call, phone number is for \"[place]\" with [employer]. There is not a HIPAA form on file, CTN was unable to leave a HIPAA compliant VM. CTN was also unable to contact another family member due to not having a HIPAA form available. CTN will resolve episode and remain available. Follow Up  No future appointments.     Jitendra Bernard RN

## 2022-07-08 NOTE — DISCHARGE SUMMARY
[facility] -- Physician Discharge Summary     Andry Sauer  1940  MRN: [id number]    Admit Date: 6/26/2022  Discharge Date: 6/29/2022  4:24 PM    Attending MD: No att. providers found  Discharging MD: Go Casey MD  PCP: Go Casey MD [id number] / [place] [id number] [phone]    Admission Diagnosis: Hyponatremia [E87.1]  Other fatigue [R53.83]  COVID [U07.1]  COVID-19 [U07.1]  DISCHARGE DIAGNOSIS: same    Full Hospital Problem List:  Active Hospital Problems    Diagnosis Date Noted    COVID [U07.1] 06/26/2022     Priority: Medium    Hyponatremia [E87.1] 12/24/2021    HTN (hypertension) [I10] 03/09/2021    Anemia [D64.9] 03/09/2021           Hospital Course:  80 y. o. male who presents emergency department today with his son for evaluation for concern of COVID-19.  The son states that the patient started with a dry cough, on Wednesday, and into Thursday. Steff Murillorocky states that the patient started with worsening fatigue last night and into today. Apolinarzoë Bernheim states that the patient has essentially slept all day as he is reported that he is not feeling well. Zadie Bernheim states that he did do an at home COVID test which was positive, and he states that his daughter at home was positive as well. Apolinarzoë Bernheim states that the patient does have a history of hyponatremia, and with the patient's worsening fatigue since yesterday, the son states that they called the patient's primary care physician, and he was told to come to the ED for further care and evaluation.      He is extremely confused/somnolent  He has been declining slowly but surely as outpt over last year     Given covid +, hyponatremia, decreased mentation - to be admitted    CRP is low, so pt does not qualify for actemra/baricitnib.     Pt improves with fluids and conservative therapies  Family elects to take pt back home with them      Consults made during Hospitalization:  IP CONSULT TO PRIMARY CARE PROVIDER  IP CONSULT TO NEPHROLOGY    Treatment team at time of Discharge: Treatment Team: Utilization Reviewer: Grabiel Epps RN; Utilization Reviewer: Chrissy Williamson RN; Occupational Therapist: Briana Dyer OT    Imaging Results:  XR CHEST PORTABLE    Result Date: 6/26/2022  EXAMINATION: ONE XRAY VIEW OF THE CHEST 6/26/2022 9:50 pm COMPARISON: 01/12/2022 HISTORY: ORDERING SYSTEM PROVIDED HISTORY: cough, concern for covid TECHNOLOGIST PROVIDED HISTORY: Reason for exam:->cough, concern for covid FINDINGS: The cardiomediastinal silhouette is unchanged in appearance. There is no consolidation, pneumothorax, or evidence of edema. No effusion is appreciated. The osseous structures are unchanged in appearance. Thoracic dextroscoliosis again demonstrated. Chronic findings in the chest without acute airspace disease identified. Discharge Exam:  See progress note from day of d/c    Disposition: home    Condition: stable    Discharge Medications:     Medication List      CHANGE how you take these medications    baclofen 10 MG tablet  Commonly known as: LIORESAL  Take 1 tablet by mouth 3 times daily  What changed:   · when to take this  · reasons to take this     Flomax 0.4 MG capsule  Generic drug: tamsulosin  What changed: Another medication with the same name was removed. Continue taking this medication, and follow the directions you see here. CONTINUE taking these medications    cyanocobalamin 1000 MCG tablet     dorzolamide 2 % ophthalmic solution  Commonly known as: TRUSOPT  Notes to patient: Resume taking prior to admission. finasteride 5 MG tablet  Commonly known as: PROSCAR     furosemide 20 MG tablet  Commonly known as: Lasix  Take 1 tablet by mouth 2 times daily     latanoprost 0.005 % ophthalmic solution  Commonly known as: XALATAN  Notes to patient: Resume as taking before admission.       magnesium hydroxide 400 MG/5ML suspension  Commonly known as: MILK OF MAGNESIA     Timolol 0.5 % Soln ophthalmic solution  Commonly known as: TIMOPTIC        STOP taking these medications    sodium chloride 1 g tablet        ASK your doctor about these medications    predniSONE 20 MG tablet  Commonly known as: DELTASONE  Take 1 tablet by mouth 2 times daily for 5 days  Ask about: Should I take this medication? Where to Get Your Medications      You can get these medications from any pharmacy    Bring a paper prescription for each of these medications  · predniSONE 20 MG tablet            Allergies:  No Known Allergies    Follow up Instructions: Follow-up with PCP: Jakob Brown MD in 2 wk .       Total time spent on day of discharge including face-to-face visit, examination, documentation, counseling, preparation of discharge plans and followup, and discharge medicine reconciliation and presciptions is 33 minutes    Signed:  Jakob Brown MD  7/8/2022

## 2023-03-02 ENCOUNTER — HOSPITAL ENCOUNTER (EMERGENCY)
Age: 83
Discharge: HOME | End: 2023-03-02
Payer: MEDICARE

## 2023-03-02 VITALS
DIASTOLIC BLOOD PRESSURE: 78 MMHG | HEIGHT: 72 IN | TEMPERATURE: 98.2 F | BODY MASS INDEX: 17.07 KG/M2 | RESPIRATION RATE: 16 BRPM | OXYGEN SATURATION: 98 % | WEIGHT: 126 LBS | HEART RATE: 68 BPM | SYSTOLIC BLOOD PRESSURE: 169 MMHG

## 2023-03-02 DIAGNOSIS — S61.216A LACERATION OF RIGHT LITTLE FINGER WITHOUT FOREIGN BODY WITHOUT DAMAGE TO NAIL, INITIAL ENCOUNTER: Primary | ICD-10-CM

## 2023-03-02 PROCEDURE — 90471 IMMUNIZATION ADMIN: CPT | Performed by: NURSE PRACTITIONER

## 2023-03-02 PROCEDURE — 6360000002 HC RX W HCPCS: Performed by: NURSE PRACTITIONER

## 2023-03-02 PROCEDURE — 90714 TD VACC NO PRESV 7 YRS+ IM: CPT | Performed by: NURSE PRACTITIONER

## 2023-03-02 PROCEDURE — 99284 EMERGENCY DEPT VISIT MOD MDM: CPT

## 2023-03-02 PROCEDURE — 12001 RPR S/N/AX/GEN/TRNK 2.5CM/<: CPT

## 2023-03-02 RX ADMIN — CLOSTRIDIUM TETANI TOXOID ANTIGEN (FORMALDEHYDE INACTIVATED) AND CORYNEBACTERIUM DIPHTHERIAE TOXOID ANTIGEN (FORMALDEHYDE INACTIVATED) 0.5 ML: 5; 2 INJECTION, SUSPENSION INTRAMUSCULAR at 01:28

## 2023-03-02 ASSESSMENT — ENCOUNTER SYMPTOMS
SHORTNESS OF BREATH: 0
CHEST TIGHTNESS: 0
DIARRHEA: 0
NAUSEA: 0
ABDOMINAL PAIN: 0
VOMITING: 0

## 2023-03-02 ASSESSMENT — PAIN - FUNCTIONAL ASSESSMENT: PAIN_FUNCTIONAL_ASSESSMENT: FACE, LEGS, ACTIVITY, CRY, AND CONSOLABILITY (FLACC)

## 2023-03-02 NOTE — ED PROVIDER NOTES
[address] [facility]        Pt Name: Janine Bravo  MRN: [id number]  [place] 1940  Date of evaluation: 3/2/2023  Provider: MARYANNE Bonilla - CUONG  PCP: Miguel A Bravo MD  Note Started: 4:19 AM EST 3/2/23      CARA. I have evaluated this patient. My supervising physician was available for consultation. CHIEF COMPLAINT       Chief Complaint   Patient presents with    Laceration     Pt was brought to the hospital due to suffering a laceration to his pinky finger on his right hand on a bed rail, unknown of tetanus shot. HISTORY OF PRESENT ILLNESS: 1 or more Elements     History from : Patient and Family daughter-in-law    Limitations to history : hard of hearing    Janine Bravo is a [de-identified] y.o. male who presents to the emergency department with laceration to the right fifth finger pad. The patient reports that he did injure himself on a bed rail this evening. Unclear of last tetanus update. Bleeding is controlled. Patient complains of a stinging pain    Denies any headache, fever, lightheadedness, dizziness, visual disturbances. No chest pain or pressure. No neck or back pain. No shortness of breath, cough, or congestion. No abdominal pain, nausea, vomiting, diarrhea, constipation, or dysuria. No rash. Nursing Notes were all reviewed and agreed with or any disagreements were addressed in the HPI. REVIEW OF SYSTEMS :      Review of Systems   Constitutional:  Negative for activity change, chills and fever. Respiratory:  Negative for chest tightness and shortness of breath. Cardiovascular:  Negative for chest pain. Gastrointestinal:  Negative for abdominal pain, diarrhea, nausea and vomiting. Genitourinary:  Negative for dysuria. Skin:  Positive for wound. All other systems reviewed and are negative. Positives and Pertinent negatives as per HPI. SURGICAL HISTORY   History reviewed.  No pertinent surgical history. [address]       Discharge Medication List as of 3/2/2023  1:36 AM        CONTINUE these medications which have NOT CHANGED    Details   furosemide (LASIX) 20 MG tablet Take 1 tablet by mouth 2 times daily, Disp-60 tablet, R-3NO PRINT      cyanocobalamin 1000 MCG tablet Take 1,000 mcg by mouth dailyHistorical Med      magnesium hydroxide (MILK OF MAGNESIA) 400 MG/5ML suspension Take 30 mLs by mouth daily as needed for ConstipationHistorical Med      baclofen (LIORESAL) 10 MG tablet Take 1 tablet by mouth 3 times daily, Disp-30 tablet, R-1Normal      Timolol (TIMOPTIC) 0.5 % (DAILY) SOLN ophthalmic solution 1 drop dailyHistorical Med      dorzolamide (TRUSOPT) 2 % ophthalmic solution Place 2 drops into both eyes 3 times dailyHistorical Med      latanoprost (XALATAN) 0.005 % ophthalmic solution Place 1 drop into both eyes nightlyHistorical Med      finasteride (PROSCAR) 5 MG tablet Take 5 mg by mouth dailyHistorical Med      tamsulosin (FLOMAX) 0.4 MG capsule Take 0.4 mg by mouth daily. ALLERGIES     Patient has no known allergies. FAMILYHISTORY     History reviewed. No pertinent family history. SOCIAL HISTORY       Social History     Tobacco Use    Smoking status: Never    Smokeless tobacco: Never   Substance Use Topics    Alcohol use: No    Drug use: No       SCREENINGS        [place] Coma Scale  Eye Opening: Spontaneous  Best Verbal Response: Oriented  Best Motor Response: Obeys commands  Jessica Coma Scale Score: 15                CIWA Assessment  BP: (!) 169/78  Heart Rate: 68           PHYSICAL EXAM  1 or more Elements     ED Triage Vitals [03/02/23 0105]   BP Temp Temp Source Heart Rate Resp SpO2 Height Weight   (!) 169/78 98.2 °F (36.8 °C) Oral 68 16 98 % 6' (1.829 m) 126 lb (57.2 kg)       Physical Exam  Vitals and nursing note reviewed. Constitutional:       Appearance: He is well-developed. He is not diaphoretic.    HENT:      Head: Normocephalic and atraumatic. Right Ear: External ear normal.      Left Ear: External ear normal.   Eyes:      General:         Right eye: No discharge. Left eye: No discharge. Neck:      Vascular: No JVD. Cardiovascular:      Rate and Rhythm: Normal rate. Pulses: Normal pulses. Pulmonary:      Effort: Pulmonary effort is normal. No respiratory distress. Abdominal:      Palpations: Abdomen is soft. Musculoskeletal:         General: Normal range of motion. Skin:     General: Skin is warm and dry. Coloration: Skin is not pale. Findings: Laceration present. Neurological:      Mental Status: He is alert. Psychiatric:         Behavior: Behavior normal.           DIAGNOSTIC RESULTS   LABS:    Labs Reviewed - No data to display    When ordered only abnormal lab results are displayed. All other labs were within normal range or not returned as of this dictation. EKG: When ordered, EKG's are interpreted by the Emergency Department Physician in the absence of a cardiologist.  Please see their note for interpretation of EKG. RADIOLOGY:   Non-plain film images such as CT, Ultrasound and MRI are read by the radiologist. Plain radiographic images are visualized and preliminarily interpreted by the ED Provider with the below findings:        Interpretation per the Radiologist below, if available at the time of this note:    No orders to display     No results found. No results found.     PROCEDURES   Unless otherwise noted below, none     Lac Repair    Date/Time: 3/2/2023 4:20 AM  Performed by: MARYANNE Golden CNP  Authorized by: MARYANNE Golden CNP     Consent:     Consent obtained:  Verbal    Consent given by:  Patient    Risks discussed:  Infection, pain, retained foreign body, poor cosmetic result and poor wound healing  Universal protocol:     Procedure explained and questions answered to patient or proxy's satisfaction: yes      Patient identity confirmed:  Verbally with patient  Anesthesia:     Anesthesia method:  Nerve block    Block anesthetic:  Lidocaine 2% w/o epi    Block injection procedure:  Anatomic landmarks identified, introduced needle, incremental injection, negative aspiration for blood and anatomic landmarks palpated    Block outcome:  Anesthesia achieved  Laceration details:     Location:  Finger    Finger location:  R small finger    Length (cm):  2  Pre-procedure details:     Preparation:  Patient was prepped and draped in usual sterile fashion  Exploration:     Wound exploration: wound explored through full range of motion and entire depth of wound visualized    Treatment:     Area cleansed with:  Chlorhexidine    Amount of cleaning:  Extensive    Irrigation solution:  Sterile saline    Irrigation method:  Pressure wash  Skin repair:     Repair method:  Sutures    Suture size:  6-0    Suture material:  Nylon    Suture technique:  Simple interrupted    Number of sutures:  4  Approximation:     Approximation:  Close  Repair type:     Repair type:  Simple  Post-procedure details:     Dressing:  Antibiotic ointment and bulky dressing    Procedure completion:  Tolerated    CRITICAL CARE TIME (.cctime)       PAST MEDICAL HISTORY      has a past medical history of Glaucoma and Prostate enlargement. Chronic Conditions affecting Care: none    EMERGENCY DEPARTMENT COURSE and DIFFERENTIAL DIAGNOSIS/MDM:   Vitals:    Vitals:    03/02/23 0105   BP: (!) 169/78   Pulse: 68   Resp: 16   Temp: 98.2 °F (36.8 °C)   TempSrc: Oral   SpO2: 98%   Weight: 126 lb (57.2 kg)   Height: 6' (1.829 m)       Patient was given the following medications:  Medications   tetanus & diphtheria toxoids (adult) 5-2 LFU injection 0.5 mL (0.5 mLs IntraMUSCular Given 3/2/23 0128)             Is this patient to be included in the SEP-1 Core Measure due to severe sepsis or septic shock? No   Exclusion criteria - the patient is NOT to be included for SEP-1 Core Measure due to:   Infection is not suspected    CONSULTS: (Who and What was discussed)  None  Discussion with Other Profesionals : None    Social Determinants : None    Records Reviewed : None    CC/HPI Summary, DDx, ED Course, and Reassessment:     Briefly, this is an 43-year-old male who presents to the emergency department with laceration to the right fifth finger pad. The patient reports that he did injure himself on a bed rail this evening. Unclear of last tetanus update. Bleeding is controlled. Patient complains of a stinging pain    Sutures were placed and wound was repaired as stated above. Sutures should be removed in 1 week. Return to the emergency department for new or worsening symptoms. Tetanus updated    I estimate there is LOW risk for COMPARTMENT SYNDROME, TENDON OR NEUROVASCULAR INJURY, FOREIGN BODY OR signs of INFECTION thus I consider the discharge disposition reasonable. Disposition Considerations (include 1 Tests not done, Shared Decision Making, Pt Expectation of Test or Tx.): shared decision making used throughout    I did consider xray to rule out fb    Appropriate for outpatient management follow up      I am the Primary Clinician of Record. FINAL IMPRESSION      1.  Laceration of right little finger without foreign body without damage to nail, initial encounter          DISPOSITION/PLAN     DISPOSITION Decision To Discharge 03/02/2023 01:25:32 AM      PATIENT REFERRED TO:  Emeka Kowalski MD  [address] [place]  [phone]    Schedule an appointment as soon as possible for a visit in 1 week  If symptoms worsen      DISCHARGE MEDICATIONS:  Discharge Medication List as of 3/2/2023  1:36 AM          DISCONTINUED MEDICATIONS:  Discharge Medication List as of 3/2/2023  1:36 AM                 (Please note that portions of this note were completed with a voice recognition program.  Efforts were made to edit the dictations but occasionally words are mis-transcribed.)    Chela Aranda, MARYANNE - CNP (electronically signed)            MARYANNE Hicks - CUONG  03/02/23 0422

## 2023-08-25 NOTE — PLAN OF CARE
Results for Cain Daley (MRN [id number]) as of 12/26/2021 15:05   Ref. Range 12/24/2021 11:53 12/25/2021 08:31 12/26/2021 06:33   Sodium Latest Ref Range: 136 - 145 mmol/L 124 (L) 128 (L) 134 (L)     Results for Cain Daley (MRN [id number]) as of 12/26/2021 15:05   Ref. Range 12/24/2021 11:53 12/25/2021 08:31 12/26/2021 06:33   BUN Latest Ref Range: 7 - 20 mg/dL 15 13 9   Creatinine Latest Ref Range: 0.8 - 1.3 mg/dL <0.5 (L) <0.5 (L) <0.5 (L)     Results for Cain Daley (MRN [id number]) as of 12/26/2021 15:05   Ref. Range 12/24/2021 11:53 12/25/2021 08:31 12/26/2021 06:33   GFR Non-African American Latest Ref Range: >60  >60 >60 >60     Results for Cain Daley (MRN [id number]) as of 12/26/2021 15:05   Ref. Range 12/24/2021 11:53 12/25/2021 08:31 12/26/2021 06:33   WBC Latest Ref Range: 4.0 - 11.0 K/uL 4.7 2.8 (L) 6.1   RBC Latest Ref Range: 4.20 - 5.90 M/uL 4.70 4.27 4.04 (L)   Hemoglobin Quant Latest Ref Range: 13.5 - 17.5 g/dL 14.8 13.7 12.9 (L)   Hematocrit Latest Ref Range: 40.5 - 52.5 % 44.6 40.1 (L) 37.8 (L)     Results for Cain Daley (MRN [id number]) as of 12/26/2021 15:05   Ref. Range 12/25/2021 08:31   D-Dimer, Quant Latest Ref Range: 0 - 229 ng/mL  (H)     Results for Cain Daley (MRN [id number]) as of 12/26/2021 15:05   Ref. Range 12/24/2021 11:48   SARS-CoV-2 Latest Ref Range: Not detected  Detected (A)     Results for Cain Daley (MRN [id number]) as of 12/26/2021 15:05   Ref.  Range 12/24/2021 11:48   Rapid Influenza A Ag Latest Ref Range: Negative  Negative   Rapid Influenza B Ag Latest Ref Range: Negative  Negative     Chucky CLAYTONN, RN   [phone] asu locker 1C/on unit

## 2023-11-13 ENCOUNTER — APPOINTMENT (OUTPATIENT)
Dept: CT IMAGING | Age: 83
DRG: 643 | End: 2023-11-13
Payer: MEDICARE

## 2023-11-13 ENCOUNTER — HOSPITAL ENCOUNTER (INPATIENT)
Age: 83
LOS: 8 days | Discharge: NURSING, REHAB OR LONG-TERM CARE | DRG: 643 | End: 2023-11-21
Attending: INTERNAL MEDICINE | Admitting: INTERNAL MEDICINE
Payer: MEDICARE

## 2023-11-13 ENCOUNTER — APPOINTMENT (OUTPATIENT)
Dept: GENERAL RADIOLOGY | Age: 83
DRG: 643 | End: 2023-11-13
Payer: MEDICARE

## 2023-11-13 DIAGNOSIS — E87.1 HYPONATREMIA: ICD-10-CM

## 2023-11-13 DIAGNOSIS — R41.82 ALTERED MENTAL STATUS, UNSPECIFIED ALTERED MENTAL STATUS TYPE: Primary | ICD-10-CM

## 2023-11-13 LAB
ALBUMIN SERPL-MCNC: 4.5 G/DL (ref 3.4–5)
ALBUMIN/GLOB SERPL: 2 {RATIO} (ref 1.1–2.2)
ALP SERPL-CCNC: 80 U/L (ref 40–129)
ALT SERPL-CCNC: 11 U/L (ref 10–40)
ANION GAP SERPL CALCULATED.3IONS-SCNC: 5 MMOL/L (ref 3–16)
AST SERPL-CCNC: 23 U/L (ref 15–37)
BASOPHILS # BLD: 0.2 K/UL (ref 0–0.2)
BASOPHILS NFR BLD: 2.7 %
BILIRUB SERPL-MCNC: 0.5 MG/DL (ref 0–1)
BUN SERPL-MCNC: 11 MG/DL (ref 7–20)
CALCIUM SERPL-MCNC: 8.9 MG/DL (ref 8.3–10.6)
CHLORIDE SERPL-SCNC: 91 MMOL/L (ref 99–110)
CO2 SERPL-SCNC: 28 MMOL/L (ref 21–32)
CREAT SERPL-MCNC: 0.5 MG/DL (ref 0.8–1.3)
DEPRECATED RDW RBC AUTO: 14.3 % (ref 12.4–15.4)
EOSINOPHIL # BLD: 0.3 K/UL (ref 0–0.6)
EOSINOPHIL NFR BLD: 5.1 %
GFR SERPLBLD CREATININE-BSD FMLA CKD-EPI: >60 ML/MIN/{1.73_M2}
GLUCOSE SERPL-MCNC: 95 MG/DL (ref 70–99)
HCT VFR BLD AUTO: 36.4 % (ref 40.5–52.5)
HGB BLD-MCNC: 12.2 G/DL (ref 13.5–17.5)
LACTATE BLDV-SCNC: 0.7 MMOL/L (ref 0.4–1.9)
LIPASE SERPL-CCNC: 39 U/L (ref 13–60)
LYMPHOCYTES # BLD: 0.4 K/UL (ref 1–5.1)
LYMPHOCYTES NFR BLD: 6.6 %
MCH RBC QN AUTO: 32.4 PG (ref 26–34)
MCHC RBC AUTO-ENTMCNC: 33.6 G/DL (ref 31–36)
MCV RBC AUTO: 96.2 FL (ref 80–100)
MONOCYTES # BLD: 0.4 K/UL (ref 0–1.3)
MONOCYTES NFR BLD: 6.1 %
NEUTROPHILS # BLD: 4.8 K/UL (ref 1.7–7.7)
NEUTROPHILS NFR BLD: 79.5 %
NT-PROBNP SERPL-MCNC: 636 PG/ML (ref 0–449)
PLATELET # BLD AUTO: 248 K/UL (ref 135–450)
PMV BLD AUTO: 5.8 FL (ref 5–10.5)
POTASSIUM SERPL-SCNC: 4.5 MMOL/L (ref 3.5–5.1)
PROCALCITONIN SERPL IA-MCNC: 0.03 NG/ML (ref 0–0.15)
PROT SERPL-MCNC: 6.7 G/DL (ref 6.4–8.2)
RBC # BLD AUTO: 3.78 M/UL (ref 4.2–5.9)
SODIUM SERPL-SCNC: 124 MMOL/L (ref 136–145)
TROPONIN, HIGH SENSITIVITY: 29 NG/L (ref 0–22)
WBC # BLD AUTO: 6.1 K/UL (ref 4–11)

## 2023-11-13 PROCEDURE — 93005 ELECTROCARDIOGRAM TRACING: CPT | Performed by: PHYSICIAN ASSISTANT

## 2023-11-13 PROCEDURE — 84484 ASSAY OF TROPONIN QUANT: CPT

## 2023-11-13 PROCEDURE — 80053 COMPREHEN METABOLIC PANEL: CPT

## 2023-11-13 PROCEDURE — 83605 ASSAY OF LACTIC ACID: CPT

## 2023-11-13 PROCEDURE — 96360 HYDRATION IV INFUSION INIT: CPT

## 2023-11-13 PROCEDURE — 2580000003 HC RX 258: Performed by: PHYSICIAN ASSISTANT

## 2023-11-13 PROCEDURE — 84145 PROCALCITONIN (PCT): CPT

## 2023-11-13 PROCEDURE — 71045 X-RAY EXAM CHEST 1 VIEW: CPT

## 2023-11-13 PROCEDURE — 83690 ASSAY OF LIPASE: CPT

## 2023-11-13 PROCEDURE — 70450 CT HEAD/BRAIN W/O DYE: CPT

## 2023-11-13 PROCEDURE — 83880 ASSAY OF NATRIURETIC PEPTIDE: CPT

## 2023-11-13 PROCEDURE — 1200000000 HC SEMI PRIVATE

## 2023-11-13 PROCEDURE — 85025 COMPLETE CBC W/AUTO DIFF WBC: CPT

## 2023-11-13 PROCEDURE — 81003 URINALYSIS AUTO W/O SCOPE: CPT

## 2023-11-13 PROCEDURE — 87040 BLOOD CULTURE FOR BACTERIA: CPT

## 2023-11-13 PROCEDURE — 99285 EMERGENCY DEPT VISIT HI MDM: CPT

## 2023-11-13 PROCEDURE — 36415 COLL VENOUS BLD VENIPUNCTURE: CPT

## 2023-11-13 RX ORDER — BRIMONIDINE TARTRATE 2 MG/ML
1 SOLUTION/ DROPS OPHTHALMIC 2 TIMES DAILY
COMMUNITY

## 2023-11-13 RX ORDER — DIPHENHYDRAMINE HCL 25 MG
25 TABLET ORAL DAILY
Status: ON HOLD | COMMUNITY
End: 2023-11-21 | Stop reason: HOSPADM

## 2023-11-13 RX ORDER — 0.9 % SODIUM CHLORIDE 0.9 %
1000 INTRAVENOUS SOLUTION INTRAVENOUS ONCE
Status: COMPLETED | OUTPATIENT
Start: 2023-11-13 | End: 2023-11-13

## 2023-11-13 RX ADMIN — SODIUM CHLORIDE 1000 ML: 9 INJECTION, SOLUTION INTRAVENOUS at 20:45

## 2023-11-13 ASSESSMENT — LIFESTYLE VARIABLES
HOW OFTEN DO YOU HAVE A DRINK CONTAINING ALCOHOL: NEVER
HOW MANY STANDARD DRINKS CONTAINING ALCOHOL DO YOU HAVE ON A TYPICAL DAY: PATIENT DOES NOT DRINK

## 2023-11-13 ASSESSMENT — ENCOUNTER SYMPTOMS
SHORTNESS OF BREATH: 0
ABDOMINAL PAIN: 0
DIARRHEA: 0
COUGH: 0
NAUSEA: 0
RHINORRHEA: 0
VOMITING: 0

## 2023-11-13 ASSESSMENT — PAIN SCALES - GENERAL: PAINLEVEL_OUTOF10: 0

## 2023-11-14 LAB
ANION GAP SERPL CALCULATED.3IONS-SCNC: 6 MMOL/L (ref 3–16)
BASOPHILS # BLD: 0 K/UL (ref 0–0.2)
BASOPHILS NFR BLD: 1 %
BILIRUB UR QL STRIP.AUTO: NEGATIVE
BILIRUB UR QL STRIP.AUTO: NEGATIVE
BUN SERPL-MCNC: 8 MG/DL (ref 7–20)
CALCIUM SERPL-MCNC: 8.3 MG/DL (ref 8.3–10.6)
CHLORIDE SERPL-SCNC: 98 MMOL/L (ref 99–110)
CLARITY UR: CLEAR
CLARITY UR: CLEAR
CO2 SERPL-SCNC: 25 MMOL/L (ref 21–32)
COLOR UR: YELLOW
COLOR UR: YELLOW
CREAT SERPL-MCNC: <0.5 MG/DL (ref 0.8–1.3)
DEPRECATED RDW RBC AUTO: 14.2 % (ref 12.4–15.4)
EKG ATRIAL RATE: 53 BPM
EKG DIAGNOSIS: NORMAL
EKG P AXIS: 79 DEGREES
EKG P-R INTERVAL: 214 MS
EKG Q-T INTERVAL: 406 MS
EKG QRS DURATION: 100 MS
EKG QTC CALCULATION (BAZETT): 380 MS
EKG R AXIS: -78 DEGREES
EKG T AXIS: 88 DEGREES
EKG VENTRICULAR RATE: 53 BPM
EOSINOPHIL # BLD: 0.3 K/UL (ref 0–0.6)
EOSINOPHIL NFR BLD: 7.1 %
GFR SERPLBLD CREATININE-BSD FMLA CKD-EPI: >60 ML/MIN/{1.73_M2}
GLUCOSE SERPL-MCNC: 83 MG/DL (ref 70–99)
GLUCOSE UR STRIP.AUTO-MCNC: NEGATIVE MG/DL
GLUCOSE UR STRIP.AUTO-MCNC: NEGATIVE MG/DL
HCT VFR BLD AUTO: 30.6 % (ref 40.5–52.5)
HGB BLD-MCNC: 10.4 G/DL (ref 13.5–17.5)
HGB UR QL STRIP.AUTO: NEGATIVE
HGB UR QL STRIP.AUTO: NEGATIVE
KETONES UR STRIP.AUTO-MCNC: NEGATIVE MG/DL
KETONES UR STRIP.AUTO-MCNC: NEGATIVE MG/DL
LEUKOCYTE ESTERASE UR QL STRIP.AUTO: NEGATIVE
LEUKOCYTE ESTERASE UR QL STRIP.AUTO: NEGATIVE
LYMPHOCYTES # BLD: 0.6 K/UL (ref 1–5.1)
LYMPHOCYTES NFR BLD: 14.6 %
MCH RBC QN AUTO: 32.9 PG (ref 26–34)
MCHC RBC AUTO-ENTMCNC: 33.9 G/DL (ref 31–36)
MCV RBC AUTO: 96.9 FL (ref 80–100)
MONOCYTES # BLD: 0.5 K/UL (ref 0–1.3)
MONOCYTES NFR BLD: 12.6 %
NEUTROPHILS # BLD: 2.7 K/UL (ref 1.7–7.7)
NEUTROPHILS NFR BLD: 64.7 %
NITRITE UR QL STRIP.AUTO: NEGATIVE
NITRITE UR QL STRIP.AUTO: NEGATIVE
OSMOLALITY UR: 460 MOSM/KG (ref 390–1070)
PH UR STRIP.AUTO: 7 [PH] (ref 5–8)
PH UR STRIP.AUTO: 7.5 [PH] (ref 5–8)
PLATELET # BLD AUTO: 193 K/UL (ref 135–450)
PMV BLD AUTO: 5.6 FL (ref 5–10.5)
POTASSIUM SERPL-SCNC: 4.1 MMOL/L (ref 3.5–5.1)
POTASSIUM UR-SCNC: 20.1 MMOL/L
PROCALCITONIN SERPL IA-MCNC: 0.04 NG/ML (ref 0–0.15)
PROT UR STRIP.AUTO-MCNC: NEGATIVE MG/DL
PROT UR STRIP.AUTO-MCNC: NEGATIVE MG/DL
RBC # BLD AUTO: 3.16 M/UL (ref 4.2–5.9)
SARS-COV-2 RDRP RESP QL NAA+PROBE: NOT DETECTED
SODIUM SERPL-SCNC: 126 MMOL/L (ref 136–145)
SODIUM SERPL-SCNC: 129 MMOL/L (ref 136–145)
SODIUM UR-SCNC: 158 MMOL/L
SP GR UR STRIP.AUTO: 1.01 (ref 1–1.03)
SP GR UR STRIP.AUTO: 1.01 (ref 1–1.03)
UA COMPLETE W REFLEX CULTURE PNL UR: NORMAL
UA DIPSTICK W REFLEX MICRO PNL UR: NORMAL
UA DIPSTICK W REFLEX MICRO PNL UR: NORMAL
URN SPEC COLLECT METH UR: NORMAL
URN SPEC COLLECT METH UR: NORMAL
UROBILINOGEN UR STRIP-ACNC: 1 E.U./DL
UROBILINOGEN UR STRIP-ACNC: 1 E.U./DL
WBC # BLD AUTO: 4.2 K/UL (ref 4–11)

## 2023-11-14 PROCEDURE — 84145 PROCALCITONIN (PCT): CPT

## 2023-11-14 PROCEDURE — 97530 THERAPEUTIC ACTIVITIES: CPT

## 2023-11-14 PROCEDURE — 97116 GAIT TRAINING THERAPY: CPT

## 2023-11-14 PROCEDURE — 6360000002 HC RX W HCPCS: Performed by: INTERNAL MEDICINE

## 2023-11-14 PROCEDURE — 85025 COMPLETE CBC W/AUTO DIFF WBC: CPT

## 2023-11-14 PROCEDURE — 97165 OT EVAL LOW COMPLEX 30 MIN: CPT

## 2023-11-14 PROCEDURE — 84300 ASSAY OF URINE SODIUM: CPT

## 2023-11-14 PROCEDURE — 97535 SELF CARE MNGMENT TRAINING: CPT

## 2023-11-14 PROCEDURE — 80048 BASIC METABOLIC PNL TOTAL CA: CPT

## 2023-11-14 PROCEDURE — 84295 ASSAY OF SERUM SODIUM: CPT

## 2023-11-14 PROCEDURE — 2580000003 HC RX 258: Performed by: INTERNAL MEDICINE

## 2023-11-14 PROCEDURE — 36415 COLL VENOUS BLD VENIPUNCTURE: CPT

## 2023-11-14 PROCEDURE — 97161 PT EVAL LOW COMPLEX 20 MIN: CPT

## 2023-11-14 PROCEDURE — 83935 ASSAY OF URINE OSMOLALITY: CPT

## 2023-11-14 PROCEDURE — 81003 URINALYSIS AUTO W/O SCOPE: CPT

## 2023-11-14 PROCEDURE — 6370000000 HC RX 637 (ALT 250 FOR IP): Performed by: INTERNAL MEDICINE

## 2023-11-14 PROCEDURE — 84133 ASSAY OF URINE POTASSIUM: CPT

## 2023-11-14 PROCEDURE — 87635 SARS-COV-2 COVID-19 AMP PRB: CPT

## 2023-11-14 PROCEDURE — 1200000000 HC SEMI PRIVATE

## 2023-11-14 PROCEDURE — 51798 US URINE CAPACITY MEASURE: CPT

## 2023-11-14 PROCEDURE — 93010 ELECTROCARDIOGRAM REPORT: CPT | Performed by: INTERNAL MEDICINE

## 2023-11-14 RX ORDER — TAMSULOSIN HYDROCHLORIDE 0.4 MG/1
0.4 CAPSULE ORAL NIGHTLY
Status: DISCONTINUED | OUTPATIENT
Start: 2023-11-14 | End: 2023-11-21 | Stop reason: HOSPADM

## 2023-11-14 RX ORDER — LATANOPROST 50 UG/ML
1 SOLUTION/ DROPS OPHTHALMIC NIGHTLY
Status: DISCONTINUED | OUTPATIENT
Start: 2023-11-14 | End: 2023-11-21 | Stop reason: HOSPADM

## 2023-11-14 RX ORDER — SODIUM CHLORIDE 9 MG/ML
INJECTION, SOLUTION INTRAVENOUS CONTINUOUS
Status: DISCONTINUED | OUTPATIENT
Start: 2023-11-14 | End: 2023-11-14

## 2023-11-14 RX ORDER — BRIMONIDINE TARTRATE 2 MG/ML
1 SOLUTION/ DROPS OPHTHALMIC 2 TIMES DAILY
Status: DISCONTINUED | OUTPATIENT
Start: 2023-11-14 | End: 2023-11-21 | Stop reason: HOSPADM

## 2023-11-14 RX ORDER — ACETAMINOPHEN 325 MG/1
650 TABLET ORAL EVERY 6 HOURS PRN
Status: DISCONTINUED | OUTPATIENT
Start: 2023-11-14 | End: 2023-11-21 | Stop reason: HOSPADM

## 2023-11-14 RX ORDER — SODIUM CHLORIDE 0.9 % (FLUSH) 0.9 %
5-40 SYRINGE (ML) INJECTION EVERY 12 HOURS SCHEDULED
Status: DISCONTINUED | OUTPATIENT
Start: 2023-11-14 | End: 2023-11-21 | Stop reason: HOSPADM

## 2023-11-14 RX ORDER — SODIUM CHLORIDE 0.9 % (FLUSH) 0.9 %
5-40 SYRINGE (ML) INJECTION PRN
Status: DISCONTINUED | OUTPATIENT
Start: 2023-11-14 | End: 2023-11-21 | Stop reason: HOSPADM

## 2023-11-14 RX ORDER — DIPHENHYDRAMINE HCL 25 MG
25 TABLET ORAL DAILY
Status: DISCONTINUED | OUTPATIENT
Start: 2023-11-14 | End: 2023-11-21

## 2023-11-14 RX ORDER — ACETAMINOPHEN 650 MG/1
650 SUPPOSITORY RECTAL EVERY 6 HOURS PRN
Status: DISCONTINUED | OUTPATIENT
Start: 2023-11-14 | End: 2023-11-21 | Stop reason: HOSPADM

## 2023-11-14 RX ORDER — POTASSIUM CHLORIDE 7.45 MG/ML
10 INJECTION INTRAVENOUS PRN
Status: DISCONTINUED | OUTPATIENT
Start: 2023-11-14 | End: 2023-11-14 | Stop reason: SDUPTHER

## 2023-11-14 RX ORDER — SODIUM CHLORIDE 9 MG/ML
INJECTION, SOLUTION INTRAVENOUS PRN
Status: DISCONTINUED | OUTPATIENT
Start: 2023-11-14 | End: 2023-11-21 | Stop reason: HOSPADM

## 2023-11-14 RX ORDER — POTASSIUM CHLORIDE 20 MEQ/1
40 TABLET, EXTENDED RELEASE ORAL PRN
Status: DISCONTINUED | OUTPATIENT
Start: 2023-11-14 | End: 2023-11-14 | Stop reason: SDUPTHER

## 2023-11-14 RX ORDER — ENOXAPARIN SODIUM 100 MG/ML
40 INJECTION SUBCUTANEOUS DAILY
Status: DISCONTINUED | OUTPATIENT
Start: 2023-11-14 | End: 2023-11-21 | Stop reason: HOSPADM

## 2023-11-14 RX ORDER — ONDANSETRON 4 MG/1
4 TABLET, ORALLY DISINTEGRATING ORAL EVERY 8 HOURS PRN
Status: DISCONTINUED | OUTPATIENT
Start: 2023-11-14 | End: 2023-11-21 | Stop reason: HOSPADM

## 2023-11-14 RX ORDER — POTASSIUM CHLORIDE 20 MEQ/1
40 TABLET, EXTENDED RELEASE ORAL PRN
Status: DISCONTINUED | OUTPATIENT
Start: 2023-11-14 | End: 2023-11-21 | Stop reason: HOSPADM

## 2023-11-14 RX ORDER — 0.9 % SODIUM CHLORIDE 0.9 %
500 INTRAVENOUS SOLUTION INTRAVENOUS PRN
Status: DISCONTINUED | OUTPATIENT
Start: 2023-11-14 | End: 2023-11-21 | Stop reason: HOSPADM

## 2023-11-14 RX ORDER — DORZOLAMIDE HCL 20 MG/ML
1 SOLUTION/ DROPS OPHTHALMIC 2 TIMES DAILY
Status: DISCONTINUED | OUTPATIENT
Start: 2023-11-14 | End: 2023-11-21 | Stop reason: HOSPADM

## 2023-11-14 RX ORDER — ONDANSETRON 2 MG/ML
4 INJECTION INTRAMUSCULAR; INTRAVENOUS EVERY 6 HOURS PRN
Status: DISCONTINUED | OUTPATIENT
Start: 2023-11-14 | End: 2023-11-21 | Stop reason: HOSPADM

## 2023-11-14 RX ORDER — HYDRALAZINE HYDROCHLORIDE 20 MG/ML
10 INJECTION INTRAMUSCULAR; INTRAVENOUS EVERY 6 HOURS PRN
Status: DISCONTINUED | OUTPATIENT
Start: 2023-11-14 | End: 2023-11-21 | Stop reason: HOSPADM

## 2023-11-14 RX ORDER — FINASTERIDE 5 MG/1
5 TABLET, FILM COATED ORAL DAILY
Status: DISCONTINUED | OUTPATIENT
Start: 2023-11-14 | End: 2023-11-21 | Stop reason: HOSPADM

## 2023-11-14 RX ORDER — POTASSIUM CHLORIDE 7.45 MG/ML
10 INJECTION INTRAVENOUS PRN
Status: DISCONTINUED | OUTPATIENT
Start: 2023-11-14 | End: 2023-11-21 | Stop reason: HOSPADM

## 2023-11-14 RX ADMIN — BRIMONIDINE TARTRATE 1 DROP: 2 SOLUTION OPHTHALMIC at 19:44

## 2023-11-14 RX ADMIN — LATANOPROST 1 DROP: 50 SOLUTION OPHTHALMIC at 19:44

## 2023-11-14 RX ADMIN — SODIUM CHLORIDE: 9 INJECTION, SOLUTION INTRAVENOUS at 00:33

## 2023-11-14 RX ADMIN — Medication 10 ML: at 19:44

## 2023-11-14 RX ADMIN — DIPHENHYDRAMINE HYDROCHLORIDE 25 MG: 25 TABLET ORAL at 10:21

## 2023-11-14 RX ADMIN — BRIMONIDINE TARTRATE 1 DROP: 2 SOLUTION OPHTHALMIC at 00:41

## 2023-11-14 RX ADMIN — TAMSULOSIN HYDROCHLORIDE 0.4 MG: 0.4 CAPSULE ORAL at 19:46

## 2023-11-14 RX ADMIN — DORZOLAMIDE HYDROCHLORIDE 1 DROP: 20 SOLUTION/ DROPS OPHTHALMIC at 19:44

## 2023-11-14 RX ADMIN — ENOXAPARIN SODIUM 40 MG: 100 INJECTION SUBCUTANEOUS at 10:22

## 2023-11-14 RX ADMIN — DORZOLAMIDE HYDROCHLORIDE 1 DROP: 20 SOLUTION/ DROPS OPHTHALMIC at 10:21

## 2023-11-14 RX ADMIN — LATANOPROST 1 DROP: 50 SOLUTION OPHTHALMIC at 00:41

## 2023-11-14 RX ADMIN — DORZOLAMIDE HYDROCHLORIDE 1 DROP: 20 SOLUTION/ DROPS OPHTHALMIC at 00:41

## 2023-11-14 RX ADMIN — BRIMONIDINE TARTRATE 1 DROP: 2 SOLUTION OPHTHALMIC at 10:21

## 2023-11-14 RX ADMIN — TAMSULOSIN HYDROCHLORIDE 0.4 MG: 0.4 CAPSULE ORAL at 00:42

## 2023-11-14 RX ADMIN — FINASTERIDE 5 MG: 5 TABLET, FILM COATED ORAL at 10:21

## 2023-11-14 NOTE — ED TRIAGE NOTES
Pt arrives with daughter, c/o increased confusion for about a week now. Pt has been c/o difficulty urinating, increased weakness worsening over this week.

## 2023-11-14 NOTE — H&P
History and Physical  Dr. Glenn Haley  11/13/2023    PCP: Mckinley Hogue MD    Cc:   Chief Complaint   Patient presents with    Altered Mental Status     Pt arrives with daughter, c/o increased confusion for about a week now. Pt has been c/o difficulty urinating, increased weakness worsening over this week. HPI:  Hitesh Mcneal is a 82 y.o. male who has a past medical history of Glaucoma and Prostate enlargement. Patient presents with Hyponatremia. HPI  (1-3 for expanded problem focused, ?4 for detailed/comprehensive)      82 y.o. male who presents to the emergency department today for evaluation for altered mental status. The daughter states that the patient does live by himself. She states that she goes over at least twice per day to give him his eyedrops. She states that he went over on Saturday, and she states that starting Saturday night she feels that he has been more confused. She states that he will just get confused on certain times of the day. She feels that the confusion is now worsening. Daughter also reports that the patient currently had much to eat or drink. She states that she called the patient's primary care physician, however they were concerned about waiting for an appointment and recommend that they come to the ED. CBC shows no evidence of leukocytosis, mild anemia. Lactic acid normal.  CMP does show a hyponatremia of 124 he was given IV fluids. Lipase normal.  Pro-Ha normal.  Imaging in the ED is otherwise unremarkable. However pt complains of intractable fatigue and he appears that he is not able to care for himslef at home. For this and the hyponatremia, to be admitted. Problem list of hospitalization thus far:   Active Hospital Problems    Diagnosis     Hyponatremia [E87.1]     Anemia [D64.9]     BPH (benign prostatic hyperplasia) [N40.0]     HTN (hypertension) [I10]          Review of Systems: (1 system for EPF, 2-9 for detailed, 10+ for

## 2023-11-14 NOTE — PLAN OF CARE
Problem: Skin/Tissue Integrity  Goal: Absence of new skin breakdown  Description: 1. Monitor for areas of redness and/or skin breakdown  2. Assess vascular access sites hourly  3. Every 4-6 hours minimum:  Change oxygen saturation probe site  4. Every 4-6 hours:  If on nasal continuous positive airway pressure, respiratory therapy assess nares and determine need for appliance change or resting period.   11/14/2023 1328 by Doron Pisano RN  Outcome: Progressing       Problem: Safety - Adult  Goal: Free from fall injury  11/14/2023 1328 by Doron Pisano RN  Outcome: Progressing  Flowsheets (Taken 11/14/2023 1328)  Free From Fall Injury:   Instruct family/caregiver on patient safety   Based on caregiver fall risk screen, instruct family/caregiver to ask for assistance with transferring infant if caregiver noted to have fall risk factors

## 2023-11-14 NOTE — ED PROVIDER NOTES
[facility]        Pt Name: Belgica Farrar  MRN: [id number]  [address] 1940  Date of evaluation: 11/13/2023  Provider: Robert Michael PA-C  PCP: Luis Felipe De Souza MD  Note Started: 8:31 PM EST 11/13/23      CARA. I have evaluated this patient. CHIEF COMPLAINT       Chief Complaint   Patient presents with    Altered Mental Status     Pt arrives with daughter, c/o increased confusion for about a week now. Pt has been c/o difficulty urinating, increased weakness worsening over this week. HISTORY OF PRESENT ILLNESS: 1 or more Elements     History From: daughter   Limitations to history : Altered Mental Status    Belgica Farrar is a 82 y.o. male who presents to the emergency department today for evaluation for altered mental status. The daughter states that the patient does live by himself. She states that she goes over at least twice per day to give him his eyedrops. She states that he went over on Saturday, and she states that starting Saturday night she feels that he has been more confused. She states that he will just get confused on certain times of the day. She feels that the confusion is now worsening. Daughter also reports that the patient currently had much to eat or drink. She states that she called the patient's primary care physician, however they were concerned about waiting for an appointment and recommend that they come to the ED. When the patient arrives to the emergency room he is alert and oriented x2 and this is his baseline. There is been no reports of any fever, cough, vomiting or diarrhea no history of any falls. No other history is able to be obtained at this    Nursing Notes were all reviewed and agreed with or any disagreements were addressed in the HPI. REVIEW OF SYSTEMS :      Review of Systems   Constitutional:  Negative for activity change, appetite change, chills and fever.    HENT:

## 2023-11-15 LAB
ANION GAP SERPL CALCULATED.3IONS-SCNC: 5 MMOL/L (ref 3–16)
BASOPHILS # BLD: 0 K/UL (ref 0–0.2)
BASOPHILS NFR BLD: 1.1 %
BUN SERPL-MCNC: 8 MG/DL (ref 7–20)
CALCIUM SERPL-MCNC: 8.1 MG/DL (ref 8.3–10.6)
CHLORIDE SERPL-SCNC: 97 MMOL/L (ref 99–110)
CO2 SERPL-SCNC: 24 MMOL/L (ref 21–32)
CREAT SERPL-MCNC: <0.5 MG/DL (ref 0.8–1.3)
DEPRECATED RDW RBC AUTO: 14 % (ref 12.4–15.4)
EOSINOPHIL # BLD: 0.3 K/UL (ref 0–0.6)
EOSINOPHIL NFR BLD: 8.2 %
GFR SERPLBLD CREATININE-BSD FMLA CKD-EPI: >60 ML/MIN/{1.73_M2}
GLUCOSE SERPL-MCNC: 90 MG/DL (ref 70–99)
HCT VFR BLD AUTO: 31.3 % (ref 40.5–52.5)
HGB BLD-MCNC: 10.6 G/DL (ref 13.5–17.5)
LYMPHOCYTES # BLD: 0.8 K/UL (ref 1–5.1)
LYMPHOCYTES NFR BLD: 21.6 %
MCH RBC QN AUTO: 32.5 PG (ref 26–34)
MCHC RBC AUTO-ENTMCNC: 33.8 G/DL (ref 31–36)
MCV RBC AUTO: 96.1 FL (ref 80–100)
MONOCYTES # BLD: 0.4 K/UL (ref 0–1.3)
MONOCYTES NFR BLD: 12.1 %
NEUTROPHILS # BLD: 2 K/UL (ref 1.7–7.7)
NEUTROPHILS NFR BLD: 57 %
PLATELET # BLD AUTO: 192 K/UL (ref 135–450)
PMV BLD AUTO: 6.1 FL (ref 5–10.5)
POTASSIUM SERPL-SCNC: 4.1 MMOL/L (ref 3.5–5.1)
RBC # BLD AUTO: 3.26 M/UL (ref 4.2–5.9)
SODIUM SERPL-SCNC: 126 MMOL/L (ref 136–145)
WBC # BLD AUTO: 3.5 K/UL (ref 4–11)

## 2023-11-15 PROCEDURE — 2580000003 HC RX 258: Performed by: INTERNAL MEDICINE

## 2023-11-15 PROCEDURE — 6360000002 HC RX W HCPCS: Performed by: INTERNAL MEDICINE

## 2023-11-15 PROCEDURE — 1200000000 HC SEMI PRIVATE

## 2023-11-15 PROCEDURE — 97530 THERAPEUTIC ACTIVITIES: CPT

## 2023-11-15 PROCEDURE — 6370000000 HC RX 637 (ALT 250 FOR IP): Performed by: INTERNAL MEDICINE

## 2023-11-15 PROCEDURE — 80048 BASIC METABOLIC PNL TOTAL CA: CPT

## 2023-11-15 PROCEDURE — 85025 COMPLETE CBC W/AUTO DIFF WBC: CPT

## 2023-11-15 PROCEDURE — 36415 COLL VENOUS BLD VENIPUNCTURE: CPT

## 2023-11-15 RX ORDER — SODIUM CHLORIDE 1 G/1
1 TABLET ORAL
Status: DISCONTINUED | OUTPATIENT
Start: 2023-11-15 | End: 2023-11-17

## 2023-11-15 RX ADMIN — BRIMONIDINE TARTRATE 1 DROP: 2 SOLUTION OPHTHALMIC at 21:10

## 2023-11-15 RX ADMIN — Medication 10 ML: at 20:45

## 2023-11-15 RX ADMIN — DIPHENHYDRAMINE HYDROCHLORIDE 25 MG: 25 TABLET ORAL at 08:54

## 2023-11-15 RX ADMIN — BRIMONIDINE TARTRATE 1 DROP: 2 SOLUTION OPHTHALMIC at 08:54

## 2023-11-15 RX ADMIN — ENOXAPARIN SODIUM 40 MG: 100 INJECTION SUBCUTANEOUS at 08:54

## 2023-11-15 RX ADMIN — LATANOPROST 1 DROP: 50 SOLUTION OPHTHALMIC at 21:10

## 2023-11-15 RX ADMIN — DORZOLAMIDE HYDROCHLORIDE 1 DROP: 20 SOLUTION/ DROPS OPHTHALMIC at 08:54

## 2023-11-15 RX ADMIN — Medication 1 G: at 17:10

## 2023-11-15 RX ADMIN — TAMSULOSIN HYDROCHLORIDE 0.4 MG: 0.4 CAPSULE ORAL at 20:45

## 2023-11-15 RX ADMIN — Medication 1 G: at 13:18

## 2023-11-15 RX ADMIN — Medication 10 ML: at 08:54

## 2023-11-15 RX ADMIN — DORZOLAMIDE HYDROCHLORIDE 1 DROP: 20 SOLUTION/ DROPS OPHTHALMIC at 21:10

## 2023-11-15 RX ADMIN — FINASTERIDE 5 MG: 5 TABLET, FILM COATED ORAL at 08:54

## 2023-11-15 ASSESSMENT — PAIN SCALES - GENERAL
PAINLEVEL_OUTOF10: 0
PAINLEVEL_OUTOF10: 0

## 2023-11-16 PROBLEM — E43 SEVERE MALNUTRITION (HCC): Chronic | Status: ACTIVE | Noted: 2023-11-16

## 2023-11-16 LAB
ANION GAP SERPL CALCULATED.3IONS-SCNC: 8 MMOL/L (ref 3–16)
BASOPHILS # BLD: 0 K/UL (ref 0–0.2)
BASOPHILS NFR BLD: 0.9 %
BUN SERPL-MCNC: 9 MG/DL (ref 7–20)
CALCIUM SERPL-MCNC: 8.1 MG/DL (ref 8.3–10.6)
CHLORIDE SERPL-SCNC: 97 MMOL/L (ref 99–110)
CO2 SERPL-SCNC: 25 MMOL/L (ref 21–32)
CREAT SERPL-MCNC: <0.5 MG/DL (ref 0.8–1.3)
DEPRECATED RDW RBC AUTO: 14.4 % (ref 12.4–15.4)
EOSINOPHIL # BLD: 0.3 K/UL (ref 0–0.6)
EOSINOPHIL NFR BLD: 7.8 %
GFR SERPLBLD CREATININE-BSD FMLA CKD-EPI: >60 ML/MIN/{1.73_M2}
GLUCOSE SERPL-MCNC: 86 MG/DL (ref 70–99)
HCT VFR BLD AUTO: 30.8 % (ref 40.5–52.5)
HGB BLD-MCNC: 10.4 G/DL (ref 13.5–17.5)
LYMPHOCYTES # BLD: 0.7 K/UL (ref 1–5.1)
LYMPHOCYTES NFR BLD: 16.5 %
MCH RBC QN AUTO: 32.4 PG (ref 26–34)
MCHC RBC AUTO-ENTMCNC: 33.8 G/DL (ref 31–36)
MCV RBC AUTO: 95.8 FL (ref 80–100)
MONOCYTES # BLD: 0.5 K/UL (ref 0–1.3)
MONOCYTES NFR BLD: 11.7 %
NEUTROPHILS # BLD: 2.8 K/UL (ref 1.7–7.7)
NEUTROPHILS NFR BLD: 63.1 %
PLATELET # BLD AUTO: 193 K/UL (ref 135–450)
PMV BLD AUTO: 5.8 FL (ref 5–10.5)
POTASSIUM SERPL-SCNC: 4.2 MMOL/L (ref 3.5–5.1)
RBC # BLD AUTO: 3.21 M/UL (ref 4.2–5.9)
SODIUM SERPL-SCNC: 130 MMOL/L (ref 136–145)
WBC # BLD AUTO: 4.5 K/UL (ref 4–11)

## 2023-11-16 PROCEDURE — 36415 COLL VENOUS BLD VENIPUNCTURE: CPT

## 2023-11-16 PROCEDURE — 97535 SELF CARE MNGMENT TRAINING: CPT

## 2023-11-16 PROCEDURE — 6360000002 HC RX W HCPCS: Performed by: INTERNAL MEDICINE

## 2023-11-16 PROCEDURE — 6370000000 HC RX 637 (ALT 250 FOR IP): Performed by: INTERNAL MEDICINE

## 2023-11-16 PROCEDURE — 97530 THERAPEUTIC ACTIVITIES: CPT

## 2023-11-16 PROCEDURE — 85025 COMPLETE CBC W/AUTO DIFF WBC: CPT

## 2023-11-16 PROCEDURE — 1200000000 HC SEMI PRIVATE

## 2023-11-16 PROCEDURE — 80048 BASIC METABOLIC PNL TOTAL CA: CPT

## 2023-11-16 PROCEDURE — 2580000003 HC RX 258: Performed by: INTERNAL MEDICINE

## 2023-11-16 RX ORDER — FUROSEMIDE 20 MG/1
20 TABLET ORAL 2 TIMES DAILY
Status: DISCONTINUED | OUTPATIENT
Start: 2023-11-16 | End: 2023-11-17

## 2023-11-16 RX ADMIN — Medication 1 G: at 12:38

## 2023-11-16 RX ADMIN — BRIMONIDINE TARTRATE 1 DROP: 2 SOLUTION OPHTHALMIC at 08:00

## 2023-11-16 RX ADMIN — FUROSEMIDE 20 MG: 20 TABLET ORAL at 17:57

## 2023-11-16 RX ADMIN — FINASTERIDE 5 MG: 5 TABLET, FILM COATED ORAL at 08:00

## 2023-11-16 RX ADMIN — Medication 10 ML: at 08:01

## 2023-11-16 RX ADMIN — Medication 1 G: at 17:57

## 2023-11-16 RX ADMIN — BRIMONIDINE TARTRATE 1 DROP: 2 SOLUTION OPHTHALMIC at 20:21

## 2023-11-16 RX ADMIN — MAGNESIUM HYDROXIDE 30 ML: 400 SUSPENSION ORAL at 03:40

## 2023-11-16 RX ADMIN — DIPHENHYDRAMINE HYDROCHLORIDE 25 MG: 25 TABLET ORAL at 08:00

## 2023-11-16 RX ADMIN — DORZOLAMIDE HYDROCHLORIDE 1 DROP: 20 SOLUTION/ DROPS OPHTHALMIC at 08:00

## 2023-11-16 RX ADMIN — FUROSEMIDE 20 MG: 20 TABLET ORAL at 12:45

## 2023-11-16 RX ADMIN — DORZOLAMIDE HYDROCHLORIDE 1 DROP: 20 SOLUTION/ DROPS OPHTHALMIC at 20:21

## 2023-11-16 RX ADMIN — TAMSULOSIN HYDROCHLORIDE 0.4 MG: 0.4 CAPSULE ORAL at 20:20

## 2023-11-16 RX ADMIN — LATANOPROST 1 DROP: 50 SOLUTION OPHTHALMIC at 20:21

## 2023-11-16 RX ADMIN — ENOXAPARIN SODIUM 40 MG: 100 INJECTION SUBCUTANEOUS at 08:00

## 2023-11-16 RX ADMIN — Medication 1 G: at 08:00

## 2023-11-16 ASSESSMENT — PAIN SCALES - GENERAL: PAINLEVEL_OUTOF10: 0

## 2023-11-17 LAB
ANION GAP SERPL CALCULATED.3IONS-SCNC: 7 MMOL/L (ref 3–16)
BACTERIA BLD CULT ORG #2: NORMAL
BACTERIA BLD CULT: NORMAL
BASOPHILS # BLD: 0 K/UL (ref 0–0.2)
BASOPHILS NFR BLD: 0.9 %
BUN SERPL-MCNC: 9 MG/DL (ref 7–20)
CALCIUM SERPL-MCNC: 8.4 MG/DL (ref 8.3–10.6)
CHLORIDE SERPL-SCNC: 92 MMOL/L (ref 99–110)
CO2 SERPL-SCNC: 27 MMOL/L (ref 21–32)
CREAT SERPL-MCNC: <0.5 MG/DL (ref 0.8–1.3)
DEPRECATED RDW RBC AUTO: 14.3 % (ref 12.4–15.4)
EOSINOPHIL # BLD: 0.4 K/UL (ref 0–0.6)
EOSINOPHIL NFR BLD: 8.6 %
GFR SERPLBLD CREATININE-BSD FMLA CKD-EPI: >60 ML/MIN/{1.73_M2}
GLUCOSE SERPL-MCNC: 89 MG/DL (ref 70–99)
HCT VFR BLD AUTO: 32.2 % (ref 40.5–52.5)
HGB BLD-MCNC: 10.9 G/DL (ref 13.5–17.5)
LYMPHOCYTES # BLD: 0.7 K/UL (ref 1–5.1)
LYMPHOCYTES NFR BLD: 15.3 %
MCH RBC QN AUTO: 32.6 PG (ref 26–34)
MCHC RBC AUTO-ENTMCNC: 33.9 G/DL (ref 31–36)
MCV RBC AUTO: 96.3 FL (ref 80–100)
MONOCYTES # BLD: 0.6 K/UL (ref 0–1.3)
MONOCYTES NFR BLD: 12.6 %
NEUTROPHILS # BLD: 2.9 K/UL (ref 1.7–7.7)
NEUTROPHILS NFR BLD: 62.6 %
PLATELET # BLD AUTO: 209 K/UL (ref 135–450)
PMV BLD AUTO: 5.9 FL (ref 5–10.5)
POTASSIUM SERPL-SCNC: 4.2 MMOL/L (ref 3.5–5.1)
RBC # BLD AUTO: 3.34 M/UL (ref 4.2–5.9)
SODIUM SERPL-SCNC: 125 MMOL/L (ref 136–145)
SODIUM SERPL-SCNC: 126 MMOL/L (ref 136–145)
SODIUM SERPL-SCNC: 126 MMOL/L (ref 136–145)
WBC # BLD AUTO: 4.7 K/UL (ref 4–11)

## 2023-11-17 PROCEDURE — 6370000000 HC RX 637 (ALT 250 FOR IP): Performed by: INTERNAL MEDICINE

## 2023-11-17 PROCEDURE — 1200000000 HC SEMI PRIVATE

## 2023-11-17 PROCEDURE — 80048 BASIC METABOLIC PNL TOTAL CA: CPT

## 2023-11-17 PROCEDURE — 97110 THERAPEUTIC EXERCISES: CPT

## 2023-11-17 PROCEDURE — 97530 THERAPEUTIC ACTIVITIES: CPT

## 2023-11-17 PROCEDURE — 6360000002 HC RX W HCPCS: Performed by: INTERNAL MEDICINE

## 2023-11-17 PROCEDURE — 97116 GAIT TRAINING THERAPY: CPT

## 2023-11-17 PROCEDURE — 2580000003 HC RX 258: Performed by: INTERNAL MEDICINE

## 2023-11-17 PROCEDURE — 85025 COMPLETE CBC W/AUTO DIFF WBC: CPT

## 2023-11-17 PROCEDURE — 84295 ASSAY OF SERUM SODIUM: CPT

## 2023-11-17 PROCEDURE — 36415 COLL VENOUS BLD VENIPUNCTURE: CPT

## 2023-11-17 RX ORDER — SODIUM CHLORIDE 9 MG/ML
INJECTION, SOLUTION INTRAVENOUS CONTINUOUS
Status: DISCONTINUED | OUTPATIENT
Start: 2023-11-17 | End: 2023-11-20

## 2023-11-17 RX ORDER — SODIUM CHLORIDE 1 G/1
1 TABLET ORAL
Qty: 90 TABLET | Refills: 3
Start: 2023-11-17

## 2023-11-17 RX ORDER — SODIUM CHLORIDE 1 G/1
2 TABLET ORAL
Status: DISCONTINUED | OUTPATIENT
Start: 2023-11-17 | End: 2023-11-20

## 2023-11-17 RX ADMIN — ENOXAPARIN SODIUM 40 MG: 100 INJECTION SUBCUTANEOUS at 10:12

## 2023-11-17 RX ADMIN — Medication 15 G: at 21:47

## 2023-11-17 RX ADMIN — DORZOLAMIDE HYDROCHLORIDE 1 DROP: 20 SOLUTION/ DROPS OPHTHALMIC at 10:06

## 2023-11-17 RX ADMIN — SODIUM CHLORIDE 2 G: 1 TABLET ORAL at 17:49

## 2023-11-17 RX ADMIN — FINASTERIDE 5 MG: 5 TABLET, FILM COATED ORAL at 10:05

## 2023-11-17 RX ADMIN — DIPHENHYDRAMINE HYDROCHLORIDE 25 MG: 25 TABLET ORAL at 10:05

## 2023-11-17 RX ADMIN — Medication 1 G: at 10:05

## 2023-11-17 RX ADMIN — Medication 1 G: at 14:38

## 2023-11-17 RX ADMIN — SODIUM CHLORIDE: 9 INJECTION, SOLUTION INTRAVENOUS at 16:35

## 2023-11-17 RX ADMIN — TAMSULOSIN HYDROCHLORIDE 0.4 MG: 0.4 CAPSULE ORAL at 21:47

## 2023-11-17 RX ADMIN — LATANOPROST 1 DROP: 50 SOLUTION OPHTHALMIC at 21:49

## 2023-11-17 RX ADMIN — BRIMONIDINE TARTRATE 1 DROP: 2 SOLUTION OPHTHALMIC at 21:48

## 2023-11-17 RX ADMIN — BRIMONIDINE TARTRATE 1 DROP: 2 SOLUTION OPHTHALMIC at 10:06

## 2023-11-17 RX ADMIN — Medication 15 G: at 10:05

## 2023-11-17 RX ADMIN — Medication 10 ML: at 10:15

## 2023-11-17 RX ADMIN — Medication 10 ML: at 22:07

## 2023-11-17 RX ADMIN — DORZOLAMIDE HYDROCHLORIDE 1 DROP: 20 SOLUTION/ DROPS OPHTHALMIC at 21:48

## 2023-11-17 ASSESSMENT — PAIN SCALES - GENERAL
PAINLEVEL_OUTOF10: 0

## 2023-11-17 NOTE — DISCHARGE INSTR - COC
0  Last Weight:   Wt Readings from Last 1 Encounters:   11/14/23 48.4 kg (106 lb 12.8 oz)     Mental Status:  alert    IV Access:  - None    Nursing Mobility/ADLs:  Walking   Assisted  Transfer  Assisted  Bathing  Dependent  Dressing  Dependent  Toileting  Dependent  Feeding  Assisted  Med Admin  Assisted  Med Delivery   crushed and prefers mixed with applesauce or can take pills one at a time    Wound Care Documentation and Therapy:  Wound 12/25/21 Coccyx Mid pink, non draining open area (Active)   Number of days: 691        Elimination:  Continence: Bowel: Yes  Bladder: Yes  Urinary Catheter: None   Colostomy/Ileostomy/Ileal Conduit: No       Date of Last BM: ***    Intake/Output Summary (Last 24 hours) at 11/17/2023 0811  Last data filed at 11/17/2023 0209  Gross per 24 hour   Intake 100 ml   Output 1800 ml   Net -1700 ml     I/O last 3 completed shifts: In: 150 [P.O.:150]  Out: 2600 [Urine:2600]    Safety Concerns: At Risk for Falls    Impairments/Disabilities:      None    Nutrition Therapy:  Current Nutrition Therapy:   - Oral Diet:  Dysphagia - Soft and Bite Sized    Routes of Feeding: Oral  Liquids: Thin Liquids  Daily Fluid Restriction: yes - amount 1500 ml  Last Modified Barium Swallow with Video (Video Swallowing Test): not done    Treatments at the Time of Hospital Discharge:   Respiratory Treatments: None  Oxygen Therapy:  is not on home oxygen therapy.   Ventilator:    - No ventilator support    Rehab Therapies: ***  Weight Bearing Status/Restrictions: No weight bearing restrictions  Other Medical Equipment (for information only, NOT a DME order):  wheelchair and bedside commode  Other Treatments: ***    Patient's personal belongings (please select all that are sent with patient):  Partial    RN SIGNATURE:  {Esignature:[id number]}    CASE MANAGEMENT/SOCIAL WORK SECTION    Inpatient Status Date: 11/13/2023    Readmission Risk Assessment Score:  Readmission Risk              Risk of Unplanned

## 2023-11-17 NOTE — PLAN OF CARE
Problem: Skin/Tissue Integrity  Goal: Absence of new skin breakdown  Description: 1. Monitor for areas of redness and/or skin breakdown  2. Assess vascular access sites hourly  3. Every 4-6 hours minimum:  Change oxygen saturation probe site  4. Every 4-6 hours:  If on nasal continuous positive airway pressure, respiratory therapy assess nares and determine need for appliance change or resting period.   Outcome: Progressing     Problem: Safety - Adult  Goal: Free from fall injury  Outcome: Progressing  Flowsheets (Taken 11/17/2023 1302)  Free From Fall Injury: Instruct family/caregiver on patient safety     Problem: Nutrition Deficit:  Goal: Optimize nutritional status  Outcome: Progressing     Problem: Pain  Goal: Verbalizes/displays adequate comfort level or baseline comfort level  Outcome: Progressing

## 2023-11-17 NOTE — DISCHARGE SUMMARY
[facility] -- Physician Discharge Summary     Lucio Moreno  1940  MRN: [id number]    Admit Date: 11/13/2023  Discharge Date: No discharge date for patient encounter. Attending MD: Bzuz Burns MD  Discharging MD: Buzz Burns MD  PCP: Buzz Burns, [address] [place] [address] Jovan Guys [id number] [phone]    Admission Diagnosis: Hyponatremia [E87.1]  Altered mental status, unspecified altered mental status type [R41.82]  DISCHARGE DIAGNOSIS: same    Full Hospital Problem List:  [address] Problems    Diagnosis Date Noted    Severe malnutrition ([address]) [E43] 11/16/2023    Hyponatremia [E87.1] 12/24/2021    Anemia [D64.9] 03/09/2021    BPH (benign prostatic hyperplasia) [N40.0] 03/09/2021    HTN (hypertension) [I10] 03/09/2021           Hospital Course:   82 y.o. male who presents to the emergency department today for evaluation for altered mental status. The daughter states that the patient does live by himself. She states that she goes over at least twice per day to give him his eyedrops. She states that he went over on Saturday, and she states that starting Saturday night she feels that he has been more confused. She states that he will just get confused on certain times of the day. She feels that the confusion is now worsening. Daughter also reports that the patient currently had much to eat or drink. She states that she called the patient's primary care physician, however they were concerned about waiting for an appointment and recommend that they come to the ED. CBC shows no evidence of leukocytosis, mild anemia. Lactic acid normal.  CMP does show a hyponatremia of 124 he was given IV fluids. Lipase normal.  Pro-Ha normal.  Imaging in the ED is otherwise unremarkable. However pt complains of intractable fatigue and he appears that he is not able to care for himslef at home. For this and the hyponatremia, to be admitted.       Pt is placed back on

## 2023-11-18 LAB
ANION GAP SERPL CALCULATED.3IONS-SCNC: 8 MMOL/L (ref 3–16)
BASOPHILS # BLD: 0 K/UL (ref 0–0.2)
BASOPHILS NFR BLD: 1.1 %
BUN SERPL-MCNC: 25 MG/DL (ref 7–20)
CALCIUM SERPL-MCNC: 8.3 MG/DL (ref 8.3–10.6)
CHLORIDE SERPL-SCNC: 95 MMOL/L (ref 99–110)
CO2 SERPL-SCNC: 23 MMOL/L (ref 21–32)
CREAT SERPL-MCNC: <0.5 MG/DL (ref 0.8–1.3)
DEPRECATED RDW RBC AUTO: 14.2 % (ref 12.4–15.4)
EOSINOPHIL # BLD: 0.4 K/UL (ref 0–0.6)
EOSINOPHIL NFR BLD: 9.8 %
GFR SERPLBLD CREATININE-BSD FMLA CKD-EPI: >60 ML/MIN/{1.73_M2}
GLUCOSE SERPL-MCNC: 85 MG/DL (ref 70–99)
HCT VFR BLD AUTO: 31.7 % (ref 40.5–52.5)
HGB BLD-MCNC: 10.5 G/DL (ref 13.5–17.5)
LYMPHOCYTES # BLD: 0.6 K/UL (ref 1–5.1)
LYMPHOCYTES NFR BLD: 14.5 %
MCH RBC QN AUTO: 32.4 PG (ref 26–34)
MCHC RBC AUTO-ENTMCNC: 33.1 G/DL (ref 31–36)
MCV RBC AUTO: 97.9 FL (ref 80–100)
MONOCYTES # BLD: 0.6 K/UL (ref 0–1.3)
MONOCYTES NFR BLD: 12.7 %
NEUTROPHILS # BLD: 2.7 K/UL (ref 1.7–7.7)
NEUTROPHILS NFR BLD: 61.9 %
PLATELET # BLD AUTO: 196 K/UL (ref 135–450)
PMV BLD AUTO: 6.1 FL (ref 5–10.5)
POTASSIUM SERPL-SCNC: 4.5 MMOL/L (ref 3.5–5.1)
RBC # BLD AUTO: 3.24 M/UL (ref 4.2–5.9)
SODIUM SERPL-SCNC: 126 MMOL/L (ref 136–145)
WBC # BLD AUTO: 4.4 K/UL (ref 4–11)

## 2023-11-18 PROCEDURE — 36415 COLL VENOUS BLD VENIPUNCTURE: CPT

## 2023-11-18 PROCEDURE — 6360000002 HC RX W HCPCS: Performed by: INTERNAL MEDICINE

## 2023-11-18 PROCEDURE — 80048 BASIC METABOLIC PNL TOTAL CA: CPT

## 2023-11-18 PROCEDURE — 85025 COMPLETE CBC W/AUTO DIFF WBC: CPT

## 2023-11-18 PROCEDURE — 2580000003 HC RX 258: Performed by: INTERNAL MEDICINE

## 2023-11-18 PROCEDURE — 1200000000 HC SEMI PRIVATE

## 2023-11-18 PROCEDURE — 6370000000 HC RX 637 (ALT 250 FOR IP): Performed by: INTERNAL MEDICINE

## 2023-11-18 RX ADMIN — SODIUM CHLORIDE 2 G: 1 TABLET ORAL at 08:41

## 2023-11-18 RX ADMIN — DORZOLAMIDE HYDROCHLORIDE 1 DROP: 20 SOLUTION/ DROPS OPHTHALMIC at 21:08

## 2023-11-18 RX ADMIN — Medication 10 ML: at 21:09

## 2023-11-18 RX ADMIN — DIPHENHYDRAMINE HYDROCHLORIDE 25 MG: 25 TABLET ORAL at 08:40

## 2023-11-18 RX ADMIN — TAMSULOSIN HYDROCHLORIDE 0.4 MG: 0.4 CAPSULE ORAL at 21:08

## 2023-11-18 RX ADMIN — Medication 10 ML: at 11:14

## 2023-11-18 RX ADMIN — SODIUM CHLORIDE 2 G: 1 TABLET ORAL at 16:52

## 2023-11-18 RX ADMIN — MAGNESIUM HYDROXIDE 30 ML: 400 SUSPENSION ORAL at 08:46

## 2023-11-18 RX ADMIN — ENOXAPARIN SODIUM 40 MG: 100 INJECTION SUBCUTANEOUS at 08:40

## 2023-11-18 RX ADMIN — LATANOPROST 1 DROP: 50 SOLUTION OPHTHALMIC at 21:08

## 2023-11-18 RX ADMIN — SODIUM CHLORIDE 2 G: 1 TABLET ORAL at 13:15

## 2023-11-18 RX ADMIN — DORZOLAMIDE HYDROCHLORIDE 1 DROP: 20 SOLUTION/ DROPS OPHTHALMIC at 08:45

## 2023-11-18 RX ADMIN — BRIMONIDINE TARTRATE 1 DROP: 2 SOLUTION OPHTHALMIC at 21:08

## 2023-11-18 RX ADMIN — BRIMONIDINE TARTRATE 1 DROP: 2 SOLUTION OPHTHALMIC at 08:45

## 2023-11-18 RX ADMIN — FINASTERIDE 5 MG: 5 TABLET, FILM COATED ORAL at 08:41

## 2023-11-18 ASSESSMENT — PAIN SCALES - GENERAL
PAINLEVEL_OUTOF10: 0
PAINLEVEL_OUTOF10: 0

## 2023-11-19 LAB
ANION GAP SERPL CALCULATED.3IONS-SCNC: 9 MMOL/L (ref 3–16)
BASOPHILS # BLD: 0 K/UL (ref 0–0.2)
BASOPHILS NFR BLD: 0.9 %
BUN SERPL-MCNC: 11 MG/DL (ref 7–20)
CALCIUM SERPL-MCNC: 8.3 MG/DL (ref 8.3–10.6)
CHLORIDE SERPL-SCNC: 94 MMOL/L (ref 99–110)
CO2 SERPL-SCNC: 24 MMOL/L (ref 21–32)
CREAT SERPL-MCNC: <0.5 MG/DL (ref 0.8–1.3)
DEPRECATED RDW RBC AUTO: 14.2 % (ref 12.4–15.4)
EOSINOPHIL # BLD: 0.5 K/UL (ref 0–0.6)
EOSINOPHIL NFR BLD: 10.3 %
GFR SERPLBLD CREATININE-BSD FMLA CKD-EPI: >60 ML/MIN/{1.73_M2}
GLUCOSE SERPL-MCNC: 84 MG/DL (ref 70–99)
HCT VFR BLD AUTO: 32 % (ref 40.5–52.5)
HGB BLD-MCNC: 10.7 G/DL (ref 13.5–17.5)
LYMPHOCYTES # BLD: 0.7 K/UL (ref 1–5.1)
LYMPHOCYTES NFR BLD: 14 %
MCH RBC QN AUTO: 32.6 PG (ref 26–34)
MCHC RBC AUTO-ENTMCNC: 33.5 G/DL (ref 31–36)
MCV RBC AUTO: 97.1 FL (ref 80–100)
MONOCYTES # BLD: 0.6 K/UL (ref 0–1.3)
MONOCYTES NFR BLD: 11.6 %
NEUTROPHILS # BLD: 3.1 K/UL (ref 1.7–7.7)
NEUTROPHILS NFR BLD: 63.2 %
PLATELET # BLD AUTO: 193 K/UL (ref 135–450)
PMV BLD AUTO: 6.2 FL (ref 5–10.5)
POTASSIUM SERPL-SCNC: 4.5 MMOL/L (ref 3.5–5.1)
RBC # BLD AUTO: 3.29 M/UL (ref 4.2–5.9)
SODIUM SERPL-SCNC: 127 MMOL/L (ref 136–145)
WBC # BLD AUTO: 4.8 K/UL (ref 4–11)

## 2023-11-19 PROCEDURE — 80048 BASIC METABOLIC PNL TOTAL CA: CPT

## 2023-11-19 PROCEDURE — 6360000002 HC RX W HCPCS: Performed by: INTERNAL MEDICINE

## 2023-11-19 PROCEDURE — 6370000000 HC RX 637 (ALT 250 FOR IP): Performed by: INTERNAL MEDICINE

## 2023-11-19 PROCEDURE — 1200000000 HC SEMI PRIVATE

## 2023-11-19 PROCEDURE — 36415 COLL VENOUS BLD VENIPUNCTURE: CPT

## 2023-11-19 PROCEDURE — 2580000003 HC RX 258: Performed by: INTERNAL MEDICINE

## 2023-11-19 PROCEDURE — 85025 COMPLETE CBC W/AUTO DIFF WBC: CPT

## 2023-11-19 RX ORDER — BACLOFEN 10 MG/1
10 TABLET ORAL 3 TIMES DAILY
Status: DISCONTINUED | OUTPATIENT
Start: 2023-11-19 | End: 2023-11-21

## 2023-11-19 RX ADMIN — SODIUM CHLORIDE 2 G: 1 TABLET ORAL at 12:54

## 2023-11-19 RX ADMIN — BACLOFEN 10 MG: 10 TABLET ORAL at 13:39

## 2023-11-19 RX ADMIN — TAMSULOSIN HYDROCHLORIDE 0.4 MG: 0.4 CAPSULE ORAL at 20:39

## 2023-11-19 RX ADMIN — Medication 10 ML: at 20:44

## 2023-11-19 RX ADMIN — SODIUM CHLORIDE 2 G: 1 TABLET ORAL at 08:42

## 2023-11-19 RX ADMIN — FINASTERIDE 5 MG: 5 TABLET, FILM COATED ORAL at 08:43

## 2023-11-19 RX ADMIN — DORZOLAMIDE HYDROCHLORIDE 1 DROP: 20 SOLUTION/ DROPS OPHTHALMIC at 20:40

## 2023-11-19 RX ADMIN — SODIUM CHLORIDE 2 G: 1 TABLET ORAL at 18:39

## 2023-11-19 RX ADMIN — Medication 10 ML: at 08:43

## 2023-11-19 RX ADMIN — ENOXAPARIN SODIUM 40 MG: 100 INJECTION SUBCUTANEOUS at 08:42

## 2023-11-19 RX ADMIN — DORZOLAMIDE HYDROCHLORIDE 1 DROP: 20 SOLUTION/ DROPS OPHTHALMIC at 08:42

## 2023-11-19 RX ADMIN — BACLOFEN 10 MG: 10 TABLET ORAL at 20:39

## 2023-11-19 RX ADMIN — DIPHENHYDRAMINE HYDROCHLORIDE 25 MG: 25 TABLET ORAL at 08:43

## 2023-11-19 RX ADMIN — LATANOPROST 1 DROP: 50 SOLUTION OPHTHALMIC at 20:40

## 2023-11-19 RX ADMIN — BRIMONIDINE TARTRATE 1 DROP: 2 SOLUTION OPHTHALMIC at 20:40

## 2023-11-19 RX ADMIN — BRIMONIDINE TARTRATE 1 DROP: 2 SOLUTION OPHTHALMIC at 08:42

## 2023-11-19 ASSESSMENT — PAIN DESCRIPTION - LOCATION: LOCATION: BACK

## 2023-11-19 ASSESSMENT — PAIN SCALES - GENERAL: PAINLEVEL_OUTOF10: 3

## 2023-11-20 ENCOUNTER — APPOINTMENT (OUTPATIENT)
Dept: CT IMAGING | Age: 83
DRG: 643 | End: 2023-11-20
Payer: MEDICARE

## 2023-11-20 LAB
AMMONIA PLAS-SCNC: 46 UMOL/L (ref 16–60)
ANION GAP SERPL CALCULATED.3IONS-SCNC: 6 MMOL/L (ref 3–16)
ANION GAP SERPL CALCULATED.3IONS-SCNC: 9 MMOL/L (ref 3–16)
BASE EXCESS BLDV CALC-SCNC: 3.4 MMOL/L (ref -3–3)
BASOPHILS # BLD: 0 K/UL (ref 0–0.2)
BASOPHILS # BLD: 0 K/UL (ref 0–0.2)
BASOPHILS NFR BLD: 0.6 %
BASOPHILS NFR BLD: 0.8 %
BUN SERPL-MCNC: 11 MG/DL (ref 7–20)
BUN SERPL-MCNC: 13 MG/DL (ref 7–20)
CALCIUM SERPL-MCNC: 8.4 MG/DL (ref 8.3–10.6)
CALCIUM SERPL-MCNC: 8.9 MG/DL (ref 8.3–10.6)
CHLORIDE SERPL-SCNC: 93 MMOL/L (ref 99–110)
CHLORIDE SERPL-SCNC: 99 MMOL/L (ref 99–110)
CO2 BLDV-SCNC: 63 MMOL/L
CO2 SERPL-SCNC: 26 MMOL/L (ref 21–32)
CO2 SERPL-SCNC: 27 MMOL/L (ref 21–32)
COHGB MFR BLDV: 3.6 % (ref 0–1.5)
CREAT SERPL-MCNC: <0.5 MG/DL (ref 0.8–1.3)
CREAT SERPL-MCNC: <0.5 MG/DL (ref 0.8–1.3)
DEPRECATED RDW RBC AUTO: 14.2 % (ref 12.4–15.4)
DEPRECATED RDW RBC AUTO: 14.5 % (ref 12.4–15.4)
EOSINOPHIL # BLD: 0.4 K/UL (ref 0–0.6)
EOSINOPHIL # BLD: 0.5 K/UL (ref 0–0.6)
EOSINOPHIL NFR BLD: 12 %
EOSINOPHIL NFR BLD: 9.4 %
GFR SERPLBLD CREATININE-BSD FMLA CKD-EPI: >60 ML/MIN/{1.73_M2}
GFR SERPLBLD CREATININE-BSD FMLA CKD-EPI: >60 ML/MIN/{1.73_M2}
GLUCOSE BLD-MCNC: 97 MG/DL (ref 70–99)
GLUCOSE SERPL-MCNC: 112 MG/DL (ref 70–99)
GLUCOSE SERPL-MCNC: 85 MG/DL (ref 70–99)
HCO3 BLDV-SCNC: 26.8 MMOL/L (ref 23–29)
HCT VFR BLD AUTO: 30.8 % (ref 40.5–52.5)
HCT VFR BLD AUTO: 33.4 % (ref 40.5–52.5)
HGB BLD-MCNC: 10.5 G/DL (ref 13.5–17.5)
HGB BLD-MCNC: 11.3 G/DL (ref 13.5–17.5)
LYMPHOCYTES # BLD: 0.6 K/UL (ref 1–5.1)
LYMPHOCYTES # BLD: 0.6 K/UL (ref 1–5.1)
LYMPHOCYTES NFR BLD: 12.6 %
LYMPHOCYTES NFR BLD: 15.8 %
MCH RBC QN AUTO: 32.5 PG (ref 26–34)
MCH RBC QN AUTO: 33 PG (ref 26–34)
MCHC RBC AUTO-ENTMCNC: 33.7 G/DL (ref 31–36)
MCHC RBC AUTO-ENTMCNC: 34 G/DL (ref 31–36)
MCV RBC AUTO: 96.5 FL (ref 80–100)
MCV RBC AUTO: 96.9 FL (ref 80–100)
METHGB MFR BLDV: 0 %
MONOCYTES # BLD: 0.4 K/UL (ref 0–1.3)
MONOCYTES # BLD: 0.4 K/UL (ref 0–1.3)
MONOCYTES NFR BLD: 8.6 %
MONOCYTES NFR BLD: 9.6 %
NEUTROPHILS # BLD: 2.3 K/UL (ref 1.7–7.7)
NEUTROPHILS # BLD: 3.3 K/UL (ref 1.7–7.7)
NEUTROPHILS NFR BLD: 61.8 %
NEUTROPHILS NFR BLD: 68.8 %
O2 CT VFR BLDV CALC: 17 VOL %
O2 THERAPY: ABNORMAL
PCO2 BLDV: 35.6 MMHG (ref 40–50)
PERFORMED ON: NORMAL
PH BLDV: 7.49 [PH] (ref 7.35–7.45)
PLATELET # BLD AUTO: 206 K/UL (ref 135–450)
PLATELET # BLD AUTO: 224 K/UL (ref 135–450)
PMV BLD AUTO: 5.8 FL (ref 5–10.5)
PMV BLD AUTO: 6.1 FL (ref 5–10.5)
PO2 BLDV: 202 MMHG (ref 25–40)
POTASSIUM SERPL-SCNC: 4.7 MMOL/L (ref 3.5–5.1)
POTASSIUM SERPL-SCNC: 4.7 MMOL/L (ref 3.5–5.1)
RBC # BLD AUTO: 3.18 M/UL (ref 4.2–5.9)
RBC # BLD AUTO: 3.46 M/UL (ref 4.2–5.9)
SAO2 % BLDV: 100 %
SODIUM SERPL-SCNC: 126 MMOL/L (ref 136–145)
SODIUM SERPL-SCNC: 128 MMOL/L (ref 136–145)
SODIUM SERPL-SCNC: 134 MMOL/L (ref 136–145)
TROPONIN, HIGH SENSITIVITY: 23 NG/L (ref 0–22)
WBC # BLD AUTO: 3.7 K/UL (ref 4–11)
WBC # BLD AUTO: 4.8 K/UL (ref 4–11)

## 2023-11-20 PROCEDURE — 84443 ASSAY THYROID STIM HORMONE: CPT

## 2023-11-20 PROCEDURE — 1200000000 HC SEMI PRIVATE

## 2023-11-20 PROCEDURE — 84484 ASSAY OF TROPONIN QUANT: CPT

## 2023-11-20 PROCEDURE — 36415 COLL VENOUS BLD VENIPUNCTURE: CPT

## 2023-11-20 PROCEDURE — 84295 ASSAY OF SERUM SODIUM: CPT

## 2023-11-20 PROCEDURE — 82140 ASSAY OF AMMONIA: CPT

## 2023-11-20 PROCEDURE — 6370000000 HC RX 637 (ALT 250 FOR IP): Performed by: INTERNAL MEDICINE

## 2023-11-20 PROCEDURE — 2580000003 HC RX 258: Performed by: INTERNAL MEDICINE

## 2023-11-20 PROCEDURE — 85025 COMPLETE CBC W/AUTO DIFF WBC: CPT

## 2023-11-20 PROCEDURE — 94761 N-INVAS EAR/PLS OXIMETRY MLT: CPT

## 2023-11-20 PROCEDURE — 82803 BLOOD GASES ANY COMBINATION: CPT

## 2023-11-20 PROCEDURE — 80048 BASIC METABOLIC PNL TOTAL CA: CPT

## 2023-11-20 PROCEDURE — 6360000002 HC RX W HCPCS: Performed by: INTERNAL MEDICINE

## 2023-11-20 PROCEDURE — 93005 ELECTROCARDIOGRAM TRACING: CPT | Performed by: INTERNAL MEDICINE

## 2023-11-20 PROCEDURE — 70450 CT HEAD/BRAIN W/O DYE: CPT

## 2023-11-20 RX ORDER — TOLVAPTAN 15 MG/1
15 TABLET ORAL ONCE
Status: COMPLETED | OUTPATIENT
Start: 2023-11-20 | End: 2023-11-20

## 2023-11-20 RX ADMIN — SODIUM CHLORIDE 2 G: 1 TABLET ORAL at 08:28

## 2023-11-20 RX ADMIN — DIPHENHYDRAMINE HYDROCHLORIDE 25 MG: 25 TABLET ORAL at 08:28

## 2023-11-20 RX ADMIN — MAGNESIUM HYDROXIDE 30 ML: 400 SUSPENSION ORAL at 16:19

## 2023-11-20 RX ADMIN — BACLOFEN 10 MG: 10 TABLET ORAL at 20:55

## 2023-11-20 RX ADMIN — DORZOLAMIDE HYDROCHLORIDE 1 DROP: 20 SOLUTION/ DROPS OPHTHALMIC at 20:58

## 2023-11-20 RX ADMIN — BACLOFEN 10 MG: 10 TABLET ORAL at 08:28

## 2023-11-20 RX ADMIN — LATANOPROST 1 DROP: 50 SOLUTION OPHTHALMIC at 20:58

## 2023-11-20 RX ADMIN — Medication 10 ML: at 08:28

## 2023-11-20 RX ADMIN — TAMSULOSIN HYDROCHLORIDE 0.4 MG: 0.4 CAPSULE ORAL at 20:57

## 2023-11-20 RX ADMIN — FINASTERIDE 5 MG: 5 TABLET, FILM COATED ORAL at 08:28

## 2023-11-20 RX ADMIN — DORZOLAMIDE HYDROCHLORIDE 1 DROP: 20 SOLUTION/ DROPS OPHTHALMIC at 10:59

## 2023-11-20 RX ADMIN — BACLOFEN 10 MG: 10 TABLET ORAL at 13:25

## 2023-11-20 RX ADMIN — Medication 10 ML: at 21:05

## 2023-11-20 RX ADMIN — TOLVAPTAN 15 MG: 15 TABLET ORAL at 10:59

## 2023-11-20 RX ADMIN — BRIMONIDINE TARTRATE 1 DROP: 2 SOLUTION OPHTHALMIC at 10:59

## 2023-11-20 RX ADMIN — BRIMONIDINE TARTRATE 1 DROP: 2 SOLUTION OPHTHALMIC at 20:58

## 2023-11-20 RX ADMIN — ENOXAPARIN SODIUM 40 MG: 100 INJECTION SUBCUTANEOUS at 08:27

## 2023-11-20 RX ADMIN — ACETAMINOPHEN 650 MG: 325 TABLET ORAL at 13:25

## 2023-11-20 ASSESSMENT — PAIN SCALES - GENERAL
PAINLEVEL_OUTOF10: 5
PAINLEVEL_OUTOF10: 0
PAINLEVEL_OUTOF10: 5

## 2023-11-20 ASSESSMENT — PAIN DESCRIPTION - LOCATION
LOCATION: HEAD
LOCATION: BACK

## 2023-11-20 ASSESSMENT — PAIN DESCRIPTION - DESCRIPTORS
DESCRIPTORS: ACHING
DESCRIPTORS: DISCOMFORT

## 2023-11-20 ASSESSMENT — PAIN - FUNCTIONAL ASSESSMENT: PAIN_FUNCTIONAL_ASSESSMENT: PREVENTS OR INTERFERES SOME ACTIVE ACTIVITIES AND ADLS

## 2023-11-20 ASSESSMENT — PAIN DESCRIPTION - ORIENTATION: ORIENTATION: OTHER (COMMENT)

## 2023-11-21 VITALS
HEIGHT: 72 IN | RESPIRATION RATE: 16 BRPM | BODY MASS INDEX: 14.37 KG/M2 | OXYGEN SATURATION: 98 % | WEIGHT: 106.1 LBS | HEART RATE: 61 BPM | TEMPERATURE: 97.1 F | DIASTOLIC BLOOD PRESSURE: 61 MMHG | SYSTOLIC BLOOD PRESSURE: 103 MMHG

## 2023-11-21 LAB
ANION GAP SERPL CALCULATED.3IONS-SCNC: 8 MMOL/L (ref 3–16)
BASOPHILS # BLD: 0 K/UL (ref 0–0.2)
BASOPHILS NFR BLD: 0.8 %
BUN SERPL-MCNC: 12 MG/DL (ref 7–20)
CALCIUM SERPL-MCNC: 9 MG/DL (ref 8.3–10.6)
CHLORIDE SERPL-SCNC: 100 MMOL/L (ref 99–110)
CO2 SERPL-SCNC: 28 MMOL/L (ref 21–32)
CREAT SERPL-MCNC: <0.5 MG/DL (ref 0.8–1.3)
DEPRECATED RDW RBC AUTO: 14.6 % (ref 12.4–15.4)
EKG ATRIAL RATE: 63 BPM
EKG DIAGNOSIS: NORMAL
EKG P AXIS: 70 DEGREES
EKG P-R INTERVAL: 216 MS
EKG Q-T INTERVAL: 406 MS
EKG QRS DURATION: 68 MS
EKG QTC CALCULATION (BAZETT): 465 MS
EKG R AXIS: -58 DEGREES
EKG T AXIS: 85 DEGREES
EKG VENTRICULAR RATE: 79 BPM
EOSINOPHIL # BLD: 0.4 K/UL (ref 0–0.6)
EOSINOPHIL NFR BLD: 12 %
GFR SERPLBLD CREATININE-BSD FMLA CKD-EPI: >60 ML/MIN/{1.73_M2}
GLUCOSE SERPL-MCNC: 94 MG/DL (ref 70–99)
HCT VFR BLD AUTO: 34.9 % (ref 40.5–52.5)
HGB BLD-MCNC: 11.9 G/DL (ref 13.5–17.5)
LACTATE BLDV-SCNC: 0.6 MMOL/L (ref 0.4–2)
LYMPHOCYTES # BLD: 0.5 K/UL (ref 1–5.1)
LYMPHOCYTES NFR BLD: 14.3 %
MCH RBC QN AUTO: 32.9 PG (ref 26–34)
MCHC RBC AUTO-ENTMCNC: 34 G/DL (ref 31–36)
MCV RBC AUTO: 96.7 FL (ref 80–100)
MONOCYTES # BLD: 0.4 K/UL (ref 0–1.3)
MONOCYTES NFR BLD: 10.4 %
NEUTROPHILS # BLD: 2.3 K/UL (ref 1.7–7.7)
NEUTROPHILS NFR BLD: 62.5 %
PLATELET # BLD AUTO: 213 K/UL (ref 135–450)
PMV BLD AUTO: 6 FL (ref 5–10.5)
POTASSIUM SERPL-SCNC: 4.4 MMOL/L (ref 3.5–5.1)
RBC # BLD AUTO: 3.61 M/UL (ref 4.2–5.9)
REASON FOR REJECTION: NORMAL
REJECTED TEST: NORMAL
SODIUM SERPL-SCNC: 136 MMOL/L (ref 136–145)
TSH SERPL DL<=0.005 MIU/L-ACNC: 2.34 UIU/ML (ref 0.27–4.2)
WBC # BLD AUTO: 3.7 K/UL (ref 4–11)

## 2023-11-21 PROCEDURE — 92610 EVALUATE SWALLOWING FUNCTION: CPT

## 2023-11-21 PROCEDURE — 80048 BASIC METABOLIC PNL TOTAL CA: CPT

## 2023-11-21 PROCEDURE — 6360000002 HC RX W HCPCS: Performed by: INTERNAL MEDICINE

## 2023-11-21 PROCEDURE — 36415 COLL VENOUS BLD VENIPUNCTURE: CPT

## 2023-11-21 PROCEDURE — 83605 ASSAY OF LACTIC ACID: CPT

## 2023-11-21 PROCEDURE — 85025 COMPLETE CBC W/AUTO DIFF WBC: CPT

## 2023-11-21 PROCEDURE — 94761 N-INVAS EAR/PLS OXIMETRY MLT: CPT

## 2023-11-21 PROCEDURE — 6370000000 HC RX 637 (ALT 250 FOR IP): Performed by: INTERNAL MEDICINE

## 2023-11-21 PROCEDURE — 92523 SPEECH SOUND LANG COMPREHEN: CPT

## 2023-11-21 PROCEDURE — 2580000003 HC RX 258: Performed by: INTERNAL MEDICINE

## 2023-11-21 PROCEDURE — 92526 ORAL FUNCTION THERAPY: CPT

## 2023-11-21 RX ORDER — SODIUM CHLORIDE 1 G/1
1 TABLET ORAL
Status: DISCONTINUED | OUTPATIENT
Start: 2023-11-22 | End: 2023-11-21 | Stop reason: HOSPADM

## 2023-11-21 RX ADMIN — Medication 10 ML: at 10:55

## 2023-11-21 RX ADMIN — BRIMONIDINE TARTRATE 1 DROP: 2 SOLUTION OPHTHALMIC at 10:54

## 2023-11-21 RX ADMIN — DORZOLAMIDE HYDROCHLORIDE 1 DROP: 20 SOLUTION/ DROPS OPHTHALMIC at 10:54

## 2023-11-21 RX ADMIN — FINASTERIDE 5 MG: 5 TABLET, FILM COATED ORAL at 10:55

## 2023-11-21 RX ADMIN — ENOXAPARIN SODIUM 40 MG: 100 INJECTION SUBCUTANEOUS at 10:55

## 2023-11-21 NOTE — DISCHARGE SUMMARY
[facility] -- Physician Discharge Summary     Ada Rivas  1940  MRN: [id number]    Admit Date: 11/13/2023  Discharge Date: No discharge date for patient encounter. Attending MD: Ramon Lakhani MD  Discharging MD: Ramon Lakhani MD  PCP: Ramon Lakhani, [address] [place] [address] Alejo Cintron [phone]    Admission Diagnosis: Hyponatremia [E87.1]  Altered mental status, unspecified altered mental status type [R41.82]  DISCHARGE DIAGNOSIS: same    Full Hospital Problem List:  [address] Problems    Diagnosis Date Noted    Severe malnutrition ([address]) [E43] 11/16/2023    Hyponatremia [E87.1] 12/24/2021    Anemia [D64.9] 03/09/2021    BPH (benign prostatic hyperplasia) [N40.0] 03/09/2021    HTN (hypertension) [I10] 03/09/2021           Hospital Course:   82 y.o. male who presents to the emergency department today for evaluation for altered mental status. The daughter states that the patient does live by himself. She states that she goes over at least twice per day to give him his eyedrops. She states that he went over on Saturday, and she states that starting Saturday night she feels that he has been more confused. She states that he will just get confused on certain times of the day. She feels that the confusion is now worsening. Daughter also reports that the patient currently had much to eat or drink. She states that she called the patient's primary care physician, however they were concerned about waiting for an appointment and recommend that they come to the ED. CBC shows no evidence of leukocytosis, mild anemia. Lactic acid normal.  CMP does show a hyponatremia of 124 he was given IV fluids. Lipase normal.  Pro-Ha normal.  Imaging in the ED is otherwise unremarkable. However pt complains of intractable fatigue and he appears that he is not able to care for himslef at home. For this and the hyponatremia, to be admitted.       Pt is placed back on

## 2023-11-21 NOTE — CARE COORDINATION
11/21/23 1429   IMM Letter   IMM Letter given to Patient/Family/Significant other/Guardian/POA/by: Given to patient/family by Ashley SPARKS   IMM Letter date given: 11/21/23   IMM Letter time given: 1105     Electronically signed by Tova Capone on 11/21/23 at 2:30 PM EST
Case Management -  Discharge Note      Patient Name: Jeff Lyons                   YOB: 1940            Readmission Risk (Low < 19, Mod (19-27), High > 27): Readmission Risk Score: 12.1    Current PCP: Brice Brand MD    ([facility]) Important Message from Medicare:    Date: 11/21/23    PT AM-PAC: 16 /24  OT AM-PAC: 16 /24    Discharge Plan:  Patient discharge to:  Residence at [address] [place] [address]. Oksana Jay [address]  Phone: [phone]  Fax: [phone]      faxed [address] and [address] to [phone]    [facility] to call report to  [id number] transport with [address] [place] [address], 4:00  time     Family advised of discharge and in agreement. HENS completed. MEME intervention complete. Financial    Payor: MEDICARE / Plan: MEDICARE PART A AND B / Product Type: *No Product type* /     Pharmacy:  Potential assistance Purchasing Medications: No  Meds-to-Beds request:        [address]  Phone: [phone] Fax: [phone]    [facility] [id number] [place] [address]  Phone: [phone] Fax: [phone]      Notes: Additional Case Management Notes: Patient will discharge to SNF today. Patient/family and facility notified. No further needs from  at this time.     Electronically signed by Tova Capone on 11/21/23 at 12:09 PM EST
Discharge Planning. The SW spoke with the pt's nursing who informed the SW the patient sodium is too low at this time to discharge. The SW canceled the 4pm transport at this time.       Electronically signed by CHERELLE Miner on 11/17/2023 at 3:13 PM
[phone]
Yes    [address]  Case Management Department  Ph: [phone]

## 2023-11-21 NOTE — SIGNIFICANT EVENT
Rapid Response Quick Summary    Room: 1-5135/6645-08    Assessment of concern / patient:  Acute confusion, altered mental status. Physician involved:  Dr. Marlen Degroot    Interventions:  BG 97, labs, stat head CT    Disposition:  remain on 5T. Code status changed to [facility] per family.

## 2023-12-06 ENCOUNTER — HOSPITAL ENCOUNTER (INPATIENT)
Age: 83
LOS: 8 days | Discharge: OTHER INSTITUTION | End: 2023-12-15
Attending: INTERNAL MEDICINE | Admitting: INTERNAL MEDICINE
Payer: MEDICARE

## 2023-12-06 DIAGNOSIS — Z86.39 HISTORY OF SIADH: ICD-10-CM

## 2023-12-06 DIAGNOSIS — R53.1 GENERALIZED WEAKNESS: ICD-10-CM

## 2023-12-06 DIAGNOSIS — E87.1 HYPONATREMIA: Primary | ICD-10-CM

## 2023-12-06 LAB
ANION GAP SERPL CALCULATED.3IONS-SCNC: 5 MMOL/L (ref 3–16)
BASOPHILS # BLD: 0.1 K/UL (ref 0–0.2)
BASOPHILS NFR BLD: 1.4 %
BUN SERPL-MCNC: 11 MG/DL (ref 7–20)
CALCIUM SERPL-MCNC: 9.4 MG/DL (ref 8.3–10.6)
CHLORIDE SERPL-SCNC: 86 MMOL/L (ref 99–110)
CO2 SERPL-SCNC: 30 MMOL/L (ref 21–32)
CREAT SERPL-MCNC: <0.5 MG/DL (ref 0.8–1.3)
DEPRECATED RDW RBC AUTO: 14.6 % (ref 12.4–15.4)
EOSINOPHIL # BLD: 0.6 K/UL (ref 0–0.6)
EOSINOPHIL NFR BLD: 14.8 %
GFR SERPLBLD CREATININE-BSD FMLA CKD-EPI: >60 ML/MIN/{1.73_M2}
GLUCOSE SERPL-MCNC: 91 MG/DL (ref 70–99)
HCT VFR BLD AUTO: 36.7 % (ref 40.5–52.5)
HGB BLD-MCNC: 12.5 G/DL (ref 13.5–17.5)
LYMPHOCYTES # BLD: 0.7 K/UL (ref 1–5.1)
LYMPHOCYTES NFR BLD: 17.9 %
MAGNESIUM SERPL-MCNC: 1.9 MG/DL (ref 1.8–2.4)
MCH RBC QN AUTO: 33.6 PG (ref 26–34)
MCHC RBC AUTO-ENTMCNC: 34 G/DL (ref 31–36)
MCV RBC AUTO: 99 FL (ref 80–100)
MONOCYTES # BLD: 0.5 K/UL (ref 0–1.3)
MONOCYTES NFR BLD: 12.3 %
NEUTROPHILS # BLD: 2.1 K/UL (ref 1.7–7.7)
NEUTROPHILS NFR BLD: 53.6 %
PHOSPHATE SERPL-MCNC: 3.3 MG/DL (ref 2.5–4.9)
PLATELET # BLD AUTO: 298 K/UL (ref 135–450)
PMV BLD AUTO: 5.9 FL (ref 5–10.5)
POTASSIUM SERPL-SCNC: 4.6 MMOL/L (ref 3.5–5.1)
RBC # BLD AUTO: 3.71 M/UL (ref 4.2–5.9)
SODIUM SERPL-SCNC: 121 MMOL/L (ref 136–145)
WBC # BLD AUTO: 3.9 K/UL (ref 4–11)

## 2023-12-06 PROCEDURE — 99285 EMERGENCY DEPT VISIT HI MDM: CPT

## 2023-12-06 PROCEDURE — 84100 ASSAY OF PHOSPHORUS: CPT

## 2023-12-06 PROCEDURE — 80048 BASIC METABOLIC PNL TOTAL CA: CPT

## 2023-12-06 PROCEDURE — 85025 COMPLETE CBC W/AUTO DIFF WBC: CPT

## 2023-12-06 PROCEDURE — 93005 ELECTROCARDIOGRAM TRACING: CPT | Performed by: INTERNAL MEDICINE

## 2023-12-06 PROCEDURE — 2580000003 HC RX 258: Performed by: INTERNAL MEDICINE

## 2023-12-06 PROCEDURE — 83735 ASSAY OF MAGNESIUM: CPT

## 2023-12-06 RX ORDER — 0.9 % SODIUM CHLORIDE 0.9 %
500 INTRAVENOUS SOLUTION INTRAVENOUS ONCE
Status: COMPLETED | OUTPATIENT
Start: 2023-12-06 | End: 2023-12-07

## 2023-12-06 RX ADMIN — SODIUM CHLORIDE 500 ML: 9 INJECTION, SOLUTION INTRAVENOUS at 23:45

## 2023-12-06 ASSESSMENT — PAIN - FUNCTIONAL ASSESSMENT: PAIN_FUNCTIONAL_ASSESSMENT: NONE - DENIES PAIN

## 2023-12-07 LAB
ALBUMIN SERPL-MCNC: 3.9 G/DL (ref 3.4–5)
ANION GAP SERPL CALCULATED.3IONS-SCNC: 4 MMOL/L (ref 3–16)
BACTERIA URNS QL MICRO: ABNORMAL /HPF
BACTERIA URNS QL MICRO: NORMAL /HPF
BILIRUB UR QL STRIP.AUTO: NEGATIVE
BILIRUB UR QL STRIP.AUTO: NEGATIVE
BUN SERPL-MCNC: 9 MG/DL (ref 7–20)
CALCIUM SERPL-MCNC: 8.6 MG/DL (ref 8.3–10.6)
CHLORIDE SERPL-SCNC: 90 MMOL/L (ref 99–110)
CHLORIDE UR-SCNC: 65 MMOL/L
CLARITY UR: ABNORMAL
CLARITY UR: ABNORMAL
CO2 SERPL-SCNC: 27 MMOL/L (ref 21–32)
COLOR UR: YELLOW
COLOR UR: YELLOW
CREAT SERPL-MCNC: <0.5 MG/DL (ref 0.8–1.3)
CREAT UR-MCNC: 26.7 MG/DL (ref 39–259)
EKG ATRIAL RATE: 57 BPM
EKG DIAGNOSIS: NORMAL
EKG P AXIS: 73 DEGREES
EKG P-R INTERVAL: 188 MS
EKG Q-T INTERVAL: 434 MS
EKG QRS DURATION: 96 MS
EKG QTC CALCULATION (BAZETT): 422 MS
EKG R AXIS: -79 DEGREES
EKG T AXIS: 90 DEGREES
EKG VENTRICULAR RATE: 57 BPM
EPI CELLS #/AREA URNS AUTO: 0 /HPF (ref 0–5)
EPI CELLS #/AREA URNS AUTO: 0 /HPF (ref 0–5)
GFR SERPLBLD CREATININE-BSD FMLA CKD-EPI: >60 ML/MIN/{1.73_M2}
GLUCOSE SERPL-MCNC: 84 MG/DL (ref 70–99)
GLUCOSE UR STRIP.AUTO-MCNC: NEGATIVE MG/DL
GLUCOSE UR STRIP.AUTO-MCNC: NEGATIVE MG/DL
HGB UR QL STRIP.AUTO: NEGATIVE
HGB UR QL STRIP.AUTO: NEGATIVE
HYALINE CASTS #/AREA URNS AUTO: 0 /LPF (ref 0–8)
HYALINE CASTS #/AREA URNS AUTO: 0 /LPF (ref 0–8)
KETONES UR STRIP.AUTO-MCNC: NEGATIVE MG/DL
KETONES UR STRIP.AUTO-MCNC: NEGATIVE MG/DL
LEUKOCYTE ESTERASE UR QL STRIP.AUTO: NEGATIVE
LEUKOCYTE ESTERASE UR QL STRIP.AUTO: NEGATIVE
NITRITE UR QL STRIP.AUTO: NEGATIVE
NITRITE UR QL STRIP.AUTO: NEGATIVE
OSMOLALITY SERPL: 263 MOSM/KG (ref 280–301)
OSMOLALITY UR: 310 MOSM/KG (ref 390–1070)
PH UR STRIP.AUTO: 8 [PH] (ref 5–8)
PH UR STRIP.AUTO: 8 [PH] (ref 5–8)
PHOSPHATE SERPL-MCNC: 2.8 MG/DL (ref 2.5–4.9)
POTASSIUM SERPL-SCNC: 4.3 MMOL/L (ref 3.5–5.1)
POTASSIUM UR-SCNC: 19.8 MMOL/L
PROT UR STRIP.AUTO-MCNC: NEGATIVE MG/DL
PROT UR STRIP.AUTO-MCNC: NEGATIVE MG/DL
RBC CLUMPS #/AREA URNS AUTO: 0 /HPF (ref 0–4)
RBC CLUMPS #/AREA URNS AUTO: 2 /HPF (ref 0–4)
SARS-COV-2 RDRP RESP QL NAA+PROBE: NOT DETECTED
SODIUM SERPL-SCNC: 121 MMOL/L (ref 136–145)
SODIUM SERPL-SCNC: 123 MMOL/L (ref 136–145)
SODIUM SERPL-SCNC: 125 MMOL/L (ref 136–145)
SODIUM UR-SCNC: 87 MMOL/L
SP GR UR STRIP.AUTO: 1.01 (ref 1–1.03)
SP GR UR STRIP.AUTO: <=1.005 (ref 1–1.03)
UA COMPLETE W REFLEX CULTURE PNL UR: ABNORMAL
UA DIPSTICK W REFLEX MICRO PNL UR: YES
UA DIPSTICK W REFLEX MICRO PNL UR: YES
URATE SERPL-MCNC: 2.3 MG/DL (ref 3.5–7.2)
URN SPEC COLLECT METH UR: ABNORMAL
URN SPEC COLLECT METH UR: ABNORMAL
UROBILINOGEN UR STRIP-ACNC: 0.2 E.U./DL
UROBILINOGEN UR STRIP-ACNC: 0.2 E.U./DL
WBC #/AREA URNS AUTO: 0 /HPF (ref 0–5)
WBC #/AREA URNS AUTO: 0 /HPF (ref 0–5)

## 2023-12-07 PROCEDURE — 2580000003 HC RX 258: Performed by: INTERNAL MEDICINE

## 2023-12-07 PROCEDURE — 1200000000 HC SEMI PRIVATE

## 2023-12-07 PROCEDURE — 82436 ASSAY OF URINE CHLORIDE: CPT

## 2023-12-07 PROCEDURE — 6370000000 HC RX 637 (ALT 250 FOR IP): Performed by: INTERNAL MEDICINE

## 2023-12-07 PROCEDURE — 82570 ASSAY OF URINE CREATININE: CPT

## 2023-12-07 PROCEDURE — 51798 US URINE CAPACITY MEASURE: CPT

## 2023-12-07 PROCEDURE — 84295 ASSAY OF SERUM SODIUM: CPT

## 2023-12-07 PROCEDURE — 36415 COLL VENOUS BLD VENIPUNCTURE: CPT

## 2023-12-07 PROCEDURE — 6360000002 HC RX W HCPCS: Performed by: INTERNAL MEDICINE

## 2023-12-07 PROCEDURE — 80069 RENAL FUNCTION PANEL: CPT

## 2023-12-07 PROCEDURE — 87635 SARS-COV-2 COVID-19 AMP PRB: CPT

## 2023-12-07 PROCEDURE — 84550 ASSAY OF BLOOD/URIC ACID: CPT

## 2023-12-07 PROCEDURE — 84300 ASSAY OF URINE SODIUM: CPT

## 2023-12-07 PROCEDURE — 83930 ASSAY OF BLOOD OSMOLALITY: CPT

## 2023-12-07 PROCEDURE — 84133 ASSAY OF URINE POTASSIUM: CPT

## 2023-12-07 PROCEDURE — 83935 ASSAY OF URINE OSMOLALITY: CPT

## 2023-12-07 PROCEDURE — 93010 ELECTROCARDIOGRAM REPORT: CPT | Performed by: INTERNAL MEDICINE

## 2023-12-07 PROCEDURE — 81001 URINALYSIS AUTO W/SCOPE: CPT

## 2023-12-07 RX ORDER — 0.9 % SODIUM CHLORIDE 0.9 %
500 INTRAVENOUS SOLUTION INTRAVENOUS PRN
Status: DISCONTINUED | OUTPATIENT
Start: 2023-12-07 | End: 2023-12-15 | Stop reason: HOSPADM

## 2023-12-07 RX ORDER — ACETAMINOPHEN 650 MG/1
650 SUPPOSITORY RECTAL EVERY 6 HOURS PRN
Status: DISCONTINUED | OUTPATIENT
Start: 2023-12-07 | End: 2023-12-15 | Stop reason: HOSPADM

## 2023-12-07 RX ORDER — SODIUM CHLORIDE 9 MG/ML
INJECTION, SOLUTION INTRAVENOUS PRN
Status: DISCONTINUED | OUTPATIENT
Start: 2023-12-07 | End: 2023-12-15 | Stop reason: HOSPADM

## 2023-12-07 RX ORDER — ONDANSETRON 4 MG/1
4 TABLET, ORALLY DISINTEGRATING ORAL EVERY 8 HOURS PRN
Status: DISCONTINUED | OUTPATIENT
Start: 2023-12-07 | End: 2023-12-15 | Stop reason: HOSPADM

## 2023-12-07 RX ORDER — MAGNESIUM SULFATE IN WATER 40 MG/ML
2000 INJECTION, SOLUTION INTRAVENOUS PRN
Status: DISCONTINUED | OUTPATIENT
Start: 2023-12-07 | End: 2023-12-15 | Stop reason: HOSPADM

## 2023-12-07 RX ORDER — POTASSIUM CHLORIDE 20 MEQ/1
40 TABLET, EXTENDED RELEASE ORAL PRN
Status: DISCONTINUED | OUTPATIENT
Start: 2023-12-07 | End: 2023-12-07 | Stop reason: SDUPTHER

## 2023-12-07 RX ORDER — POTASSIUM CHLORIDE 7.45 MG/ML
10 INJECTION INTRAVENOUS PRN
Status: DISCONTINUED | OUTPATIENT
Start: 2023-12-07 | End: 2023-12-07 | Stop reason: SDUPTHER

## 2023-12-07 RX ORDER — BRIMONIDINE TARTRATE 2 MG/ML
1 SOLUTION/ DROPS OPHTHALMIC 2 TIMES DAILY
Status: DISCONTINUED | OUTPATIENT
Start: 2023-12-07 | End: 2023-12-15 | Stop reason: HOSPADM

## 2023-12-07 RX ORDER — ACETAMINOPHEN 325 MG/1
650 TABLET ORAL EVERY 6 HOURS PRN
Status: DISCONTINUED | OUTPATIENT
Start: 2023-12-07 | End: 2023-12-15 | Stop reason: HOSPADM

## 2023-12-07 RX ORDER — DORZOLAMIDE HCL 20 MG/ML
1 SOLUTION/ DROPS OPHTHALMIC 2 TIMES DAILY
Status: DISCONTINUED | OUTPATIENT
Start: 2023-12-07 | End: 2023-12-07

## 2023-12-07 RX ORDER — POTASSIUM CHLORIDE 20 MEQ/1
40 TABLET, EXTENDED RELEASE ORAL PRN
Status: DISCONTINUED | OUTPATIENT
Start: 2023-12-07 | End: 2023-12-15 | Stop reason: HOSPADM

## 2023-12-07 RX ORDER — ONDANSETRON 2 MG/ML
4 INJECTION INTRAMUSCULAR; INTRAVENOUS EVERY 6 HOURS PRN
Status: DISCONTINUED | OUTPATIENT
Start: 2023-12-07 | End: 2023-12-15 | Stop reason: HOSPADM

## 2023-12-07 RX ORDER — POTASSIUM CHLORIDE 7.45 MG/ML
10 INJECTION INTRAVENOUS PRN
Status: DISCONTINUED | OUTPATIENT
Start: 2023-12-07 | End: 2023-12-15 | Stop reason: HOSPADM

## 2023-12-07 RX ORDER — POLYETHYLENE GLYCOL 3350 17 G/17G
17 POWDER, FOR SOLUTION ORAL DAILY PRN
Status: DISCONTINUED | OUTPATIENT
Start: 2023-12-07 | End: 2023-12-15 | Stop reason: HOSPADM

## 2023-12-07 RX ORDER — LATANOPROST 50 UG/ML
1 SOLUTION/ DROPS OPHTHALMIC NIGHTLY
Status: DISCONTINUED | OUTPATIENT
Start: 2023-12-07 | End: 2023-12-15 | Stop reason: HOSPADM

## 2023-12-07 RX ORDER — SODIUM CHLORIDE 0.9 % (FLUSH) 0.9 %
5-40 SYRINGE (ML) INJECTION PRN
Status: DISCONTINUED | OUTPATIENT
Start: 2023-12-07 | End: 2023-12-15 | Stop reason: HOSPADM

## 2023-12-07 RX ORDER — TAMSULOSIN HYDROCHLORIDE 0.4 MG/1
0.4 CAPSULE ORAL NIGHTLY
Status: DISCONTINUED | OUTPATIENT
Start: 2023-12-07 | End: 2023-12-08

## 2023-12-07 RX ORDER — SODIUM CHLORIDE 0.9 % (FLUSH) 0.9 %
5-40 SYRINGE (ML) INJECTION EVERY 12 HOURS SCHEDULED
Status: DISCONTINUED | OUTPATIENT
Start: 2023-12-07 | End: 2023-12-15 | Stop reason: HOSPADM

## 2023-12-07 RX ORDER — SODIUM CHLORIDE 9 MG/ML
INJECTION, SOLUTION INTRAVENOUS CONTINUOUS
Status: DISCONTINUED | OUTPATIENT
Start: 2023-12-07 | End: 2023-12-08

## 2023-12-07 RX ORDER — SODIUM CHLORIDE 1 G/1
1 TABLET ORAL
Status: DISCONTINUED | OUTPATIENT
Start: 2023-12-07 | End: 2023-12-11

## 2023-12-07 RX ORDER — HYDRALAZINE HYDROCHLORIDE 20 MG/ML
10 INJECTION INTRAMUSCULAR; INTRAVENOUS EVERY 6 HOURS PRN
Status: DISCONTINUED | OUTPATIENT
Start: 2023-12-07 | End: 2023-12-15 | Stop reason: HOSPADM

## 2023-12-07 RX ORDER — FINASTERIDE 5 MG/1
5 TABLET, FILM COATED ORAL NIGHTLY
Status: DISCONTINUED | OUTPATIENT
Start: 2023-12-07 | End: 2023-12-15 | Stop reason: HOSPADM

## 2023-12-07 RX ORDER — ENOXAPARIN SODIUM 100 MG/ML
30 INJECTION SUBCUTANEOUS DAILY
Status: DISCONTINUED | OUTPATIENT
Start: 2023-12-07 | End: 2023-12-15 | Stop reason: HOSPADM

## 2023-12-07 RX ADMIN — TAMSULOSIN HYDROCHLORIDE 0.4 MG: 0.4 CAPSULE ORAL at 20:31

## 2023-12-07 RX ADMIN — SODIUM CHLORIDE 1 G: 1 TABLET ORAL at 09:34

## 2023-12-07 RX ADMIN — LATANOPROST 1 DROP: 50 SOLUTION OPHTHALMIC at 20:30

## 2023-12-07 RX ADMIN — SODIUM CHLORIDE: 9 INJECTION, SOLUTION INTRAVENOUS at 04:50

## 2023-12-07 RX ADMIN — SODIUM CHLORIDE 1 G: 1 TABLET ORAL at 12:43

## 2023-12-07 RX ADMIN — POLYETHYLENE GLYCOL 3350 17 G: 17 POWDER, FOR SOLUTION ORAL at 20:40

## 2023-12-07 RX ADMIN — SODIUM CHLORIDE: 9 INJECTION, SOLUTION INTRAVENOUS at 23:06

## 2023-12-07 RX ADMIN — Medication 10 ML: at 09:37

## 2023-12-07 RX ADMIN — FINASTERIDE 5 MG: 5 TABLET, FILM COATED ORAL at 20:31

## 2023-12-07 RX ADMIN — ENOXAPARIN SODIUM 30 MG: 100 INJECTION SUBCUTANEOUS at 09:36

## 2023-12-07 RX ADMIN — BRIMONIDINE TARTRATE 1 DROP: 2 SOLUTION OPHTHALMIC at 20:30

## 2023-12-07 RX ADMIN — SODIUM CHLORIDE 1 G: 1 TABLET ORAL at 17:46

## 2023-12-07 RX ADMIN — BRIMONIDINE TARTRATE 1 DROP: 2 SOLUTION OPHTHALMIC at 09:33

## 2023-12-07 ASSESSMENT — PAIN SCALES - GENERAL
PAINLEVEL_OUTOF10: 0
PAINLEVEL_OUTOF10: 0

## 2023-12-07 NOTE — PROGRESS NOTES
Attempted x 2 to insert ledbetter cath due to patient having urinary retention, tried regular ldebetter cath then a coude cath but unsuccessful with both. Dr Jordan Mas notified per secure message and aware. Family at bedside and assisted with keeping patient calm.  Patient after unsuccessful, sat on [facility] and had a bowel movement and voided 200ml

## 2023-12-07 NOTE — CARE COORDINATION
12/07/23 1349   Readmission Assessment   Number of Days since last admission? 8-30 days   Previous Disposition SNF  ([facility])   Who is being Sammi Anderson  (daughter at bedside)   What was the patient's/caregiver's perception as to why they think they needed to return back to the hospital? Other (Comment)  (per Mardy Lesches at [facility] court sent from MD office appt to hospital for low sodium)   Did you visit your Primary Care Physician after you left the hospital, before you returned this time? Yes   Did you see a specialist, such as Cardiac, Pulmonary, Orthopedic Physician, etc. after you left the hospital? Other (Comment)  (was skilled at [facility])   Who advised the patient to return to the hospital? Physician's Nurse/Office staff   Does the patient report anything that got in the way of taking their medications? No  (provided by snf)   In our efforts to provide the best possible care to you and others like you, can you think of anything that we could have done to help you after you left the hospital the first time, so that you might not have needed to return so soon?  Other (Comment)  (pt was at snf, paperwork faxed to snf on last discharge)

## 2023-12-07 NOTE — PROGRESS NOTES
ED TO INPATIENT SBAR HANDOFF    Patient Name: Sari Boswell   :  1940  82 y.o. MRN:  [id number]  Preferred Name    ED Room #:  ED-0021/21  Family/Caregiver Present no   Restraints no   Sitter no   Sepsis Risk Score Sepsis Risk Score: 3.17    Situation  Code Status: Prior No additional code details. Allergies: Patient has no known allergies. Weight: Patient Vitals for the past 96 hrs (Last 3 readings):   Weight   23 2224 48.1 kg (106 lb)     Arrived from: nursing home  Chief Complaint:   Chief Complaint   Patient presents with    Hyponatremia     Pt brought in by daughter from [facility]. Pt's sodium yesterday was 124. Pt recently discharged x 2 weeks ago. Hospital Problem/Diagnosis:  Principal Problem:    Hyponatremia  Resolved Problems:    * No resolved hospital problems.  *    Imaging:   No orders to display     Abnormal labs:   Abnormal Labs Reviewed   URINALYSIS WITH REFLEX TO CULTURE - Abnormal; Notable for the following components:       Result Value    Clarity, UA CLOUDY (*)     All other components within normal limits   CBC WITH AUTO DIFFERENTIAL - Abnormal; Notable for the following components:    WBC 3.9 (*)     RBC 3.71 (*)     Hemoglobin 12.5 (*)     Hematocrit 36.7 (*)     Lymphocytes Absolute 0.7 (*)     All other components within normal limits   BASIC METABOLIC PANEL - Abnormal; Notable for the following components:    Sodium 121 (*)     Chloride 86 (*)     Creatinine <0.5 (*)     All other components within normal limits     Critical values: no     Abnormal Assessment Findings: low sodium    Background  History:   Past Medical History:   Diagnosis Date    Glaucoma     Hyponatremia     Prostate enlargement        Assessment    Vitals/MEWS: MEWS Score: 1  Level of Consciousness: Responds to voice (1)   Vitals:    23 2300 23 0000 23 0100 23 0200   BP: 111/66 125/64 (!) 118/59 120/66   Pulse:  55 (!) 48 55   Resp: 25  14    Temp:       SpO2:  97%

## 2023-12-07 NOTE — CONSULTS
MD Emily Butcher MD Shann Pellet, MD               Office: [phone]                      Fax: [phone] [address]                   NEPHROLOGY INITIAL CONSULT NOTE:     PATIENT NAME: Shekhar Dos Santos  : 1940  MRN: [id number]  REASON FOR CONSULT: For evaluation and management of hyponatremia . (My recommendations will be communicated by way of shared medical record.)      RECOMMENDATIONS:   Addendum - pending labs back - keep current plans w/ below,     - pending labs   - check osm., urine lytes, uric acid    - hold Lasix  - low rate NS for mIVF  - Salt tabs   - BP is tolerating, so monitoring BP.   - COVID r/p, not hypoxic ''    - trend NA - goal <6-8 points rise in 24 hrs   - monitor PVR w/ bladder scan for ledbetter insertion need. - w/ h/o BPH  - at higher risk for decompensation, needing closer monitoring. 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 Hyponatremia  E87.1       2. History of SIADH  Z86.39       3. Generalized weakness  R53.1         Hyponatremia   - BL Na low 130s -> in past in to 110s - now on admission low 120s   -: Etiology in past - NSAIDs, URIs, w/ COVID , w/o hypoxia   + in past h/o SIADH.   needed tolvaptan also . - no major neuro s/s reported , except encephalopathy, likely acute metabolic vs toxic    H/O BPH-on Finasteride and Flomax      Associated problems:   - Volume status: mild hypo-volemic  : BP: no need for tight control   - Electrolytes: K: WNL  - h/o Hypomagnesemia - now better     - Acid-Base: stable     - Anemia: of chronic disease mild        Other major problems: Management per primary and other consulting teams. Saturation:  No components found for: \"PERCENTFE\"  FERRITIN:  No results found for: \"FERRITIN\"  IRON:  No results found for: \"IRON\"  TIBC:  No results found for: \"TIBC\"    Recent Labs     12/06/23  2245   *   K 4.6   CL 86*   CO2 30   BUN 11   CREATININE <0.5*       Recent Labs     12/06/23  2245   CALCIUM 9.4   MG 1.90   PHOS 3.3       No results for input(s): \"PH\", \"PCO2\", \"PO2\" in the last 72 hours.     Invalid input(s): \"E3FGSDKWPGJG\", \"INSPIREDO2\"    ABG:  No results found for: \"PH\", \"PCO2\", \"PO2\", \"HCO3\", \"BE\", \"THGB\", \"TCO2\", \"O2SAT\"  VBG:    Lab Results   Component Value Date/Time    PHVEN 7.485 11/20/2023 10:54 PM    ELJ5RRV 35.6 11/20/2023 10:54 PM    BEVEN 3.4 11/20/2023 10:54 PM    V5IDUJRU 100 11/20/2023 10:54 PM       LDH:  No results found for: \"LDH\"  Uric Acid:    Lab Results   Component Value Date/Time    LABURIC 3.2 06/27/2022 05:19 AM       PT/INR:    Lab Results   Component Value Date/Time    PROTIME 15.6 06/26/2022 09:08 PM    INR 1.25 06/26/2022 09:08 PM     Warfarin PT/INR:  No components found for: \"PTPATWAR\", \"PTINRWAR\"  PTT:    Lab Results   Component Value Date/Time    APTT 49.8 06/26/2022 09:08 PM   [APTT}  Last 3 Troponin:    Lab Results   Component Value Date/Time    TROPONINI <0.01 06/26/2022 09:08 PM    TROPONINI <0.01 01/12/2022 04:45 PM    TROPONINI <0.01 12/25/2021 08:31 AM       U/A:    Lab Results   Component Value Date/Time    COLORU Yellow 12/07/2023 12:59 AM    PROTEINU Negative 12/07/2023 12:59 AM    PHUR 8.0 12/07/2023 12:59 AM    WBCUA 0 12/07/2023 12:59 AM    RBCUA 0 12/07/2023 12:59 AM    BACTERIA None Seen 12/07/2023 12:59 AM    CLARITYU CLOUDY 12/07/2023 12:59 AM    SPECGRAV <=1.005 12/07/2023 12:59 AM    LEUKOCYTESUR Negative 12/07/2023 12:59 AM    UROBILINOGEN 0.2 12/07/2023 12:59 AM    BILIRUBINUR Negative 12/07/2023 12:59 AM    BLOODU Negative 12/07/2023 12:59 AM    GLUCOSEU Negative 12/07/2023 12:59 AM     Microalbumen/Creatinine ratio:  No components found

## 2023-12-07 NOTE — PROGRESS NOTES
Shift assessment completed. Routine vitals stable. Scheduled medications given. Patient is awake, alert and oriented. Respirations are easy and unlabored. Patient does not appear to be in distress, resting comfortably at this time. Call light within reach. Family at bedside. Patient resting quietly today.

## 2023-12-07 NOTE — H&P
Hospital Medicine History & Physical      Patient Name: Kiersten Marin    : 1940    PCP: Robbie Cummings MD    Date of Service:  Patient seen and examined on 2023    Chief Complaint: Hyponatremia    History Of Present Illness:    Kiersten Marin is a 82 y.o. male with a PMH of hyponatremia BPH glaucoma who presents to the ED for worsening mental status with associated hyponatremia. Of note patient was recently admitted here at [facility] for hyponatremia, associated history of SIADH was treated with IV fluids and p.o. salt tablets was seen in consult by nephrology discharged to a [facility], daughter-in-law at bedside states that his dose of p.o. salt tablets were decreased from 2 tablets to 1 tablet 3 times daily. While in rehab labs drawn showed continued downtrending of his sodium from 127 to 124 prompting presentation to the ED. In the ED repeat labs shows sodium of 121 chloride of 86. Patient was given 500 cc of normal saline. Being admitted for further management of his hyponatremia with nephrology consult    Past Medical History:    Patient has a past medical history of Glaucoma, Hyponatremia, and Prostate enlargement. Past Surgical History:    Patient has no past surgical history on file. Medications Prior to Admission:      Prior to Admission medications    Medication Sig Start Date End Date Taking?  Authorizing Provider   sodium chloride 1 g tablet Take 1 tablet by mouth 3 times daily (with meals) 23   Robbie Cummings MD   brimonidine [facility]) 0.2 % ophthalmic solution Place 1 drop into both eyes 2 times daily    Dg Chiu MD   furosemide (LASIX) 20 MG tablet Take 1 tablet by mouth 2 times daily 22   Robbie Cummings MD   dorzolamide (TRUSOPT) 2 % ophthalmic solution Place 1 drop into both eyes 2 times daily    Dg Chiu MD   latanoprost (XALATAN) 0.005 % ophthalmic solution Place 1 drop into both eyes nightly

## 2023-12-07 NOTE — PLAN OF CARE
Problem: Discharge Planning  Goal: Discharge to home or other facility with appropriate resources  Outcome: Progressing  Flowsheets (Taken 12/7/2023 0920)  Discharge to home or other facility with appropriate resources: Identify barriers to discharge with patient and caregiver     Problem: Safety - Adult  Goal: Free from fall injury  Outcome: Progressing

## 2023-12-07 NOTE — CARE COORDINATION
Case Management Assessment  Initial Evaluation    Date/Time of Evaluation: 12/7/2023 1:39 PM   Assessment Completed by: Princess Wray RN    If patient is discharged prior to next notation, then this note serves as note for discharge by case management. 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 Yes    Chart Reviewed: Yes      History Provided by: Child/Family (daughter at bedside)  Patient Orientation: Alert and Oriented    Patient Cognition: Other (see comment) (resting with eyes closed most of the time)    Hospitalization in the last 30 days (Readmission):  Yes    If yes, Readmission Assessment in  Navigator will be completed. Advance Directives:      Code Status: Full Code   Patient's Primary Decision Maker is: Legal Next of Kin      Discharge Planning:    Patient lives with: Other (Comment) (was skilled at snf) Type of Home: [address] [facility]  Primary Care Giver: Other (Comment) (pt came from [facility] skilled)  Patient Support Systems include: Family Members, Children   Current Financial resources: Medicare  Current community resources: ECF/Home Care (came from snf)  Current services prior to admission: [address]            Current DME:              Type of Home Care services:  None    ADLS  Prior functional level: Assistance with the following:, Bathing, Dressing, Toileting, Feeding, Mobility, Other (see comment) (was at [employer] [facility] skilled)  Current functional level: Assistance with the following:, Dressing, Bathing, Toileting, Feeding, Mobility    PT AM-PAC:   /24  OT AM-PAC:   /24    Family can provide assistance at DC:  No Risk (Low < 19, Mod (19-27), High > 27): Readmission Risk Score: 12.1    Current PCP: Oleg Ang MD  PCP verified by CM? Yes    Chart Reviewed: Yes      History Provided by: Child/Family (daughter at bedside)  Patient Orientation: Alert and Oriented    Patient Cognition: Other (see comment) (resting with eyes closed most of the time)    Hospitalization in the last 30 days (Readmission):  Yes    If yes, Readmission Assessment in  Navigator will be completed. Advance Directives:      Code Status: Full Code   Patient's Primary Decision Maker is: Legal Next of Kin      Discharge Planning:    Patient expects to discharge to: [facility] for transportation at discharge: Other (see comment) (transport service)    Financial    Payor: MEDICARE / Plan: MEDICARE PART A AND B / Product Type: *No Product type* /         Current Nursing Home Information:  Patient admitted from:  [address] [place] [address]     Call to Laurynchancedarius , [phone], at [facility]  who confirmed the patient is: [address] [place], no Precert required for return. Patient Covid vaccination status:    Internal Administration   First Dose      Second Dose           Last COVID Lab SARS-CoV-2 (no units)   Date Value   01/13/2022 Detected (A)     SARS-CoV-2 RNA, RT PCR (no units)   Date Value   06/26/2022 DETECTED (A)     SARS-CoV-2, NAAT (no units)   Date Value   12/07/2023 Not Detected     POC Glucose (mg/dl)   Date Value   11/20/2023 97            Covid Test requirement for return: No Rapid/PCR Covid test needed before return      Additional Case Management Notes:     Pt came from [address] [place] [address] skilled services and can return per admissions. Therapy pending. Daughter at bedside states working towards [address] [place] [address] as she cannot care for pt in her home.        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

## 2023-12-07 NOTE — PROGRESS NOTES
Pt admitted from ED to 3316. Daughter in law with pt. Mepilex applied to coccyx. External cath applied. Pt confused at times. Oriented to room and environment.

## 2023-12-08 LAB
ALBUMIN SERPL-MCNC: 3.5 G/DL (ref 3.4–5)
ALBUMIN/GLOB SERPL: 1.9 {RATIO} (ref 1.1–2.2)
ALP SERPL-CCNC: 65 U/L (ref 40–129)
ALT SERPL-CCNC: 6 U/L (ref 10–40)
ANION GAP SERPL CALCULATED.3IONS-SCNC: 4 MMOL/L (ref 3–16)
AST SERPL-CCNC: 12 U/L (ref 15–37)
BASOPHILS # BLD: 0.1 K/UL (ref 0–0.2)
BASOPHILS NFR BLD: 1.7 %
BILIRUB SERPL-MCNC: 0.6 MG/DL (ref 0–1)
BUN SERPL-MCNC: 9 MG/DL (ref 7–20)
CALCIUM SERPL-MCNC: 8.7 MG/DL (ref 8.3–10.6)
CHLORIDE SERPL-SCNC: 94 MMOL/L (ref 99–110)
CO2 SERPL-SCNC: 29 MMOL/L (ref 21–32)
CREAT SERPL-MCNC: <0.5 MG/DL (ref 0.8–1.3)
DEPRECATED RDW RBC AUTO: 14.1 % (ref 12.4–15.4)
EOSINOPHIL # BLD: 0.6 K/UL (ref 0–0.6)
EOSINOPHIL NFR BLD: 15.1 %
GFR SERPLBLD CREATININE-BSD FMLA CKD-EPI: >60 ML/MIN/{1.73_M2}
GLUCOSE SERPL-MCNC: 85 MG/DL (ref 70–99)
HCT VFR BLD AUTO: 30 % (ref 40.5–52.5)
HGB BLD-MCNC: 10.5 G/DL (ref 13.5–17.5)
LYMPHOCYTES # BLD: 0.7 K/UL (ref 1–5.1)
LYMPHOCYTES NFR BLD: 18 %
MCH RBC QN AUTO: 34.2 PG (ref 26–34)
MCHC RBC AUTO-ENTMCNC: 34.8 G/DL (ref 31–36)
MCV RBC AUTO: 98.3 FL (ref 80–100)
MONOCYTES # BLD: 0.5 K/UL (ref 0–1.3)
MONOCYTES NFR BLD: 13.7 %
NEUTROPHILS # BLD: 1.9 K/UL (ref 1.7–7.7)
NEUTROPHILS NFR BLD: 51.5 %
NT-PROBNP SERPL-MCNC: 472 PG/ML (ref 0–449)
PLATELET # BLD AUTO: 225 K/UL (ref 135–450)
PMV BLD AUTO: 5.7 FL (ref 5–10.5)
POTASSIUM SERPL-SCNC: 4.4 MMOL/L (ref 3.5–5.1)
PROT SERPL-MCNC: 5.3 G/DL (ref 6.4–8.2)
RBC # BLD AUTO: 3.06 M/UL (ref 4.2–5.9)
SODIUM SERPL-SCNC: 124 MMOL/L (ref 136–145)
SODIUM SERPL-SCNC: 125 MMOL/L (ref 136–145)
SODIUM SERPL-SCNC: 127 MMOL/L (ref 136–145)
WBC # BLD AUTO: 3.7 K/UL (ref 4–11)

## 2023-12-08 PROCEDURE — 84295 ASSAY OF SERUM SODIUM: CPT

## 2023-12-08 PROCEDURE — 97162 PT EVAL MOD COMPLEX 30 MIN: CPT

## 2023-12-08 PROCEDURE — 85025 COMPLETE CBC W/AUTO DIFF WBC: CPT

## 2023-12-08 PROCEDURE — 51798 US URINE CAPACITY MEASURE: CPT

## 2023-12-08 PROCEDURE — 2580000003 HC RX 258: Performed by: INTERNAL MEDICINE

## 2023-12-08 PROCEDURE — 6370000000 HC RX 637 (ALT 250 FOR IP): Performed by: INTERNAL MEDICINE

## 2023-12-08 PROCEDURE — 6360000002 HC RX W HCPCS: Performed by: INTERNAL MEDICINE

## 2023-12-08 PROCEDURE — 97530 THERAPEUTIC ACTIVITIES: CPT

## 2023-12-08 PROCEDURE — 1200000000 HC SEMI PRIVATE

## 2023-12-08 PROCEDURE — 6370000000 HC RX 637 (ALT 250 FOR IP): Performed by: NURSE PRACTITIONER

## 2023-12-08 PROCEDURE — 80053 COMPREHEN METABOLIC PANEL: CPT

## 2023-12-08 PROCEDURE — 83880 ASSAY OF NATRIURETIC PEPTIDE: CPT

## 2023-12-08 PROCEDURE — 97535 SELF CARE MNGMENT TRAINING: CPT

## 2023-12-08 PROCEDURE — 97116 GAIT TRAINING THERAPY: CPT

## 2023-12-08 PROCEDURE — 97166 OT EVAL MOD COMPLEX 45 MIN: CPT

## 2023-12-08 PROCEDURE — 36415 COLL VENOUS BLD VENIPUNCTURE: CPT

## 2023-12-08 RX ORDER — SODIUM CHLORIDE 9 MG/ML
INJECTION, SOLUTION INTRAVENOUS CONTINUOUS
Status: DISCONTINUED | OUTPATIENT
Start: 2023-12-08 | End: 2023-12-09

## 2023-12-08 RX ORDER — TAMSULOSIN HYDROCHLORIDE 0.4 MG/1
0.4 CAPSULE ORAL 2 TIMES DAILY
Status: DISCONTINUED | OUTPATIENT
Start: 2023-12-08 | End: 2023-12-15 | Stop reason: HOSPADM

## 2023-12-08 RX ADMIN — TAMSULOSIN HYDROCHLORIDE 0.4 MG: 0.4 CAPSULE ORAL at 20:10

## 2023-12-08 RX ADMIN — BRIMONIDINE TARTRATE 1 DROP: 2 SOLUTION OPHTHALMIC at 20:11

## 2023-12-08 RX ADMIN — ENOXAPARIN SODIUM 30 MG: 100 INJECTION SUBCUTANEOUS at 09:44

## 2023-12-08 RX ADMIN — SODIUM CHLORIDE 1 G: 1 TABLET ORAL at 13:10

## 2023-12-08 RX ADMIN — LATANOPROST 1 DROP: 50 SOLUTION OPHTHALMIC at 20:10

## 2023-12-08 RX ADMIN — SODIUM CHLORIDE: 9 INJECTION, SOLUTION INTRAVENOUS at 12:45

## 2023-12-08 RX ADMIN — SODIUM CHLORIDE: 9 INJECTION, SOLUTION INTRAVENOUS at 20:00

## 2023-12-08 RX ADMIN — SODIUM CHLORIDE 1 G: 1 TABLET ORAL at 09:44

## 2023-12-08 RX ADMIN — FINASTERIDE 5 MG: 5 TABLET, FILM COATED ORAL at 20:10

## 2023-12-08 RX ADMIN — SODIUM CHLORIDE 1 G: 1 TABLET ORAL at 17:05

## 2023-12-08 ASSESSMENT — PAIN SCALES - GENERAL
PAINLEVEL_OUTOF10: 0
PAINLEVEL_OUTOF10: 0

## 2023-12-08 NOTE — PLAN OF CARE
Problem: Discharge Planning  Goal: Discharge to home or other facility with appropriate resources  12/7/2023 2345 by Viry Lua RN  Outcome: Progressing  12/7/2023 0945 by Madhuri Jurado RN     Problem: Safety - Adult  Goal: Free from fall injury  12/7/2023 2345 by Viry Lua RN       Problem: Skin/Tissue Integrity  Goal: Absence of new skin breakdown  Description: 1. Monitor for areas of redness and/or skin breakdown  2. Assess vascular access sites hourly  3. Every 4-6 hours minimum:  Change oxygen saturation probe site  4. Every 4-6 hours:  If on nasal continuous positive airway pressure, respiratory therapy assess nares and determine need for appliance change or resting period.   Outcome: Progressing

## 2023-12-08 NOTE — PROGRESS NOTES
Nutrition Note    RECOMMENDATIONS  PO Diet: Continue the current diet  ONS: Ordered Magic cup BID    NUTRITION ASSESSMENT   Pt triggered nutrition assessment for MST 2. Pt is not able to answer questions. Infomation are collected from his daughter. Daughter reported pt lost about 20lb (16%) weight over the past 2 months d/t poor eating secondary to swallowing difficuties at home. 2 weeks ago, pt was transfered to the nursing home and was provided pureed diet there, he tolerates pureed diet well with % po intake. Pt is severely malnourished AEB sig wt loss and severe fat and muscle loss. BMI is 14. 3. Offered ONS for additional nutrition, daughter stated pt likes magic cup. Will order magic cup BID and cont. to follow. Nutrition Related Findings: Na+ 127, K+ 4.4 No edema noted. No BM noted. 0.9NA via IV at 50ml/hr. Wounds: None  Nutrition Education:  Education not indicated   Nutrition Goals: PO intake 75% or greater, by next RD assessment     MALNUTRITION ASSESSMENT   Chronic Illness  Malnutrition Status: Severe malnutrition  Findings of the 6 clinical characteristics of malnutrition:  Energy Intake:  Mild decrease in energy intake (Comment) (hx of poor po intake d/t swallowing difficulties  2 months ago)  Weight Loss:   (16% wt loss x 2 months)     Body Fat Loss:  Severe body fat loss Orbital, Triceps   Muscle Mass Loss:  Severe muscle mass loss Temples (temporalis), Clavicles (pectoralis & deltoids)  Fluid Accumulation:  No significant fluid accumulation     Strength:  Not Performed      NUTRITION DIAGNOSIS   Severe malnutrition related to swallowing difficulty as evidenced by Criteria as identified in malnutrition assessment      CURRENT NUTRITION THERAPIES  ADULT DIET;  Dysphagia - Pureed     PO Intake: %   PO Supplement Intake:None Ordered      ANTHROPOMETRICS  Current Height: 182.9 cm (6' 0.01\")  Current Weight - Scale: 47.7 kg (105 lb 3.2 oz)    Ideal Body Weight (IBW): 178 lbs  (81 kg)

## 2023-12-08 NOTE — CONSULTS
Urology Consult Note  [facility]     Patient: Skye Sosa MRN: [id number]  Room/Bed: [facility]3263/7825-39   YOB: 1940  Age/Sex: 82 y.o.male  Admission Date: 12/6/2023     Date of Service:  12/8/2023    Consulting Provider: Raquel Downing, MARYANNE - CNP CNP  Admitting/Requesting Physician: Ronni Vivas MD  Primary Care Physician: Radha Paul MD    Reason for Consult: Urinary Retention    ASSESSMENT/PLAN     83 yo male  Hx of BPH on Flomax and finasteride  Hyponatremia hx of SIADH  Nephrology is following  Incomplete bladder emptying yesterday- pvr 300cc's  Staff unable to place a ledbetter, now Pvr is 15cc's. Recommendations:  Double flomax  Continue finasteride  Monitor for urinary retention, bladder scan prn, can str cath residuals >400cc's, although he is likely turning the corner. Outpatient follow up for bph requested    All patient questions were answered. He understands the plan as listed above. HISTORY     Chief Complaint:   Chief Complaint   Patient presents with    Hyponatremia     Pt brought in by daughter from [facility]. Pt's sodium yesterday was 124. Pt recently discharged x 2 weeks ago. History of Present Illness: Skye Sosa is a 82 y.o. male with incomplete bladder emptying. Onset of symptoms was days ago with  course since that time. Symptoms are aggravated by bph. Symptoms improved with flomax. Associated symptoms include decreased urine output. Patient also reports incompletely emptying bladder. He has tried the following treatments: douible flomax, str cath, finasteride. Past Medical History:  He has a past medical history of Glaucoma, Hyponatremia, and Prostate enlargement. Hospital Problem List:  Principal Problem:    Hyponatremia  Active Problems:    HTN (hypertension)    BPH (benign prostatic hyperplasia)    Anemia  Resolved Problems:    * No resolved hospital problems.  *      Past Surgical History:  He has no past surgical history on

## 2023-12-08 NOTE — PROGRESS NOTES
Progress Note - Dr. Nagy Parkinson - Internal Medicine  PCP: Radha Paul, [address] / [address]  [phone]    Hospital Day: 1  Code Status: Full Code  Current Diet: ADULT DIET; Dysphagia - Pureed        CC: follow up on medical issues    Subjective:   Skye Sosa is a 82 y.o. male. Pt seen and examined  Chart reviewed since last visit, labs and imaging below      Na better, 127  Appreciate renal eval    Pt does not have any specific complaints    Review of Systems: (1 system for EPF, 2-9 for detailed, 10+ for comprehensive)  Constitutional: Negative for chills and fever. HENT: Negative for dental problem, nosebleeds and rhinorrhea. Eyes: Negative for photophobia and visual disturbance. Respiratory: Negative for cough, chest tightness and shortness of breath. Cardiovascular: Negative for chest pain and leg swelling. Gastrointestinal: Negative for diarrhea, nausea and vomiting. Endocrine: Negative for polydipsia and polyphagia. Genitourinary: Negative for frequency, hematuria and urgency. Musculoskeletal: Negative for back pain and myalgias. Skin: Negative for rash. Allergic/Immunologic: Negative for food allergies. Neurological: Negative for dizziness, seizures, syncope and facial asymmetry. Hematological: Negative for adenopathy. Psychiatric/Behavioral: Negative for dysphoric mood. The patient is not nervous/anxious. I have reviewed the patient's medical and social history in detail and updated the computerized patient record. To recap: He  has a past medical history of Glaucoma, Hyponatremia, and Prostate enlargement. . He  has no past surgical history on file. Vik Greer He  reports that he has never smoked. He has never used smokeless tobacco. He reports that he does not drink alcohol and does not use drugs. .        Active Hospital Problems    Diagnosis Date Noted    Hyponatremia [E87.1] 12/24/2021    HTN (hypertension) [I10] 03/09/2021    BPH  (L) 12/08/2023    K 4.4 12/08/2023    CL 94 (L) 12/08/2023    CREATININE <0.5 (L) 12/08/2023    BUN 9 12/08/2023    CO2 29 12/08/2023    TSH 1.02 09/03/2019    PSA 0.87 09/03/2019    INR 1.25 (H) 06/26/2022    LABA1C 5.6 09/03/2019     Lab Results   Component Value Date    TROPONINI <0.01 06/26/2022       Recent Imaging Results are Reviewed:  CT HEAD WO CONTRAST    Result Date: 11/20/2023  EXAMINATION: CT OF THE HEAD WITHOUT CONTRAST  11/20/2023 11:08 pm TECHNIQUE: CT of the head was performed without the administration of intravenous contrast. Automated exposure control, iterative reconstruction, and/or weight based adjustment of the mA/kV was utilized to reduce the radiation dose to as low as reasonably achievable. COMPARISON: November 13, 2023 HISTORY: ORDERING SYSTEM PROVIDED HISTORY: Acute AMS TECHNOLOGIST PROVIDED HISTORY: Reason for exam:->Acute AMS Has a \"code stroke\" or \"stroke alert\" been called? ->No Reason for Exam: Acute AMS. FINDINGS: BRAIN/VENTRICLES: There is no acute intracranial hemorrhage, mass effect or midline shift. No abnormal extra-axial fluid collection. There is volume loss with mild chronic white matter microvascular ischemic change. There is no evidence of hydrocephalus. ORBITS: The visualized portion of the orbits demonstrate no acute abnormality. SINUSES: The visualized paranasal sinuses and mastoid air cells demonstrate no acute abnormality. SOFT TISSUES/SKULL:  No acute abnormality of the visualized skull or soft tissues. No acute intracranial abnormality identified. XR CHEST PORTABLE    Result Date: 11/13/2023  EXAMINATION: ONE XRAY VIEW OF THE CHEST 11/13/2023 8:08 pm COMPARISON: 06/26/2022. HISTORY: ORDERING SYSTEM PROVIDED HISTORY: ams TECHNOLOGIST PROVIDED HISTORY: Reason for exam:->ams Reason for Exam: ams FINDINGS: The cardiomediastinal silhouette is stable. The lungs are clear. No infiltrate, pleural fluid or evidence of overt failure.   Pulmonary

## 2023-12-08 NOTE — PLAN OF CARE
Problem: Discharge Planning  Goal: Discharge to home or other facility with appropriate resources  12/8/2023 1308 by Indio Caballero RN  Outcome: Progressing     Problem: Safety - Adult  Goal: Free from fall injury  12/8/2023 1308 by Indio Caballero RN  Outcome: Progressing     Problem: Skin/Tissue Integrity  Goal: Absence of new skin breakdown  Description: 1. Monitor for areas of redness and/or skin breakdown  2. Assess vascular access sites hourly  3. Every 4-6 hours minimum:  Change oxygen saturation probe site  4. Every 4-6 hours:  If on nasal continuous positive airway pressure, respiratory therapy assess nares and determine need for appliance change or resting period.   12/8/2023 1308 by Indio Caballero RN  Outcome: Progressing     Problem: Pain  Goal: Verbalizes/displays adequate comfort level or baseline comfort level  Outcome: Progressing     Problem: Nutrition Deficit:  Goal: Optimize nutritional status  Outcome: Progressing

## 2023-12-08 NOTE — PROGRESS NOTES
[facility] Department   Phone: [phone]    Occupational Therapy    [x] Initial Evaluation            [] Daily Treatment Note         [] Discharge Summary      Patient: Teagan Banda   : 1940   MRN: [id number]   Date of Service:  2023    Admitting Diagnosis:  Hyponatremia  Current Admission Summary:  Hyponatremia -Established problem. Unstable. Na 127   Past Medical History:  has a past medical history of Glaucoma, Hyponatremia, and Prostate enlargement. Past Surgical History:  has no past surgical history on file. Discharge Recommendations: Teagan Banda scored a 18/24 on the AM-PAC ADL Inpatient form. Current research shows that an AM-PAC score of 17 or less is typically not associated with a discharge to the patient's home setting. Based on the patient's AM-PAC score and their current ADL deficits, it is recommended that the patient have 3-5 sessions per week of Occupational Therapy at d/c to increase the patient's independence. Please see assessment section for further patient specific details. If patient discharges prior to next session this note will serve as a discharge summary. Please see below for the latest assessment towards goals. Patient is not cognitively able to live in his home environment.   He is legally blind and [place]    DME Required For Discharge: no DME required at discharge    Precautions/Restrictions: high fall risk  Weight Bearing Restrictions: weight bearing as tolerated  [] Right Upper Extremity  [] Left Upper Extremity [] Right Lower Extremity  [] Left Lower Extremity     Required Braces/Orthotics: no braces required   [] Right  [] Left  Positional Restrictions:no positional restrictions      Pre-Admission Information     Lives With: alone                     Type of Home: house  Home Layout: one level  Home Access: level entry  Bathroom Layout: walk in shower  Toilet Height: standard

## 2023-12-08 NOTE — PROGRESS NOTES
[address] [facility] Department   Phone: [phone]    Physical Therapy    [x] Initial Evaluation            [] Daily Treatment Note         [] Discharge Summary      Patient: Ric Freire   : 1940   MRN: [id number]   Date of Service:  2023  Admitting Diagnosis: Hyponatremia  Current Admission Summary: The pt was admitted with confusion and hyponatremia. This is his second hospital admission in 2 months. Past Medical History:  has a past medical history of Glaucoma, Hyponatremia, and Prostate enlargement. Past Surgical History:  has no past surgical history on file. Discharge Recommendations: Ric Freire scored a 17/24 on the AM-PAC short mobility form. Current research shows that an AM-PAC score of 17 or less is typically not associated with a discharge to the patient's home setting. Based on the patient's AM-PAC score and their current functional mobility deficits, it is recommended that the patient have 3-5 sessions per week of Physical Therapy at d/c to increase the patient's independence. Please see assessment section for further patient specific details. If patient discharges prior to next session this note will serve as a discharge summary. Please see below for the latest assessment towards goals.      DME Required For Discharge: DME to be determined at next level of care  Precautions/Restrictions: high fall risk    Positional Restrictions:no positional restrictions    Pre-Admission Information   Lives With: alone                     Type of Home: house  Home Layout: one level  Home Access: level entry  [facility]: walk in shower  Toilet Height: standard height  Bathroom Equipment: grab bars around toilet, shower chair  Home Equipment: rolling walker  Transfer Assistance: Independent without use of device  Ambulation Assistance:Independent without use of device  ADL Assistance: independent with all ADL's  IADL Assistance: independent with homemaking tasks  Active :        [] Yes                 [x] No  Hand Dominance: [] Left                 [] Right  Current Employment: retired. Occupation:   Hobbies:   Recent Falls: endorses no falls, daughter comes over twice daily for eye drops, pt was doing his own medication management with daughter checking it, pt did not have to manage finances    The pt discharged to [facility] after a hospital admission in mid-November. He was completing ADLs with assistance and ambulating 150' with no AD and assist from therapy. He has had a cognitive decline since November. Examination   Vision:   Vision Gross Assessment: Legally blind and Vision Corrective Device: wears glasses at all times -blind in R eye, can not see to read large print or recognize staff in his room  Hearing:   hard of hearing  Observation:   General Observation:  pt has a flat affect and decreased visual scanning of his environment, he uses the back of his hands to touch objects for navigation  Posture:   Decreased thoracic extension  Sensation:   WFL  Proprioception:    WFL  Tone:   Normotonic  Coordination Testing:   Coordination and Movement Description: gross motor impairments, decreased speed, decreased accuracy  Alternating Pronation/Supination: Impaired Bilaterally  Alternating Toe Tapping: Impaired Bilaterally    The pt has difficulty initiating tasks, then shows improvement for a few reps and then has decreased accuracy again. Overflow demonstrated. The pt has parkinsonian symptoms.    ROM:   (B) LE AROM WFL  Decreased full B elbow and knee extension  Strength:   (B) LE strength grossly WFL  Therapist Clinical Decision Making (Complexity): medium complexity  Clinical Presentation: evolving      Subjective  General: pt was supine in bed, daughter was present but left during session, agreeable to PT  Pain: 0/10  Pain Interventions: not applicable       Functional Mobility  Bed Mobility:  Supine to Sit: contact

## 2023-12-08 NOTE — PROGRESS NOTES
found for: \"FERRITIN\"  IRON:  No results found for: \"IRON\"  TIBC:  No results found for: \"TIBC\"    Recent Labs     12/06/23 2245 12/07/23 0922 12/07/23  1438 12/07/23  2133 12/08/23  0524   * 121* 123* 125* 127*   K 4.6 4.3  --   --  4.4   CL 86* 90*  --   --  94*   CO2 30 27  --   --  29   BUN 11 9  --   --  9   CREATININE <0.5* <0.5*  --   --  <0.5*       Recent Labs     12/06/23 2245 12/07/23 0922 12/08/23 0524   CALCIUM 9.4 8.6 8.7   MG 1.90  --   --    PHOS 3.3 2.8  --        No results for input(s): \"PH\", \"PCO2\", \"PO2\" in the last 72 hours.     Invalid input(s): \"[id number]\", \"INSPIREDO2\"    ABG:  No results found for: \"PH\", \"PCO2\", \"PO2\", \"HCO3\", \"BE\", \"THGB\", \"TCO2\", \"O2SAT\"  VBG:    Lab Results   Component Value Date/Time    PHVEN 7.485 11/20/2023 10:54 PM    HLJ6BXV 35.6 11/20/2023 10:54 PM    BEVEN 3.4 11/20/2023 10:54 PM    A8BITOWE 100 11/20/2023 10:54 PM       LDH:  No results found for: \"LDH\"  Uric Acid:    Lab Results   Component Value Date/Time    LABURIC 2.3 12/07/2023 09:22 AM       PT/INR:    Lab Results   Component Value Date/Time    PROTIME 15.6 06/26/2022 09:08 PM    INR 1.25 06/26/2022 09:08 PM     Warfarin PT/INR:  No components found for: \"PTPATWAR\", \"PTINRWAR\"  PTT:    Lab Results   Component Value Date/Time    APTT 49.8 06/26/2022 09:08 PM   [APTT}  Last 3 Troponin:    Lab Results   Component Value Date/Time    TROPONINI <0.01 06/26/2022 09:08 PM    TROPONINI <0.01 01/12/2022 04:45 PM    TROPONINI <0.01 12/25/2021 08:31 AM       U/A:    Lab Results   Component Value Date/Time    COLORU Yellow 12/07/2023 11:35 AM    PROTEINU Negative 12/07/2023 11:35 AM    PHUR 8.0 12/07/2023 11:35 AM    WBCUA 0 12/07/2023 11:35 AM    RBCUA 2 12/07/2023 11:35 AM    BACTERIA None Seen 12/07/2023 11:35 AM    CLARITYU CLOUDY 12/07/2023 11:35 AM    SPECGRAV 1.010 12/07/2023 11:35 AM    LEUKOCYTESUR Negative 12/07/2023 11:35 AM    UROBILINOGEN 0.2 12/07/2023 11:35 AM    BILIRUBINUR Negative 12/07/2023 11:35 AM    BLOODU Negative 12/07/2023 11:35 AM    GLUCOSEU Negative 12/07/2023 11:35 AM     Microalbumen/Creatinine ratio:  No components found for: \"RUCREAT\"  24 Hour Urine for Protein:  No components found for: \"RAWUPRO\", \"UHRS3\", \"VUQT79RM\", \"UTV3\"  24 Hour Urine for Creatinine Clearance:  No components found for: \"CREAT4\", \"UHRS10\", \"UTV10\"  Urine Toxicology:  No components found for: \"IAMMENTA\", \"IBARBIT\", \"IBENZO\", \"ICOCAINE\", \"IMARTHC\", \"IOPIATES\", \"IPHENCYC\"    HgBA1c:    Lab Results   Component Value Date/Time    LABA1C 5.6 09/03/2019 10:00 AM     RPR:  No results found for: \"RPR\"  HIV:  No results found for: \"HIV\"  HEMAL:  No results found for: \"ANATITER\", \"HEMAL\"  RF:  No results found for: \"RF\"  DSDNA:  No components found for: \"DNA\"  AMYLASE:  No results found for: \"AMYLASE\"  LIPASE:    Lab Results   Component Value Date/Time    LIPASE 39.0 11/13/2023 07:59 PM     Fibrinogen Level:  No components found for: \"FIB\"       BELOW MENTIONED RADIOLOGY STUDY RESULTS BY ME (AS NEEDED FOR MY EVALUATION AND MANAGEMENT). IMPRESSION:  No acute intracranial abnormality identified.               Specimen Collected: 11/20/23 23:37 EST Last Resulted: 11/20/23 23:39 EST

## 2023-12-09 LAB
ANION GAP SERPL CALCULATED.3IONS-SCNC: 3 MMOL/L (ref 3–16)
BASOPHILS # BLD: 0.1 K/UL (ref 0–0.2)
BASOPHILS NFR BLD: 1.8 %
BUN SERPL-MCNC: 11 MG/DL (ref 7–20)
CALCIUM SERPL-MCNC: 8.6 MG/DL (ref 8.3–10.6)
CHLORIDE SERPL-SCNC: 95 MMOL/L (ref 99–110)
CO2 SERPL-SCNC: 29 MMOL/L (ref 21–32)
CREAT SERPL-MCNC: <0.5 MG/DL (ref 0.8–1.3)
DEPRECATED RDW RBC AUTO: 14.3 % (ref 12.4–15.4)
EOSINOPHIL # BLD: 0.6 K/UL (ref 0–0.6)
EOSINOPHIL NFR BLD: 17.7 %
GFR SERPLBLD CREATININE-BSD FMLA CKD-EPI: >60 ML/MIN/{1.73_M2}
GLUCOSE SERPL-MCNC: 81 MG/DL (ref 70–99)
HCT VFR BLD AUTO: 31.2 % (ref 40.5–52.5)
HGB BLD-MCNC: 10.5 G/DL (ref 13.5–17.5)
LYMPHOCYTES # BLD: 0.8 K/UL (ref 1–5.1)
LYMPHOCYTES NFR BLD: 22.3 %
MCH RBC QN AUTO: 33.2 PG (ref 26–34)
MCHC RBC AUTO-ENTMCNC: 33.6 G/DL (ref 31–36)
MCV RBC AUTO: 98.9 FL (ref 80–100)
MONOCYTES # BLD: 0.5 K/UL (ref 0–1.3)
MONOCYTES NFR BLD: 12.9 %
NEUTROPHILS # BLD: 1.7 K/UL (ref 1.7–7.7)
NEUTROPHILS NFR BLD: 45.3 %
PLATELET # BLD AUTO: 235 K/UL (ref 135–450)
PMV BLD AUTO: 6.2 FL (ref 5–10.5)
POTASSIUM SERPL-SCNC: 4.2 MMOL/L (ref 3.5–5.1)
RBC # BLD AUTO: 3.15 M/UL (ref 4.2–5.9)
SODIUM SERPL-SCNC: 126 MMOL/L (ref 136–145)
SODIUM SERPL-SCNC: 127 MMOL/L (ref 136–145)
WBC # BLD AUTO: 3.6 K/UL (ref 4–11)

## 2023-12-09 PROCEDURE — 84295 ASSAY OF SERUM SODIUM: CPT

## 2023-12-09 PROCEDURE — 36415 COLL VENOUS BLD VENIPUNCTURE: CPT

## 2023-12-09 PROCEDURE — 2580000003 HC RX 258: Performed by: INTERNAL MEDICINE

## 2023-12-09 PROCEDURE — 85025 COMPLETE CBC W/AUTO DIFF WBC: CPT

## 2023-12-09 PROCEDURE — 6360000002 HC RX W HCPCS: Performed by: INTERNAL MEDICINE

## 2023-12-09 PROCEDURE — 6370000000 HC RX 637 (ALT 250 FOR IP): Performed by: NURSE PRACTITIONER

## 2023-12-09 PROCEDURE — 80048 BASIC METABOLIC PNL TOTAL CA: CPT

## 2023-12-09 PROCEDURE — 6370000000 HC RX 637 (ALT 250 FOR IP): Performed by: INTERNAL MEDICINE

## 2023-12-09 PROCEDURE — 1200000000 HC SEMI PRIVATE

## 2023-12-09 RX ADMIN — BRIMONIDINE TARTRATE 1 DROP: 2 SOLUTION OPHTHALMIC at 21:40

## 2023-12-09 RX ADMIN — FINASTERIDE 5 MG: 5 TABLET, FILM COATED ORAL at 21:40

## 2023-12-09 RX ADMIN — SODIUM CHLORIDE 1 G: 1 TABLET ORAL at 17:21

## 2023-12-09 RX ADMIN — Medication 10 ML: at 08:56

## 2023-12-09 RX ADMIN — TAMSULOSIN HYDROCHLORIDE 0.4 MG: 0.4 CAPSULE ORAL at 21:40

## 2023-12-09 RX ADMIN — TAMSULOSIN HYDROCHLORIDE 0.4 MG: 0.4 CAPSULE ORAL at 08:56

## 2023-12-09 RX ADMIN — SODIUM CHLORIDE 1 G: 1 TABLET ORAL at 13:17

## 2023-12-09 RX ADMIN — ENOXAPARIN SODIUM 30 MG: 100 INJECTION SUBCUTANEOUS at 08:56

## 2023-12-09 RX ADMIN — SODIUM CHLORIDE 1 G: 1 TABLET ORAL at 08:56

## 2023-12-09 RX ADMIN — Medication 10 ML: at 21:46

## 2023-12-09 RX ADMIN — BRIMONIDINE TARTRATE 1 DROP: 2 SOLUTION OPHTHALMIC at 09:35

## 2023-12-09 ASSESSMENT — PAIN SCALES - GENERAL
PAINLEVEL_OUTOF10: 0

## 2023-12-09 NOTE — PROGRESS NOTES
MD Nila Mckeon MD Tolbert Coad, MD               Office: [phone]                      Fax: [phone] [address]                   NEPHROLOGY INPATIENT PROGRESS NOTE:     PATIENT NAME: Renee William  : 1940  MRN: [id number]'      RECOMMENDATIONS:      -Most likely due to low osmolars/solutes load  -With mental status and dysphagia, the diet is tricky,    - hold Lasix  - low rate NS for mIVF-stop   - Salt tabs-continue now   - BP is tolerating, so monitoring BP.   -Next - consider urea powder  -increase protein intake     - palliative care was consulted     - trend NA - goal <6-8 points rise in 24 hrs   -Follow-up with urology- w/ h/o BPH  - at higher risk for decompensation, needing closer monitoring. 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   needed tolvaptan also .    - no major neuro s/s reported , except encephalopathy, likely acute metabolic vs toxic    H/O BPH-on Finasteride and Flomax      Associated problems:   - Volume status: mild hypo-volemic  : BP: no need for tight control   - Electrolytes: K: WNL  - h/o Hypomagnesemia - now better     - Acid-Base: stable     - Anemia: of chronic disease mild        Other major problems: Management per primary and other consulting teams.   //         Hospital Problems             Last Modified POA    * (Principal) Hyponatremia 2023 Yes    HTN (hypertension) 2023 Yes    BPH (benign prostatic hyperplasia) 2023 \"FERRITIN\"  IRON:  No results found for: \"IRON\"  TIBC:  No results found for: \"TIBC\"    Recent Labs     12/06/23  2245 12/07/23 0922 12/07/23  1438 12/07/23  2133 12/08/23  0524 12/08/23  1127 12/08/23  1815 12/09/23  0535   * 121*   < > 125* 127* 124* 125* 127*   K 4.6 4.3  --   --  4.4  --   --  4.2   CL 86* 90*  --   --  94*  --   --  95*   CO2 30 27  --   --  29  --   --  29   BUN 11 9  --   --  9  --   --  11   CREATININE <0.5* <0.5*  --   --  <0.5*  --   --  <0.5*    < > = values in this interval not displayed. Recent Labs     12/06/23 2245 12/07/23 0922 12/08/23 0524 12/09/23  0535   CALCIUM 9.4 8.6 8.7 8.6   MG 1.90  --   --   --    PHOS 3.3 2.8  --   --        No results for input(s): \"PH\", \"PCO2\", \"PO2\" in the last 72 hours.     Invalid input(s): \"T0OSIYIVZCWY\", \"INSPIREDO2\"    ABG:  No results found for: \"PH\", \"PCO2\", \"PO2\", \"HCO3\", \"BE\", \"THGB\", \"TCO2\", \"O2SAT\"  VBG:    Lab Results   Component Value Date/Time    PHVEN 7.485 11/20/2023 10:54 PM    JDR9UPN 35.6 11/20/2023 10:54 PM    BEVEN 3.4 11/20/2023 10:54 PM    B5JBWAQQ 100 11/20/2023 10:54 PM       LDH:  No results found for: \"LDH\"  Uric Acid:    Lab Results   Component Value Date/Time    LABURIC 2.3 12/07/2023 09:22 AM       PT/INR:    Lab Results   Component Value Date/Time    PROTIME 15.6 06/26/2022 09:08 PM    INR 1.25 06/26/2022 09:08 PM     Warfarin PT/INR:  No components found for: \"PTPATWAR\", \"PTINRWAR\"  PTT:    Lab Results   Component Value Date/Time    APTT 49.8 06/26/2022 09:08 PM   [APTT}  Last 3 Troponin:    Lab Results   Component Value Date/Time    TROPONINI <0.01 06/26/2022 09:08 PM    TROPONINI <0.01 01/12/2022 04:45 PM    TROPONINI <0.01 12/25/2021 08:31 AM       U/A:    Lab Results   Component Value Date/Time    COLORU Yellow 12/07/2023 11:35 AM    PROTEINU Negative 12/07/2023 11:35 AM    PHUR 8.0 12/07/2023 11:35 AM    WBCUA 0 12/07/2023 11:35 AM    RBCUA 2 12/07/2023 11:35 AM    BACTERIA None Seen 12/07/2023 11:35

## 2023-12-09 NOTE — PROGRESS NOTES
Progress Note - Dr. Derick Sylvester - Internal Medicine  PCP: Alexandra Israel, [facility] [address] / Jacki Dears [phone]    Hospital Day: 2  Code Status: Full Code  Current Diet: ADULT DIET; Dysphagia - Pureed  ADULT ORAL NUTRITION SUPPLEMENT; Lunch, Dinner; Frozen Oral Supplement        CC: follow up on medical issues    Subjective:   Caridad Gardiner is a 82 y.o. male. Pt seen and examined  Chart reviewed since last visit, labs and imaging below      Na unchanged, 127  Appreciate renal eval    Pt does not have any specific complaints  Therapy rec return to snf    Review of Systems: (1 system for EPF, 2-9 for detailed, 10+ for comprehensive)  Constitutional: Negative for chills and fever. HENT: Negative for dental problem, nosebleeds and rhinorrhea. Eyes: Negative for photophobia and visual disturbance. Respiratory: Negative for cough, chest tightness and shortness of breath. Cardiovascular: Negative for chest pain and leg swelling. Gastrointestinal: Negative for diarrhea, nausea and vomiting. Endocrine: Negative for polydipsia and polyphagia. Genitourinary: Negative for frequency, hematuria and urgency. Musculoskeletal: Negative for back pain and myalgias. Skin: Negative for rash. Allergic/Immunologic: Negative for food allergies. Neurological: Negative for dizziness, seizures, syncope and facial asymmetry. Hematological: Negative for adenopathy. Psychiatric/Behavioral: Negative for dysphoric mood. The patient is not nervous/anxious. I have reviewed the patient's medical and social history in detail and updated the computerized patient record. To recap: He  has a past medical history of Glaucoma, Hyponatremia, and Prostate enlargement. . He  has no past surgical history on file. Jimmie Closs He  reports that he has never smoked. He has never used smokeless tobacco. He reports that he does not drink alcohol and does not use drugs. [facility] and Plan:  Principal Problem:    Hyponatremia -Established problem. Unstable. Na 127 again  Plan: admit, renal on board. May require samsca again - defer this decision to renal  Active Problems:    HTN (hypertension) -Established problem. Stable. 143/82  Plan: Continue medications. Continue to check BP q shift. CBC and BMP ordered to monitor for disease progression, medication side effect. BPH (benign prostatic hyperplasia) -Established problem. Stable. Plan: cont home meds    Anemia  Plan: No indication for transfusion. Cont to monitor h/h to assess progression of anemia. Repeat Hgb level ordered. Recommend ferrous sulfate or MVI as outpatient. Case discussed with: renal  Tests ordered/reviewed: cbc, bmp, fsbs          (Please note that portions of this note were completed with a voice recognition program.  Efforts were made to edit the dictations but occasionally words are mis-transcribed.)        Brice Brand MD  12/9/2023    Please use [employer] to contact me with any questions during the day. The hospitalist service will provide cross-coverage for this patient from 7pm to 7am.    During those hours, contact the on-call hospitalist MD/CARA for questions.

## 2023-12-10 LAB
ALBUMIN SERPL-MCNC: 3.5 G/DL (ref 3.4–5)
ANION GAP SERPL CALCULATED.3IONS-SCNC: 1 MMOL/L (ref 3–16)
BASOPHILS # BLD: 0.1 K/UL (ref 0–0.2)
BASOPHILS NFR BLD: 1.5 %
BUN SERPL-MCNC: 13 MG/DL (ref 7–20)
CALCIUM SERPL-MCNC: 8.5 MG/DL (ref 8.3–10.6)
CHLORIDE SERPL-SCNC: 93 MMOL/L (ref 99–110)
CO2 SERPL-SCNC: 31 MMOL/L (ref 21–32)
CREAT SERPL-MCNC: <0.5 MG/DL (ref 0.8–1.3)
DEPRECATED RDW RBC AUTO: 14.3 % (ref 12.4–15.4)
EOSINOPHIL # BLD: 0.6 K/UL (ref 0–0.6)
EOSINOPHIL NFR BLD: 14.7 %
GFR SERPLBLD CREATININE-BSD FMLA CKD-EPI: >60 ML/MIN/{1.73_M2}
GLUCOSE SERPL-MCNC: 89 MG/DL (ref 70–99)
HCT VFR BLD AUTO: 30.5 % (ref 40.5–52.5)
HGB BLD-MCNC: 10.4 G/DL (ref 13.5–17.5)
LYMPHOCYTES # BLD: 0.8 K/UL (ref 1–5.1)
LYMPHOCYTES NFR BLD: 21 %
MCH RBC QN AUTO: 33.4 PG (ref 26–34)
MCHC RBC AUTO-ENTMCNC: 34 G/DL (ref 31–36)
MCV RBC AUTO: 98.3 FL (ref 80–100)
MONOCYTES # BLD: 0.5 K/UL (ref 0–1.3)
MONOCYTES NFR BLD: 11.5 %
NEUTROPHILS # BLD: 2 K/UL (ref 1.7–7.7)
NEUTROPHILS NFR BLD: 51.3 %
PHOSPHATE SERPL-MCNC: 3 MG/DL (ref 2.5–4.9)
PLATELET # BLD AUTO: 219 K/UL (ref 135–450)
PMV BLD AUTO: 5.9 FL (ref 5–10.5)
POTASSIUM SERPL-SCNC: 4.1 MMOL/L (ref 3.5–5.1)
RBC # BLD AUTO: 3.1 M/UL (ref 4.2–5.9)
SODIUM SERPL-SCNC: 125 MMOL/L (ref 136–145)
WBC # BLD AUTO: 3.9 K/UL (ref 4–11)

## 2023-12-10 PROCEDURE — 36415 COLL VENOUS BLD VENIPUNCTURE: CPT

## 2023-12-10 PROCEDURE — 80069 RENAL FUNCTION PANEL: CPT

## 2023-12-10 PROCEDURE — 84156 ASSAY OF PROTEIN URINE: CPT

## 2023-12-10 PROCEDURE — 86335 IMMUNFIX E-PHORSIS/URINE/CSF: CPT

## 2023-12-10 PROCEDURE — 6360000002 HC RX W HCPCS: Performed by: INTERNAL MEDICINE

## 2023-12-10 PROCEDURE — 6370000000 HC RX 637 (ALT 250 FOR IP): Performed by: INTERNAL MEDICINE

## 2023-12-10 PROCEDURE — 6370000000 HC RX 637 (ALT 250 FOR IP): Performed by: NURSE PRACTITIONER

## 2023-12-10 PROCEDURE — 2580000003 HC RX 258: Performed by: INTERNAL MEDICINE

## 2023-12-10 PROCEDURE — 85025 COMPLETE CBC W/AUTO DIFF WBC: CPT

## 2023-12-10 PROCEDURE — 84166 PROTEIN E-PHORESIS/URINE/CSF: CPT

## 2023-12-10 PROCEDURE — 1200000000 HC SEMI PRIVATE

## 2023-12-10 RX ADMIN — TAMSULOSIN HYDROCHLORIDE 0.4 MG: 0.4 CAPSULE ORAL at 21:06

## 2023-12-10 RX ADMIN — SODIUM CHLORIDE 1 G: 1 TABLET ORAL at 08:55

## 2023-12-10 RX ADMIN — LATANOPROST 1 DROP: 50 SOLUTION OPHTHALMIC at 21:55

## 2023-12-10 RX ADMIN — BRIMONIDINE TARTRATE 1 DROP: 2 SOLUTION OPHTHALMIC at 08:55

## 2023-12-10 RX ADMIN — SODIUM CHLORIDE 1 G: 1 TABLET ORAL at 17:17

## 2023-12-10 RX ADMIN — BRIMONIDINE TARTRATE 1 DROP: 2 SOLUTION OPHTHALMIC at 21:20

## 2023-12-10 RX ADMIN — Medication 15 G: at 17:17

## 2023-12-10 RX ADMIN — LATANOPROST 1 DROP: 50 SOLUTION OPHTHALMIC at 00:00

## 2023-12-10 RX ADMIN — TAMSULOSIN HYDROCHLORIDE 0.4 MG: 0.4 CAPSULE ORAL at 08:55

## 2023-12-10 RX ADMIN — Medication 10 ML: at 08:55

## 2023-12-10 RX ADMIN — Medication 10 ML: at 21:21

## 2023-12-10 RX ADMIN — FINASTERIDE 5 MG: 5 TABLET, FILM COATED ORAL at 21:06

## 2023-12-10 RX ADMIN — SODIUM CHLORIDE 1 G: 1 TABLET ORAL at 12:42

## 2023-12-10 RX ADMIN — ENOXAPARIN SODIUM 30 MG: 100 INJECTION SUBCUTANEOUS at 08:55

## 2023-12-10 ASSESSMENT — PAIN SCALES - GENERAL
PAINLEVEL_OUTOF10: 0

## 2023-12-10 NOTE — PROGRESS NOTES
Berline Goodell, MD Janel Cleaver, MD Ellie Kinsman, MD               Office: [phone]                      Fax: [phone] [email or web address]                   NEPHROLOGY INPATIENT PROGRESS NOTE:     PATIENT NAME: Teagan Banda  : 1940  MRN: [id number]'      RECOMMENDATIONS:      -Most likely due to low osmolars/solutes load   -With mental status and dysphagia, the diet is tricky,    -Due to very low anion gap, with positive hyponatremia : Rule out paraproteinemia    - trend NA - goal <6-8 points rise in 24 hrs     - low rate NS for mIVF- stopped as slightly D saline initiated in SIADH  - Salt tabs-continue 1g TID  - BP is tolerating, so monitoring BP.   -Add urea powder 15 g BID   -increase protein intake   - hold Lasix w/ lower BP    - palliative care was consulted     -Follow-up with urology- w/ h/o BPH  - at higher risk for decompensation, needing closer monitoring. 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   needed tolvaptan also .    - no major neuro s/s reported , except encephalopathy, likely acute metabolic vs toxic    H/O BPH-on Finasteride and Flomax      Associated problems:   - Volume status: mild hypo-volemic  : BP: no need for tight control   - Electrolytes: K: WNL  - h/o Hypomagnesemia - now better     - Acid-Base: stable     - Anemia: of chronic disease mild        Other major problems: Management per primary and other consulting teams.   //         Hospital Problems 11:35 AM    GLUCOSEU Negative 12/07/2023 11:35 AM     Microalbumen/Creatinine ratio:  No components found for: \"RUCREAT\"  24 Hour Urine for Protein:  No components found for: \"RAWUPRO\", \"UHRS3\", \"ZWHW60TR\", \"UTV3\"  24 Hour Urine for Creatinine Clearance:  No components found for: \"CREAT4\", \"UHRS10\", \"UTV10\"  Urine Toxicology:  No components found for: \"IAMMENTA\", \"IBARBIT\", \"IBENZO\", \"ICOCAINE\", \"IMARTHC\", \"IOPIATES\", \"IPHENCYC\"    HgBA1c:    Lab Results   Component Value Date/Time    LABA1C 5.6 09/03/2019 10:00 AM     RPR:  No results found for: \"RPR\"  HIV:  No results found for: \"HIV\"  HEMAL:  No results found for: \"ANATITER\", \"HEMAL\"  RF:  No results found for: \"RF\"  DSDNA:  No components found for: \"DNA\"  AMYLASE:  No results found for: \"AMYLASE\"  LIPASE:    Lab Results   Component Value Date/Time    LIPASE 39.0 11/13/2023 07:59 PM     Fibrinogen Level:  No components found for: \"FIB\"       BELOW MENTIONED RADIOLOGY STUDY RESULTS BY ME (AS NEEDED FOR MY EVALUATION AND MANAGEMENT). IMPRESSION:  No acute intracranial abnormality identified.               Specimen Collected: 11/20/23 23:37 EST Last Resulted: 11/20/23 23:39 EST

## 2023-12-10 NOTE — PROGRESS NOTES
[phone]   Errol Cutler    PT says she has been taking 3000 units of Vitamin D since Sunday. Had been taking 1000 units up until then. Had labs done yesterday, said her Vitamin D was normal    Wants to know if taking 3000 units this week will have affected her Vitamin D results? PT was seen yesterday    Please advise patient. Progress Note - Dr. Kaci Wiseman - Internal Medicine  PCP: Shiva Wheeler, [facility] Jaden Ng / Heriberto Foss [phone]    Hospital Day: 3  Code Status: Full Code  Current Diet: ADULT ORAL NUTRITION SUPPLEMENT; Lunch, Dinner; Frozen Oral Supplement  ADULT DIET; Dysphagia - Pureed; Double Protein Portions        CC: follow up on medical issues    Subjective:   Dinora Vega is a 82 y.o. male. Pt seen and examined  Chart reviewed since last visit, labs and imaging below      Na worse  125  Appreciate renal eval    Pt does not have any specific complaints  Therapy rec return to snf    Review of Systems: (1 system for EPF, 2-9 for detailed, 10+ for comprehensive)  Constitutional: Negative for chills and fever. HENT: Negative for dental problem, nosebleeds and rhinorrhea. Eyes: Negative for photophobia and visual disturbance. Respiratory: Negative for cough, chest tightness and shortness of breath. Cardiovascular: Negative for chest pain and leg swelling. Gastrointestinal: Negative for diarrhea, nausea and vomiting. Endocrine: Negative for polydipsia and polyphagia. Genitourinary: Negative for frequency, hematuria and urgency. Musculoskeletal: Negative for back pain and myalgias. Skin: Negative for rash. Allergic/Immunologic: Negative for food allergies. Neurological: Negative for dizziness, seizures, syncope and facial asymmetry. Hematological: Negative for adenopathy. Psychiatric/Behavioral: Negative for dysphoric mood. The patient is not nervous/anxious. I have reviewed the patient's medical and social history in detail and updated the computerized patient record. To recap: He  has a past medical history of Glaucoma, Hyponatremia, and Prostate enlargement. . He  has no past surgical history on file. Delorise Peter He  reports that he has never smoked. He has never used smokeless tobacco. He reports that he does not drink alcohol and does not use drugs. .        Active

## 2023-12-10 NOTE — PLAN OF CARE
Problem: Safety - Adult  Goal: Free from fall injury  12/10/2023 1057 by Tawana Rodriges RN  Outcome: Progressing  12/10/2023 0427 by Rhona Giraldo RN  Outcome: Progressing     Problem: Skin/Tissue Integrity  Goal: Absence of new skin breakdown  Description: 1. Monitor for areas of redness and/or skin breakdown  2. Assess vascular access sites hourly  3. Every 4-6 hours minimum:  Change oxygen saturation probe site  4. Every 4-6 hours:  If on nasal continuous positive airway pressure, respiratory therapy assess nares and determine need for appliance change or resting period.   12/10/2023 1057 by Tawana Rodriges RN  Outcome: Progressing  12/10/2023 0427 by Tj Bond RN  Outcome: Progressing     Problem: Pain  Goal: Verbalizes/displays adequate comfort level or baseline comfort level  12/10/2023 1057 by Tawana Rodriges RN  Outcome: Progressing  12/10/2023 0427 by Tj Bond RN  Outcome: Progressing

## 2023-12-10 NOTE — PROGRESS NOTES
Patient alert and oriented x4, sometimes forgetful, VS WNL , pt denies pain, daughter at bedside, safety precautions in place, call light within reach. RN monitoring during shift.

## 2023-12-11 LAB
ALBUMIN SERPL-MCNC: 3.4 G/DL (ref 3.4–5)
ANION GAP SERPL CALCULATED.3IONS-SCNC: 3 MMOL/L (ref 3–16)
BASOPHILS # BLD: 0 K/UL (ref 0–0.2)
BASOPHILS NFR BLD: 0.9 %
BUN SERPL-MCNC: 26 MG/DL (ref 7–20)
CALCIUM SERPL-MCNC: 8.7 MG/DL (ref 8.3–10.6)
CHLORIDE SERPL-SCNC: 95 MMOL/L (ref 99–110)
CO2 SERPL-SCNC: 29 MMOL/L (ref 21–32)
CREAT SERPL-MCNC: <0.5 MG/DL (ref 0.8–1.3)
DEPRECATED RDW RBC AUTO: 14.2 % (ref 12.4–15.4)
EOSINOPHIL # BLD: 0.5 K/UL (ref 0–0.6)
EOSINOPHIL NFR BLD: 10.2 %
GFR SERPLBLD CREATININE-BSD FMLA CKD-EPI: >60 ML/MIN/{1.73_M2}
GLUCOSE SERPL-MCNC: 86 MG/DL (ref 70–99)
HCT VFR BLD AUTO: 30.9 % (ref 40.5–52.5)
HGB BLD-MCNC: 10.6 G/DL (ref 13.5–17.5)
LYMPHOCYTES # BLD: 0.6 K/UL (ref 1–5.1)
LYMPHOCYTES NFR BLD: 11.5 %
MCH RBC QN AUTO: 33.9 PG (ref 26–34)
MCHC RBC AUTO-ENTMCNC: 34.3 G/DL (ref 31–36)
MCV RBC AUTO: 98.6 FL (ref 80–100)
MONOCYTES # BLD: 0.5 K/UL (ref 0–1.3)
MONOCYTES NFR BLD: 9.8 %
NEUTROPHILS # BLD: 3.6 K/UL (ref 1.7–7.7)
NEUTROPHILS NFR BLD: 67.6 %
PHOSPHATE SERPL-MCNC: 3.4 MG/DL (ref 2.5–4.9)
PLATELET # BLD AUTO: 225 K/UL (ref 135–450)
PMV BLD AUTO: 6.3 FL (ref 5–10.5)
POTASSIUM SERPL-SCNC: 4.4 MMOL/L (ref 3.5–5.1)
PROT UR-MCNC: 0.01 G/DL
PROT UR-MCNC: 8 MG/DL
RBC # BLD AUTO: 3.13 M/UL (ref 4.2–5.9)
SODIUM SERPL-SCNC: 127 MMOL/L (ref 136–145)
WBC # BLD AUTO: 5.3 K/UL (ref 4–11)

## 2023-12-11 PROCEDURE — 6360000002 HC RX W HCPCS: Performed by: INTERNAL MEDICINE

## 2023-12-11 PROCEDURE — 1200000000 HC SEMI PRIVATE

## 2023-12-11 PROCEDURE — 6370000000 HC RX 637 (ALT 250 FOR IP): Performed by: INTERNAL MEDICINE

## 2023-12-11 PROCEDURE — 85025 COMPLETE CBC W/AUTO DIFF WBC: CPT

## 2023-12-11 PROCEDURE — 6370000000 HC RX 637 (ALT 250 FOR IP): Performed by: NURSE PRACTITIONER

## 2023-12-11 PROCEDURE — 83883 ASSAY NEPHELOMETRY NOT SPEC: CPT

## 2023-12-11 PROCEDURE — 84165 PROTEIN E-PHORESIS SERUM: CPT

## 2023-12-11 PROCEDURE — 97530 THERAPEUTIC ACTIVITIES: CPT

## 2023-12-11 PROCEDURE — 94760 N-INVAS EAR/PLS OXIMETRY 1: CPT

## 2023-12-11 PROCEDURE — 2580000003 HC RX 258: Performed by: INTERNAL MEDICINE

## 2023-12-11 PROCEDURE — 97110 THERAPEUTIC EXERCISES: CPT

## 2023-12-11 PROCEDURE — 84155 ASSAY OF PROTEIN SERUM: CPT

## 2023-12-11 PROCEDURE — 36415 COLL VENOUS BLD VENIPUNCTURE: CPT

## 2023-12-11 PROCEDURE — 97116 GAIT TRAINING THERAPY: CPT

## 2023-12-11 PROCEDURE — 80069 RENAL FUNCTION PANEL: CPT

## 2023-12-11 RX ORDER — SODIUM CHLORIDE 1 G/1
2 TABLET ORAL
Status: DISCONTINUED | OUTPATIENT
Start: 2023-12-12 | End: 2023-12-13

## 2023-12-11 RX ADMIN — TAMSULOSIN HYDROCHLORIDE 0.4 MG: 0.4 CAPSULE ORAL at 21:45

## 2023-12-11 RX ADMIN — Medication 15 G: at 09:12

## 2023-12-11 RX ADMIN — BRIMONIDINE TARTRATE 1 DROP: 2 SOLUTION OPHTHALMIC at 21:47

## 2023-12-11 RX ADMIN — BRIMONIDINE TARTRATE 1 DROP: 2 SOLUTION OPHTHALMIC at 09:12

## 2023-12-11 RX ADMIN — SODIUM CHLORIDE 1 G: 1 TABLET ORAL at 08:48

## 2023-12-11 RX ADMIN — TAMSULOSIN HYDROCHLORIDE 0.4 MG: 0.4 CAPSULE ORAL at 09:12

## 2023-12-11 RX ADMIN — SODIUM CHLORIDE 1 G: 1 TABLET ORAL at 12:22

## 2023-12-11 RX ADMIN — Medication 10 ML: at 09:12

## 2023-12-11 RX ADMIN — LATANOPROST 1 DROP: 50 SOLUTION OPHTHALMIC at 22:14

## 2023-12-11 RX ADMIN — Medication 10 ML: at 21:58

## 2023-12-11 RX ADMIN — Medication 30 G: at 21:45

## 2023-12-11 RX ADMIN — ENOXAPARIN SODIUM 30 MG: 100 INJECTION SUBCUTANEOUS at 09:12

## 2023-12-11 RX ADMIN — FINASTERIDE 5 MG: 5 TABLET, FILM COATED ORAL at 21:45

## 2023-12-11 ASSESSMENT — PAIN SCALES - GENERAL: PAINLEVEL_OUTOF10: 0

## 2023-12-11 NOTE — CARE COORDINATION
Discharge Planning Note Re: Skilled Nursing     CM/SW noted consult for discharge planning. Chart reviewed. Noted recommendations for SNF. CM placed call to Patient's daughter Aurea Donis  [phone] ,  Introduced self and explained role of CM and discharge planning. Zac Barney is agreeable to SNF on dc at [facility]. Referral made to to [facility] where Patient is at for LTC. CM placed call to Cal Calvin [phone] in admissions who reports Patient can return for Skilled Services. CM/SW will follow-up and provide any additional documentation necessary to facilitate placement.      Electronically signed by Scarlet Pepper on 12/11/2023 at 11:59 AM

## 2023-12-12 LAB
ALBUMIN SERPL-MCNC: 3.7 G/DL (ref 3.4–5)
ANION GAP SERPL CALCULATED.3IONS-SCNC: 3 MMOL/L (ref 3–16)
BASOPHILS # BLD: 0 K/UL (ref 0–0.2)
BASOPHILS NFR BLD: 0.9 %
BUN SERPL-MCNC: 39 MG/DL (ref 7–20)
CALCIUM SERPL-MCNC: 9.1 MG/DL (ref 8.3–10.6)
CHLORIDE SERPL-SCNC: 93 MMOL/L (ref 99–110)
CO2 SERPL-SCNC: 29 MMOL/L (ref 21–32)
CREAT SERPL-MCNC: <0.5 MG/DL (ref 0.8–1.3)
DEPRECATED RDW RBC AUTO: 14.2 % (ref 12.4–15.4)
EOSINOPHIL # BLD: 0.7 K/UL (ref 0–0.6)
EOSINOPHIL NFR BLD: 17.6 %
GFR SERPLBLD CREATININE-BSD FMLA CKD-EPI: >60 ML/MIN/{1.73_M2}
GLUCOSE SERPL-MCNC: 85 MG/DL (ref 70–99)
HCT VFR BLD AUTO: 31 % (ref 40.5–52.5)
HGB BLD-MCNC: 10.7 G/DL (ref 13.5–17.5)
KAPPA LC FREE SER-MCNC: 23.15 MG/L (ref 3.3–19.4)
KAPPA LC FREE/LAMBDA FREE SER: 1.76 {RATIO} (ref 0.26–1.65)
LAMBDA LC FREE SERPL-MCNC: 13.13 MG/L (ref 5.71–26.3)
LYMPHOCYTES # BLD: 0.6 K/UL (ref 1–5.1)
LYMPHOCYTES NFR BLD: 17 %
MCH RBC QN AUTO: 33.8 PG (ref 26–34)
MCHC RBC AUTO-ENTMCNC: 34.4 G/DL (ref 31–36)
MCV RBC AUTO: 98.3 FL (ref 80–100)
MONOCYTES # BLD: 0.5 K/UL (ref 0–1.3)
MONOCYTES NFR BLD: 12.3 %
NEUTROPHILS # BLD: 2 K/UL (ref 1.7–7.7)
NEUTROPHILS NFR BLD: 52.2 %
PHOSPHATE SERPL-MCNC: 3.4 MG/DL (ref 2.5–4.9)
PLATELET # BLD AUTO: 210 K/UL (ref 135–450)
PMV BLD AUTO: 6.2 FL (ref 5–10.5)
POTASSIUM SERPL-SCNC: 4 MMOL/L (ref 3.5–5.1)
RBC # BLD AUTO: 3.16 M/UL (ref 4.2–5.9)
RPT COMMENT: ABNORMAL
SODIUM SERPL-SCNC: 125 MMOL/L (ref 136–145)
SODIUM SERPL-SCNC: 128 MMOL/L (ref 136–145)
WBC # BLD AUTO: 3.8 K/UL (ref 4–11)

## 2023-12-12 PROCEDURE — 1200000000 HC SEMI PRIVATE

## 2023-12-12 PROCEDURE — 97530 THERAPEUTIC ACTIVITIES: CPT

## 2023-12-12 PROCEDURE — 6370000000 HC RX 637 (ALT 250 FOR IP): Performed by: NURSE PRACTITIONER

## 2023-12-12 PROCEDURE — 6370000000 HC RX 637 (ALT 250 FOR IP): Performed by: INTERNAL MEDICINE

## 2023-12-12 PROCEDURE — 97110 THERAPEUTIC EXERCISES: CPT

## 2023-12-12 PROCEDURE — 2580000003 HC RX 258: Performed by: INTERNAL MEDICINE

## 2023-12-12 PROCEDURE — 36415 COLL VENOUS BLD VENIPUNCTURE: CPT

## 2023-12-12 PROCEDURE — 6360000002 HC RX W HCPCS: Performed by: INTERNAL MEDICINE

## 2023-12-12 PROCEDURE — 94760 N-INVAS EAR/PLS OXIMETRY 1: CPT

## 2023-12-12 PROCEDURE — 85025 COMPLETE CBC W/AUTO DIFF WBC: CPT

## 2023-12-12 PROCEDURE — 80069 RENAL FUNCTION PANEL: CPT

## 2023-12-12 PROCEDURE — 97535 SELF CARE MNGMENT TRAINING: CPT

## 2023-12-12 PROCEDURE — 84295 ASSAY OF SERUM SODIUM: CPT

## 2023-12-12 PROCEDURE — 97116 GAIT TRAINING THERAPY: CPT

## 2023-12-12 RX ADMIN — LATANOPROST 1 DROP: 50 SOLUTION OPHTHALMIC at 20:45

## 2023-12-12 RX ADMIN — BRIMONIDINE TARTRATE 1 DROP: 2 SOLUTION OPHTHALMIC at 09:39

## 2023-12-12 RX ADMIN — FINASTERIDE 5 MG: 5 TABLET, FILM COATED ORAL at 20:44

## 2023-12-12 RX ADMIN — Medication 30 G: at 09:42

## 2023-12-12 RX ADMIN — SODIUM CHLORIDE 2 G: 1 TABLET ORAL at 09:38

## 2023-12-12 RX ADMIN — SODIUM CHLORIDE 2 G: 1 TABLET ORAL at 12:32

## 2023-12-12 RX ADMIN — Medication 10 ML: at 20:44

## 2023-12-12 RX ADMIN — TAMSULOSIN HYDROCHLORIDE 0.4 MG: 0.4 CAPSULE ORAL at 20:44

## 2023-12-12 RX ADMIN — BRIMONIDINE TARTRATE 1 DROP: 2 SOLUTION OPHTHALMIC at 20:45

## 2023-12-12 RX ADMIN — Medication 10 ML: at 09:39

## 2023-12-12 RX ADMIN — ENOXAPARIN SODIUM 30 MG: 100 INJECTION SUBCUTANEOUS at 09:38

## 2023-12-12 RX ADMIN — SODIUM CHLORIDE 2 G: 1 TABLET ORAL at 18:22

## 2023-12-12 RX ADMIN — Medication 30 G: at 20:44

## 2023-12-12 RX ADMIN — TAMSULOSIN HYDROCHLORIDE 0.4 MG: 0.4 CAPSULE ORAL at 09:38

## 2023-12-12 ASSESSMENT — PAIN SCALES - GENERAL
PAINLEVEL_OUTOF10: 0

## 2023-12-12 NOTE — PROGRESS NOTES
Shift assessment completed. VSS, scheduled meds given/held per MAR orders. Pt is alert to self and knows he is in a hospital, confused at times. Pt has no complaints or needs at this time. Daughter at bedside helps take care of patient. Brakes locked, bed in lowest position, all belongings and call light within reach.

## 2023-12-12 NOTE — PROGRESS NOTES
[address] [facility] Department   Phone: [phone]    Physical Therapy    [] Initial Evaluation            [x] Daily Treatment Note         [] Discharge Summary      Patient: Avery Eduardo   : 1940   MRN: [id number]   Date of Service:  2023  Admitting Diagnosis: Hyponatremia  Current Admission Summary: The pt was admitted with confusion and hyponatremia. This is his second hospital admission in 2 months. Past Medical History:  has a past medical history of Glaucoma, Hyponatremia, and Prostate enlargement. Past Surgical History:  has no past surgical history on file. Discharge Recommendations: Avery Eduardo scored a 18/24 on the AM-PAC short mobility form. Current research shows that an AM-PAC score of 17 or less is typically not associated with a discharge to the patient's home setting. Based on the patient's AM-PAC score and their current functional mobility deficits, it is recommended that the patient have 3-5 sessions per week of Physical Therapy at d/c to increase the patient's independence. Please see assessment section for further patient specific details. If patient discharges prior to next session this note will serve as a discharge summary. Please see below for the latest assessment towards goals.      DME Required For Discharge: DME to be determined at next level of care  Precautions/Restrictions: high fall risk    Positional Restrictions:no positional restrictions    Pre-Admission Information   Lives With: alone                     Type of Home: house  Home Layout: one level  Home Access: level entry  [address]: walk in shower  Toilet Height: standard height  Bathroom Equipment: grab bars around toilet, shower chair  Home Equipment: rolling walker  Transfer Assistance: Independent without use of device  Ambulation Assistance:Independent without use of device  ADL Assistance: independent with all ADL's  IADL Assistance: independent with

## 2023-12-12 NOTE — CONSULTS
Palliative Care:   a 82 y.o. male with a PMH of hyponatremia BPH glaucoma who presents to the ED for worsening mental status with associated hyponatremia. Of note patient was recently admitted here at [facility] for hyponatremia, associated history of SIADH was treated with IV fluids and p.o. salt tablets was seen in consult by nephrology discharged to a [facility], daughter-in-law at bedside states that his dose of p.o. salt tablets were decreased from 2 tablets to 1 tablet 3 times daily. While in rehab labs drawn showed continued downtrending of his sodium from 127 to 124 prompting presentation to the ED. In the ED repeat labs shows sodium of 121 chloride of 86. Patient was given 500 cc of normal saline. Being admitted for further management of his hyponatremia with nephrology consult on 12/7/23. Palliative consult placed 12/12/23. Past Medical History:   has a past medical history of Glaucoma, Hyponatremia, and Prostate enlargement. Past Surgical History:   has no past surgical history on file. Advance Directives:   DNR-CCA. Patient wishes are DNR-CC. Currently in patient for tx. Of hyponatremia. Problem Severity: Pain/Other Symptoms:   Renal consult pending. Na not improving. Currently @ 125. Bed Mobility/Toileting/Transfer:   Ambulatory. Stand by assist. Therapy working with patient today. Performance Status:        Palliative Performance Scale:  100% []Full Normal activity & work No evidence of disease  90%   [] Full Normal activity & work Some evidence of disease  80%   [] Full Normal activity with Effort Some evidence of disease  70%   [] Reduced Unable Normal Job/Work Significant disease Full Normal or reduced  60%   [] Ambulation reduced; Significant disease; Can't do hobbies/housework; intake normal   or reduced; occasional assist; LOC full/confusion  50%   [] Mainly sit/lie;  Extensive disease; Can't do any work; Considerable assist; intake normal  Or reduced; LOC

## 2023-12-12 NOTE — PLAN OF CARE
Problem: Safety - Adult  Goal: Free from fall injury  12/12/2023 1012 by Analy Geronimo RN  Outcome: Progressing  12/11/2023 2244 by Alesia Post RN  Outcome: Progressing     Problem: Skin/Tissue Integrity  Goal: Absence of new skin breakdown  Description: 1. Monitor for areas of redness and/or skin breakdown  2. Assess vascular access sites hourly  3. Every 4-6 hours minimum:  Change oxygen saturation probe site  4. Every 4-6 hours:  If on nasal continuous positive airway pressure, respiratory therapy assess nares and determine need for appliance change or resting period.   12/12/2023 1012 by Analy Geronimo RN  Outcome: Progressing  12/11/2023 2244 by Alesia Post RN  Outcome: Progressing     Problem: Pain  Goal: Verbalizes/displays adequate comfort level or baseline comfort level  12/12/2023 1012 by Analy Geronimo RN  Outcome: Progressing  12/11/2023 2244 by Alesia Post RN  Outcome: Progressing

## 2023-12-12 NOTE — PROGRESS NOTES
Durene Reges, MD Tanna Justice, MD Jimmy Riedel, MD               Office: [phone]                      Fax: [phone] [address]       NEPHROLOGY INPATIENT PROGRESS NOTE:     PATIENT NAME: Fidel Hager  : 1940  MRN: [id number]'      RECOMMENDATIONS:    - previous urine studies 23 consistent with SIADH. Can be worsened by low solute intake. - Previously required Tolvaptan  - increased NaCl tabs to 2g TID and Urea 30g bid  - rate of correction acceptable. Would not increase by more than 8meq over 24 hours. Na still low today, repeat at 2pm, if still low, give Tolvaptan. -Due to very low anion gap, with positive hyponatremia : Rule out paraproteinemia. Follow results. 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 Na lower today.   - BL Na low 130s -> in past in to 110s - now on admission low 120s   -: Etiology in past - NSAIDs, URIs, w/ COVID , w/o hypoxia   + in past h/o SIADH.   needed tolvaptan also . - no major neuro s/s reported , except encephalopathy, likely acute metabolic vs toxic    H/O BPH-on Finasteride and Flomax. Urology following. Difficult ledbetter placement, holding off for now. Associated problems:   - Volume status: mildly hypovolemic.   : BP: at goal  - Electrolytes: K: WNL  - h/o Hypomagnesemia - now better     - Acid-Base: stable.   Follow higher serum HCO3.     - Anemia: of chronic disease mild        Other major problems: Management per primary and other consulting teams.   //         Hospital Problems             Last Modified

## 2023-12-12 NOTE — PROGRESS NOTES
[facility] Department   Phone: [phone]    Occupational Therapy    [] Initial Evaluation            [x] Daily Treatment Note         [] Discharge Summary      Patient: Berta Mathews   : 1940   MRN: [id number]   Date of Service:  2023    Admitting Diagnosis:  Hyponatremia  Current Admission Summary:  Hyponatremia -Established problem. Unstable. Na 127   Past Medical History:  has a past medical history of Glaucoma, Hyponatremia, and Prostate enlargement. Past Surgical History:  has no past surgical history on file. Discharge Recommendations: Berta Mathews scored a 15/24 on the AM-PAC ADL Inpatient form. Current research shows that an AM-PAC score of 17 or less is typically not associated with a discharge to the patient's home setting. Based on the patient's AM-PAC score and their current ADL deficits, it is recommended that the patient have 3-5 sessions per week of Occupational Therapy at d/c to increase the patient's independence. Please see assessment section for further patient specific details. If patient discharges prior to next session this note will serve as a discharge summary. Please see below for the latest assessment towards goals. Patient is not cognitively able to live in his home environment. He is legally blind and [place].     DME Required For Discharge: no DME required at discharge    Precautions/Restrictions: high fall risk  Weight Bearing Restrictions: weight bearing as tolerated  [] Right Upper Extremity  [] Left Upper Extremity [] Right Lower Extremity  [] Left Lower Extremity     Required Braces/Orthotics: no braces required   [] Right  [] Left  Positional Restrictions:no positional restrictions      Pre-Admission Information     Lives With: alone                     Type of Home: house  Home Layout: one level  Home Access: level entry  Bathroom Layout: walk in shower  Toilet Height: standard while pt seated EOB with assistance as needed. Instrumental Activities of Daily Living  No IADL completed on this date. Functional Mobility    Bed Mobility  Supine to Sit: Stand by assistance  Sit to supine: minimal assistance  Rolling right: stand by assistance  Comments: HOB elevated and pt utilized bed rail for safety; pt tolerated sitting EOB with no c/o dizziness or pain. Verbal cues provided for bed mobility sequencing. Transfers  Sit to stand transfer:stand by assistance  Stand to sit transfer: stand by assistance  Comments: Pt completed 3 stands from EOB using RW for LB ADL's using RW. Functional Mobility:  No functional mobility assessed this date secondary to fatigue.     Other Therapeutic Interventions      Functional Outcomes  AM-PAC Inpatient Daily Activity Raw Score: 15      Cognition  Following Commands: follows one step commands with increased time  Problem Solving: assistance required to implement solutions  Orientation:    alert and oriented x 4  Command Following:   impaired  Patient is very [address] and impaired vision     Education    Barriers To Learning: visual and hearing  Patient Education: patient educated on goals, OT role and benefits, plan of care, precautions, ADL adaptive strategies, weight-bearing education, proper use of assistive device/equipment, adaptive device training, discharge recommendations  Learning Assessment:  patient verbalizes understanding, would benefit from continued reinforcement    Assessment    Activity Tolerance: Fair  Impairments Requiring Therapeutic Intervention: decreased functional mobility, decreased ADL status, decreased cognition, decreased endurance, decreased sensation, decreased balance, decreased vision, decreased IADL, decreased fine motor control, decreased coordination, decreased posture  Prognosis: good  Clinical Assessment: Pt presents with the above deficits impacting daily occupational performance and would benefit from continued skilled OT

## 2023-12-12 NOTE — PROGRESS NOTES
Progress Note - Dr. Juanito Jane - Internal Medicine  PCP: Urban Baez, [address] [facility] [phone]    Hospital Day: 5  Code Status: Full Code  Current Diet: ADULT ORAL NUTRITION SUPPLEMENT; Lunch, Dinner; Frozen Oral Supplement  ADULT DIET; Dysphagia - Pureed; Double Protein Portions        CC: follow up on medical issues    Subjective:   Mick Trujillo is a 82 y.o. male. Pt seen and examined  Chart reviewed since last visit, labs and imaging below      Na worse 125   Appreciate renal eval    Pt does not have any specific complaints  Therapy rec return to snf    Review of Systems: (1 system for EPF, 2-9 for detailed, 10+ for comprehensive)  Constitutional: Negative for chills and fever. HENT: Negative for dental problem, nosebleeds and rhinorrhea. Eyes: Negative for photophobia and visual disturbance. Respiratory: Negative for cough, chest tightness and shortness of breath. Cardiovascular: Negative for chest pain and leg swelling. Gastrointestinal: Negative for diarrhea, nausea and vomiting. Endocrine: Negative for polydipsia and polyphagia. Genitourinary: Negative for frequency, hematuria and urgency. Musculoskeletal: Negative for back pain and myalgias. Skin: Negative for rash. Allergic/Immunologic: Negative for food allergies. Neurological: Negative for dizziness, seizures, syncope and facial asymmetry. Hematological: Negative for adenopathy. Psychiatric/Behavioral: Negative for dysphoric mood. The patient is not nervous/anxious. I have reviewed the patient's medical and social history in detail and updated the computerized patient record. To recap: He  has a past medical history of Glaucoma, Hyponatremia, and Prostate enlargement. . He  has no past surgical history on file. Vitor Geiger He  reports that he has never smoked. He has never used smokeless tobacco. He reports that he does not drink alcohol and does not use drugs. .        Active renal. Also on  urea. Defer to renal  Active Problems:    HTN (hypertension) -Established problem. Stable. 122/64  Plan: Continue medications. Continue to check BP q shift. CBC and BMP ordered to monitor for disease progression, medication side effect. BPH (benign prostatic hyperplasia) -Established problem. Stable. Plan: cont home meds    Anemia  Plan: No indication for transfusion. Cont to monitor h/h to assess progression of anemia. Repeat Hgb level ordered. Recommend ferrous sulfate or MVI as outpatient. Case discussed with: renal  Tests ordered/reviewed: cbc, bmp, fsbs          (Please note that portions of this note were completed with a voice recognition program.  Efforts were made to edit the dictations but occasionally words are mis-transcribed.)        Irving Leiva MD  12/12/2023    Please use [employer] to contact me with any questions during the day. The hospitalist service will provide cross-coverage for this patient from 7pm to 7am.    During those hours, contact the on-call hospitalist MD/CARA for questions.

## 2023-12-13 LAB
ALBUMIN SERPL ELPH-MCNC: 2.8 G/DL (ref 3.1–4.9)
ALBUMIN SERPL-MCNC: 3.8 G/DL (ref 3.4–5)
ALPHA1 GLOB SERPL ELPH-MCNC: 0.2 G/DL (ref 0.2–0.4)
ALPHA2 GLOB SERPL ELPH-MCNC: 0.5 G/DL (ref 0.4–1.1)
ANION GAP SERPL CALCULATED.3IONS-SCNC: 2 MMOL/L (ref 3–16)
B-GLOBULIN SERPL ELPH-MCNC: 0.7 G/DL (ref 0.9–1.6)
BASOPHILS # BLD: 0 K/UL (ref 0–0.2)
BASOPHILS NFR BLD: 1.1 %
BUN SERPL-MCNC: 44 MG/DL (ref 7–20)
CALCIUM SERPL-MCNC: 9.1 MG/DL (ref 8.3–10.6)
CHLORIDE SERPL-SCNC: 93 MMOL/L (ref 99–110)
CO2 SERPL-SCNC: 32 MMOL/L (ref 21–32)
CREAT SERPL-MCNC: <0.5 MG/DL (ref 0.8–1.3)
DEPRECATED RDW RBC AUTO: 14 % (ref 12.4–15.4)
EOSINOPHIL # BLD: 0.6 K/UL (ref 0–0.6)
EOSINOPHIL NFR BLD: 16.5 %
GAMMA GLOB SERPL ELPH-MCNC: 0.7 G/DL (ref 0.6–1.8)
GFR SERPLBLD CREATININE-BSD FMLA CKD-EPI: >60 ML/MIN/{1.73_M2}
GLUCOSE SERPL-MCNC: 85 MG/DL (ref 70–99)
HCT VFR BLD AUTO: 31.8 % (ref 40.5–52.5)
HGB BLD-MCNC: 10.7 G/DL (ref 13.5–17.5)
LYMPHOCYTES # BLD: 0.6 K/UL (ref 1–5.1)
LYMPHOCYTES NFR BLD: 16.8 %
MCH RBC QN AUTO: 33.3 PG (ref 26–34)
MCHC RBC AUTO-ENTMCNC: 33.6 G/DL (ref 31–36)
MCV RBC AUTO: 99.2 FL (ref 80–100)
MONOCYTES # BLD: 0.4 K/UL (ref 0–1.3)
MONOCYTES NFR BLD: 11.6 %
NEUTROPHILS # BLD: 2 K/UL (ref 1.7–7.7)
NEUTROPHILS NFR BLD: 54 %
OSMOLALITY UR: 215 MOSM/KG (ref 390–1070)
PHOSPHATE SERPL-MCNC: 3.1 MG/DL (ref 2.5–4.9)
PLATELET # BLD AUTO: 216 K/UL (ref 135–450)
PMV BLD AUTO: 6 FL (ref 5–10.5)
POTASSIUM SERPL-SCNC: 4.2 MMOL/L (ref 3.5–5.1)
POTASSIUM UR-SCNC: 8.3 MMOL/L
PROT SERPL-MCNC: 4.9 G/DL (ref 6.4–8.2)
RBC # BLD AUTO: 3.21 M/UL (ref 4.2–5.9)
SODIUM SERPL-SCNC: 127 MMOL/L (ref 136–145)
SODIUM UR-SCNC: <20 MMOL/L
SPE/IFE INTERPRETATION: NORMAL
WBC # BLD AUTO: 3.8 K/UL (ref 4–11)

## 2023-12-13 PROCEDURE — 6370000000 HC RX 637 (ALT 250 FOR IP): Performed by: INTERNAL MEDICINE

## 2023-12-13 PROCEDURE — 84133 ASSAY OF URINE POTASSIUM: CPT

## 2023-12-13 PROCEDURE — 6370000000 HC RX 637 (ALT 250 FOR IP): Performed by: NURSE PRACTITIONER

## 2023-12-13 PROCEDURE — 83935 ASSAY OF URINE OSMOLALITY: CPT

## 2023-12-13 PROCEDURE — 1200000000 HC SEMI PRIVATE

## 2023-12-13 PROCEDURE — 2580000003 HC RX 258: Performed by: INTERNAL MEDICINE

## 2023-12-13 PROCEDURE — 84300 ASSAY OF URINE SODIUM: CPT

## 2023-12-13 PROCEDURE — 97535 SELF CARE MNGMENT TRAINING: CPT

## 2023-12-13 PROCEDURE — 36415 COLL VENOUS BLD VENIPUNCTURE: CPT

## 2023-12-13 PROCEDURE — 85025 COMPLETE CBC W/AUTO DIFF WBC: CPT

## 2023-12-13 PROCEDURE — 97530 THERAPEUTIC ACTIVITIES: CPT

## 2023-12-13 PROCEDURE — 80069 RENAL FUNCTION PANEL: CPT

## 2023-12-13 PROCEDURE — 6360000002 HC RX W HCPCS: Performed by: INTERNAL MEDICINE

## 2023-12-13 RX ORDER — TOLVAPTAN 15 MG/1
15 TABLET ORAL ONCE
Status: COMPLETED | OUTPATIENT
Start: 2023-12-13 | End: 2023-12-13

## 2023-12-13 RX ADMIN — BRIMONIDINE TARTRATE 1 DROP: 2 SOLUTION OPHTHALMIC at 08:57

## 2023-12-13 RX ADMIN — ENOXAPARIN SODIUM 30 MG: 100 INJECTION SUBCUTANEOUS at 08:56

## 2023-12-13 RX ADMIN — TAMSULOSIN HYDROCHLORIDE 0.4 MG: 0.4 CAPSULE ORAL at 08:56

## 2023-12-13 RX ADMIN — LATANOPROST 1 DROP: 50 SOLUTION OPHTHALMIC at 20:23

## 2023-12-13 RX ADMIN — Medication 10 ML: at 08:57

## 2023-12-13 RX ADMIN — Medication 10 ML: at 20:24

## 2023-12-13 RX ADMIN — FINASTERIDE 5 MG: 5 TABLET, FILM COATED ORAL at 20:24

## 2023-12-13 RX ADMIN — BRIMONIDINE TARTRATE 1 DROP: 2 SOLUTION OPHTHALMIC at 20:23

## 2023-12-13 RX ADMIN — TOLVAPTAN 15 MG: 15 TABLET ORAL at 10:29

## 2023-12-13 RX ADMIN — TAMSULOSIN HYDROCHLORIDE 0.4 MG: 0.4 CAPSULE ORAL at 20:24

## 2023-12-13 ASSESSMENT — PAIN SCALES - GENERAL
PAINLEVEL_OUTOF10: 0
PAINLEVEL_OUTOF10: 0

## 2023-12-13 NOTE — PLAN OF CARE
Problem: Discharge Planning  Goal: Discharge to home or other facility with appropriate resources  Outcome: Progressing     Problem: Safety - Adult  Goal: Free from fall injury  12/12/2023 2117 by Jamie Gonsales RN  Outcome: Progressing     Problem: Skin/Tissue Integrity  Goal: Absence of new skin breakdown  Description: 1. Monitor for areas of redness and/or skin breakdown  2. Assess vascular access sites hourly  3. Every 4-6 hours minimum:  Change oxygen saturation probe site  4. Every 4-6 hours:  If on nasal continuous positive airway pressure, respiratory therapy assess nares and determine need for appliance change or resting period.   12/12/2023 2117 by Jamie Gonsales, RN  Outcome: Progressing     Problem: Pain  Goal: Verbalizes/displays adequate comfort level or baseline comfort level  12/12/2023 2117 by Jamie Gonsales RN  Outcome: Progressing     Problem: Nutrition Deficit:  Goal: Optimize nutritional status  Outcome: Progressing

## 2023-12-13 NOTE — PROGRESS NOTES
Shift assessment completed. VSS, scheduled meds given/held per MAR orders. Pt is alert to self and place but disoriented to time and situation, daughter at bedside. Pt has no complaints or needs at this time. Brakes locked, bed in lowest position, patients daughter takes patient to bathroom. All belongings and call light within reach.

## 2023-12-13 NOTE — PLAN OF CARE
Problem: Discharge Planning  Goal: Discharge to home or other facility with appropriate resources  Outcome: Progressing     Problem: Safety - Adult  Goal: Free from fall injury  Outcome: Progressing     Problem: Skin/Tissue Integrity  Goal: Absence of new skin breakdown  Description: 1. Monitor for areas of redness and/or skin breakdown  2. Assess vascular access sites hourly  3. Every 4-6 hours minimum:  Change oxygen saturation probe site  4. Every 4-6 hours:  If on nasal continuous positive airway pressure, respiratory therapy assess nares and determine need for appliance change or resting period.   Outcome: Progressing     Problem: Pain  Goal: Verbalizes/displays adequate comfort level or baseline comfort level  Outcome: Progressing     Problem: Nutrition Deficit:  Goal: Optimize nutritional status  Outcome: Progressing  Flowsheets (Taken 12/13/2023 8010 by Katlin Thomas, FRANCISCO, LD)  Nutrient intake appropriate for improving, restoring, or maintaining nutritional needs:   Monitor oral intake, labs, and treatment plans   Recommend appropriate diets, oral nutritional supplements, and vitamin/mineral supplements     Problem: Musculoskeletal - Adult  Goal: Return mobility to safest level of function  Outcome: Progressing     Problem: Metabolic/Fluid and Electrolytes - Adult  Goal: Electrolytes maintained within normal limits  Outcome: Progressing     Problem: Metabolic/Fluid and Electrolytes - Adult  Goal: Hemodynamic stability and optimal renal function maintained  Outcome: Progressing

## 2023-12-13 NOTE — PROGRESS NOTES
Nutrition Note    RECOMMENDATIONS  PO Diet: Diet as tolerated   ONS: Magic cup BID     NUTRITION ASSESSMENT   Pt triggered for f/u assessment. Pt receives a pureed diet with % meals consumed. Pt is also taking 100% magic cups per dtr. Nutrition status stable @ this time, despite meeting criteria for severe malnutrition. Will con't to monitor progress. Nutrition Related Findings: No edema noted; LBM 12/12; Na 127  Wounds: None  Nutrition Education:  Education not indicated   Nutrition Goals: PO intake 75% or greater, by next RD assessment     MALNUTRITION ASSESSMENT   Chronic Illness  Malnutrition Status: Severe malnutrition  Findings of the 6 clinical characteristics of malnutrition:  Energy Intake:  75% or less estimated energy requirements for 1 month or longer (suspected prior to admission)  Weight Loss:  Unable to assess     Body Fat Loss:  Severe body fat loss Orbital, Fat Overlying Ribs, Buccal region   Muscle Mass Loss:  Severe muscle mass loss Temples (temporalis), Clavicles (pectoralis & deltoids)  Fluid Accumulation:  No significant fluid accumulation     Strength:  Not Performed      NUTRITION DIAGNOSIS   Severe malnutrition related to inadequate protein-energy intake (prior to admission) as evidenced by poor intake prior to admission, Criteria as identified in malnutrition assessment      CURRENT NUTRITION THERAPIES  ADULT ORAL NUTRITION SUPPLEMENT; Lunch, Dinner; Frozen Oral Supplement  ADULT DIET;  Dysphagia - Pureed; Double Protein Portions     PO Intake: %   PO Supplement Intake:%    ANTHROPOMETRICS  Current Height: 182.9 cm (6')  Current Weight - Scale: 45.9 kg (101 lb 4.8 oz)    Admission weight: 46.7 kg (103 lb)  Ideal Body Weight (IBW): 178 lbs  (81 kg)    Usual Bodyweight         BMI: 13.7      COMPARATIVE STANDARDS  Total Energy Requirements (kcals/day): 0064-1549     Protein (g):  72-96       Fluid (mL/day):  1928-6882    The patient will be monitored per nutrition standards of care. Consult dietitian if additional nutrition interventions are needed prior to RD reassessment.      David Vizcaino RD, LD    Contact: [phone]

## 2023-12-13 NOTE — PROGRESS NOTES
25 [facility] Department   Phone: [phone]    Occupational Therapy    [] Initial Evaluation            [x] Daily Treatment Note         [] Discharge Summary      Patient: Belgica Farrar   : 1940   MRN: [id number]   Date of Service:  2023    Admitting Diagnosis:  Hyponatremia  Current Admission Summary:  Hyponatremia -Established problem. Unstable. Na 127   Past Medical History:  has a past medical history of Glaucoma, Hyponatremia, and Prostate enlargement. Past Surgical History:  has no past surgical history on file. Discharge Recommendations: Belgica Farrar scored a 15/24 on the AM-PAC ADL Inpatient form. Current research shows that an AM-PAC score of 17 or less is typically not associated with a discharge to the patient's home setting. Based on the patient's AM-PAC score and their current ADL deficits, it is recommended that the patient have 3-5 sessions per week of Occupational Therapy at d/c to increase the patient's independence. Please see assessment section for further patient specific details. If patient discharges prior to next session this note will serve as a discharge summary. Please see below for the latest assessment towards goals. Patient is not cognitively able to live in his home environment. He is legally blind and [place].     DME Required For Discharge: no DME required at discharge    Precautions/Restrictions: high fall risk, modified diet  Weight Bearing Restrictions: weight bearing as tolerated  [] Right Upper Extremity  [] Left Upper Extremity [] Right Lower Extremity  [] Left Lower Extremity     Required Braces/Orthotics: no braces required   [] Right  [] Left  Positional Restrictions:no positional restrictions      Pre-Admission Information   Lives With: alone                     Type of Home: house  Home Layout: one level  Home Access: level entry  Bathroom Layout: walk in shower  Toilet Height: standard OTR/L [id number]

## 2023-12-13 NOTE — PROGRESS NOTES
Progress Note - Dr. Betty Joy - Internal Medicine  PCP: Doug Kurtz / Samreen Mtz [phone]    Hospital Day: 6  Code Status: DNR-CCA  Current Diet: ADULT ORAL NUTRITION SUPPLEMENT; Lunch, Dinner; Frozen Oral Supplement  ADULT DIET; Dysphagia - Pureed; Double Protein Portions        CC: follow up on medical issues    Subjective:   Sara Sunshine is a 82 y.o. male. Pt seen and examined  Chart reviewed since last visit, labs and imaging below      Na a little better 127  Appreciate renal eval    Pt does not have any specific complaints  Therapy rec return to snf    Review of Systems: (1 system for EPF, 2-9 for detailed, 10+ for comprehensive)  Constitutional: Negative for chills and fever. HENT: Negative for dental problem, nosebleeds and rhinorrhea. Eyes: Negative for photophobia and visual disturbance. Respiratory: Negative for cough, chest tightness and shortness of breath. Cardiovascular: Negative for chest pain and leg swelling. Gastrointestinal: Negative for diarrhea, nausea and vomiting. Endocrine: Negative for polydipsia and polyphagia. Genitourinary: Negative for frequency, hematuria and urgency. Musculoskeletal: Negative for back pain and myalgias. Skin: Negative for rash. Allergic/Immunologic: Negative for food allergies. Neurological: Negative for dizziness, seizures, syncope and facial asymmetry. Hematological: Negative for adenopathy. Psychiatric/Behavioral: Negative for dysphoric mood. The patient is not nervous/anxious. I have reviewed the patient's medical and social history in detail and updated the computerized patient record. To recap: He  has a past medical history of Glaucoma, Hyponatremia, and Prostate enlargement. . He  has no past surgical history on file. Demetrio Solorzano He  reports that he has never smoked.  He has never used smokeless tobacco. He reports that he does not drink alcohol and does not use Sabino Rubio was seen in the clinic today for an audiological evaluation.  Mr. Rubio reported difficulty hearing in background noise as well as group settings. Patient further reported intermittent bilateral tinnitus that is perceived as high-pitched.  Mr. Rubio noted that he has a history of occupational noise exposure without the use of hearing protection.    Audiological testing revealed a Moderate to profound mid to high frequency sensorineural hearing loss (0755-0163 Hz) for the right ear and a severe to profound mid to high frequency sensorineural hearing loss (6295-4243 Hz) for the left ear.  A speech reception threshold was obtained at 15 dBHL for the right ear and at 25 dBHL for the left ear.  Speech discrimination was 80% for the right ear and 80% for the left ear.      Tympanometry testing revealed a Type A tympanogram for the right ear and a Type A tympanogram for the left ear.      Recommendations:  1. Otologic evaluation  2. Annual audiological evaluation  3. Hearing protection when in noise   4. Hearing aid consultation following medical clearance

## 2023-12-14 ENCOUNTER — APPOINTMENT (OUTPATIENT)
Dept: GENERAL RADIOLOGY | Age: 83
End: 2023-12-14
Payer: MEDICARE

## 2023-12-14 LAB
ALBUMIN SERPL-MCNC: 4 G/DL (ref 3.4–5)
ANION GAP SERPL CALCULATED.3IONS-SCNC: 4 MMOL/L (ref 3–16)
BASOPHILS # BLD: 0.1 K/UL (ref 0–0.2)
BASOPHILS NFR BLD: 1.2 %
BUN SERPL-MCNC: 24 MG/DL (ref 7–20)
CALCIUM SERPL-MCNC: 9.4 MG/DL (ref 8.3–10.6)
CHLORIDE SERPL-SCNC: 100 MMOL/L (ref 99–110)
CO2 SERPL-SCNC: 33 MMOL/L (ref 21–32)
CREAT SERPL-MCNC: <0.5 MG/DL (ref 0.8–1.3)
DEPRECATED RDW RBC AUTO: 14.6 % (ref 12.4–15.4)
EOSINOPHIL # BLD: 0.7 K/UL (ref 0–0.6)
EOSINOPHIL NFR BLD: 14.6 %
GFR SERPLBLD CREATININE-BSD FMLA CKD-EPI: >60 ML/MIN/{1.73_M2}
GLUCOSE SERPL-MCNC: 90 MG/DL (ref 70–99)
HCT VFR BLD AUTO: 34.1 % (ref 40.5–52.5)
HGB BLD-MCNC: 11.5 G/DL (ref 13.5–17.5)
LYMPHOCYTES # BLD: 0.9 K/UL (ref 1–5.1)
LYMPHOCYTES NFR BLD: 18 %
MCH RBC QN AUTO: 33.6 PG (ref 26–34)
MCHC RBC AUTO-ENTMCNC: 33.7 G/DL (ref 31–36)
MCV RBC AUTO: 99.7 FL (ref 80–100)
MONOCYTES # BLD: 0.5 K/UL (ref 0–1.3)
MONOCYTES NFR BLD: 10.2 %
NEUTROPHILS # BLD: 2.7 K/UL (ref 1.7–7.7)
NEUTROPHILS NFR BLD: 56 %
PHOSPHATE SERPL-MCNC: 3.4 MG/DL (ref 2.5–4.9)
PLATELET # BLD AUTO: 230 K/UL (ref 135–450)
PMV BLD AUTO: 5.9 FL (ref 5–10.5)
POTASSIUM SERPL-SCNC: 4.1 MMOL/L (ref 3.5–5.1)
RBC # BLD AUTO: 3.42 M/UL (ref 4.2–5.9)
SODIUM SERPL-SCNC: 134 MMOL/L (ref 136–145)
SODIUM SERPL-SCNC: 137 MMOL/L (ref 136–145)
WBC # BLD AUTO: 4.9 K/UL (ref 4–11)

## 2023-12-14 PROCEDURE — 85025 COMPLETE CBC W/AUTO DIFF WBC: CPT

## 2023-12-14 PROCEDURE — 97535 SELF CARE MNGMENT TRAINING: CPT

## 2023-12-14 PROCEDURE — 6370000000 HC RX 637 (ALT 250 FOR IP): Performed by: NURSE PRACTITIONER

## 2023-12-14 PROCEDURE — 97530 THERAPEUTIC ACTIVITIES: CPT

## 2023-12-14 PROCEDURE — 1200000000 HC SEMI PRIVATE

## 2023-12-14 PROCEDURE — 2580000003 HC RX 258: Performed by: INTERNAL MEDICINE

## 2023-12-14 PROCEDURE — 6370000000 HC RX 637 (ALT 250 FOR IP): Performed by: INTERNAL MEDICINE

## 2023-12-14 PROCEDURE — 36415 COLL VENOUS BLD VENIPUNCTURE: CPT

## 2023-12-14 PROCEDURE — 71046 X-RAY EXAM CHEST 2 VIEWS: CPT

## 2023-12-14 PROCEDURE — 6360000002 HC RX W HCPCS: Performed by: INTERNAL MEDICINE

## 2023-12-14 PROCEDURE — 97116 GAIT TRAINING THERAPY: CPT

## 2023-12-14 PROCEDURE — 80069 RENAL FUNCTION PANEL: CPT

## 2023-12-14 PROCEDURE — 84295 ASSAY OF SERUM SODIUM: CPT

## 2023-12-14 RX ORDER — DEXTROSE MONOHYDRATE 50 MG/ML
INJECTION, SOLUTION INTRAVENOUS CONTINUOUS
Status: DISCONTINUED | OUTPATIENT
Start: 2023-12-14 | End: 2023-12-14

## 2023-12-14 RX ADMIN — DEXTROSE MONOHYDRATE: 50 INJECTION, SOLUTION INTRAVENOUS at 10:08

## 2023-12-14 RX ADMIN — Medication 10 ML: at 09:47

## 2023-12-14 RX ADMIN — Medication 10 ML: at 20:47

## 2023-12-14 RX ADMIN — LATANOPROST 1 DROP: 50 SOLUTION OPHTHALMIC at 20:47

## 2023-12-14 RX ADMIN — TAMSULOSIN HYDROCHLORIDE 0.4 MG: 0.4 CAPSULE ORAL at 20:46

## 2023-12-14 RX ADMIN — BRIMONIDINE TARTRATE 1 DROP: 2 SOLUTION OPHTHALMIC at 20:47

## 2023-12-14 RX ADMIN — ENOXAPARIN SODIUM 30 MG: 100 INJECTION SUBCUTANEOUS at 09:47

## 2023-12-14 RX ADMIN — TAMSULOSIN HYDROCHLORIDE 0.4 MG: 0.4 CAPSULE ORAL at 09:47

## 2023-12-14 RX ADMIN — BRIMONIDINE TARTRATE 1 DROP: 2 SOLUTION OPHTHALMIC at 09:47

## 2023-12-14 RX ADMIN — FINASTERIDE 5 MG: 5 TABLET, FILM COATED ORAL at 20:46

## 2023-12-14 NOTE — PROGRESS NOTES
25 [facility] Department   Phone: [phone]    Occupational Therapy    [] Initial Evaluation            [x] Daily Treatment Note         [] Discharge Summary      Patient: Fidel Hager   : 1940   MRN: [id number]   Date of Service:  2023    Admitting Diagnosis:  Hyponatremia  Current Admission Summary:  Hyponatremia -Established problem. Unstable. Na 127   Past Medical History:  has a past medical history of Glaucoma, Hyponatremia, and Prostate enlargement. Past Surgical History:  has no past surgical history on file. Discharge Recommendations: Fidel Hager scored a 15/24 on the AM-PAC ADL Inpatient form. Current research shows that an AM-PAC score of 17 or less is typically not associated with a discharge to the patient's home setting. Based on the patient's AM-PAC score and their current ADL deficits, it is recommended that the patient have 3-5 sessions per week of Occupational Therapy at d/c to increase the patient's independence. Please see assessment section for further patient specific details. If patient discharges prior to next session this note will serve as a discharge summary. Please see below for the latest assessment towards goals. Patient is not cognitively able to live in his home environment. He is legally blind and [place].     DME Required For Discharge: no DME required at discharge    Precautions/Restrictions: high fall risk, modified diet  Weight Bearing Restrictions: weight bearing as tolerated  [] Right Upper Extremity  [] Left Upper Extremity [] Right Lower Extremity  [] Left Lower Extremity     Required Braces/Orthotics: no braces required   [] Right  [] Left  Positional Restrictions:no positional restrictions      Pre-Admission Information   Lives With: alone                     Type of Home: house  Home Layout: one level  Home Access: level entry  Bathroom Layout: walk in shower  Toilet Height: standard Intervention: decreased functional mobility, decreased ADL status, decreased cognition, decreased endurance, decreased sensation, decreased balance, decreased vision, decreased IADL, decreased fine motor control, decreased coordination, decreased posture  Prognosis: good  Clinical Assessment: Pt presents with the above deficits impacting daily occupational performance and would benefit from continued skilled OT services. Pt requiring CGA-Minimal Assistance with ambulation using AD, Moderate assistance with LB ADL's and moderate assistance with toileting. Continue with present POC. Safety Interventions: patient left in chair, chair alarm in place, call light within reach, patient at risk for falls, family present, and nurse notified. Plan  Frequency: 3-5 x/per week  Current Treatment Recommendations: balance training, functional mobility training, transfer training, ADL/self-care training, IADL training, and home management training    Goals  Patient Goals: Patient would like to return to his home. Short Term Goals:  Time Frame: discharge  Patient will complete upper body ADL at supervision   Patient will complete lower body ADL at supervision   Patient will complete toileting at supervision   Patient will complete self-feeding at supervision   Patient will complete grooming at supervision   Patient will complete functional transfers at supervision   Patient will complete functional mobility at supervision        Therapy Session Time     Individual Group Co-treatment   Time In 1057     Time Out 1157     Minutes 60          Timed Code Treatment Minutes:  60 minutes    Total Treatment Minutes:  60 minutes    Electronically Signed By: Sterling Ornelas OTR/L [id number].

## 2023-12-14 NOTE — PLAN OF CARE
Problem: Discharge Planning  Goal: Discharge to home or other facility with appropriate resources  12/14/2023 1115 by Neftali Montes RN  Outcome: Progressing  12/14/2023 0131 by Ashley Baugh RN  Outcome: Progressing     Problem: Safety - Adult  Goal: Free from fall injury  12/14/2023 1115 by Neftali Montes RN  Outcome: Progressing  12/14/2023 0131 by Ashley Baugh RN  Outcome: Progressing     Problem: Skin/Tissue Integrity  Goal: Absence of new skin breakdown  Description: 1. Monitor for areas of redness and/or skin breakdown  2. Assess vascular access sites hourly  3. Every 4-6 hours minimum:  Change oxygen saturation probe site  4. Every 4-6 hours:  If on nasal continuous positive airway pressure, respiratory therapy assess nares and determine need for appliance change or resting period.   12/14/2023 1115 by Neftali Montes RN  Outcome: Progressing  12/14/2023 0131 by Ashley Baugh RN  Outcome: Progressing     Problem: Pain  Goal: Verbalizes/displays adequate comfort level or baseline comfort level  12/14/2023 1115 by Neftali Montes RN  Outcome: Progressing  12/14/2023 0131 by Ashley Baugh RN  Outcome: Progressing     Problem: Nutrition Deficit:  Goal: Optimize nutritional status  12/14/2023 1115 by Neftali Montes RN  Outcome: Progressing  12/14/2023 0131 by Ashley Baugh RN  Outcome: Progressing     Problem: Musculoskeletal - Adult  Goal: Return mobility to safest level of function  12/14/2023 1115 by Neftali Montes RN  Outcome: Progressing  12/14/2023 0131 by Ashley Baugh RN  Outcome: Progressing     Problem: Metabolic/Fluid and Electrolytes - Adult  Goal: Electrolytes maintained within normal limits  12/14/2023 1115 by Neftali Montes RN  Outcome: Progressing  12/14/2023 0131 by Ashley Baugh RN  Outcome: Progressing  Goal: Hemodynamic stability and optimal renal function maintained  12/14/2023 1115 by Jessica Ballard, RN  Outcome: Progressing  12/14/2023 0131 by Fabi Torres RN  Outcome: Progressing

## 2023-12-14 NOTE — PROGRESS NOTES
Patients head to toe assessment completed. Vital signs WNL, call light within reach. Scheduled medications given per MAR. Patient denies any pain at the moment. Patient resting in bed. Daughter at bedside. Bed alarm off per daughter's request. Will continue to monitor.

## 2023-12-14 NOTE — PROGRESS NOTES
[address] [facility] Department   Phone: [phone]    Physical Therapy    [] Initial Evaluation            [x] Daily Treatment Note         [] Discharge Summary      Patient: Caridad Gardiner   : 1940   MRN: [id number]   Date of Service:  2023  Admitting Diagnosis: Hyponatremia  Current Admission Summary: The pt was admitted with confusion and hyponatremia. This is his second hospital admission in 2 months. Past Medical History:  has a past medical history of Glaucoma, Hyponatremia, and Prostate enlargement. Past Surgical History:  has no past surgical history on file. Discharge Recommendations: Caridad Gardiner scored a 17/24 on the AM-PAC short mobility form. Current research shows that an AM-PAC score of 17 or less is typically not associated with a discharge to the patient's home setting. Based on the patient's AM-PAC score and their current functional mobility deficits, it is recommended that the patient have 3-5 sessions per week of Physical Therapy at d/c to increase the patient's independence. Please see assessment section for further patient specific details. If patient discharges prior to next session this note will serve as a discharge summary. Please see below for the latest assessment towards goals.      DME Required For Discharge: DME to be determined at next level of care  Precautions/Restrictions: high fall risk    Positional Restrictions:no positional restrictions    Pre-Admission Information   Lives With: alone                     Type of Home: house  Home Layout: one level  Home Access: level entry  [address] [place] [address] Road: walk in shower  Toilet Height: standard height  Bathroom Equipment: grab bars around toilet, shower chair  Home Equipment: rolling walker  Transfer Assistance: Independent without use of device  Ambulation Assistance:Independent without use of device  ADL Assistance: independent with all ADL's  IADL Assistance: independent with

## 2023-12-14 NOTE — PLAN OF CARE
Problem: Discharge Planning  Goal: Discharge to home or other facility with appropriate resources  12/14/2023 0131 by Ileana Cruz, CLARE  Outcome: Progressing     Problem: Safety - Adult  Goal: Free from fall injury  12/14/2023 0131 by Ileana Cruz RN  Outcome: Progressing     Problem: Skin/Tissue Integrity  Goal: Absence of new skin breakdown  Description: 1. Monitor for areas of redness and/or skin breakdown  2. Assess vascular access sites hourly  3. Every 4-6 hours minimum:  Change oxygen saturation probe site  4. Every 4-6 hours:  If on nasal continuous positive airway pressure, respiratory therapy assess nares and determine need for appliance change or resting period.   12/14/2023 0131 by Ileana Cruz, RN  Outcome: Progressing     Problem: Pain  Goal: Verbalizes/displays adequate comfort level or baseline comfort level  12/14/2023 0131 by Ileana Cruz, RN  Outcome: Progressing     Problem: Nutrition Deficit:  Goal: Optimize nutritional status  12/14/2023 0131 by Ileana Cruz, RN  Outcome: Progressing     Problem: Musculoskeletal - Adult  Goal: Return mobility to safest level of function  12/14/2023 0131 by Ileana Cruz, RN  Outcome: Progressing     Problem: Metabolic/Fluid and Electrolytes - Adult  Goal: Electrolytes maintained within normal limits  12/14/2023 0131 by Ileana Cruz, RN  Outcome: Progressing  Goal: Hemodynamic stability and optimal renal function maintained  12/14/2023 0131 by Ileana Cruz, RN  Outcome: Progressing

## 2023-12-14 NOTE — PROGRESS NOTES
Assessment completed and documented. C/O difficulty breathing. Placed on 1L/NC for comfort. Dr. Devan Jarrett at bedside. Daughter at bedside. Repositioned in bed. IVFs started. Denies additional needs at this time.

## 2023-12-14 NOTE — PROGRESS NOTES
Palliative Care: Follow up visit with patient and daughter in law Salina Swartz at bedside. Dr. Betty Joy in. Na+ improved after administration of Samsca yesterday. Currently @ 137 (from 127). Will monitor over next 24 hours to determine stabilization. Supportive daughter in law assisting with AM meal. Patient with c/o mild HA and SOB. Nurse Michelle Almanza addressing patient concerns. DC POC is SNF and then family may consider hospice comfort care measures when patient unable to participate further at such time. Palliative support ongoing. Will continue to follow as needed.      Electronically signed by Kenneth Rose, RN, BSN, [facility] (Palliative Care) [phone]

## 2023-12-15 VITALS
TEMPERATURE: 97.7 F | HEIGHT: 72 IN | OXYGEN SATURATION: 98 % | HEART RATE: 54 BPM | BODY MASS INDEX: 13.23 KG/M2 | SYSTOLIC BLOOD PRESSURE: 126 MMHG | WEIGHT: 97.7 LBS | DIASTOLIC BLOOD PRESSURE: 70 MMHG | RESPIRATION RATE: 18 BRPM

## 2023-12-15 LAB
ALBUMIN SERPL-MCNC: 3.8 G/DL (ref 3.4–5)
ANION GAP SERPL CALCULATED.3IONS-SCNC: 6 MMOL/L (ref 3–16)
BASOPHILS # BLD: 0.1 K/UL (ref 0–0.2)
BASOPHILS NFR BLD: 1.3 %
BUN SERPL-MCNC: 19 MG/DL (ref 7–20)
CALCIUM SERPL-MCNC: 9 MG/DL (ref 8.3–10.6)
CHLORIDE SERPL-SCNC: 98 MMOL/L (ref 99–110)
CO2 SERPL-SCNC: 30 MMOL/L (ref 21–32)
CREAT SERPL-MCNC: <0.5 MG/DL (ref 0.8–1.3)
DEPRECATED RDW RBC AUTO: 14.5 % (ref 12.4–15.4)
EOSINOPHIL # BLD: 0.9 K/UL (ref 0–0.6)
EOSINOPHIL NFR BLD: 16.8 %
GFR SERPLBLD CREATININE-BSD FMLA CKD-EPI: >60 ML/MIN/{1.73_M2}
GLUCOSE SERPL-MCNC: 85 MG/DL (ref 70–99)
HCT VFR BLD AUTO: 33.3 % (ref 40.5–52.5)
HGB BLD-MCNC: 11.2 G/DL (ref 13.5–17.5)
LYMPHOCYTES # BLD: 0.9 K/UL (ref 1–5.1)
LYMPHOCYTES NFR BLD: 17.5 %
MCH RBC QN AUTO: 33.8 PG (ref 26–34)
MCHC RBC AUTO-ENTMCNC: 33.8 G/DL (ref 31–36)
MCV RBC AUTO: 100 FL (ref 80–100)
MONOCYTES # BLD: 0.5 K/UL (ref 0–1.3)
MONOCYTES NFR BLD: 9.8 %
NEUTROPHILS # BLD: 2.8 K/UL (ref 1.7–7.7)
NEUTROPHILS NFR BLD: 54.6 %
PHOSPHATE SERPL-MCNC: 3.5 MG/DL (ref 2.5–4.9)
PLATELET # BLD AUTO: 211 K/UL (ref 135–450)
PMV BLD AUTO: 5.9 FL (ref 5–10.5)
POTASSIUM SERPL-SCNC: 4.2 MMOL/L (ref 3.5–5.1)
RBC # BLD AUTO: 3.33 M/UL (ref 4.2–5.9)
SODIUM SERPL-SCNC: 134 MMOL/L (ref 136–145)
WBC # BLD AUTO: 5.1 K/UL (ref 4–11)

## 2023-12-15 PROCEDURE — 97535 SELF CARE MNGMENT TRAINING: CPT

## 2023-12-15 PROCEDURE — 85025 COMPLETE CBC W/AUTO DIFF WBC: CPT

## 2023-12-15 PROCEDURE — 6360000002 HC RX W HCPCS: Performed by: INTERNAL MEDICINE

## 2023-12-15 PROCEDURE — 6370000000 HC RX 637 (ALT 250 FOR IP): Performed by: INTERNAL MEDICINE

## 2023-12-15 PROCEDURE — 6370000000 HC RX 637 (ALT 250 FOR IP): Performed by: NURSE PRACTITIONER

## 2023-12-15 PROCEDURE — 36415 COLL VENOUS BLD VENIPUNCTURE: CPT

## 2023-12-15 PROCEDURE — 2580000003 HC RX 258: Performed by: INTERNAL MEDICINE

## 2023-12-15 PROCEDURE — 97110 THERAPEUTIC EXERCISES: CPT

## 2023-12-15 PROCEDURE — 80069 RENAL FUNCTION PANEL: CPT

## 2023-12-15 RX ORDER — SODIUM CHLORIDE 1 G/1
1 TABLET ORAL
Status: DISCONTINUED | OUTPATIENT
Start: 2023-12-15 | End: 2023-12-15 | Stop reason: HOSPADM

## 2023-12-15 RX ADMIN — SODIUM CHLORIDE 1 G: 1 TABLET ORAL at 13:22

## 2023-12-15 RX ADMIN — ENOXAPARIN SODIUM 30 MG: 100 INJECTION SUBCUTANEOUS at 09:55

## 2023-12-15 RX ADMIN — TAMSULOSIN HYDROCHLORIDE 0.4 MG: 0.4 CAPSULE ORAL at 09:54

## 2023-12-15 RX ADMIN — Medication 10 ML: at 10:00

## 2023-12-15 RX ADMIN — BRIMONIDINE TARTRATE 1 DROP: 2 SOLUTION OPHTHALMIC at 09:55

## 2023-12-15 NOTE — PLAN OF CARE
Problem: Discharge Planning  Goal: Discharge to home or other facility with appropriate resources  12/14/2023 2242 by Davion Bravo RN  Outcome: Progressing  12/14/2023 1115 by Jose David Gaines RN  Outcome: Progressing     Problem: Safety - Adult  Goal: Free from fall injury  12/14/2023 2242 by Davion Bravo RN  Outcome: Progressing  12/14/2023 1115 by Jose David Gaines RN  Outcome: Progressing     Problem: Skin/Tissue Integrity  Goal: Absence of new skin breakdown  Description: 1. Monitor for areas of redness and/or skin breakdown  2. Assess vascular access sites hourly  3. Every 4-6 hours minimum:  Change oxygen saturation probe site  4. Every 4-6 hours:  If on nasal continuous positive airway pressure, respiratory therapy assess nares and determine need for appliance change or resting period.   12/14/2023 2242 by Davion Bravo RN  Outcome: Progressing  12/14/2023 1115 by Jose David Gaines RN  Outcome: Progressing     Problem: Pain  Goal: Verbalizes/displays adequate comfort level or baseline comfort level  12/14/2023 2242 by Davion Bravo RN  Outcome: Progressing  12/14/2023 1115 by Jose David Gaines RN  Outcome: Progressing     Problem: Nutrition Deficit:  Goal: Optimize nutritional status  12/14/2023 2242 by Davion Bravo RN  Outcome: Progressing  12/14/2023 1115 by Jose David Gaines RN  Outcome: Progressing     Problem: Musculoskeletal - Adult  Goal: Return mobility to safest level of function  12/14/2023 2242 by Davion Bravo RN  Outcome: Progressing  12/14/2023 1115 by Jose David Gaines RN  Outcome: Progressing     Problem: Metabolic/Fluid and Electrolytes - Adult  Goal: Electrolytes maintained within normal limits  12/14/2023 2242 by Davion Bravo RN  Outcome: Progressing  12/14/2023 1115 by Jose David Gaines RN  Outcome: Progressing  Goal: Hemodynamic stability and optimal renal function maintained  12/14/2023 1115 by Jose David Gaines RN  Outcome:

## 2023-12-15 NOTE — PROGRESS NOTES
25 [facility] Department   Phone: [phone]    Occupational Therapy    [] Initial Evaluation            [x] Daily Treatment Note         [] Discharge Summary      Patient: Ludy Lerma   : 1940   MRN: [id number]   Date of Service:  12/15/2023    Admitting Diagnosis:  Hyponatremia  Current Admission Summary:  Hyponatremia -Established problem. Unstable. Na 127   Past Medical History:  has a past medical history of Glaucoma, Hyponatremia, and Prostate enlargement. Past Surgical History:  has no past surgical history on file. Discharge Recommendations: Ludy Lerma scored a 15/24 on the AM-PAC ADL Inpatient form. Current research shows that an AM-PAC score of 17 or less is typically not associated with a discharge to the patient's home setting. Based on the patient's AM-PAC score and their current ADL deficits, it is recommended that the patient have 3-5 sessions per week of Occupational Therapy at d/c to increase the patient's independence. Please see assessment section for further patient specific details. If patient discharges prior to next session this note will serve as a discharge summary. Please see below for the latest assessment towards goals. Patient is not cognitively able to live in his home environment. He is legally blind and [place].     DME Required For Discharge: no DME required at discharge    Precautions/Restrictions: high fall risk, modified diet  Weight Bearing Restrictions: weight bearing as tolerated  [] Right Upper Extremity  [] Left Upper Extremity [] Right Lower Extremity  [] Left Lower Extremity     Required Braces/Orthotics: no braces required   [] Right  [] Left  Positional Restrictions:no positional restrictions      Pre-Admission Information   Lives With: alone                     Type of Home: house  Home Layout: one level  Home Access: level entry  Bathroom Layout: walk in shower  Toilet Height: standard

## 2023-12-15 NOTE — DISCHARGE SUMMARY
[facility] -- Physician Discharge Summary     Gale Aguirre  1940  MRN: [id number]    Admit Date: 12/6/2023  Discharge Date: No discharge date for patient encounter. 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 male with a PMH of hyponatremia BPH glaucoma who presents to the ED for worsening mental status with associated hyponatremia. Of note patient was recently admitted here at [facility] for hyponatremia, associated history of SIADH was treated with IV fluids and p.o. salt tablets was seen in consult by nephrology discharged to a [facility], daughter-in-law at bedside states that his dose of p.o. salt tablets were decreased from 2 tablets to 1 tablet 3 times daily. While in rehab labs drawn showed continued downtrending of his sodium from 127 to 124 prompting presentation to the ED. In the ED repeat labs shows sodium of 121 chloride of 86. Patient was given 500 cc of normal saline.   Being admitted for further management of his hyponatremia with nephrology consult    He is placed on nacl tabs, urea for awhile and does get some samsca while here  Na on d/c is 134    Consults made during Hospitalization:  [address]    Treatment team at time of Discharge: Treatment Team: Attending Provider: Gael Sebastian MD; Consulting Physician: Tonie Watson MD;

## 2023-12-15 NOTE — PROGRESS NOTES
Progress Note - Dr. Mavis Shepard - Internal Medicine  PCP: Doug Higginbotham / [address]  [phone]    Hospital Day: 8  Code Status: DNR-CCA  Current Diet: ADULT ORAL NUTRITION SUPPLEMENT; Lunch, Dinner; Frozen Oral Supplement  ADULT DIET; Dysphagia - Pureed; Double Protein Portions        CC: follow up on medical issues    Subjective:   Renee William is a 82 y.o. male. Pt seen and examined  Chart reviewed since last visit, labs and imaging below      Na 137 after samsca yest -> 134  Appreciate renal eval    Pt does not have any specific complaints  Therapy rec return to snf    Review of Systems: (1 system for EPF, 2-9 for detailed, 10+ for comprehensive)  Constitutional: Negative for chills and fever. HENT: Negative for dental problem, nosebleeds and rhinorrhea. Eyes: Negative for photophobia and visual disturbance. Respiratory: Negative for cough, chest tightness and shortness of breath. Cardiovascular: Negative for chest pain and leg swelling. Gastrointestinal: Negative for diarrhea, nausea and vomiting. Endocrine: Negative for polydipsia and polyphagia. Genitourinary: Negative for frequency, hematuria and urgency. Musculoskeletal: Negative for back pain and myalgias. Skin: Negative for rash. Allergic/Immunologic: Negative for food allergies. Neurological: Negative for dizziness, seizures, syncope and facial asymmetry. Hematological: Negative for adenopathy. Psychiatric/Behavioral: Negative for dysphoric mood. The patient is not nervous/anxious. I have reviewed the patient's medical and social history in detail and updated the computerized patient record. To recap: He  has a past medical history of Glaucoma, Hyponatremia, and Prostate enlargement. . He  has no past surgical history on file. Diamond Hamman He  reports that he has never smoked.  He has never used smokeless tobacco. He reports that he does not drink alcohol and does not use 122/70  Plan: Continue medications. Continue to check BP q shift. CBC and BMP ordered to monitor for disease progression, medication side effect. BPH (benign prostatic hyperplasia) -Established problem. Stable. Plan: cont home meds    Anemia  Plan: No indication for transfusion. Cont to monitor h/h to assess progression of anemia. Repeat Hgb level ordered. Recommend ferrous sulfate or MVI as outpatient. Case discussed with: renal  Tests ordered/reviewed: cbc, bmp, fsbs    Disp - home when renal thinks Na is stable      (Please note that portions of this note were completed with a voice recognition program.  Efforts were made to edit the dictations but occasionally words are mis-transcribed.)        Clint Bourgeois MD  12/15/2023    Please use [employer] to contact me with any questions during the day. The hospitalist service will provide cross-coverage for this patient from 7pm to 7am.    During those hours, contact the on-call hospitalist MD/CARA for questions.

## 2023-12-15 NOTE — PROGRESS NOTES
Pt discharged back to [facility]; pt picked via [employer],tried calling report but no answer, voicemail left with this nurse name and number.

## 2023-12-15 NOTE — PROGRESS NOTES
Shift assessment complete. Meds given per eMAR. Daughter in law at bedside. Pt is very hard of hearing. Discussed eye gtts with pt and family. Will continue to monitor. The care plan and education has been reviewed and mutually agreed upon with the patient.    Electronically signed by Gabriele Valdivia RN on 12/15/2023 at 1:04 AM

## 2023-12-15 NOTE — PROGRESS NOTES
Palliative Care:      Visit with patient and daughter in law at bedside. Na+ remains at 134 today. Medically cleared with Nephrology and Dr. Stuart Whittington. Discharge pending to return to [address] today with skilled services. Emotional support ongoing. Will continue to follow as needed.      Electronically signed by Honey Duff RN, BSN, [facility] (Palliative Care) [phone]

## 2023-12-15 NOTE — CARE COORDINATION
12/15/23 1508   IMM Letter   IMM Letter given to Patient/Family/Significant other/Guardian/POA/by: Proveided to Patient's Daughter, Kitty Palacios, by . Daughter signed IMM Letter.    IMM Letter date given: 12/15/23   IMM Letter time given: 1506     Electronically signed by CHERELLE Ramirez on 12/15/2023 at 3:09 PM

## 2023-12-15 NOTE — CARE COORDINATION
Discharge Plan:  Patient discharge to:    Residence at [address] [facility]. [place] [address]  Phone: [phone]  Fax: [phone]    SW faxed [address] and AVS to [phone]    TYLER Matthew to call report to [phone] with [facility], 5:00pm  time     [employer] admissions staff, Sterling Ortiz ([phone]), advised of discharge and in agreement. Daughter, [facility] (on emergency contact list), advised of discharge and in agreement. HENS not needed for return to facility. SW intervention complete.         Deidra VILLARREAL, JAMA-S, [facility] -   [phone]    Electronically signed by CHERELLE Stoddard on 12/15/2023 at 3:33 PM

## 2023-12-15 NOTE — PLAN OF CARE
Problem: Discharge Planning  Goal: Discharge to home or other facility with appropriate resources  Outcome: Completed     Problem: Safety - Adult  Goal: Free from fall injury  Outcome: Completed     Problem: Skin/Tissue Integrity  Goal: Absence of new skin breakdown  Description: 1. Monitor for areas of redness and/or skin breakdown  2. Assess vascular access sites hourly  3. Every 4-6 hours minimum:  Change oxygen saturation probe site  4. Every 4-6 hours:  If on nasal continuous positive airway pressure, respiratory therapy assess nares and determine need for appliance change or resting period.   Outcome: Completed     Problem: Pain  Goal: Verbalizes/displays adequate comfort level or baseline comfort level  Outcome: Completed     Problem: Nutrition Deficit:  Goal: Optimize nutritional status  Outcome: Completed     Problem: Musculoskeletal - Adult  Goal: Return mobility to safest level of function  Outcome: Completed     Problem: Metabolic/Fluid and Electrolytes - Adult  Goal: Electrolytes maintained within normal limits  Outcome: Completed     Problem: Metabolic/Fluid and Electrolytes - Adult  Goal: Hemodynamic stability and optimal renal function maintained  Outcome: Completed

## 2023-12-19 ENCOUNTER — APPOINTMENT (OUTPATIENT)
Dept: GENERAL RADIOLOGY | Age: 83
DRG: 643 | End: 2023-12-19
Payer: MEDICARE

## 2023-12-19 ENCOUNTER — HOSPITAL ENCOUNTER (INPATIENT)
Age: 83
LOS: 3 days | Discharge: HOSPICE | DRG: 643 | End: 2023-12-22
Attending: INTERNAL MEDICINE | Admitting: INTERNAL MEDICINE
Payer: MEDICARE

## 2023-12-19 DIAGNOSIS — R07.9 CHEST PAIN, UNSPECIFIED TYPE: ICD-10-CM

## 2023-12-19 DIAGNOSIS — E87.1 HYPONATREMIA: Primary | ICD-10-CM

## 2023-12-19 DIAGNOSIS — R06.02 SHORTNESS OF BREATH: ICD-10-CM

## 2023-12-19 DIAGNOSIS — R53.1 GENERAL WEAKNESS: ICD-10-CM

## 2023-12-19 DIAGNOSIS — E83.42 HYPOMAGNESEMIA: ICD-10-CM

## 2023-12-19 DIAGNOSIS — Z51.5 HOSPICE CARE: ICD-10-CM

## 2023-12-19 DIAGNOSIS — R00.1 BRADYCARDIA: ICD-10-CM

## 2023-12-19 LAB
ALBUMIN SERPL-MCNC: 3.7 G/DL (ref 3.4–5)
ALBUMIN/GLOB SERPL: 1.8 {RATIO} (ref 1.1–2.2)
ALP SERPL-CCNC: 69 U/L (ref 40–129)
ALT SERPL-CCNC: 13 U/L (ref 10–40)
ANION GAP SERPL CALCULATED.3IONS-SCNC: 6 MMOL/L (ref 3–16)
AST SERPL-CCNC: 15 U/L (ref 15–37)
BACTERIA URNS QL MICRO: NORMAL /HPF
BASOPHILS # BLD: 0 K/UL (ref 0–0.2)
BASOPHILS NFR BLD: 0.9 %
BILIRUB SERPL-MCNC: 0.6 MG/DL (ref 0–1)
BILIRUB UR QL STRIP.AUTO: NEGATIVE
BUN SERPL-MCNC: 9 MG/DL (ref 7–20)
CALCIUM SERPL-MCNC: 8.8 MG/DL (ref 8.3–10.6)
CHLORIDE SERPL-SCNC: 95 MMOL/L (ref 99–110)
CLARITY UR: CLEAR
CO2 SERPL-SCNC: 26 MMOL/L (ref 21–32)
COLOR UR: YELLOW
CREAT SERPL-MCNC: <0.5 MG/DL (ref 0.8–1.3)
DEPRECATED RDW RBC AUTO: 14.2 % (ref 12.4–15.4)
EKG ATRIAL RATE: 61 BPM
EKG ATRIAL RATE: 69 BPM
EKG DIAGNOSIS: NORMAL
EKG DIAGNOSIS: NORMAL
EKG P AXIS: 79 DEGREES
EKG P AXIS: 87 DEGREES
EKG P-R INTERVAL: 190 MS
EKG P-R INTERVAL: 202 MS
EKG Q-T INTERVAL: 402 MS
EKG Q-T INTERVAL: 422 MS
EKG QRS DURATION: 100 MS
EKG QRS DURATION: 96 MS
EKG QTC CALCULATION (BAZETT): 424 MS
EKG QTC CALCULATION (BAZETT): 430 MS
EKG R AXIS: -60 DEGREES
EKG R AXIS: -60 DEGREES
EKG T AXIS: 66 DEGREES
EKG T AXIS: 80 DEGREES
EKG VENTRICULAR RATE: 61 BPM
EKG VENTRICULAR RATE: 69 BPM
EOSINOPHIL # BLD: 0.5 K/UL (ref 0–0.6)
EOSINOPHIL NFR BLD: 10.3 %
EPI CELLS #/AREA URNS AUTO: 1 /HPF (ref 0–5)
GFR SERPLBLD CREATININE-BSD FMLA CKD-EPI: >60 ML/MIN/{1.73_M2}
GLUCOSE SERPL-MCNC: 83 MG/DL (ref 70–99)
GLUCOSE UR STRIP.AUTO-MCNC: NEGATIVE MG/DL
HCT VFR BLD AUTO: 37.6 % (ref 40.5–52.5)
HGB BLD-MCNC: 12.9 G/DL (ref 13.5–17.5)
HGB UR QL STRIP.AUTO: NEGATIVE
HYALINE CASTS #/AREA URNS AUTO: 1 /LPF (ref 0–8)
KETONES UR STRIP.AUTO-MCNC: NEGATIVE MG/DL
LEUKOCYTE ESTERASE UR QL STRIP.AUTO: ABNORMAL
LYMPHOCYTES # BLD: 0.5 K/UL (ref 1–5.1)
LYMPHOCYTES NFR BLD: 10.1 %
MAGNESIUM SERPL-MCNC: 1.7 MG/DL (ref 1.8–2.4)
MCH RBC QN AUTO: 34 PG (ref 26–34)
MCHC RBC AUTO-ENTMCNC: 34.2 G/DL (ref 31–36)
MCV RBC AUTO: 99.3 FL (ref 80–100)
MONOCYTES # BLD: 0.4 K/UL (ref 0–1.3)
MONOCYTES NFR BLD: 7.8 %
NEUTROPHILS # BLD: 3.8 K/UL (ref 1.7–7.7)
NEUTROPHILS NFR BLD: 70.9 %
NITRITE UR QL STRIP.AUTO: NEGATIVE
NT-PROBNP SERPL-MCNC: 118 PG/ML (ref 0–449)
OSMOLALITY SERPL: 269 MOSM/KG (ref 280–301)
OSMOLALITY UR: 505 MOSM/KG (ref 390–1070)
PH UR STRIP.AUTO: 7 [PH] (ref 5–8)
PLATELET # BLD AUTO: 234 K/UL (ref 135–450)
PMV BLD AUTO: 5.7 FL (ref 5–10.5)
POTASSIUM SERPL-SCNC: 4.3 MMOL/L (ref 3.5–5.1)
PROT SERPL-MCNC: 5.8 G/DL (ref 6.4–8.2)
PROT UR STRIP.AUTO-MCNC: NEGATIVE MG/DL
RBC # BLD AUTO: 3.79 M/UL (ref 4.2–5.9)
RBC CLUMPS #/AREA URNS AUTO: 1 /HPF (ref 0–4)
REASON FOR REJECTION: NORMAL
REJECTED TEST: NORMAL
SODIUM SERPL-SCNC: 127 MMOL/L (ref 136–145)
SP GR UR STRIP.AUTO: 1.01 (ref 1–1.03)
TROPONIN, HIGH SENSITIVITY: 15 NG/L (ref 0–22)
TROPONIN, HIGH SENSITIVITY: 15 NG/L (ref 0–22)
UA COMPLETE W REFLEX CULTURE PNL UR: ABNORMAL
UA DIPSTICK W REFLEX MICRO PNL UR: YES
URN SPEC COLLECT METH UR: ABNORMAL
UROBILINOGEN UR STRIP-ACNC: 1 E.U./DL
WBC # BLD AUTO: 5.3 K/UL (ref 4–11)
WBC #/AREA URNS AUTO: 1 /HPF (ref 0–5)

## 2023-12-19 PROCEDURE — 1200000000 HC SEMI PRIVATE

## 2023-12-19 PROCEDURE — 84300 ASSAY OF URINE SODIUM: CPT

## 2023-12-19 PROCEDURE — 93005 ELECTROCARDIOGRAM TRACING: CPT | Performed by: PHYSICIAN ASSISTANT

## 2023-12-19 PROCEDURE — 81001 URINALYSIS AUTO W/SCOPE: CPT

## 2023-12-19 PROCEDURE — 93010 ELECTROCARDIOGRAM REPORT: CPT | Performed by: INTERNAL MEDICINE

## 2023-12-19 PROCEDURE — 36415 COLL VENOUS BLD VENIPUNCTURE: CPT

## 2023-12-19 PROCEDURE — 6370000000 HC RX 637 (ALT 250 FOR IP): Performed by: INTERNAL MEDICINE

## 2023-12-19 PROCEDURE — 6370000000 HC RX 637 (ALT 250 FOR IP): Performed by: NURSE PRACTITIONER

## 2023-12-19 PROCEDURE — 6360000002 HC RX W HCPCS: Performed by: PHYSICIAN ASSISTANT

## 2023-12-19 PROCEDURE — 84484 ASSAY OF TROPONIN QUANT: CPT

## 2023-12-19 PROCEDURE — 93005 ELECTROCARDIOGRAM TRACING: CPT | Performed by: INTERNAL MEDICINE

## 2023-12-19 PROCEDURE — 83880 ASSAY OF NATRIURETIC PEPTIDE: CPT

## 2023-12-19 PROCEDURE — 85025 COMPLETE CBC W/AUTO DIFF WBC: CPT

## 2023-12-19 PROCEDURE — 2580000003 HC RX 258: Performed by: INTERNAL MEDICINE

## 2023-12-19 PROCEDURE — 6360000002 HC RX W HCPCS: Performed by: INTERNAL MEDICINE

## 2023-12-19 PROCEDURE — 83735 ASSAY OF MAGNESIUM: CPT

## 2023-12-19 PROCEDURE — 80053 COMPREHEN METABOLIC PANEL: CPT

## 2023-12-19 PROCEDURE — 71045 X-RAY EXAM CHEST 1 VIEW: CPT

## 2023-12-19 PROCEDURE — 96374 THER/PROPH/DIAG INJ IV PUSH: CPT

## 2023-12-19 PROCEDURE — 83930 ASSAY OF BLOOD OSMOLALITY: CPT

## 2023-12-19 PROCEDURE — 99285 EMERGENCY DEPT VISIT HI MDM: CPT

## 2023-12-19 PROCEDURE — 83935 ASSAY OF URINE OSMOLALITY: CPT

## 2023-12-19 PROCEDURE — 2580000003 HC RX 258: Performed by: PHYSICIAN ASSISTANT

## 2023-12-19 RX ORDER — TIMOLOL MALEATE 5 MG/ML
1 SOLUTION/ DROPS OPHTHALMIC 2 TIMES DAILY
Status: DISCONTINUED | OUTPATIENT
Start: 2023-12-19 | End: 2023-12-22 | Stop reason: HOSPADM

## 2023-12-19 RX ORDER — ACETAMINOPHEN 650 MG/1
650 SUPPOSITORY RECTAL EVERY 6 HOURS PRN
Status: DISCONTINUED | OUTPATIENT
Start: 2023-12-19 | End: 2023-12-22 | Stop reason: HOSPADM

## 2023-12-19 RX ORDER — 0.9 % SODIUM CHLORIDE 0.9 %
500 INTRAVENOUS SOLUTION INTRAVENOUS PRN
Status: DISCONTINUED | OUTPATIENT
Start: 2023-12-19 | End: 2023-12-22 | Stop reason: HOSPADM

## 2023-12-19 RX ORDER — POTASSIUM CHLORIDE 20 MEQ/1
40 TABLET, EXTENDED RELEASE ORAL PRN
Status: DISCONTINUED | OUTPATIENT
Start: 2023-12-19 | End: 2023-12-22 | Stop reason: HOSPADM

## 2023-12-19 RX ORDER — SODIUM CHLORIDE 9 MG/ML
INJECTION, SOLUTION INTRAVENOUS CONTINUOUS
Status: DISCONTINUED | OUTPATIENT
Start: 2023-12-19 | End: 2023-12-20

## 2023-12-19 RX ORDER — DORZOLAMIDE HYDROCHLORIDE AND TIMOLOL MALEATE 20; 5 MG/ML; MG/ML
1 SOLUTION/ DROPS OPHTHALMIC 2 TIMES DAILY
Status: DISCONTINUED | OUTPATIENT
Start: 2023-12-19 | End: 2023-12-19 | Stop reason: CLARIF

## 2023-12-19 RX ORDER — MAGNESIUM SULFATE 1 G/100ML
1000 INJECTION INTRAVENOUS ONCE
Status: COMPLETED | OUTPATIENT
Start: 2023-12-19 | End: 2023-12-19

## 2023-12-19 RX ORDER — SODIUM CHLORIDE 0.9 % (FLUSH) 0.9 %
5-40 SYRINGE (ML) INJECTION EVERY 12 HOURS SCHEDULED
Status: DISCONTINUED | OUTPATIENT
Start: 2023-12-19 | End: 2023-12-22 | Stop reason: HOSPADM

## 2023-12-19 RX ORDER — ENOXAPARIN SODIUM 100 MG/ML
40 INJECTION SUBCUTANEOUS DAILY
Status: DISCONTINUED | OUTPATIENT
Start: 2023-12-19 | End: 2023-12-19 | Stop reason: DRUGHIGH

## 2023-12-19 RX ORDER — 0.9 % SODIUM CHLORIDE 0.9 %
1000 INTRAVENOUS SOLUTION INTRAVENOUS ONCE
Status: COMPLETED | OUTPATIENT
Start: 2023-12-19 | End: 2023-12-19

## 2023-12-19 RX ORDER — DORZOLAMIDE HYDROCHLORIDE AND TIMOLOL MALEATE 20; 5 MG/ML; MG/ML
1 SOLUTION/ DROPS OPHTHALMIC 2 TIMES DAILY
COMMUNITY

## 2023-12-19 RX ORDER — ONDANSETRON 4 MG/1
4 TABLET, ORALLY DISINTEGRATING ORAL EVERY 8 HOURS PRN
Status: DISCONTINUED | OUTPATIENT
Start: 2023-12-19 | End: 2023-12-22 | Stop reason: HOSPADM

## 2023-12-19 RX ORDER — DORZOLAMIDE HCL 20 MG/ML
1 SOLUTION/ DROPS OPHTHALMIC 2 TIMES DAILY
Status: DISCONTINUED | OUTPATIENT
Start: 2023-12-19 | End: 2023-12-22 | Stop reason: HOSPADM

## 2023-12-19 RX ORDER — HYDRALAZINE HYDROCHLORIDE 20 MG/ML
10 INJECTION INTRAMUSCULAR; INTRAVENOUS EVERY 6 HOURS PRN
Status: DISCONTINUED | OUTPATIENT
Start: 2023-12-19 | End: 2023-12-22 | Stop reason: HOSPADM

## 2023-12-19 RX ORDER — BRIMONIDINE TARTRATE 2 MG/ML
1 SOLUTION/ DROPS OPHTHALMIC 2 TIMES DAILY
Status: DISCONTINUED | OUTPATIENT
Start: 2023-12-19 | End: 2023-12-22 | Stop reason: HOSPADM

## 2023-12-19 RX ORDER — ONDANSETRON 2 MG/ML
4 INJECTION INTRAMUSCULAR; INTRAVENOUS EVERY 6 HOURS PRN
Status: DISCONTINUED | OUTPATIENT
Start: 2023-12-19 | End: 2023-12-22 | Stop reason: HOSPADM

## 2023-12-19 RX ORDER — LATANOPROST 50 UG/ML
1 SOLUTION/ DROPS OPHTHALMIC NIGHTLY
Status: DISCONTINUED | OUTPATIENT
Start: 2023-12-19 | End: 2023-12-22 | Stop reason: HOSPADM

## 2023-12-19 RX ORDER — SODIUM CHLORIDE 9 MG/ML
INJECTION, SOLUTION INTRAVENOUS PRN
Status: DISCONTINUED | OUTPATIENT
Start: 2023-12-19 | End: 2023-12-22 | Stop reason: HOSPADM

## 2023-12-19 RX ORDER — FINASTERIDE 5 MG/1
5 TABLET, FILM COATED ORAL DAILY
Status: DISCONTINUED | OUTPATIENT
Start: 2023-12-19 | End: 2023-12-22 | Stop reason: HOSPADM

## 2023-12-19 RX ORDER — POTASSIUM CHLORIDE 20 MEQ/1
40 TABLET, EXTENDED RELEASE ORAL PRN
Status: DISCONTINUED | OUTPATIENT
Start: 2023-12-19 | End: 2023-12-19 | Stop reason: SDUPTHER

## 2023-12-19 RX ORDER — POTASSIUM CHLORIDE 7.45 MG/ML
10 INJECTION INTRAVENOUS PRN
Status: DISCONTINUED | OUTPATIENT
Start: 2023-12-19 | End: 2023-12-22 | Stop reason: HOSPADM

## 2023-12-19 RX ORDER — ENOXAPARIN SODIUM 100 MG/ML
30 INJECTION SUBCUTANEOUS DAILY
Status: DISCONTINUED | OUTPATIENT
Start: 2023-12-19 | End: 2023-12-22 | Stop reason: HOSPADM

## 2023-12-19 RX ORDER — DORZOLAMIDE HCL 20 MG/ML
1 SOLUTION/ DROPS OPHTHALMIC 2 TIMES DAILY
Status: DISCONTINUED | OUTPATIENT
Start: 2023-12-19 | End: 2023-12-19

## 2023-12-19 RX ORDER — SODIUM CHLORIDE 1 G/1
1 TABLET ORAL
Status: DISCONTINUED | OUTPATIENT
Start: 2023-12-19 | End: 2023-12-20

## 2023-12-19 RX ORDER — TAMSULOSIN HYDROCHLORIDE 0.4 MG/1
0.4 CAPSULE ORAL NIGHTLY
Status: DISCONTINUED | OUTPATIENT
Start: 2023-12-19 | End: 2023-12-22 | Stop reason: HOSPADM

## 2023-12-19 RX ORDER — ACETAMINOPHEN 325 MG/1
650 TABLET ORAL EVERY 6 HOURS PRN
Status: DISCONTINUED | OUTPATIENT
Start: 2023-12-19 | End: 2023-12-22 | Stop reason: HOSPADM

## 2023-12-19 RX ORDER — SODIUM CHLORIDE 0.9 % (FLUSH) 0.9 %
5-40 SYRINGE (ML) INJECTION PRN
Status: DISCONTINUED | OUTPATIENT
Start: 2023-12-19 | End: 2023-12-22 | Stop reason: HOSPADM

## 2023-12-19 RX ORDER — POTASSIUM CHLORIDE 7.45 MG/ML
10 INJECTION INTRAVENOUS PRN
Status: DISCONTINUED | OUTPATIENT
Start: 2023-12-19 | End: 2023-12-19 | Stop reason: SDUPTHER

## 2023-12-19 RX ADMIN — DORZOLAMIDE HYDROCHLORIDE 1 DROP: 20 SOLUTION/ DROPS OPHTHALMIC at 22:08

## 2023-12-19 RX ADMIN — SODIUM CHLORIDE 1000 ML: 9 INJECTION, SOLUTION INTRAVENOUS at 14:56

## 2023-12-19 RX ADMIN — SODIUM CHLORIDE: 9 INJECTION, SOLUTION INTRAVENOUS at 17:06

## 2023-12-19 RX ADMIN — MAGNESIUM SULFATE HEPTAHYDRATE 1000 MG: 1 INJECTION, SOLUTION INTRAVENOUS at 14:57

## 2023-12-19 RX ADMIN — BRIMONIDINE TARTRATE 1 DROP: 2 SOLUTION OPHTHALMIC at 21:18

## 2023-12-19 RX ADMIN — ENOXAPARIN SODIUM 30 MG: 100 INJECTION SUBCUTANEOUS at 17:30

## 2023-12-19 RX ADMIN — Medication 1 G: at 17:31

## 2023-12-19 RX ADMIN — LATANOPROST 1 DROP: 50 SOLUTION OPHTHALMIC at 21:17

## 2023-12-19 RX ADMIN — TIMOLOL MALEATE 1 DROP: 5 SOLUTION OPHTHALMIC at 22:08

## 2023-12-19 RX ADMIN — FINASTERIDE 5 MG: 5 TABLET, FILM COATED ORAL at 17:30

## 2023-12-19 RX ADMIN — TAMSULOSIN HYDROCHLORIDE 0.4 MG: 0.4 CAPSULE ORAL at 21:26

## 2023-12-19 RX ADMIN — SODIUM CHLORIDE: 9 INJECTION, SOLUTION INTRAVENOUS at 21:04

## 2023-12-19 NOTE — PROGRESS NOTES
Pharmacist Review and Automatic Dose Adjustment of Prophylactic Enoxaparin         The reviewing pharmacist has made an adjustment to the ordered enoxaparin dose or converted to UFH per the approved [facility] protocol and table as identified below. Caridad Gardiner is a 82 y.o. male. Recent Labs     12/19/23  1347   CREATININE <0.5*       Estimated Creatinine Clearance: 73 mL/min (based on SCr of 0.5 mg/dL). Recent Labs     12/19/23  1228   HGB 12.9*   HCT 37.6*        No results for input(s): \"INR\" in the last 72 hours.     Height:   Ht Readings from Last 1 Encounters:   12/14/23 1.829 m (6' 0.01\")     Weight:  Wt Readings from Last 1 Encounters:   12/14/23 44.3 kg (97 lb 11.2 oz)               Plan: Based upon the patient's weight and renal function    Ordered: Enoxaparin 40mg SUBQ Daily    Changed/converted to    New Order: Enoxaparin 30mg SUBQ Daily      Thank you,  Juan Mclean, [address] [place] [address]  12/19/2023, 5:04 PM

## 2023-12-19 NOTE — PROGRESS NOTES
Pharmacy Home Medication Reconciliation Note    A medication reconciliation has been completed for Mariama Rodrigues 1940    Pharmacy: [address] [place] [address]. Fred Yang, Killian Villalobos  Information provided by: patient's daughter    The patient's home medication list is as follows: No current facility-administered medications on file prior to encounter. Current Outpatient Medications on File Prior to Encounter   Medication Sig Dispense Refill    dorzolamide-timolol (COSOPT) 2-0.5 % ophthalmic solution Place 1 drop into both eyes 2 times daily      sodium chloride 1 g tablet Take 1 tablet by mouth 3 times daily (with meals) 90 tablet 3    brimonidine (ALPHAGAN) 0.2 % ophthalmic solution Place 1 drop into both eyes 2 times daily      dorzolamide (TRUSOPT) 2 % ophthalmic solution Place 1 drop into both eyes 2 times daily (Patient not taking: Reported on 12/19/2023)      latanoprost (XALATAN) 0.005 % ophthalmic solution Place 1 drop into both eyes nightly      finasteride (PROSCAR) 5 MG tablet Take 1 tablet by mouth daily      tamsulosin (FLOMAX) 0.4 MG capsule Take 1 capsule by mouth nightly         Patient is no longer taking Trusopt eye solution (using Cosopt BID). Of note, patient took morning and midday meds prior to ED arrival.    Timing of last doses updated. Thank you,  Az Sharif

## 2023-12-19 NOTE — PROGRESS NOTES
Pt admitted for hyponatremia      Full h+p to follow    Active Hospital Problems    Diagnosis Date Noted    Bradycardia [R00.1] 12/19/2023    Severe malnutrition ([address]) [E43] 11/16/2023    Hyponatremia [E87.1] 12/24/2021    HTN (hypertension) [I10] 03/09/2021    BPH (benign prostatic hyperplasia) [N40.0] 03/09/2021    Anemia [D64.9] 03/09/2021       Please use [employer] to contact me with any questions during the day. The hospitalist service will provide cross-coverage for this patient from 7pm to 7am.    During those hours, contact the on-call hospitalist MD/CARA for questions.

## 2023-12-19 NOTE — ED PROVIDER NOTES
The Ekg interpreted by me in the absence of a cardiologist shows. Sinus rhythm with a ventricular rate of 69. Left axis deviation. QTc appropriate. No specific ST or T wave abnormality. No significant change when compared to prior 12-6/23. I only performed EKG interpretation and was not otherwise involved in the care of this patient.        Edmar Kelly MD  12/19/23 6078

## 2023-12-19 NOTE — ED PROVIDER NOTES
[facility]        Pt Name: Avery Eduardo  MRN: [id number]  [address] [place] 1940  Date of evaluation: 12/19/2023  Provider: Miladys De La Cruz PA-C  PCP: Irving Leiva MD  Note Started: 12:34 PM EST 12/19/23      CARA. I have evaluated this patient. CHIEF COMPLAINT       Chief Complaint   Patient presents with    Low Sodium     PT to ED with outpatient lab results. PT reports recent admission for low sodium and just discharged this past Friday. Sodium 125. PT sts \"I don't feel to good\". PT extremely hard of hearing. PT from [facility]. HISTORY OF PRESENT ILLNESS: 1 or more Elements     History from : Patient    Limitations to history : None    Avery Eduardo is a 82 y.o. male with a history of hypertension, severe malnutrition and SIADH who presents to the emergency department today with his daughter-in-law stating he had lab work done at [facility] and his sodium was back down to 125. Daughter-in-law states that was at 134 on Friday. He has become more generally weak and fatigued to lower his sodium has gotten. Patient denies having any pain when asked. He denies lightheadedness or dizziness. He is hemodynamically stable. He is very hard of hearing. Nursing Notes were all reviewed and agreed with or any disagreements were addressed in the HPI. REVIEW OF SYSTEMS :      Review of Systems   Constitutional:  Positive for fatigue. Negative for chills and fever. Respiratory:  Negative for cough, chest tightness and shortness of breath. Cardiovascular:  Negative for chest pain, palpitations and leg swelling. Gastrointestinal:  Negative for abdominal pain, diarrhea, nausea and vomiting. Genitourinary:  Negative for difficulty urinating, dysuria, flank pain, frequency, hematuria and urgency. Neurological:  Positive for weakness.  Negative for dizziness, tremors, seizures, syncope, speech URINE, RANDOM       When ordered only abnormal lab results are displayed. All other labs were within normal range or not returned as of this dictation. EKG: When ordered, EKG's are interpreted by the Emergency Department Physician in the absence of a cardiologist.  Please see their note for interpretation of EKG. RADIOLOGY:   Non-plain film images such as CT, Ultrasound and MRI are read by the radiologist. Plain radiographic images are visualized and preliminarily interpreted by the ED Provider with the below findings:        Interpretation per the Radiologist below, if available at the time of this note:    XR CHEST PORTABLE   Final Result   COPD with no acute finding in the chest.           XR CHEST (2 VW)    Result Date: 12/14/2023  EXAMINATION: TWO XRAY VIEWS OF THE CHEST 12/14/2023 11:59 am COMPARISON: 11/13/2023 HISTORY: ORDERING SYSTEM PROVIDED HISTORY: cough TECHNOLOGIST PROVIDED HISTORY: Reason for exam:->cough Reason for Exam: cough FINDINGS: The lungs are without acute focal process. There is no effusion or pneumothorax. The cardiomediastinal silhouette is without acute process. The osseous structures are without acute process. No acute process. No results found. PROCEDURES   Unless otherwise noted below, none     Procedures    CRITICAL CARE TIME (.cctime)   There was a high probability of life-threatening clinical deterioration in the patient's condition requiring my urgent intervention. I personally saw the patient and independently provided 34 minutes of non-concurrent critical care out of the total shared critical care time provided, excluding separately reportable procedures. PAST MEDICAL HISTORY      has a past medical history of Glaucoma, Hyponatremia, and Prostate enlargement.      Chronic Conditions affecting Care: None    EMERGENCY DEPARTMENT COURSE and DIFFERENTIAL DIAGNOSIS/MDM:   Vitals:    Vitals:    12/19/23 1204 12/19/23 1430 12/19/23 1446   BP: 126/68 103/80

## 2023-12-19 NOTE — PROGRESS NOTES
Patient seen in ED, room 20. Admission completed with the following exceptions:  4 Eyes Assessment, Immunizations, Vaccines, Rights and Responsibilities, Orientation to room, Plan of Care, Education/Learning Assessment and Education Plan, white board, height and weight, pain assessment and head to toe assessment. Patient is alert and oriented X 3. Patient does have a history of confusion at times. Patient lives currently at [facility] and is being admitted for Hyponatremia. Home Medications as well as Outside Sources has been verbally reviewed with patient and updated as appropriate and is now Completed per pharmacy technician Lady Frere. Plan of care updated if indicated. All questions answered. Advance directives are on file. Daughter in law at bedside (listed on advance directives) and assisted with admission questions. Patient is hard of hearing. Patient is using a walker at the nursing facility and is on a pureed diet.

## 2023-12-20 ENCOUNTER — APPOINTMENT (OUTPATIENT)
Dept: GENERAL RADIOLOGY | Age: 83
DRG: 643 | End: 2023-12-20
Payer: MEDICARE

## 2023-12-20 LAB
ANION GAP SERPL CALCULATED.3IONS-SCNC: 6 MMOL/L (ref 3–16)
BASOPHILS # BLD: 0 K/UL (ref 0–0.2)
BASOPHILS NFR BLD: 0.9 %
BUN SERPL-MCNC: 8 MG/DL (ref 7–20)
CALCIUM SERPL-MCNC: 8.8 MG/DL (ref 8.3–10.6)
CHLORIDE SERPL-SCNC: 95 MMOL/L (ref 99–110)
CO2 SERPL-SCNC: 26 MMOL/L (ref 21–32)
CREAT SERPL-MCNC: <0.5 MG/DL (ref 0.8–1.3)
DEPRECATED RDW RBC AUTO: 14.1 % (ref 12.4–15.4)
EOSINOPHIL # BLD: 0.6 K/UL (ref 0–0.6)
EOSINOPHIL NFR BLD: 12 %
GFR SERPLBLD CREATININE-BSD FMLA CKD-EPI: >60 ML/MIN/{1.73_M2}
GLUCOSE SERPL-MCNC: 84 MG/DL (ref 70–99)
HCT VFR BLD AUTO: 34.4 % (ref 40.5–52.5)
HGB BLD-MCNC: 11.9 G/DL (ref 13.5–17.5)
LYMPHOCYTES # BLD: 0.8 K/UL (ref 1–5.1)
LYMPHOCYTES NFR BLD: 15.6 %
MAGNESIUM SERPL-MCNC: 1.9 MG/DL (ref 1.8–2.4)
MCH RBC QN AUTO: 33.9 PG (ref 26–34)
MCHC RBC AUTO-ENTMCNC: 34.5 G/DL (ref 31–36)
MCV RBC AUTO: 98.4 FL (ref 80–100)
MONOCYTES # BLD: 0.5 K/UL (ref 0–1.3)
MONOCYTES NFR BLD: 9.2 %
NEUTROPHILS # BLD: 3.1 K/UL (ref 1.7–7.7)
NEUTROPHILS NFR BLD: 62.3 %
PLATELET # BLD AUTO: 205 K/UL (ref 135–450)
PMV BLD AUTO: 5.9 FL (ref 5–10.5)
POTASSIUM SERPL-SCNC: 4.3 MMOL/L (ref 3.5–5.1)
PROCALCITONIN SERPL IA-MCNC: 0.05 NG/ML (ref 0–0.15)
RBC # BLD AUTO: 3.5 M/UL (ref 4.2–5.9)
SODIUM SERPL-SCNC: 126 MMOL/L (ref 136–145)
SODIUM SERPL-SCNC: 127 MMOL/L (ref 136–145)
SODIUM UR-SCNC: 94 MMOL/L
WBC # BLD AUTO: 5 K/UL (ref 4–11)

## 2023-12-20 PROCEDURE — 99222 1ST HOSP IP/OBS MODERATE 55: CPT | Performed by: INTERNAL MEDICINE

## 2023-12-20 PROCEDURE — 6370000000 HC RX 637 (ALT 250 FOR IP): Performed by: INTERNAL MEDICINE

## 2023-12-20 PROCEDURE — 85025 COMPLETE CBC W/AUTO DIFF WBC: CPT

## 2023-12-20 PROCEDURE — 36415 COLL VENOUS BLD VENIPUNCTURE: CPT

## 2023-12-20 PROCEDURE — 6360000002 HC RX W HCPCS: Performed by: INTERNAL MEDICINE

## 2023-12-20 PROCEDURE — 71045 X-RAY EXAM CHEST 1 VIEW: CPT

## 2023-12-20 PROCEDURE — 83735 ASSAY OF MAGNESIUM: CPT

## 2023-12-20 PROCEDURE — 92610 EVALUATE SWALLOWING FUNCTION: CPT

## 2023-12-20 PROCEDURE — 92526 ORAL FUNCTION THERAPY: CPT

## 2023-12-20 PROCEDURE — 2580000003 HC RX 258: Performed by: INTERNAL MEDICINE

## 2023-12-20 PROCEDURE — 84145 PROCALCITONIN (PCT): CPT

## 2023-12-20 PROCEDURE — 84295 ASSAY OF SERUM SODIUM: CPT

## 2023-12-20 PROCEDURE — 80048 BASIC METABOLIC PNL TOTAL CA: CPT

## 2023-12-20 PROCEDURE — 1200000000 HC SEMI PRIVATE

## 2023-12-20 RX ORDER — SODIUM CHLORIDE 1 G/1
2 TABLET ORAL
Status: DISCONTINUED | OUTPATIENT
Start: 2023-12-20 | End: 2023-12-22 | Stop reason: HOSPADM

## 2023-12-20 RX ADMIN — DORZOLAMIDE HYDROCHLORIDE 1 DROP: 20 SOLUTION/ DROPS OPHTHALMIC at 21:10

## 2023-12-20 RX ADMIN — SODIUM CHLORIDE 2 G: 1 TABLET ORAL at 18:18

## 2023-12-20 RX ADMIN — TAMSULOSIN HYDROCHLORIDE 0.4 MG: 0.4 CAPSULE ORAL at 21:09

## 2023-12-20 RX ADMIN — Medication 1 G: at 08:11

## 2023-12-20 RX ADMIN — ENOXAPARIN SODIUM 30 MG: 100 INJECTION SUBCUTANEOUS at 08:11

## 2023-12-20 RX ADMIN — BRIMONIDINE TARTRATE 1 DROP: 2 SOLUTION OPHTHALMIC at 08:12

## 2023-12-20 RX ADMIN — LATANOPROST 1 DROP: 50 SOLUTION OPHTHALMIC at 21:10

## 2023-12-20 RX ADMIN — BRIMONIDINE TARTRATE 1 DROP: 2 SOLUTION OPHTHALMIC at 21:09

## 2023-12-20 RX ADMIN — FINASTERIDE 5 MG: 5 TABLET, FILM COATED ORAL at 08:11

## 2023-12-20 RX ADMIN — Medication 15 G: at 21:09

## 2023-12-20 RX ADMIN — TIMOLOL MALEATE 1 DROP: 5 SOLUTION OPHTHALMIC at 21:10

## 2023-12-20 RX ADMIN — SODIUM CHLORIDE, PRESERVATIVE FREE 10 ML: 5 INJECTION INTRAVENOUS at 21:09

## 2023-12-20 RX ADMIN — DORZOLAMIDE HYDROCHLORIDE 1 DROP: 20 SOLUTION/ DROPS OPHTHALMIC at 08:12

## 2023-12-20 RX ADMIN — TIMOLOL MALEATE 1 DROP: 5 SOLUTION OPHTHALMIC at 08:12

## 2023-12-20 RX ADMIN — Medication 15 G: at 11:40

## 2023-12-20 RX ADMIN — SODIUM CHLORIDE 2 G: 1 TABLET ORAL at 12:28

## 2023-12-20 NOTE — CARE COORDINATION
12/20/23 1446   Readmission Assessment   Number of Days since last admission? 1-7 days   Previous Disposition SNF   Who is being Interviewed Caregiver  (The SW spke with the pt's daughter)   What was the patient's/caregiver's perception as to why they think they needed to return back to the hospital? Other (Comment)  (The pt retuned to the hsopital due to low    Low Sodium.)   Did you visit your Primary Care Physician after you left the hospital, before you returned this time? No   Why weren't you able to visit your PCP? Did not have an appointment   Did you see a specialist, such as Cardiac, Pulmonary, Orthopedic Physician, etc. after you left the hospital? Yes   Who advised the patient to return to the hospital? Skilled Unit   Does the patient report anything that got in the way of taking their medications? No   In our efforts to provide the best possible care to you and others like you, can you think of anything that we could have done to help you after you left the hospital the first time, so that you might not have needed to return so soon?  Other (Comment)  (The pt reports no concerns at this itme)

## 2023-12-20 NOTE — CONSULTS
[facility]   Electrophysiology Consultation   Date: 12/20/2023  Reason for Consultation: Bradycardia  Consult Requesting Physician: Michael Redd MD     Chief Complaint   Patient presents with    Low Sodium     PT to ED with outpatient lab results. PT reports recent admission for low sodium and just discharged this past Friday. Sodium 125. PT sts \"I don't feel to good\". PT extremely hard of hearing. PT from [email or web address]. HPI: Ollie Rasheed is a 82 y.o. male with history of hypertension, severe malnutrition, SIADH, who was admitted due to hyponatremia, fatigue and weakness. Past Medical History:   Diagnosis Date    Glaucoma     Hyponatremia     Prostate enlargement         Past Surgical History:   Procedure Laterality Date    DENTAL SURGERY      top teeth removed       No Known Allergies    Social History:  Reviewed. reports that he has never smoked. He has never used smokeless tobacco. He reports that he does not drink alcohol and does not use drugs. Family History:  Reviewed. family history includes Heart Attack in his father; Other in his mother. Review of System:  All other systems reviewed and are negative except for that noted above. Pertinent negatives are:     General: negative for fever, chills   Ophthalmic ROS: negative for - eye pain or loss of vision  ENT ROS: negative for - headaches, sore throat   Respiratory: negative for - cough, sputum  Cardiovascular: Reviewed in HPI  Gastrointestinal: negative for - abdominal pain, diarrhea, N/V  Hematology: negative for - bleeding, blood clots, bruising or jaundice  Genito-Urinary:  negative for - Dysuria or incontinence  Musculoskeletal: negative for - Joint swelling, muscle pain  Neurological: negative for - confusion, dizziness, headaches   Psychiatric: No anxiety, no depression.   Dermatological: negative for - rash    Physical Examination:  Vitals:    12/20/23 0600   BP:    Pulse: 64   Resp:    Temp:    SpO2:

## 2023-12-20 NOTE — PROGRESS NOTES
Facility/Department: [address]  Speech Language Pathology  DYSPHAGIA BEDSIDE SWALLOW EVALUATION     Patient: Braulio Alonso   : 1940   MRN: [id number]      Evaluation Date: 2023   Admitting Diagnosis: Shortness of breath [R06.02]  Hypomagnesemia [E83.42]  Bradycardia [R00.1]  Hyponatremia [E87.1]  General weakness [R53.1]  Chest pain, unspecified type [R07.9]  Pain: Did not state                                                       H&P: 2023  \"Kana Ellison is a 82 y.o. male with a history of hypertension, severe malnutrition and SIADH who presents to the emergency department today with his daughter-in-law stating he had lab work done at [facility] and his sodium was back down to 125. Daughter-in-law states that was at 134 on Friday. He has become more generally weak and fatigued to lower his sodium has gotten. Patient denies having any pain when asked. He denies lightheadedness or dizziness. He is hemodynamically stable. He is very hard of hearing. \"    Imaging:  Chest X-ray: 2023  IMPRESSION:  COPD with no acute finding in the chest.    History/Prior Level of Function:   Living Status: Residence at [facility] per [facility]  Prior Dysphagia History: Pt seen for dysphagia intervention during previous admission , , and most recently 2023 with recommendation for Dysphagia II Minced and Moist diet with thin liquids. Per [facility], pt was on a pureed diet at the nursing home and has been tolerating diet. Reason for referral: SLP evaluation orders received due to coughing with intake (meds in puree per RN) and prior hx of dysphagia     Dysphagia Impressions/Diagnosis: Oropharyngeal Dysphagia   Pt alert and sitting upright in bed, daughter-in-law present. Pt with reduced responsiveness, pt with significant hearing impairment and visual impairment at baseline. On room air with RR <20/min.  Oral motor exam limited as pt did not consistently follow multimodal commands, apparent

## 2023-12-20 NOTE — PROGRESS NOTES
Progress Note - Dr. Jack Cooper - Internal Medicine  PCP: Miguelina Ortez, [facility] Jacqueline Chun / Tanner Henry [phone]    Hospital Day: 1  Code Status: Full Code  Current Diet: Diet NPO        CC: follow up on medical issues    Subjective:   Yony Stafford is a 82 y.o. male. Pt seen and examined  Chart reviewed since last visit, labs and imaging below      Pt has no new complaints  HR is better  Na still 127    Review of Systems: (1 system for EPF, 2-9 for detailed, 10+ for comprehensive)  Constitutional: Negative for chills and fever. Positive for weakness  HENT: Negative for dental problem, nosebleeds and rhinorrhea. Eyes: Negative for photophobia and visual disturbance. Respiratory: Negative for cough, chest tightness and shortness of breath. Cardiovascular: Negative for chest pain and leg swelling. Gastrointestinal: Negative for diarrhea, nausea and vomiting. Endocrine: Negative for polydipsia and polyphagia. Genitourinary: Negative for frequency, hematuria and urgency. Musculoskeletal: Negative for back pain and myalgias. Skin: Negative for rash. Allergic/Immunologic: Negative for food allergies. Neurological: Negative for dizziness, seizures, syncope and facial asymmetry. Hematological: Negative for adenopathy. Psychiatric/Behavioral: Negative for dysphoric mood. The patient is not nervous/anxious. I have reviewed the patient's medical and social history in detail and updated the computerized patient record. To recap: He  has a past medical history of Glaucoma, Hyponatremia, and Prostate enlargement. . He  has a past surgical history that includes Dental surgery. Hui Alfonso He  reports that he has never smoked. He has never used smokeless tobacco. He reports that he does not drink alcohol and does not use drugs. .        Active Hospital Problems    Diagnosis Date Noted    Bradycardia [R00.1] 12/19/2023    Hypomagnesemia [E83.42] 12/19/2023    Severe symmetrical, trachea midline and thyroid not enlarged, symmetric, no tenderness/mass/nodules     Respiratory: clear to auscultation and percussion bilaterally with normal respiratory effort    Cardiovascular: normal rate, regular rhythm, normal S1 and S2 and no murmurs    Gastrointestinal: soft, non-tender, non-distended, normal bowel sounds, no masses or organomegaly    Extremities: no clubbing, no edema    Skin:No rashes or nodules noted. Neurologic:negative         Labs:  Lab Results   Component Value Date    WBC 5.0 12/20/2023    HGB 11.9 (L) 12/20/2023    HCT 34.4 (L) 12/20/2023     12/20/2023    CHOL 124 09/03/2019    TRIG 40 09/03/2019    HDL 64 (H) 09/03/2019    ALT 13 12/19/2023    AST 15 12/19/2023     (L) 12/20/2023    K 4.3 12/20/2023    CL 95 (L) 12/20/2023    CREATININE <0.5 (L) 12/20/2023    BUN 8 12/20/2023    CO2 26 12/20/2023    TSH 1.02 09/03/2019    PSA 0.87 09/03/2019    INR 1.25 (H) 06/26/2022    LABA1C 5.6 09/03/2019     Lab Results   Component Value Date    TROPONINI <0.01 06/26/2022       Recent Imaging Results are Reviewed:  XR CHEST PORTABLE    Result Date: 12/19/2023  EXAMINATION: ONE XRAY VIEW OF THE CHEST 12/19/2023 12:41 pm COMPARISON: 12/14/2023 radiograph HISTORY: ORDERING SYSTEM PROVIDED HISTORY: Weakness TECHNOLOGIST PROVIDED HISTORY: Reason for exam:->Weakness Reason for Exam: weakness FINDINGS: The heart and mediastinum are normal.  Pulmonary vascular markings are normal.  The lungs are lucent hyperinflated representing changes of COPD. No acute pulmonary finding. Scoliosis of the thoracic spine with no acute skeletal finding.      COPD with no acute finding in the chest.     XR CHEST (2 VW)    Result Date: 12/14/2023  EXAMINATION: TWO XRAY VIEWS OF THE CHEST 12/14/2023 11:59 am COMPARISON: 11/13/2023 HISTORY: ORDERING SYSTEM PROVIDED HISTORY: cough TECHNOLOGIST PROVIDED HISTORY: Reason for exam:->cough Reason for Exam: cough FINDINGS: The lungs are without acute

## 2023-12-20 NOTE — PROGRESS NOTES
Hospice [facility]    Met with patient and daughter in law at bedside. They are heavily leaning towards hospice care at this time- this writer has met with this family previously on another admission. They have concerns about the best care setting for the patient and the best way to proceed. They are also waiting to speak with cardiology about potential pacemaker placement, though are leaning away from having it placed, they would like to still speak to a cardiologist regarding his heart, electrolytes, and management options. HOC to follow until D/C and assist with needs. Thank you for allowing [facility] to participate in [facility] care.      Cassie Christianson BSN, RN  [facility]: [phone]

## 2023-12-20 NOTE — CARE COORDINATION
Case Management Assessment  Initial Evaluation    Date/Time of Evaluation: 12/20/2023 2:59 PM  Assessment Completed by: CHERELLE Dominguez    If patient is discharged prior to next notation, then this note serves as note for discharge by case management. Patient Name: Tr Trotter                   YOB: 1940  Diagnosis: Shortness of breath [R06.02]  Hypomagnesemia [E83.42]  Bradycardia [R00.1]  Hyponatremia [E87.1]  General weakness [R53.1]  Chest pain, unspecified type [R07.9]                   Date / Time: 12/19/2023 11:55 AM    Patient Admission Status: Inpatient   Readmission Risk (Low < 19, Mod (19-27), High > 27): Readmission Risk Score: 20.8    Current PCP: Marlaine Cushing, MD  PCP verified by CM? Yes    Chart Reviewed: Yes      History Provided by: Other (see comment) (The SW spoke with the pt's daughter Amrita Fajardo)  Patient Orientation: Other (see comment) (The SW spoke with the pt's daughter Amrita Fajardo)    Patient Cognition: Other (see comment) (The SW spoke with the pt's daughter Amrita Fajardo)    Hospitalization in the last 30 days (Readmission):  Yes    If yes, Readmission Assessment in  Navigator will be completed. Advance Directives:      Code Status: DNR-CC   Patient's Primary Decision Maker is: Named in [address]     Primary Decision Maker: TyraalfredoBrenden - Child - [phone]    Secondary Decision Maker: Sravan Delgado - Other Relative - [phone]    Discharge Planning:    Patient lives with: Alone Type of Home: House  Primary Care Giver: Family  Patient Support Systems include: Family Members, Children   Current Financial resources: Medicare  Current community resources: None  Current services prior to admission: [address]            Current DME:              Type of Home Care services:  None    ADLS  Prior functional level:  Independent in ADLs/IADLs  Current functional level: Mobility, Shopping, Housework, Cooking, Toileting, Dressing, Bathing,

## 2023-12-20 NOTE — CONSULTS
[facility]  Consultation Note    Reason for Consult: Recurrent hyponatremia, acute on chronic  Requesting Physician:  Dr. Phyllis Mcgill:  Low Na    History obtained from records and patient. HISTORY OF PRESENT ILLNESS:       Mick Trujillo  is 82 y.o.. 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 Has had recurrent admissions due to hyponatremia. History of overcorrection with tolvaptan. Best serum Na around 132-134. On presentation Na was 127. -140's. Neg 160ml since admission. On NaCl tabs 1g 3 times daily as an outpatient. No vomiting nor diarrhea. He is hearing impaired. Currently an ECF resident. Family at bedside. Past Medical History:     has a past medical history of Glaucoma, Hyponatremia, and Prostate enlargement. Past Surgical History:     has a past surgical history that includes Dental surgery.    Current Medications:    Current Facility-Administered Medications: sodium chloride tablet 2 g, 2 g, Oral, TID WC  urea (URE-NA) packet 15 g, 15 g, Oral, BID  tamsulosin (FLOMAX) capsule 0.4 mg, 0.4 mg, Oral, Nightly  latanoprost (XALATAN) 0.005 % ophthalmic solution 1 drop, 1 drop, Both Eyes, Nightly  finasteride (PROSCAR) tablet 5 mg, 5 mg, Oral, Daily  brimonidine (ALPHAGAN) 0.2 % ophthalmic solution 1 drop, 1 drop, Both Eyes, BID  sodium chloride flush 0.9 % injection 5-40 mL, 5-40 mL, IntraVENous, 2 times per day  sodium chloride flush 0.9 % injection 5-40 mL, 5-40 mL, IntraVENous, PRN  0.9 % sodium chloride infusion, , IntraVENous, PRN  potassium chloride (KLOR-CON M) extended release tablet 40 mEq, 40 mEq, Oral, PRN **OR** potassium bicarb-citric acid (EFFER-K) effervescent tablet 40 mEq, 40 mEq, Oral, PRN **OR** potassium chloride 10 mEq/100 mL IVPB (Peripheral Line), 10 mEq, IntraVENous, PRN  ondansetron (ZOFRAN-ODT)

## 2023-12-21 LAB
ANION GAP SERPL CALCULATED.3IONS-SCNC: 7 MMOL/L (ref 3–16)
BASOPHILS # BLD: 0 K/UL (ref 0–0.2)
BASOPHILS NFR BLD: 0.8 %
BUN SERPL-MCNC: 26 MG/DL (ref 7–20)
CALCIUM SERPL-MCNC: 9.1 MG/DL (ref 8.3–10.6)
CHLORIDE SERPL-SCNC: 94 MMOL/L (ref 99–110)
CO2 SERPL-SCNC: 26 MMOL/L (ref 21–32)
CREAT SERPL-MCNC: <0.5 MG/DL (ref 0.8–1.3)
DEPRECATED RDW RBC AUTO: 13.8 % (ref 12.4–15.4)
EOSINOPHIL # BLD: 0.6 K/UL (ref 0–0.6)
EOSINOPHIL NFR BLD: 13.6 %
GFR SERPLBLD CREATININE-BSD FMLA CKD-EPI: >60 ML/MIN/{1.73_M2}
GLUCOSE SERPL-MCNC: 82 MG/DL (ref 70–99)
HCT VFR BLD AUTO: 33.1 % (ref 40.5–52.5)
HGB BLD-MCNC: 11.3 G/DL (ref 13.5–17.5)
LACTATE BLDV-SCNC: 0.7 MMOL/L (ref 0.4–2)
LYMPHOCYTES # BLD: 0.9 K/UL (ref 1–5.1)
LYMPHOCYTES NFR BLD: 19.2 %
MCH RBC QN AUTO: 33.7 PG (ref 26–34)
MCHC RBC AUTO-ENTMCNC: 34.2 G/DL (ref 31–36)
MCV RBC AUTO: 98.7 FL (ref 80–100)
MONOCYTES # BLD: 0.4 K/UL (ref 0–1.3)
MONOCYTES NFR BLD: 9.6 %
NEUTROPHILS # BLD: 2.6 K/UL (ref 1.7–7.7)
NEUTROPHILS NFR BLD: 56.8 %
PLATELET # BLD AUTO: 200 K/UL (ref 135–450)
PMV BLD AUTO: 6 FL (ref 5–10.5)
POTASSIUM SERPL-SCNC: 4.2 MMOL/L (ref 3.5–5.1)
RBC # BLD AUTO: 3.35 M/UL (ref 4.2–5.9)
SODIUM SERPL-SCNC: 127 MMOL/L (ref 136–145)
WBC # BLD AUTO: 4.5 K/UL (ref 4–11)

## 2023-12-21 PROCEDURE — 85025 COMPLETE CBC W/AUTO DIFF WBC: CPT

## 2023-12-21 PROCEDURE — 83605 ASSAY OF LACTIC ACID: CPT

## 2023-12-21 PROCEDURE — 80048 BASIC METABOLIC PNL TOTAL CA: CPT

## 2023-12-21 PROCEDURE — 6360000002 HC RX W HCPCS: Performed by: INTERNAL MEDICINE

## 2023-12-21 PROCEDURE — 36415 COLL VENOUS BLD VENIPUNCTURE: CPT

## 2023-12-21 PROCEDURE — 93306 TTE W/DOPPLER COMPLETE: CPT

## 2023-12-21 PROCEDURE — 6370000000 HC RX 637 (ALT 250 FOR IP): Performed by: INTERNAL MEDICINE

## 2023-12-21 PROCEDURE — 1200000000 HC SEMI PRIVATE

## 2023-12-21 PROCEDURE — 2580000003 HC RX 258: Performed by: INTERNAL MEDICINE

## 2023-12-21 PROCEDURE — A4216 STERILE WATER/SALINE, 10 ML: HCPCS | Performed by: INTERNAL MEDICINE

## 2023-12-21 PROCEDURE — 92526 ORAL FUNCTION THERAPY: CPT

## 2023-12-21 RX ORDER — LORAZEPAM 2 MG/ML
1 CONCENTRATE ORAL EVERY 8 HOURS PRN
Qty: 1 EACH | Refills: 1 | Status: SHIPPED | OUTPATIENT
Start: 2023-12-21 | End: 2024-01-20

## 2023-12-21 RX ADMIN — SODIUM CHLORIDE 2 G: 1 TABLET ORAL at 09:00

## 2023-12-21 RX ADMIN — DORZOLAMIDE HYDROCHLORIDE 1 DROP: 20 SOLUTION/ DROPS OPHTHALMIC at 11:03

## 2023-12-21 RX ADMIN — ACETAMINOPHEN 650 MG: 325 TABLET ORAL at 23:30

## 2023-12-21 RX ADMIN — SODIUM CHLORIDE 2 G: 1 TABLET ORAL at 11:32

## 2023-12-21 RX ADMIN — SODIUM CHLORIDE, PRESERVATIVE FREE 10 ML: 5 INJECTION INTRAVENOUS at 09:48

## 2023-12-21 RX ADMIN — SODIUM CHLORIDE, PRESERVATIVE FREE 2 MG: 5 INJECTION INTRAVENOUS at 13:24

## 2023-12-21 RX ADMIN — ENOXAPARIN SODIUM 30 MG: 100 INJECTION SUBCUTANEOUS at 09:47

## 2023-12-21 RX ADMIN — SODIUM CHLORIDE 0.5 MG: 9 INJECTION INTRAMUSCULAR; INTRAVENOUS; SUBCUTANEOUS at 11:33

## 2023-12-21 RX ADMIN — BRIMONIDINE TARTRATE 1 DROP: 2 SOLUTION OPHTHALMIC at 11:03

## 2023-12-21 RX ADMIN — TIMOLOL MALEATE 1 DROP: 5 SOLUTION OPHTHALMIC at 11:03

## 2023-12-21 RX ADMIN — Medication 15 G: at 09:47

## 2023-12-21 RX ADMIN — FINASTERIDE 5 MG: 5 TABLET, FILM COATED ORAL at 09:48

## 2023-12-21 RX ADMIN — SODIUM CHLORIDE, PRESERVATIVE FREE 2 MG: 5 INJECTION INTRAVENOUS at 23:30

## 2023-12-21 NOTE — PROGRESS NOTES
Physician Progress Note      Jason Gann  CSN #:                  [id number]  :                       1940  ADMIT DATE:       2023 11:55 AM  DISCH DATE:  Lambert Loza  PROVIDER #:        Sincere Ritter MD          QUERY TEXT:    Patient admitted with hyponatremia of 127 with hx of SIADH. Serum   osmolality-269, urine osmolality-505, and urine sodium-94. Patient treated   with NaCl tabs and urea. After study, please specify: The medical record reflects the following:  Risk Factors: age, severe malnutrition, hx of SIADH  Clinical Indicators:  hyponatremia of 127 with hx of SIADH. Serum   osmolality-269, urine osmolality-505, and urine sodium-94. Treatment: nephrology consult, NaCl tabs, urea, serial labs    Thank you,  Harman Tai RN,BSN,CCDS,CRCR  Options provided:  -- SIADH  -- No SIADH  -- Other - I will add my own diagnosis  -- Disagree - Not applicable / Not valid  -- Disagree - Clinically unable to determine / Unknown  -- Refer to Clinical Documentation Reviewer    PROVIDER RESPONSE TEXT:    The patient being treated with SIADH.     Query created by: Harman Tai on 2023 9:53 AM      Electronically signed by:  Sincere Ritter MD 2023 2:20 PM

## 2023-12-21 NOTE — PROGRESS NOTES
Physical/Occupational Therapy            Ollie Lively    PT/OT evaluations attempted. The pt was restless in bed and his daughter was with him. She stated he had just been given Ativan due to agitation and requested he be left alone at this time. She stated he will be discharging to her home with hospice. She has a w/c and BSC at home. PT/OT will follow-up with this pt as the schedule allows. Thanks.   Belle Vazquez, PT DPT [id number]            Joshua Otto, OTR/L

## 2023-12-21 NOTE — PROGRESS NOTES
Speech Language Pathology  Attempt/Hold Note    Kiersten Marin  1940    SLP attempted to see pt this date for dysphagia intervention. Chart reviewed, spoke with RN. Pt currently off floor for echo. Will hold and attempt again as pt is appropriate and available.     Kirsty Monroe, [address] [facility]  Speech-Language Pathologist  [address] [facility] [address]

## 2023-12-21 NOTE — PROGRESS NOTES
Facility/Department: [address]  Speech Language Pathology   Dysphagia Treatment Note    Patient: Salina Osborn   : 1940   MRN: [id number]      Evaluation Date: 2023      Admitting Dx: Shortness of breath [R06.02]  Hypomagnesemia [E83.42]  Bradycardia [R00.1]  Hyponatremia [E87.1]  General weakness [R53.1]  Chest pain, unspecified type [R07.9]  Treatment Diagnosis: Oropharyngeal Dysphagia   Pain: Did not state                                              Diet and Treatment Recommendations 2023:  Diet Solids Recommendation:  Dysphagia I Puree  Liquid Consistency Recommendation: Thin liquids  Recommended form of Meds: Meds crushed as able in puree        Compensatory strategies:   Upright as possible with all PO intake , Small bites/sips , Eat/feed slowly, Remain upright 30-45 min      Assessment of Texture Tolerance:  Diet level prior to treatment: Dysphagia I Puree , Thin liquids   Tolerance of Current Diet Level: Per chart, no noted difficulty with current diet level     Impressions: Pt was positioned Upright in bed , awake and alert but with some confusion noted. Daughter in law present at bedside, reports good tolerance of breakfast this AM. Currently on room air. Trials of thin liquids, puree, and minced solids were provided to assess swallow function. Pt benefited from increased assistance for feeding this date; pt holding utensil upside down, reduced coordination to load utensil with bolus, and attempting to drink from pudding cup. With initial tactile support, pt was then able to feed himself. Oral phase characterized by functional labial seal with no anterior spillage, perseverating munching mastication of minced solids, and reduced A-P transit with trace oral residue of minced solids. More timely and complete oral transit with purees. No overt clinical s/s aspiration across PO intake.  Pt demonstrates increased risk for aspiration due to impaired cognition, advanced age, and co-morbidities. Based on today's assessment recommend continuation of Dysphagia I Puree  with Thin liquids , Meds crushed as able in puree . Dysphagia Goals:   Pt will functionally tolerate recommended diet with no overt clinical s/s of aspiration (Ongoing 12/21/23)  Pt will advance to least restrictive diet as indicated (Ongoing 12/21/23)  If clinical s/s of aspiration/penetration continue to be noted, Pt will participate in Modified Barium Swallow Study (Ongoing 12/21/23)    Plan:   3-5 times per week during acute care stay. Patient/Family Education:  Provided education regarding role of SLP, recommendations and general speech pathology plan of care. [] Pt verbalized understanding and agreement   [x] Pt requires ongoing learning   [x] No evidence of comprehension     Discharge Recommendations:    Discharge recommendations to be determined pending ongoing follow-up during acute care stay    Treatment time:  Timed Code Treatment Minutes: 0  Total Treatment time: 10 minutes    If patient discharges prior to next session this note will serve as a discharge summary.        Signature:   Franko Marks, [address] [facility]  Speech-Language Pathologist  [place]. [phone]

## 2023-12-21 NOTE — PROGRESS NOTES
Nutrition Note    RECOMMENDATIONS  PO Diet: Pureed as tolerated  ONS: Ensure TID     NUTRITION ASSESSMENT   Nutrition intervention triggered d/t inadequate po intake. Pt is severely malnourished AEB prolonged time with poor po intake, wt loss, & muscle/fat tissue wasting identified during NFPE. Pt receives a pureed diet with Ensure supplements BID. Observed bkf tray, with less than 10% meal consumed, but pt drank 100% Ensure. Will increase supplements to TID & monitor for changes in plan of care as hospice consult is pending. Nutrition Related Findings: LBM 12/20; no edema noted; Na 127  Wounds: None  Nutrition Education:  Education not indicated   Nutrition Goals: PO intake 50% or greater     MALNUTRITION ASSESSMENT   Chronic Illness  Malnutrition Status: Severe malnutrition  Findings of the 6 clinical characteristics of malnutrition:  Energy Intake:  75% or less estimated energy requirements for 1 month or longer  Weight Loss:  Greater than 5% over 1 month     Body Fat Loss:  Severe body fat loss Orbital, Fat Overlying Ribs, Buccal region   Muscle Mass Loss:  Severe muscle mass loss Clavicles (pectoralis & deltoids)  Fluid Accumulation:  No significant fluid accumulation     Strength:  Not Performed      NUTRITION DIAGNOSIS   Severe malnutrition related to inadequate protein-energy intake as evidenced by intake 0-25%, poor intake prior to admission, weight loss greater than or equal to 5% in 1 month, severe muscle loss, severe loss of subcutaneous fat      CURRENT NUTRITION THERAPIES  ADULT DIET;  Dysphagia - Pureed  ADULT ORAL NUTRITION SUPPLEMENT; Breakfast, Dinner; Standard High Calorie/High Protein Oral Supplement     PO Intake: 1-25%   PO Supplement Intake:%    ANTHROPOMETRICS  Current Height: 182.9 cm (6')  Current Weight - Scale: 44.7 kg (98 lb 9.6 oz)    Ideal Body Weight (IBW): 178 lbs  (81 kg)    Usual Bodyweight 48.1 kg (106 lb) (1 month ago)       BMI: 13.3      COMPARATIVE STANDARDS  Total Energy Requirements (kcals/day): 1350- 1575     Protein (g):  68-90g       Fluid (mL/day):  1 ml/kcal    The patient will be monitored per nutrition standards of care. Consult dietitian if additional nutrition interventions are needed prior to RD reassessment.      Maximo Sánchez RD, LD    Contact: [phone]

## 2023-12-21 NOTE — PROGRESS NOTES
[facility]    Met with patient and family bedside to discuss d/c with [facility] services today. Family wants HOC but wants to wait at least one more day. They state that nephrology wants his sodium levels to come up to 130 and that there is a medication they would like for him to d/c, even in hospice care, that needs to be checked for hospice coverage. She also states that she needs to ready her home to bring patient home d/t patient having been at a SNF prior to hospital admission. This writer spoke with  regarding medication- he wondered about coverage for urea on hospice care. This writer reached out to [facility] pharmacist regarding this medication. HOC will f/u for discharge planning.      Blas CLAYTONN, RN  [address]: [phone]

## 2023-12-21 NOTE — PROGRESS NOTES
PALLIATIVE MEDICINE PROGRESS NOTE     Patient name:Kana Cedeno    JOT:[id number] :1940  Room/Bed:X0V-9145/5914-01    LOS: 2 days        ASSESSMENT/RECOMMENDATIONS   82 y.o. male with AMS and severe malnutrition        Symptom Management:  Severe malnutrition- BMI 13.37 pt with severe muscle wasting   AMS- pt hard of hearing appears oriented to self only    Goals of Care- talked to pt and Ramsey ADKINS at bedside pt with increased confusion, She met with HOC and plans to DC to home in next 1-2 days. [facility] no additional questions      Patient/Family Goals of Care :      Roxy Peoples states that pt is [facility] at baseline and she has talked to [facility] in the past she states that pt has used up his SNF days and she is asking to talk to palliative care to switch to comfort focus and help support pt at home. Referral faxed to [facility]      talked to pt and Ramsey ADKINS at bedside pt with increased confusion, She met with HOC and plans to DC to home in next 1-2 days. [facility] no additional questions         Disposition/Discharge Plan:   Pending      Advance Directives: The patient has appointed the following active healthcare agents:    Primary Decision Maker: Brenden Flores - Child - [phone]    Secondary Decision Maker: Emily Macias - Other Relative - [phone]     The Patient has the following current code status:    Code Status: DNR-CC        Interactive exchange regarding medications,tests and procedures with: patient, floor RN, Dr Salvador Rivera  Thank you for allowing us to participate in the care of this patient. SUBJECTIVE     Chief Complaint: Increased confusion     Last 24 hours:   Pt more confused overnight resting quietly now     ROS:    Review of Systems   Unable to perform ROS: Mental status change               Physical Exam  Constitutional:       Appearance: He is ill-appearing. Comments: drowsy   HENT:      Head: Normocephalic and atraumatic.       Nose: Nose normal.      Mouth/Throat:      Mouth:

## 2023-12-21 NOTE — DISCHARGE SUMMARY
[facility] -- Physician Discharge [address] [facility]  1940  MRN: [id number]    Admit Date: 12/19/2023  Discharge Date: No discharge date for patient encounter. Attending MD: Urban Baez MD  Discharging MD: Urban Baez MD  PCP: Urban Baez, [address] [place] [address] / Polly Webster [id number] [phone]    Admission Diagnosis: Shortness of breath [R06.02]  Hypomagnesemia [E83.42]  Bradycardia [R00.1]  Hyponatremia [E87.1]  General weakness [R53.1]  Chest pain, unspecified type [R07.9]  DISCHARGE DIAGNOSIS: same    Full Hospital Problem List:  [address] Problems    Diagnosis Date Noted    Bradycardia [R00.1] 12/19/2023    Hypomagnesemia [E83.42] 12/19/2023    Severe malnutrition ([address]) [E43] 11/16/2023    Hyponatremia [E87.1] 12/24/2021    HTN (hypertension) [I10] 03/09/2021    BPH (benign prostatic hyperplasia) [N40.0] 03/09/2021    Anemia [D64.9] 03/09/2021           Hospital Course:  pt with recurrent hyponatremia, third admit for similar sx in past 2 months. Mentally has been declining. Na does not improve much while here  Renal consulted - continued on sodium and urea    Family decides to enroll with hospice  To go home with [facility] care    Consults made during Hospitalization:  IP CONSULT TO NEPHROLOGY  IP CONSULT TO CARDIOLOGY  IP CONSULT TO PALLIATIVE CARE  IP CONSULT TO DIETITIAN  IP CONSULT TO [address] TO SOCIAL WORK    Treatment team at time of Discharge: Treatment Team: Attending Provider: Urban Baez MD; Consulting Physician: Urban Baez MD; Consulting Physician: Ying Miller MD; Registered Nurse: Dagmar Erazo, RN; Consulting Physician: MARYANNE Anderson CNP; Utilization Reviewer: Kerwin Bernard RN; Respiratory Therapist (Day): Sonja Mora RCP;  Registered Nurse: Viji Argueta, CLARE; Physical Therapist: Ethelda Goodpasture, PT; Occupational Therapist: Adonis Mayer OT; Speech Language Pathologist:

## 2023-12-22 ENCOUNTER — APPOINTMENT (OUTPATIENT)
Dept: GENERAL RADIOLOGY | Age: 83
DRG: 643 | End: 2023-12-22
Payer: MEDICARE

## 2023-12-22 VITALS
DIASTOLIC BLOOD PRESSURE: 73 MMHG | TEMPERATURE: 98.6 F | SYSTOLIC BLOOD PRESSURE: 121 MMHG | OXYGEN SATURATION: 100 % | BODY MASS INDEX: 13.35 KG/M2 | RESPIRATION RATE: 14 BRPM | WEIGHT: 98.6 LBS | HEART RATE: 75 BPM | HEIGHT: 72 IN

## 2023-12-22 LAB
ANION GAP SERPL CALCULATED.3IONS-SCNC: 5 MMOL/L (ref 3–16)
BASOPHILS # BLD: 0 K/UL (ref 0–0.2)
BASOPHILS NFR BLD: 0.3 %
BUN SERPL-MCNC: 18 MG/DL (ref 7–20)
CALCIUM SERPL-MCNC: 9 MG/DL (ref 8.3–10.6)
CHLORIDE SERPL-SCNC: 93 MMOL/L (ref 99–110)
CO2 SERPL-SCNC: 29 MMOL/L (ref 21–32)
CREAT SERPL-MCNC: <0.5 MG/DL (ref 0.8–1.3)
DEPRECATED RDW RBC AUTO: 13.8 % (ref 12.4–15.4)
EOSINOPHIL # BLD: 0.3 K/UL (ref 0–0.6)
EOSINOPHIL NFR BLD: 3 %
GFR SERPLBLD CREATININE-BSD FMLA CKD-EPI: >60 ML/MIN/{1.73_M2}
GLUCOSE SERPL-MCNC: 90 MG/DL (ref 70–99)
HCT VFR BLD AUTO: 36.2 % (ref 40.5–52.5)
HGB BLD-MCNC: 12.5 G/DL (ref 13.5–17.5)
LYMPHOCYTES # BLD: 0.6 K/UL (ref 1–5.1)
LYMPHOCYTES NFR BLD: 7.3 %
MCH RBC QN AUTO: 34.2 PG (ref 26–34)
MCHC RBC AUTO-ENTMCNC: 34.5 G/DL (ref 31–36)
MCV RBC AUTO: 99.2 FL (ref 80–100)
MONOCYTES # BLD: 0.7 K/UL (ref 0–1.3)
MONOCYTES NFR BLD: 7.7 %
NEUTROPHILS # BLD: 7.1 K/UL (ref 1.7–7.7)
NEUTROPHILS NFR BLD: 81.7 %
PLATELET # BLD AUTO: 206 K/UL (ref 135–450)
PMV BLD AUTO: 5.6 FL (ref 5–10.5)
POTASSIUM SERPL-SCNC: 4.3 MMOL/L (ref 3.5–5.1)
RBC # BLD AUTO: 3.65 M/UL (ref 4.2–5.9)
SODIUM SERPL-SCNC: 127 MMOL/L (ref 136–145)
WBC # BLD AUTO: 8.7 K/UL (ref 4–11)

## 2023-12-22 PROCEDURE — 71045 X-RAY EXAM CHEST 1 VIEW: CPT

## 2023-12-22 PROCEDURE — 80048 BASIC METABOLIC PNL TOTAL CA: CPT

## 2023-12-22 PROCEDURE — 36415 COLL VENOUS BLD VENIPUNCTURE: CPT

## 2023-12-22 PROCEDURE — 6360000002 HC RX W HCPCS: Performed by: INTERNAL MEDICINE

## 2023-12-22 PROCEDURE — 2580000003 HC RX 258: Performed by: INTERNAL MEDICINE

## 2023-12-22 PROCEDURE — 85025 COMPLETE CBC W/AUTO DIFF WBC: CPT

## 2023-12-22 RX ADMIN — SODIUM CHLORIDE, PRESERVATIVE FREE 2 MG: 5 INJECTION INTRAVENOUS at 13:26

## 2023-12-22 NOTE — PROGRESS NOTES
Occupational/Physical Therapy           Vincent Olmos    PT/OT evaluations discussed with the pt's daughter in law. The pt is sleeping. DIL requested PT/OT cancel therapy orders as the pt is discharging with hospice. Goals are for comfort now. Pt has been restless and agitated and received ativan.       [place], [place] DPT [id number]   Vidhi Meza OTR/L

## 2023-12-22 NOTE — PROGRESS NOTES
[facility]    Patient assessed at bedside. He has significant decline over the last 24 hours. He has increased oxygen demand now requiring  2L O2 and more frequent dosing of lorazepam for agitation. He is also still displaying anxiety/agitated behavior despite lorazepam.     Family feels that he too declined to go home at this point, and are interested in [facility]. Patient currently meeting requirements for symptom management. This writer reaching out to MD for eligibility and to bed planning for bed availability. HOC to facilitate discharge planning.      Shayna Lima BSN, RN  Hospice [address]: [phone]

## 2023-12-22 NOTE — DISCHARGE SUMMARY
[facility] -- Physician Discharge [address] [facility]  1940  MRN: [id number]    Admit Date: 12/19/2023  Discharge Date: No discharge date for patient encounter. Attending MD: Aaron Villegas MD  Discharging MD: Aaron Villegas MD  PCP: Aaron Villegas, [address] [place] [address] Albertina Bashir / Talib Gallego [address] [phone]    Admission Diagnosis: Shortness of breath [R06.02]  Hypomagnesemia [E83.42]  Bradycardia [R00.1]  Hyponatremia [E87.1]  General weakness [R53.1]  Chest pain, unspecified type [R07.9]  DISCHARGE DIAGNOSIS: same    Full Hospital Problem List:  [address] Problems    Diagnosis Date Noted    Bradycardia [R00.1] 12/19/2023    Hypomagnesemia [E83.42] 12/19/2023    Severe malnutrition ([address]) [E43] 11/16/2023    Hyponatremia [E87.1] 12/24/2021    HTN (hypertension) [I10] 03/09/2021    BPH (benign prostatic hyperplasia) [N40.0] 03/09/2021    Anemia [D64.9] 03/09/2021           Hospital Course:  pt with recurrent hyponatremia, third admit for similar sx in past 2 months. Mentally has been declining. Na does not improve much while here  Renal consulted - continued on sodium and urea    Family decides to enroll with hospice  To go home with [facility]  Discharge ordered 12/21 but transport could not be set up until 12/22  Repeat d/c order placed    Consults made during Hospitalization:  IP CONSULT TO NEPHROLOGY  IP CONSULT TO CARDIOLOGY  IP CONSULT TO PALLIATIVE CARE  IP CONSULT TO [address] [place] [address] TO SOCIAL WORK    Treatment team at time of Discharge: Treatment Team: Attending Provider: Aaron Villegas MD; Consulting Physician: Aaron Villegas MD; Consulting Physician: Diego Hyman MD; Consulting Physician: MARYANNE Toussaint - CNP; Utilization Reviewer: Aisha Shepherd RN; Patient Care Tech: Twyla Coyne; Respiratory Therapist (Day): Trini Gonzales RCP;  Registered Nurse: Marcell Chavez RN; Physical Therapist:

## 2023-12-22 NOTE — PROGRESS NOTES
Speech Therapy  Ollie Edwardmaye  1940    SLP attempted to complete dysphagia treatment follow up. Pt's chart reviewed and consult completed with pt's RN. Pt's RN reports that the pt is lethargic from receiving Ativan yesterday but the SLP could attempt. Pt did not react to the SLP's verbal/tactile cues to improve his alertness for PO trials. Will re-attempt as schedule allows and pt appropriate. Franchesca Mcdowell M.S. [facility]SP. [facility]

## 2023-12-22 NOTE — PROGRESS NOTES
[facility]    Patient to discharge to [facility] inpatient unit at 1330. SW and primary RN updated. Thank you for letting us participate in [place]'s care!     William CLAYTONN, RN  [facility] [address]: [phone]